# Patient Record
Sex: FEMALE | Race: WHITE | Employment: UNEMPLOYED | URBAN - METROPOLITAN AREA
[De-identification: names, ages, dates, MRNs, and addresses within clinical notes are randomized per-mention and may not be internally consistent; named-entity substitution may affect disease eponyms.]

---

## 2017-01-03 ENCOUNTER — OFFICE VISIT (OUTPATIENT)
Dept: FAMILY MEDICINE | Facility: CLINIC | Age: 57
End: 2017-01-03
Payer: COMMERCIAL

## 2017-01-03 VITALS
SYSTOLIC BLOOD PRESSURE: 132 MMHG | HEIGHT: 68 IN | BODY MASS INDEX: 19.19 KG/M2 | HEART RATE: 89 BPM | OXYGEN SATURATION: 97 % | TEMPERATURE: 98.6 F | RESPIRATION RATE: 16 BRPM | WEIGHT: 126.6 LBS | DIASTOLIC BLOOD PRESSURE: 80 MMHG

## 2017-01-03 DIAGNOSIS — Z00.00 ROUTINE GENERAL MEDICAL EXAMINATION AT A HEALTH CARE FACILITY: Primary | ICD-10-CM

## 2017-01-03 DIAGNOSIS — Z98.84 S/P GASTRIC BYPASS: ICD-10-CM

## 2017-01-03 DIAGNOSIS — R53.83 FATIGUE, UNSPECIFIED TYPE: ICD-10-CM

## 2017-01-03 DIAGNOSIS — T14.8XXA BRUISING: ICD-10-CM

## 2017-01-03 DIAGNOSIS — R25.2 CRAMP OF LIMB: ICD-10-CM

## 2017-01-03 DIAGNOSIS — R11.2 NON-INTRACTABLE VOMITING WITH NAUSEA, UNSPECIFIED VOMITING TYPE: ICD-10-CM

## 2017-01-03 DIAGNOSIS — R35.0 URINARY FREQUENCY: ICD-10-CM

## 2017-01-03 LAB
ALBUMIN SERPL-MCNC: 3.7 G/DL (ref 3.4–5)
ALBUMIN UR-MCNC: NEGATIVE MG/DL
ALP SERPL-CCNC: 120 U/L (ref 40–150)
ALT SERPL W P-5'-P-CCNC: 14 U/L (ref 0–50)
ANION GAP SERPL CALCULATED.3IONS-SCNC: 8 MMOL/L (ref 3–14)
APPEARANCE UR: CLEAR
AST SERPL W P-5'-P-CCNC: 14 U/L (ref 0–45)
BACTERIA #/AREA URNS HPF: ABNORMAL /HPF
BILIRUB SERPL-MCNC: 0.4 MG/DL (ref 0.2–1.3)
BILIRUB UR QL STRIP: ABNORMAL
BUN SERPL-MCNC: 12 MG/DL (ref 7–30)
CALCIUM SERPL-MCNC: 9 MG/DL (ref 8.5–10.1)
CAOX CRY #/AREA URNS HPF: ABNORMAL /HPF
CHLORIDE SERPL-SCNC: 107 MMOL/L (ref 94–109)
CHOLEST SERPL-MCNC: 179 MG/DL
CO2 SERPL-SCNC: 27 MMOL/L (ref 20–32)
COLOR UR AUTO: YELLOW
CREAT SERPL-MCNC: 0.76 MG/DL (ref 0.52–1.04)
DEPRECATED CALCIDIOL+CALCIFEROL SERPL-MC: 32 UG/L (ref 20–75)
ERYTHROCYTE [DISTWIDTH] IN BLOOD BY AUTOMATED COUNT: 12.9 % (ref 10–15)
ERYTHROCYTE [SEDIMENTATION RATE] IN BLOOD BY WESTERGREN METHOD: 9 MM/H (ref 0–30)
FERRITIN SERPL-MCNC: 12 NG/ML (ref 8–252)
GFR SERPL CREATININE-BSD FRML MDRD: 78 ML/MIN/1.7M2
GLUCOSE SERPL-MCNC: 99 MG/DL (ref 70–99)
GLUCOSE UR STRIP-MCNC: NEGATIVE MG/DL
HCT VFR BLD AUTO: 41.3 % (ref 35–47)
HDLC SERPL-MCNC: 78 MG/DL
HGB BLD-MCNC: 13.5 G/DL (ref 11.7–15.7)
HGB UR QL STRIP: ABNORMAL
INR PPP: 0.93 (ref 0.86–1.14)
IRON SATN MFR SERPL: 15 % (ref 15–46)
IRON SERPL-MCNC: 56 UG/DL (ref 35–180)
KETONES UR STRIP-MCNC: NEGATIVE MG/DL
LDLC SERPL CALC-MCNC: 89 MG/DL
LEUKOCYTE ESTERASE UR QL STRIP: NEGATIVE
MCH RBC QN AUTO: 30.3 PG (ref 26.5–33)
MCHC RBC AUTO-ENTMCNC: 32.7 G/DL (ref 31.5–36.5)
MCV RBC AUTO: 93 FL (ref 78–100)
MUCOUS THREADS #/AREA URNS LPF: PRESENT /LPF
NITRATE UR QL: NEGATIVE
NON-SQ EPI CELLS #/AREA URNS LPF: ABNORMAL /LPF
NONHDLC SERPL-MCNC: 101 MG/DL
PH UR STRIP: 5.5 PH (ref 5–7)
PLATELET # BLD AUTO: 290 10E9/L (ref 150–450)
POTASSIUM SERPL-SCNC: 4.3 MMOL/L (ref 3.4–5.3)
PROT SERPL-MCNC: 6.6 G/DL (ref 6.8–8.8)
RBC # BLD AUTO: 4.46 10E12/L (ref 3.8–5.2)
RBC #/AREA URNS AUTO: ABNORMAL /HPF (ref 0–2)
SODIUM SERPL-SCNC: 142 MMOL/L (ref 133–144)
SP GR UR STRIP: >1.03 (ref 1–1.03)
TIBC SERPL-MCNC: 372 UG/DL (ref 240–430)
TRIGL SERPL-MCNC: 59 MG/DL
TSH SERPL DL<=0.005 MIU/L-ACNC: 0.55 MU/L (ref 0.4–4)
URN SPEC COLLECT METH UR: ABNORMAL
UROBILINOGEN UR STRIP-ACNC: 0.2 EU/DL (ref 0.2–1)
WBC # BLD AUTO: 7.5 10E9/L (ref 4–11)
WBC #/AREA URNS AUTO: ABNORMAL /HPF (ref 0–2)

## 2017-01-03 PROCEDURE — 82306 VITAMIN D 25 HYDROXY: CPT | Mod: 90 | Performed by: PHYSICIAN ASSISTANT

## 2017-01-03 PROCEDURE — 99396 PREV VISIT EST AGE 40-64: CPT | Performed by: PHYSICIAN ASSISTANT

## 2017-01-03 PROCEDURE — 83550 IRON BINDING TEST: CPT | Performed by: PHYSICIAN ASSISTANT

## 2017-01-03 PROCEDURE — 84443 ASSAY THYROID STIM HORMONE: CPT | Performed by: PHYSICIAN ASSISTANT

## 2017-01-03 PROCEDURE — 36415 COLL VENOUS BLD VENIPUNCTURE: CPT | Performed by: PHYSICIAN ASSISTANT

## 2017-01-03 PROCEDURE — 99213 OFFICE O/P EST LOW 20 MIN: CPT | Mod: 25 | Performed by: PHYSICIAN ASSISTANT

## 2017-01-03 PROCEDURE — 82607 VITAMIN B-12: CPT | Mod: 90 | Performed by: PHYSICIAN ASSISTANT

## 2017-01-03 PROCEDURE — 99000 SPECIMEN HANDLING OFFICE-LAB: CPT | Performed by: PHYSICIAN ASSISTANT

## 2017-01-03 PROCEDURE — 85610 PROTHROMBIN TIME: CPT | Performed by: PHYSICIAN ASSISTANT

## 2017-01-03 PROCEDURE — 80061 LIPID PANEL: CPT | Performed by: PHYSICIAN ASSISTANT

## 2017-01-03 PROCEDURE — 85027 COMPLETE CBC AUTOMATED: CPT | Performed by: PHYSICIAN ASSISTANT

## 2017-01-03 PROCEDURE — 80053 COMPREHEN METABOLIC PANEL: CPT | Performed by: PHYSICIAN ASSISTANT

## 2017-01-03 PROCEDURE — 85652 RBC SED RATE AUTOMATED: CPT | Performed by: PHYSICIAN ASSISTANT

## 2017-01-03 PROCEDURE — 83540 ASSAY OF IRON: CPT | Performed by: PHYSICIAN ASSISTANT

## 2017-01-03 PROCEDURE — 82728 ASSAY OF FERRITIN: CPT | Performed by: PHYSICIAN ASSISTANT

## 2017-01-03 PROCEDURE — 81001 URINALYSIS AUTO W/SCOPE: CPT | Performed by: PHYSICIAN ASSISTANT

## 2017-01-03 RX ORDER — PANTOPRAZOLE SODIUM 20 MG/1
20 TABLET, DELAYED RELEASE ORAL DAILY
Qty: 30 TABLET | Refills: 3 | Status: SHIPPED | OUTPATIENT
Start: 2017-01-03 | End: 2017-08-17
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
## 2017-01-03 NOTE — MR AVS SNAPSHOT
After Visit Summary   1/3/2017    Antoinette Romero    MRN: [id number]           Patient Information     Date Of Birth          1960        Visit Information        Provider Department      1/3/2017 8:00 AM Umu Roberto PA-C [facility] Instructions      Preventive Health Recommendations  Female Ages 50 - 64    Yearly exam: See your health care provider every year in order to  o Review health changes.   o Discuss preventive care.    o Review your medicines if your doctor has prescribed any.      Get a Pap test every three years (unless you have an abnormal result and your provider advises testing more often).    If you get Pap tests with HPV test, you only need to test every 5 years, unless you have an abnormal result.     You do not need a Pap test if your uterus was removed (hysterectomy) and you have not had cancer.    You should be tested each year for STDs (sexually transmitted diseases) if you're at risk.     Have a mammogram every 1 to 2 years.    Have a colonoscopy at age 50, or have a yearly FIT test (stool test). These exams screen for colon cancer.      Have a cholesterol test every 5 years, or more often if advised.    Have a diabetes test (fasting glucose) every three years. If you are at risk for diabetes, you should have this test more often.     If you are at risk for osteoporosis (brittle bone disease), think about having a bone density scan (DEXA).    Shots: Get a flu shot each year. Get a tetanus shot every 10 years.    Nutrition:     Eat at least 5 servings of fruits and vegetables each day.    Eat whole-grain bread, whole-wheat pasta and brown rice instead of white grains and rice.    Talk to your provider about Calcium and Vitamin D.     Lifestyle    Exercise at least 150 minutes a week (30 minutes a day, 5 days a week). This will help you control your weight and prevent disease.    Limit alcohol to one drink per day.    No smoking.  "    Wear sunscreen to prevent skin cancer.     See your dentist every six months for an exam and cleaning.    See your eye doctor every 1 to 2 years.            Follow-ups after your visit        Who to contact     If you have questions or need follow up information about today's clinic visit or your schedule please contact [facility] directly at [phone].  Normal or non-critical lab and imaging results will be communicated to you by MyChart, letter or phone within 4 business days after the clinic has received the results. If you do not hear from us within 7 days, please contact the clinic through [employer] or phone. If you have a critical or abnormal lab result, we will notify you by phone as soon as possible.  Submit refill requests through [employer] or call your pharmacy and they will forward the refill request to us. Please allow 3 business days for your refill to be completed.          Additional Information About Your Visit        [employer] Information     [employer] lets you send messages to your doctor, view your test results, renew your prescriptions, schedule appointments and more. To sign up, go to [email or web address] . Click on \"Log in\" on the left side of the screen, which will take you to the Welcome page. Then click on \"Sign up Now\" on the right side of the page.     You will be asked to enter the access code listed below, as well as some personal information. Please follow the directions to create your username and password.     Your access code is: [email or web address]  Expires: 4/3/2017  8:19 AM     Your access code will  in 90 days. If you need help or a new code, please call your [facility] or [phone].        Your Vitals Were     Pulse Temperature Respirations    89 98.6  F (37  C) (Oral) 16    Height BMI (Body Mass Index) Pulse Oximetry    5' 8\" (1.727 m) 19.25 kg/m2 97%    Last Period Breastfeeding?       2010 No        Blood Pressure from Last 3 Encounters: "   01/03/17 132/80   06/17/15 123/90   05/04/15 124/72    Weight from Last 3 Encounters:   01/03/17 126 lb 9.6 oz (57.425 kg)   06/17/15 135 lb 11.2 oz (61.553 kg)   05/03/15 132 lb 9.6 oz (60.147 kg)              Today, you had the following     No orders found for display         Today's Medication Changes          These changes are accurate as of: 1/3/17  8:19 AM.  If you have any questions, ask your nurse or doctor.               Stop taking these medicines if you haven't already. Please contact your care team if you have questions.     biotin 1000 MCG Tabs tablet   Stopped by:  Umu Roberto PA-C           calcium carbonate 500 MG tablet   Commonly known as:  OS-BESSY 500 mg [place]. Ca   Stopped by:  Umu Roberto PA-C           cyanocolbalamin 500 MCG tablet   Commonly known as:  vitamin  B-12   Stopped by:  Umu Roberto PA-C           irbesartan 75 MG tablet   Commonly known as:  AVAPRO   Stopped by:  Umu Roberto PA-C           levofloxacin 500 MG tablet   Commonly known as:  LEVAQUIN   Stopped by:  Umu Roberto PA-C           LORazepam 0.5 MG tablet   Commonly known as:  ATIVAN   Stopped by:  Umu Roberto PA-C           MULTIVITAMIN GUMMIES ADULT Chew   Stopped by:  Umu Roberto PA-C           nicotine 14 MG/24HR 24 hr patch   Commonly known as:  NICODERM CQ   Stopped by:  Umu Roberto PA-C           nicotine 7 MG/24HR 24 hr patch   Commonly known as:  NICODERM CQ   Stopped by:  Umu Roberto PA-C           pantoprazole 40 MG EC tablet   Commonly known as:  PROTONIX   Stopped by:  Umu Roberto PA-C           promethazine 25 MG tablet   Commonly known as:  PHENERGAN   Stopped by:  Umu Roberto PA-C           sucralfate 1 GM/10ML suspension   Commonly known as:  CARAFATE   Stopped by:  Umu Roberto PA-C           SUMAtriptan 50 MG tablet   Commonly known as:  IMITREX    Stopped by:  Umu Roberto PA-C           vitamin B complex with vitamin C Tabs tablet   Stopped by:  Umu Roberto PA-C           VITAMIN C PO   Stopped by:  Umu Roberto PA-C           VITAMIN D3 PO   Stopped by:  Umu Roberto PA-C                    Primary Care Provider Office Phone # Fax #    Muu Cornelia Roberto PA-C [phone]       [address] [address]        Thank you!     Thank you for choosing [facility]  for your care. Our goal is always to provide you with excellent care. Hearing back from our patients is one way we can continue to improve our services. Please take a few minutes to complete the written survey that you may receive in the mail after your visit with us. Thank you!             Your Updated Medication List - Protect others around you: Learn how to safely use, store and throw away your medicines at www.disposemymeds.org.      Notice  As of 1/3/2017  8:19 AM    You have not been prescribed any medications.

## 2017-01-03 NOTE — NURSING NOTE
"Chief Complaint   Patient presents with     Physical       Initial /80 mmHg  Pulse 89  Temp(Src) 98.6  F (37  C) (Oral)  Resp 16  Ht 5' 8\" (1.727 m)  Wt 126 lb 9.6 oz (57.425 kg)  BMI 19.25 kg/m2  SpO2 97%  LMP 03/01/2010  Breastfeeding? No Estimated body mass index is 19.25 kg/(m^2) as calculated from the following:    Height as of this encounter: 5' 8\" (1.727 m).    Weight as of this encounter: 126 lb 9.6 oz (57.425 kg).  BP completed using cuff size: large rt arm Kortney Borrego MA  Health Maintenance has been reviewed.       "

## 2017-01-03 NOTE — Clinical Note
[facility]  [address] [place], [address]  [phone]        January 6, 2017    Antoinette THEODORE Nick                                                                                                                                                       [address]  [facility] [address]    Antionette,    Your labs show your B12, urine, blood clotting factors, thyroid, kidney function, electrolytes, liver enzymes, CBC and cholesterol were normal.   Your protein, vitamin D, ferritin (iron storage), iron saturation index were low.  This is likely what is contributing to your symptoms. Please take 3524-8322 IU of vitamin D3, iron (ferrous sulfate) 324 mg daily to help with this.  If you don't see improvement, please let me know.     Take Care,   Umu Roberto PA-C        Results for orders placed or performed in visit on 01/03/17   ESR: Erythrocyte sedimentation rate   Result Value Ref Range    Sed Rate 9 0 - 30 mm/h   INR   Result Value Ref Range    INR 0.93 0.86 - 1.14   CBC with platelets   Result Value Ref Range    WBC 7.5 4.0 - 11.0 10e9/L    RBC Count 4.46 3.8 - 5.2 10e12/L    Hemoglobin 13.5 11.7 - 15.7 g/dL    Hematocrit 41.3 35.0 - 47.0 %    MCV 93 78 - 100 fl    MCH 30.3 26.5 - 33.0 pg    MCHC 32.7 31.5 - 36.5 g/dL    RDW 12.9 10.0 - 15.0 %    Platelet Count 290 150 - 450 10e9/L   Comprehensive metabolic panel (BMP + Alb, Alk Phos, ALT, AST, Total. Bili, TP)   Result Value Ref Range    Sodium 142 133 - 144 mmol/L    Potassium 4.3 3.4 - 5.3 mmol/L    Chloride 107 94 - 109 mmol/L    Carbon Dioxide 27 20 - 32 mmol/L    Anion Gap 8 3 - 14 mmol/L    Glucose 99 70 - 99 mg/dL    Urea Nitrogen 12 7 - 30 mg/dL    Creatinine 0.76 0.52 - 1.04 mg/dL    GFR Estimate 78 >60 mL/min/1.7m2    GFR Estimate If Black >90   GFR Calc   >60 mL/min/1.7m2    Calcium 9.0 8.5 - 10.1 mg/dL    Bilirubin Total 0.4 0.2 - 1.3 mg/dL    Albumin 3.7 3.4 - 5.0 g/dL    Protein Total 6.6  (L) 6.8 - 8.8 g/dL    Alkaline Phosphatase 120 40 - 150 U/L    ALT 14 0 - 50 U/L    AST 14 0 - 45 U/L   Vitamin B12   Result Value Ref Range    Vitamin B12 929 193 - 986 pg/mL   Vitamin D Deficiency   Result Value Ref Range    Vitamin D Deficiency screening 32 20 - 75 ug/L   Ferritin   Result Value Ref Range    Ferritin 12 8 - 252 ng/mL   Iron and iron binding capacity   Result Value Ref Range    Iron 56 35 - 180 ug/dL    Iron Binding Cap 372 240 - 430 ug/dL    Iron Saturation Index 15 15 - 46 %   *UA reflex to Microscopic and Culture ([facility] (except [place] and [place])   Result Value Ref Range    Color Urine Yellow     Appearance Urine Clear     Glucose Urine Negative NEG mg/dL    Bilirubin Urine (A) NEG     Small  This is an unconfirmed screening test result. A positive result may be false.      Ketones Urine Negative NEG mg/dL    Specific Gravity Urine >1.030 1.003 - 1.035    Blood Urine Moderate (A) NEG    pH Urine 5.5 5.0 - 7.0 pH    Protein Albumin Urine Negative NEG mg/dL    Urobilinogen Urine 0.2 0.2 - 1.0 EU/dL    Nitrite Urine Negative NEG    Leukocyte Esterase Urine Negative NEG    Source Midstream Urine    Lipid Profile with reflex to direct LDL   Result Value Ref Range    Cholesterol 179 <200 mg/dL    Triglycerides 59 <150 mg/dL    HDL Cholesterol 78 >49 mg/dL    LDL Cholesterol Calculated 89 <100 mg/dL    Non HDL Cholesterol 101 <130 mg/dL   TSH with free T4 reflex   Result Value Ref Range    TSH 0.55 0.40 - 4.00 mU/L   Urine Microscopic   Result Value Ref Range    WBC Urine O - 2 0 - 2 /HPF    RBC Urine 2-5 (A) 0 - 2 /HPF    Squamous Epithelial /LPF Urine Few FEW /LPF    Bacteria Urine Few (A) NEG /HPF    Calcium Oxalate Few (A) NEG /HPF    Mucous Urine Present (A) NEG /LPF     KL

## 2017-01-03 NOTE — PATIENT INSTRUCTIONS

## 2017-01-04 ASSESSMENT — PATIENT HEALTH QUESTIONNAIRE - PHQ9: SUM OF ALL RESPONSES TO PHQ QUESTIONS 1-9: 18

## 2017-01-06 LAB — VIT B12 SERPL-MCNC: 929 PG/ML (ref 193–986)

## 2017-01-10 ENCOUNTER — TELEPHONE (OUTPATIENT)
Dept: FAMILY MEDICINE | Facility: CLINIC | Age: 57
End: 2017-01-10

## 2017-01-10 NOTE — TELEPHONE ENCOUNTER
Patient calling for lab results.  Advised of below and that letter will be coming.  Advised to recheck if not improving.  Patient agrees with plan.  Ivelisse Epperson RN    Notes Recorded by Kortney Borrego MA on 1/6/2017 at 2:59 PM  Letter has been sent.  Kortney Chen MA      ------    Notes Recorded by Umu Roberto PA-C on 1/6/2017 at 2:56 PM  Antoinette,    Your labs show your B12, urine, blood clotting factors, thyroid, kidney function, electrolytes, liver enzymes, CBC and cholesterol were normal.   Your protein, vitamin D, ferritin (iron storage), iron saturation index were low.  This is likely what is contributing to your symptoms. Please take 7461-3294 IU of vitamin D3, iron (ferrous sulfate) 324 mg daily to help with this.  If you don't see improvement, please let me know.     Take Care,   Umu Roberto PA-C

## 2017-01-17 ENCOUNTER — TELEPHONE (OUTPATIENT)
Dept: NURSING | Facility: CLINIC | Age: 57
End: 2017-01-17

## 2017-01-17 NOTE — TELEPHONE ENCOUNTER
There are specific medical doctors, MD's, that qualify patients for disability, SSDI.   [employer] office will give pt list of MDs for this.     Umu Roberto PA-C

## 2017-01-17 NOTE — TELEPHONE ENCOUNTER
Pt calls, questions on applying for social security disability, discussed with CJ at recent appointment, called SS, want to know why pt wants disability benefits, pt does not know what to say, wants CJ to give diagnosis and if disability will continue lifelong or in one year, pt never followed up with gastric MD (last appointment 6/2015), pt feels all related to gastric bypass and now depression and medication related side effects, wants to pursue permanent disability and needs CJ input, called them, pt was not sure what diagnosis to use, routed to CJ, please advise, route to inform pt  Sheridan Zuniga RN, BSN  Message handled by Nurse Triage.

## 2017-01-18 NOTE — TELEPHONE ENCOUNTER
[phone] (home)   Left VM with message  Aydee Voss RN, BS  Message handled by Nurse Triage .

## 2017-04-25 ENCOUNTER — TELEPHONE (OUTPATIENT)
Dept: FAMILY MEDICINE | Facility: CLINIC | Age: 57
End: 2017-04-25

## 2017-04-25 NOTE — TELEPHONE ENCOUNTER
Panel Management Review      Patient has the following on her problem list:     Hypertension   Last three blood pressure readings:  BP Readings from Last 3 Encounters:   01/03/17 132/80   06/17/15 123/90   05/04/15 124/72     Blood pressure: Passed    HTN Guidelines:  Age 18-59 BP range:  Less than 140/90  Age 60-85 with Diabetes:  Less than 140/90  Age 60-85 without Diabetes:  less than 150/90      Composite cancer screening  Chart review shows that this patient is due/due soon for the following Pap Smear, Mammogram and Colonoscopy  Summary:    Patient is due/failing the following:   COLONOSCOPY, MAMMOGRAM, PAP and PHYSICAL    Action needed:   Patient needs office visit for physical with pap. and Patient needs referral/order: mammo and colonoscopy     Type of outreach:    Phone, spoke to patient.  patient declined at this time     Questions for provider review:    None                                                                                                                                    Kortney Borrego MA       Chart routed to no one .

## 2017-07-05 ENCOUNTER — TELEPHONE (OUTPATIENT)
Dept: FAMILY MEDICINE | Facility: CLINIC | Age: 57
End: 2017-07-05

## 2017-07-05 NOTE — TELEPHONE ENCOUNTER
Panel Management Review      Patient has the following on her problem list:     Hypertension   Last three blood pressure readings:  BP Readings from Last 3 Encounters:   01/03/17 132/80   06/17/15 123/90   05/04/15 124/72     Blood pressure: Passed    HTN Guidelines:  Age 18-59 BP range:  Less than 140/90  Age 60-85 with Diabetes:  Less than 140/90  Age 60-85 without Diabetes:  less than 150/90      Composite cancer screening  Chart review shows that this patient is due/due soon for the following Pap Smear, Mammogram and Colonoscopy  Summary:    Patient is due/failing the following:   COLONOSCOPY, MAMMOGRAM, PAP and PHYSICAL    Action needed:   Patient needs office visit for physical with pap. and Patient needs referral/order: Mammo and Colonoscopy     Type of outreach:    Phone, left message for patient to call back.     Questions for provider review:    None                                                                                                                                    Kortney Borrego MA       Chart routed to no one .

## 2017-08-17 ENCOUNTER — OFFICE VISIT (OUTPATIENT)
Dept: FAMILY MEDICINE | Facility: CLINIC | Age: 57
End: 2017-08-17
Payer: COMMERCIAL

## 2017-08-17 ENCOUNTER — RADIANT APPOINTMENT (OUTPATIENT)
Dept: GENERAL RADIOLOGY | Facility: CLINIC | Age: 57
End: 2017-08-17
Attending: PHYSICIAN ASSISTANT
Payer: COMMERCIAL

## 2017-08-17 VITALS
TEMPERATURE: 99 F | BODY MASS INDEX: 19.61 KG/M2 | HEIGHT: 68 IN | WEIGHT: 129.4 LBS | HEART RATE: 76 BPM | DIASTOLIC BLOOD PRESSURE: 80 MMHG | OXYGEN SATURATION: 99 % | SYSTOLIC BLOOD PRESSURE: 142 MMHG

## 2017-08-17 DIAGNOSIS — M25.561 CHRONIC PAIN OF RIGHT KNEE: ICD-10-CM

## 2017-08-17 DIAGNOSIS — R11.2 NON-INTRACTABLE VOMITING WITH NAUSEA, UNSPECIFIED VOMITING TYPE: ICD-10-CM

## 2017-08-17 DIAGNOSIS — G89.29 CHRONIC PAIN OF RIGHT KNEE: ICD-10-CM

## 2017-08-17 DIAGNOSIS — R03.0 ELEVATED BLOOD PRESSURE READING WITHOUT DIAGNOSIS OF HYPERTENSION: ICD-10-CM

## 2017-08-17 DIAGNOSIS — Z12.31 VISIT FOR SCREENING MAMMOGRAM: ICD-10-CM

## 2017-08-17 DIAGNOSIS — S20.212A CONTUSION OF RIB, LEFT, INITIAL ENCOUNTER: Primary | ICD-10-CM

## 2017-08-17 PROCEDURE — 99214 OFFICE O/P EST MOD 30 MIN: CPT | Performed by: PHYSICIAN ASSISTANT

## 2017-08-17 PROCEDURE — 71101 X-RAY EXAM UNILAT RIBS/CHEST: CPT | Mod: LT

## 2017-08-17 RX ORDER — PANTOPRAZOLE SODIUM 20 MG/1
20 TABLET, DELAYED RELEASE ORAL DAILY
Qty: 30 TABLET | Refills: 3 | Status: SHIPPED | OUTPATIENT
Start: 2017-08-17 | End: 2018-03-08

## 2017-08-17 RX ORDER — HYDROCODONE BITARTRATE AND ACETAMINOPHEN 5; 325 MG/1; MG/1
1-2 TABLET ORAL EVERY 6 HOURS PRN
Qty: 20 TABLET | Refills: 0 | Status: SHIPPED | OUTPATIENT
Start: 2017-08-17 | End: 2018-03-08

## 2017-08-17 NOTE — NURSING NOTE
"Chief Complaint   Patient presents with     Fall     Rib Pain       Initial BP (!) 181/91 (BP Location: Right arm, Patient Position: Chair, Cuff Size: Adult Regular)  Pulse 76  Temp 99  F (37.2  C) (Oral)  Ht 5' 8\" (1.727 m)  Wt 129 lb 6.4 oz (58.7 kg)  LMP 03/01/2010  SpO2 99%  Breastfeeding? No  BMI 19.68 kg/m2 Estimated body mass index is 19.68 kg/(m^2) as calculated from the following:    Height as of this encounter: 5' 8\" (1.727 m).    Weight as of this encounter: 129 lb 6.4 oz (58.7 kg).  Medication Reconciliation: complete Kortney Borrego MA  Health Maintenance has been reviewed.       "

## 2017-08-17 NOTE — MR AVS SNAPSHOT
After Visit Summary   8/17/2017    Antoinette Romero    MRN: [id number]           Patient Information     Date Of Birth          1960        Visit Information        Provider Department      8/17/2017 1:15 PM Uum Roberto PA-C [facility]        Today's Diagnoses     Contusion of rib, left, initial encounter    -  1    Non-intractable vomiting with nausea, unspecified vomiting type        Chronic pain of right knee        Elevated blood pressure reading without diagnosis of hypertension        Visit for screening mammogram           Follow-ups after your visit        Additional Services     ORTHO  REFERRAL       [facility] is referring you to the Orthopedic  Services at [facility].       The  Representative will assist you in the coordination of your Orthopedic and Musculoskeletal Care as prescribed by your physician.    The  Representative will call you within 1 business day to help schedule your appointment, or you may contact the  Representative at:    All areas ~ [phone]     Type of Referral : Non Surgical       Timeframe requested: 3 - 5 days    Coverage of these services is subject to the terms and limitations of your health insurance plan.  Please call member services at your health plan with any benefit or coverage questions.      If X-rays, CT or MRI's have been performed, please contact the facility where they were done to arrange for , prior to your scheduled appointment.  Please bring this referral request to your appointment and present it to your specialist.                  Future tests that were ordered for you today     Open Future Orders        Priority Expected Expires Ordered    MA SCREENING DIGITAL BILAT - Future  (s+30) Routine  8/17/2018 8/17/2017            Who to contact     If you have questions or need follow up information about today's clinic  "visit or your schedule please contact [facility] directly at [phone].  Normal or non-critical lab and imaging results will be communicated to you by MyChart, letter or phone within 4 business days after the clinic has received the results. If you do not hear from us within 7 days, please contact the clinic through [employer] or phone. If you have a critical or abnormal lab result, we will notify you by phone as soon as possible.  Submit refill requests through [employer] or call your pharmacy and they will forward the refill request to us. Please allow 3 business days for your refill to be completed.          Additional Information About Your Visit        MyChart Information     [employer] lets you send messages to your doctor, view your test results, renew your prescriptions, schedule appointments and more. To sign up, go to [email or web address] . Click on \"Log in\" on the left side of the screen, which will take you to the Welcome page. Then click on \"Sign up Now\" on the right side of the page.     You will be asked to enter the access code listed below, as well as some personal information. Please follow the directions to create your username and password.     Your access code is: [email or web address]  Expires: 11/15/2017  3:12 PM     Your access code will  in 90 days. If you need help or a new code, please call your [facility] clinic or [phone].        Care EveryWhere ID     This is your Care EveryWhere ID. This could be used by other organizations to access your [facility] medical records  [id number]        Your Vitals Were     Pulse Temperature Height Last Period Pulse Oximetry Breastfeeding?    76 99  F (37.2  C) (Oral) 5' 8\" (1.727 m) 2010 99% No    BMI (Body Mass Index)                   19.68 kg/m2            Blood Pressure from Last 3 Encounters:   17 142/80   17 132/80   06/17/15 123/90    Weight from Last 3 Encounters:   17 129 lb 6.4 oz (58.7 kg)   17 126 " lb 9.6 oz (57.4 kg)   06/17/15 135 lb 11.2 oz (61.6 kg)              We Performed the Following     ORTHO  REFERRAL     XR Ribs & Chest Left G/E 3 Views          Today's Medication Changes          These changes are accurate as of: 8/17/17  3:12 PM.  If you have any questions, ask your nurse or doctor.               Start taking these medicines.        Dose/Directions    HYDROcodone-acetaminophen 5-325 MG per tablet   Commonly known as:  NORCO   Used for:  Contusion of rib, left, initial encounter   Started by:  Umu Roberto PA-C        Dose:  1-2 tablet   Take 1-2 tablets by mouth every 6 hours as needed for moderate to severe pain maximum 8 tablet(s) per day   Quantity:  20 tablet   Refills:  0            Where to get your medicines      These medications were sent to [facility] [id number] [place] [address]     Phone:  [phone]     pantoprazole 20 MG EC tablet         Some of these will need a paper prescription and others can be bought over the counter.  Ask your nurse if you have questions.     Bring a paper prescription for each of these medications     HYDROcodone-acetaminophen 5-325 MG per tablet                Primary Care Provider Office Phone # Fax #    Umu Roberto PA-C [phone] [address] [place] [address]        Equal Access to Services     MARY GALAN AH: Freddy hassan Sobhavyaali, waaxda luqadaha, qaybta kaalmada adeegyada, enrike torres. So [facility] [phone].    ATENCIÓN: Si habla español, tiene a shah disposición servicios gratuitos de asistencia lingüística. Fahad al [phone].    We comply with applicable federal civil rights laws and [place] laws. We do not discriminate on the basis of race, color, national origin, age, disability sex, sexual orientation or gender identity.            Thank you!     Thank you for choosing  [facility]  for your care. Our goal is always to provide you with excellent care. Hearing back from our patients is one way we can continue to improve our services. Please take a few minutes to complete the written survey that you may receive in the mail after your visit with us. Thank you!             Your Updated Medication List - Protect others around you: Learn how to safely use, store and throw away your medicines at www.disposemymeds.org.          This list is accurate as of: 8/17/17  3:12 PM.  Always use your most recent med list.                   Brand Name Dispense Instructions for use Diagnosis    HYDROcodone-acetaminophen 5-325 MG per tablet    NORCO    20 tablet    Take 1-2 tablets by mouth every 6 hours as needed for moderate to severe pain maximum 8 tablet(s) per day    Contusion of rib, left, initial encounter       pantoprazole 20 MG EC tablet    PROTONIX    30 tablet    Take 1 tablet (20 mg) by mouth daily Take by mouth 30-60 minutes before a meal.    Non-intractable vomiting with nausea, unspecified vomiting type

## 2017-08-17 NOTE — PROGRESS NOTES
SUBJECTIVE:   Antoinette Romero is a 56 year old female who presents to clinic today for the following health issues:      Patient states she fell in her bathroom about 10 days ago. Patient states she tripped over a stool and landed with her left side over the tub. Patient states she heard a cracking noise and had to lay on the floor for about 10 min. Patient states the pain is very bad and is having a hard time breathing and can't sleep. Patient states when she takes a breath she can feel a clicking or popping on the left side.     Refill protonix, works well but has been out due to not having insurance.     Problem list and histories reviewed & adjusted, as indicated.  Additional history: as documented    Patient Active Problem List   Diagnosis     HTN (hypertension)     Anxiety     HTN, goal below 140/90     CARDIOVASCULAR SCREENING; LDL GOAL LESS THAN 160     Tobacco abuse     Obesity     Morbid obesity (H)     S/P gastric bypass     Vitamin D deficiency disease     Nausea     Symptomatic cholelithiasis     Cholecystitis     Headache     Scotoma     Hiatal hernia     Abdominal pain     Ulcer (H)     Nausea & vomiting     Bilateral otitis media     Tobacco use disorder     Past Surgical History:   Procedure Laterality Date     ENDOSCOPIC UPPER GASTROINTESTINAL, REMOVE SUTURE N/A 1/17/2015    Procedure: ENDOSCOPIC UPPER GASTROINTESTINAL, REMOVE SUTURE;  Surgeon: Parviz Martinez MD;  Location:  OR     ESOPHAGOSCOPY, GASTROSCOPY, DUODENOSCOPY (EGD), COMBINED  2/18/2014    Procedure: COMBINED ESOPHAGOSCOPY, GASTROSCOPY, DUODENOSCOPY (EGD);  Esophagoscopy,Gastroscopy,Duodenoscopy;  Surgeon: Neo Sher MD;  Location:  GI     ESOPHAGOSCOPY, GASTROSCOPY, DUODENOSCOPY (EGD), COMBINED  5/7/2014    Procedure: COMBINED ESOPHAGOSCOPY, GASTROSCOPY, DUODENOSCOPY (EGD), BIOPSY SINGLE OR MULTIPLE;  Surgeon: Jose Antonio Valencia MD;  Location:  GI     GYN SURGERY       LAPAROSCOPIC BYPASS GASTRIC  3/25/2013  "   Procedure: LAPAROSCOPIC BYPASS GASTRIC;  Laparoscopic Nawaf En Y Gastric Bypass. Hiatel hernia repair;  Surgeon: Parviz Martinez MD;  Location: [facility] OR     LAPAROSCOPIC CHOLECYSTECTOMY WITH CHOLANGIOGRAMS  3/28/2014    Procedure: LAPAROSCOPIC CHOLECYSTECTOMY WITH CHOLANGIOGRAMS;;  Surgeon: Jose Antonio Valencia MD;  Location: [facility] OR     LAPAROSCOPY DIAGNOSTIC (GENERAL)  3/28/2014    Procedure: LAPAROSCOPY DIAGNOSTIC (GENERAL);  Diagnostic Laparoscopy, laparoscopic cholecystectomy, EGD, repair of johnston defect;  Surgeon: Jose Antonio Valencia MD;  Location: [facility] OR     LAPAROSCOPY OPERATIVE ADULT N/A 1/17/2015    Procedure: LAPAROSCOPY OPERATIVE ADULT;  Surgeon: Parviz Martinez MD;  Location: [facility] OR       Social History   Substance Use Topics     Smoking status: Current Every Day Smoker     Packs/day: 1.00     Types: Cigarettes     Smokeless tobacco: Never Used      Comment: quite 1 week ago     Alcohol use No     Family History   Problem Relation Age of Onset     Respiratory Mother      DIABETES Mother      Neurologic Disorder Father      CANCER Maternal Grandfather      Breast Cancer No family hx of      Ovarian Cancer No family hx of      Arthritis Maternal Grandmother      RA     Arthritis Mother      OA             Reviewed and updated as needed this visit by clinical staffTobacco  Allergies  Med Hx  Surg Hx  Fam Hx  Soc Hx      Reviewed and updated as needed this visit by Provider         ROS:  Constitutional, HEENT, cardiovascular, pulmonary, gi and gu systems are negative, except as otherwise noted.      OBJECTIVE:   BP (!) 181/91 (BP Location: Right arm, Patient Position: Chair, Cuff Size: Adult Regular)  Pulse 76  Temp 99  F (37.2  C) (Oral)  Ht 5' 8\" (1.727 m)  Wt 129 lb 6.4 oz (58.7 kg)  LMP 03/01/2010  SpO2 99%  Breastfeeding? No  BMI 19.68 kg/m2  Body mass index is 19.68 kg/(m^2).  GENERAL APPEARANCE: healthy, alert and no distress  RESP: lungs clear to auscultation - no rales, " rhonchi or wheezes, tender right lateral ribs  CV: regular rates and rhythm, normal S1 S2, no S3 or S4 and no murmur, click or rub  SKIN: no suspicious lesions or rashes        ASSESSMENT/PLAN:         1. Contusion of rib, left, initial encounter  Ice, rest.   - XR Ribs & Chest Left G/E 3 Views  - HYDROcodone-acetaminophen (NORCO) 5-325 MG per tablet; Take 1-2 tablets by mouth every 6 hours as needed for moderate to severe pain maximum 8 tablet(s) per day  Dispense: 20 tablet; Refill: 0    2. Non-intractable vomiting with nausea, unspecified vomiting type    - pantoprazole (PROTONIX) 20 MG EC tablet; Take 1 tablet (20 mg) by mouth daily Take by mouth 30-60 minutes before a meal.  Dispense: 30 tablet; Refill: 3    3. Chronic pain of right knee  On the way out the door, pt mentions chronic right knee pain for 2 year, knee if visibly swollen thru knee, referral place.d   - ORTHO  REFERRAL    4. Elevated blood pressure reading without diagnosis of hypertension  Check at pharmacy.     5. Visit for screening mammogram    - MA SCREENING DIGITAL BILAT - Future  (s+30); Future    See Patient Instructions    Umu Roberto PA-C, PAJudithC  [facility]

## 2017-10-22 ENCOUNTER — HEALTH MAINTENANCE LETTER (OUTPATIENT)
Age: 57
End: 2017-10-22

## 2017-10-22 ENCOUNTER — NURSE TRIAGE (OUTPATIENT)
Dept: NURSING | Facility: CLINIC | Age: 57
End: 2017-10-22

## 2017-10-22 NOTE — TELEPHONE ENCOUNTER
"  Additional Information    Negative: Drug overdose and nurse unable to answer question    Negative: Caller requesting information not related to medicine    Negative: Caller requesting a prescription for Strep throat and has a positive culture result    Negative: Rash while taking a medication or within 3 days of stopping it    Negative: Immunization reaction suspected    Negative: [1] Asthma and [2] having symptoms of asthma (cough, wheezing, etc)    Negative: MORE THAN A DOUBLE DOSE of a prescription or over-the-counter (OTC) drug    Negative: [1] DOUBLE DOSE (an extra dose or lesser amount) of over-the-counter (OTC) drug AND [2] any symptoms (e.g., dizziness, nausea, pain, sleepiness)    Negative: [1] DOUBLE DOSE (an extra dose or lesser amount) of prescription drug AND [2] any symptoms (e.g., dizziness, nausea, pain, sleepiness)    Negative: Took another person's prescription drug    Negative: [1] DOUBLE DOSE (an extra dose or lesser amount) of prescription drug AND [2] NO symptoms (Exception: a double dose of antibiotics)    Negative: Diabetes drug error or overdose (e.g., insulin or extra dose)    Negative: [1] Request for URGENT new prescription or refill of \"essential\" medication (i.e., likelihood of harm to patient if not taken) AND [2] triager unable to fill per unit policy    Negative: [1] Prescription not at pharmacy AND [2] was prescribed today by PCP    Negative: Pharmacy calling with prescription questions and triager unable to answer question    Negative: Caller has URGENT medication question about med that PCP prescribed and triager unable to answer question    Negative: Caller has NON-URGENT medication question about med that PCP prescribed and triager unable to answer question    Negative: Caller requesting a NON-URGENT new prescription or refill and triager unable to refill per unit policy    Negative: Caller has medication question about med not prescribed by PCP and triager unable to answer " "question (e.g., compatibility with other med, storage)    Negative: [1] DOUBLE DOSE (an extra dose or lesser amount) of over-the-counter (OTC) drug AND [2] NO symptoms (all triage questions negative)    Negative: [1] DOUBLE DOSE (an extra dose or lesser amount) of antibiotic drug AND [2] NO symptoms (all triage questions negative)    Negative: Caller has medication question, adult has minor symptoms, caller declines triage, and triager answers question    Caller has medication question only, adult not sick, and triager answers question     \"I need to have my RX transferred. Called [phone] [facility] with verbal for pantoprazole (PROTONIX) 20 MG EC tablet 30 tablet 3 8/17/2017  No  Sig: Take 1 tablet (20 mg) by mouth daily Take by mouth 30-60 minutes before a meal.    Per epic.    Protocols used: MEDICATION QUESTION CALL-ADULT-    "

## 2018-01-09 ENCOUNTER — TELEPHONE (OUTPATIENT)
Dept: FAMILY MEDICINE | Facility: CLINIC | Age: 58
End: 2018-01-09

## 2018-01-09 NOTE — TELEPHONE ENCOUNTER
Panel Management Review      Patient has the following on her problem list:     Hypertension   Last three blood pressure readings:  BP Readings from Last 3 Encounters:   08/17/17 142/80   01/03/17 132/80   06/17/15 123/90     Blood pressure: FAILED    HTN Guidelines:  Age 18-59 BP range:  Less than 140/90  Age 60-85 with Diabetes:  Less than 140/90  Age 60-85 without Diabetes:  less than 150/90        Composite cancer screening  Chart review shows that this patient is due/due soon for the following Pap Smear, Mammogram and Colonoscopy  Summary:    Patient is due/failing the following:   COLONOSCOPY, MAMMOGRAM, PAP and PHYSICAL    Action needed:   Patient needs office visit for physical with pap. and Patient needs referral/order: Mammo and Colonoscopy     Type of outreach:    Phone, spoke to patient.  Patient states she is without insurance at this time,but will come in when she gets insurance     Questions for provider review:    None                                                                                                                                    Kortney Borrego MA       Chart routed to no one .

## 2018-01-19 ENCOUNTER — TELEPHONE (OUTPATIENT)
Dept: FAMILY MEDICINE | Facility: CLINIC | Age: 58
End: 2018-01-19

## 2018-01-19 NOTE — TELEPHONE ENCOUNTER
Placed called to patient advised she may try OTC omeprazole or nexium. Advised not to take more than as directed and should not take with the pantoprazole. She may also consult with a pharmacist at any drug store.    Pt expressed understanding and acceptance of the plan.  Pt had no further questions at this time.  Advised can call back to clinic at any time with concerns.       Karen KWON RN, BSN, PHN  [facility]

## 2018-01-19 NOTE — TELEPHONE ENCOUNTER
Patient would like to speak with nurse to see what she can take in place of pantoprazole (PROTONIX) 20 MG EC tablet  As she will not have insurance.  Please call to discuss.

## 2018-03-08 ENCOUNTER — OFFICE VISIT (OUTPATIENT)
Dept: FAMILY MEDICINE | Facility: CLINIC | Age: 58
End: 2018-03-08
Payer: COMMERCIAL

## 2018-03-08 VITALS
BODY MASS INDEX: 20.56 KG/M2 | HEIGHT: 67 IN | WEIGHT: 131 LBS | RESPIRATION RATE: 16 BRPM | HEART RATE: 76 BPM | DIASTOLIC BLOOD PRESSURE: 81 MMHG | SYSTOLIC BLOOD PRESSURE: 130 MMHG | OXYGEN SATURATION: 98 % | TEMPERATURE: 98 F

## 2018-03-08 DIAGNOSIS — K44.9 HIATAL HERNIA: ICD-10-CM

## 2018-03-08 DIAGNOSIS — R11.2 NON-INTRACTABLE VOMITING WITH NAUSEA, UNSPECIFIED VOMITING TYPE: ICD-10-CM

## 2018-03-08 DIAGNOSIS — Z98.84 S/P GASTRIC BYPASS: Primary | ICD-10-CM

## 2018-03-08 DIAGNOSIS — Z72.0 TOBACCO ABUSE: ICD-10-CM

## 2018-03-08 DIAGNOSIS — F41.9 ANXIETY: ICD-10-CM

## 2018-03-08 PROCEDURE — 99214 OFFICE O/P EST MOD 30 MIN: CPT | Performed by: PHYSICIAN ASSISTANT

## 2018-03-08 RX ORDER — PANTOPRAZOLE SODIUM 20 MG/1
20 TABLET, DELAYED RELEASE ORAL DAILY
Qty: 90 TABLET | Refills: 3 | Status: ON HOLD | OUTPATIENT
Start: 2018-03-08 | End: 2018-03-27

## 2018-03-08 RX ORDER — BUSPIRONE HYDROCHLORIDE 5 MG/1
TABLET ORAL
Qty: 150 TABLET | Refills: 0 | Status: ON HOLD | OUTPATIENT
Start: 2018-03-08 | End: 2018-03-15

## 2018-03-08 RX ORDER — PANTOPRAZOLE SODIUM 20 MG/1
20 TABLET, DELAYED RELEASE ORAL DAILY
Qty: 90 TABLET | Refills: 3 | Status: SHIPPED | OUTPATIENT
Start: 2018-03-08 | End: 2018-03-08

## 2018-03-08 NOTE — NURSING NOTE
"Chief Complaint   Patient presents with     Recheck Medication     Protonix       Initial /90 (BP Location: Right arm, Patient Position: Chair, Cuff Size: Adult Regular)  Pulse 76  Temp 98  F (36.7  C) (Oral)  Resp 16  Ht 5' 7\" (1.702 m)  Wt 131 lb (59.4 kg)  LMP 03/01/2010  SpO2 98%  BMI 20.52 kg/m2 Estimated body mass index is 20.52 kg/(m^2) as calculated from the following:    Height as of this encounter: 5' 7\" (1.702 m).    Weight as of this encounter: 131 lb (59.4 kg).  Medication Reconciliation: complete   "

## 2018-03-08 NOTE — MR AVS SNAPSHOT
"              After Visit Summary   3/8/2018    Antoinette Romero    MRN: [id number]           Patient Information     Date Of Birth          1960        Visit Information        Provider Department      3/8/2018 10:45 AM Umu Roberto PA-C [facility]        Today's Diagnoses     S/P gastric bypass    -  1    Non-intractable vomiting with nausea, unspecified vomiting type        Hiatal hernia        Ulcer (H)        Tobacco abuse        Anxiety           Follow-ups after your visit        Who to contact     If you have questions or need follow up information about today's clinic visit or your schedule please contact [facility] directly at [phone].  Normal or non-critical lab and imaging results will be communicated to you by MyChart, letter or phone within 4 business days after the clinic has received the results. If you do not hear from us within 7 days, please contact the clinic through MyChart or phone. If you have a critical or abnormal lab result, we will notify you by phone as soon as possible.  Submit refill requests through [employer] or call your pharmacy and they will forward the refill request to us. Please allow 3 business days for your refill to be completed.          Additional Information About Your Visit        MyChart Information     [employer] lets you send messages to your doctor, view your test results, renew your prescriptions, schedule appointments and more. To sign up, go to [email or web address] . Click on \"Log in\" on the left side of the screen, which will take you to the Welcome page. Then click on \"Sign up Now\" on the right side of the page.     You will be asked to enter the access code listed below, as well as some personal information. Please follow the directions to create your username and password.     Your access code is: [email or web address]  Expires: 2018 12:49 PM     Your access code will  in 90 days. If you need help or a new " "code, please call your [facility] clinic or [phone].        Care EveryWhere ID     This is your Care EveryWhere ID. This could be used by other organizations to access your [facility] medical records  [id number]        Your Vitals Were     Pulse Temperature Respirations Height Last Period Pulse Oximetry    76 98  F (36.7  C) (Oral) 16 5' 7\" (1.702 m) 03/01/2010 98%    BMI (Body Mass Index)                   20.52 kg/m2            Blood Pressure from Last 3 Encounters:   03/08/18 130/81   08/17/17 142/80   01/03/17 132/80    Weight from Last 3 Encounters:   03/08/18 131 lb (59.4 kg)   08/17/17 129 lb 6.4 oz (58.7 kg)   01/03/17 126 lb 9.6 oz (57.4 kg)              Today, you had the following     No orders found for display         Today's Medication Changes          These changes are accurate as of 3/8/18 12:49 PM.  If you have any questions, ask your nurse or doctor.               Start taking these medicines.        Dose/Directions    busPIRone 5 MG tablet   Commonly known as:  BUSPAR   Used for:  Anxiety   Started by:  Umu Roberto PA-C        Start at 5 mg twice daily for 3 days, then 7.5 mg (1.5 tabs) twice daily for 3 days, then 10 mg (2 tabs) twice daily for 3 days, then 12.5 mg (2.5 tabs) twice daily for 3 days, then 15 mg (3 tabs) twice daily and stay at that dose   Quantity:  150 tablet   Refills:  0       nicotine 7 MG/24HR 24 hr patch   Commonly known as:  NICODERM CQ   Used for:  Tobacco abuse   Started by:  Umu Roberto PA-C        Dose:  1 patch   Place 1 patch onto the skin every 24 hours   Quantity:  30 patch   Refills:  1       pantoprazole 20 MG EC tablet   Commonly known as:  PROTONIX   Used for:  Non-intractable vomiting with nausea, unspecified vomiting type, S/P gastric bypass, Hiatal hernia, Ulcer (H)   Started by:  Umu Roberto PA-C        Dose:  20 mg   Take 1 tablet (20 mg) by mouth daily Take by mouth 30-60 minutes before a meal.   Quantity:  90 tablet   Refills:  3 "            Where to get your medicines      Some of these will need a paper prescription and others can be bought over the counter.  Ask your nurse if you have questions.     Bring a paper prescription for each of these medications     busPIRone 5 MG tablet    nicotine 7 MG/24HR 24 hr patch    pantoprazole 20 MG EC tablet                Primary Care Provider Office Phone # Fax #    Umu LETITIA Roberto PA-C [phone] [address] [facility] [address]        Equal Access to Services     MARY GALAN : Hadii aad ku hadasho Soomaali, waaxda luqadaha, qaybta kaalmada adeegyada, waxay idiin hayaan adeeg kharash lajohnathan . So [facility] [phone].    ATENCIÓN: Si habla español, tiene a shah disposición servicios gratuitos de asistencia lingüística. [facility] [phone].    We comply with applicable federal civil rights laws and [place] laws. We do not discriminate on the basis of race, color, national origin, age, disability, sex, sexual orientation, or gender identity.            Thank you!     Thank you for choosing [facility]  for your care. Our goal is always to provide you with excellent care. Hearing back from our patients is one way we can continue to improve our services. Please take a few minutes to complete the written survey that you may receive in the mail after your visit with us. Thank you!             Your Updated Medication List - Protect others around you: Learn how to safely use, store and throw away your medicines at www.disposemymeds.org.          This list is accurate as of 3/8/18 12:49 PM.  Always use your most recent med list.                   Brand Name Dispense Instructions for use Diagnosis    busPIRone 5 MG tablet    BUSPAR    150 tablet    Start at 5 mg twice daily for 3 days, then 7.5 mg (1.5 tabs) twice daily for 3 days, then 10 mg (2 tabs) twice daily for 3 days, then 12.5 mg (2.5 tabs) twice daily for 3 days, then 15 mg (3 tabs) twice daily and stay  at that dose    Anxiety       nicotine 7 MG/24HR 24 hr patch    NICODERM CQ    30 patch    Place 1 patch onto the skin every 24 hours    Tobacco abuse       pantoprazole 20 MG EC tablet    PROTONIX    90 tablet    Take 1 tablet (20 mg) by mouth daily Take by mouth 30-60 minutes before a meal.    Non-intractable vomiting with nausea, unspecified vomiting type, S/P gastric bypass, Hiatal hernia, Ulcer (H)

## 2018-03-08 NOTE — PROGRESS NOTES
SUBJECTIVE:   Antoinette Romero is a 57 year old female who presents to clinic today for the following health issues:      Medication Followup of  PROTONIX    Taking Medication as prescribed: yes    Side Effects:  None    Medication Helping Symptoms:  yes     Anxiety Follow-Up    Status since last visit: No change    Other associated symptoms:None    Complicating factors:   Significant life event: No   Current substance abuse: None  Depression symptoms: Yes-    Stress caring for grandchild.    RAFAL-7 SCORE 10/30/2014   Total Score 16     Patient would like to stop smoking as well.  Smoking 1/2-3/4 ppd  RAFAL-7    Problem list and histories reviewed & adjusted, as indicated.  Additional history: as documented    Patient Active Problem List   Diagnosis     HTN (hypertension)     Anxiety     HTN, goal below 140/90     CARDIOVASCULAR SCREENING; LDL GOAL LESS THAN 160     Tobacco abuse     Obesity     Morbid obesity (H)     S/P gastric bypass     Vitamin D deficiency disease     Nausea     Symptomatic cholelithiasis     Cholecystitis     Headache     Scotoma     Hiatal hernia     Abdominal pain     Ulcer (H)     Nausea & vomiting     Bilateral otitis media     Tobacco use disorder     Past Surgical History:   Procedure Laterality Date     ENDOSCOPIC UPPER GASTROINTESTINAL, REMOVE SUTURE N/A 1/17/2015    Procedure: ENDOSCOPIC UPPER GASTROINTESTINAL, REMOVE SUTURE;  Surgeon: Parviz Martinez MD;  Location:  OR     ESOPHAGOSCOPY, GASTROSCOPY, DUODENOSCOPY (EGD), COMBINED  2/18/2014    Procedure: COMBINED ESOPHAGOSCOPY, GASTROSCOPY, DUODENOSCOPY (EGD);  Esophagoscopy,Gastroscopy,Duodenoscopy;  Surgeon: Neo Sher MD;  Location:  GI     ESOPHAGOSCOPY, GASTROSCOPY, DUODENOSCOPY (EGD), COMBINED  5/7/2014    Procedure: COMBINED ESOPHAGOSCOPY, GASTROSCOPY, DUODENOSCOPY (EGD), BIOPSY SINGLE OR MULTIPLE;  Surgeon: Jose Antonio Valencia MD;  Location:  GI     GYN SURGERY       LAPAROSCOPIC BYPASS GASTRIC   "3/25/2013    Procedure: LAPAROSCOPIC BYPASS GASTRIC;  Laparoscopic Nawaf En Y Gastric Bypass. Hiatel hernia repair;  Surgeon: Parviz Martinez MD;  Location: [facility] OR     LAPAROSCOPIC CHOLECYSTECTOMY WITH CHOLANGIOGRAMS  3/28/2014    Procedure: LAPAROSCOPIC CHOLECYSTECTOMY WITH CHOLANGIOGRAMS;;  Surgeon: Jose Antonio Valencia MD;  Location: [facility] OR     LAPAROSCOPY DIAGNOSTIC (GENERAL)  3/28/2014    Procedure: LAPAROSCOPY DIAGNOSTIC (GENERAL);  Diagnostic Laparoscopy, laparoscopic cholecystectomy, EGD, repair of johnston defect;  Surgeon: Jose Antonio Valencia MD;  Location: [facility] OR     LAPAROSCOPY OPERATIVE ADULT N/A 1/17/2015    Procedure: LAPAROSCOPY OPERATIVE ADULT;  Surgeon: Parviz Martinez MD;  Location: [facility] OR       Social History   Substance Use Topics     Smoking status: Current Every Day Smoker     Packs/day: 1.00     Types: Cigarettes     Smokeless tobacco: Never Used      Comment: quite 1 week ago     Alcohol use No     Family History   Problem Relation Age of Onset     Respiratory Mother      DIABETES Mother      Arthritis Mother      OA     Neurologic Disorder Father      CANCER Maternal Grandfather      Arthritis Maternal Grandmother      RA     Breast Cancer No family hx of      Ovarian Cancer No family hx of            Reviewed and updated as needed this visit by clinical staff  Tobacco  Allergies  Meds  Med Hx  Surg Hx  Fam Hx  Soc Hx      Reviewed and updated as needed this visit by Provider         ROS:  Constitutional, HEENT, cardiovascular, pulmonary, gi and gu systems are negative, except as otherwise noted.    OBJECTIVE:     /81  Pulse 76  Temp 98  F (36.7  C) (Oral)  Resp 16  Ht 5' 7\" (1.702 m)  Wt 131 lb (59.4 kg)  LMP 03/01/2010  SpO2 98%  BMI 20.52 kg/m2  Body mass index is 20.52 kg/(m^2).  GENERAL APPEARANCE: healthy, alert and no distress  RESP: lungs clear to auscultation - no rales, rhonchi or wheezes  CV: regular rates and rhythm, normal S1 S2, no S3 or S4 and no " murmur, click or rub  ABDOMEN: soft, nontender, without hepatosplenomegaly or masses and bowel sounds normal  NEURO: Normal strength and tone, mentation intact and speech normal  PSYCH: mentation appears normal and affect normal/bright    Diagnostic Test Results:  none     ASSESSMENT/PLAN:             1. Non-intractable vomiting with nausea, unspecified vomiting type  Stable with meds.   - pantoprazole (PROTONIX) 20 MG EC tablet; Take 1 tablet (20 mg) by mouth daily Take by mouth 30-60 minutes before a meal.  Dispense: 90 tablet; Refill: 3    2. S/P gastric bypass  Stable with meds.   - pantoprazole (PROTONIX) 20 MG EC tablet; Take 1 tablet (20 mg) by mouth daily Take by mouth 30-60 minutes before a meal.  Dispense: 90 tablet; Refill: 3    3. Hiatal hernia  Stable with meds  - pantoprazole (PROTONIX) 20 MG EC tablet; Take 1 tablet (20 mg) by mouth daily Take by mouth 30-60 minutes before a meal.  Dispense: 90 tablet; Refill: 3    4. Ulcer (H)  Stable with meds- pantoprazole (PROTONIX) 20 MG EC tablet; Take 1 tablet (20 mg) by mouth daily Take by mouth 30-60 minutes before a meal.  Dispense: 90 tablet; Refill: 3    5. Tobacco abuse  Trial of patch  - nicotine (NICODERM CQ) 7 MG/24HR 24 hr patch; Place 1 patch onto the skin every 24 hours  Dispense: 30 patch; Refill: 1    6. Anxiety  Trial of buspar. F/u 3 months.   - busPIRone (BUSPAR) 5 MG tablet; Start at 5 mg twice daily for 3 days, then 7.5 mg (1.5 tabs) twice daily for 3 days, then 10 mg (2 tabs) twice daily for 3 days, then 12.5 mg (2.5 tabs) twice daily for 3 days, then 15 mg (3 tabs) twice daily and stay at that dose  Dispense: 150 tablet; Refill: 0        Umu Roberto PA-C  [facility]

## 2018-03-09 ASSESSMENT — PATIENT HEALTH QUESTIONNAIRE - PHQ9: SUM OF ALL RESPONSES TO PHQ QUESTIONS 1-9: 21

## 2018-03-10 ENCOUNTER — APPOINTMENT (OUTPATIENT)
Dept: GENERAL RADIOLOGY | Facility: CLINIC | Age: 58
End: 2018-03-10
Attending: EMERGENCY MEDICINE
Payer: COMMERCIAL

## 2018-03-10 ENCOUNTER — APPOINTMENT (OUTPATIENT)
Dept: CT IMAGING | Facility: CLINIC | Age: 58
End: 2018-03-10
Attending: EMERGENCY MEDICINE
Payer: COMMERCIAL

## 2018-03-10 ENCOUNTER — APPOINTMENT (OUTPATIENT)
Dept: MRI IMAGING | Facility: CLINIC | Age: 58
End: 2018-03-10
Attending: EMERGENCY MEDICINE
Payer: COMMERCIAL

## 2018-03-10 ENCOUNTER — HOSPITAL ENCOUNTER (INPATIENT)
Facility: CLINIC | Age: 58
LOS: 8 days | Discharge: NURSING, REHAB OR LONG-TERM CARE | DRG: 028 | End: 2018-03-18
Attending: INTERNAL MEDICINE | Admitting: INTERNAL MEDICINE
Payer: COMMERCIAL

## 2018-03-10 ENCOUNTER — HOSPITAL ENCOUNTER (EMERGENCY)
Facility: CLINIC | Age: 58
Discharge: ANOTHER HOSPITAL | End: 2018-03-10
Attending: EMERGENCY MEDICINE | Admitting: EMERGENCY MEDICINE
Payer: COMMERCIAL

## 2018-03-10 VITALS
OXYGEN SATURATION: 97 % | TEMPERATURE: 99.3 F | DIASTOLIC BLOOD PRESSURE: 102 MMHG | SYSTOLIC BLOOD PRESSURE: 185 MMHG | RESPIRATION RATE: 24 BRPM

## 2018-03-10 DIAGNOSIS — S16.1XXA STRAIN OF NECK MUSCLE, INITIAL ENCOUNTER: ICD-10-CM

## 2018-03-10 DIAGNOSIS — F10.20 ALCOHOL DEPENDENCE, DAILY USE (H): ICD-10-CM

## 2018-03-10 DIAGNOSIS — S09.90XA CLOSED HEAD INJURY, INITIAL ENCOUNTER: ICD-10-CM

## 2018-03-10 DIAGNOSIS — Z98.1 STATUS POST CERVICAL SPINAL FUSION: Primary | ICD-10-CM

## 2018-03-10 DIAGNOSIS — I10 BENIGN ESSENTIAL HYPERTENSION: ICD-10-CM

## 2018-03-10 DIAGNOSIS — S14.109A CONTUSION OF CERVICAL CORD, INITIAL ENCOUNTER (H): Primary | ICD-10-CM

## 2018-03-10 DIAGNOSIS — R20.2 ARM PARESTHESIA, LEFT: ICD-10-CM

## 2018-03-10 DIAGNOSIS — M79.641 BILATERAL HAND PAIN: ICD-10-CM

## 2018-03-10 DIAGNOSIS — S40.812A ARM ABRASION, LEFT, INITIAL ENCOUNTER: ICD-10-CM

## 2018-03-10 DIAGNOSIS — R31.29 OTHER MICROSCOPIC HEMATURIA: ICD-10-CM

## 2018-03-10 DIAGNOSIS — M79.642 BILATERAL HAND PAIN: ICD-10-CM

## 2018-03-10 DIAGNOSIS — M79.662 PAIN OF LEFT LOWER LEG: ICD-10-CM

## 2018-03-10 DIAGNOSIS — S50.02XA CONTUSION OF LEFT ELBOW, INITIAL ENCOUNTER: ICD-10-CM

## 2018-03-10 DIAGNOSIS — F43.22 ADJUSTMENT DISORDER WITH ANXIOUS MOOD: ICD-10-CM

## 2018-03-10 PROBLEM — T14.8XXA CONTUSION: Status: ACTIVE | Noted: 2018-03-10

## 2018-03-10 LAB
ALBUMIN SERPL-MCNC: 3.8 G/DL (ref 3.4–5)
ALBUMIN UR-MCNC: NEGATIVE MG/DL
ALP SERPL-CCNC: 106 U/L (ref 40–150)
ALT SERPL W P-5'-P-CCNC: 35 U/L (ref 0–50)
AMORPH CRY #/AREA URNS HPF: ABNORMAL /HPF
ANION GAP SERPL CALCULATED.3IONS-SCNC: 6 MMOL/L (ref 3–14)
APPEARANCE UR: CLEAR
AST SERPL W P-5'-P-CCNC: 64 U/L (ref 0–45)
BASOPHILS # BLD AUTO: 0.1 10E9/L (ref 0–0.2)
BASOPHILS NFR BLD AUTO: 0.5 %
BILIRUB SERPL-MCNC: 0.6 MG/DL (ref 0.2–1.3)
BILIRUB UR QL STRIP: NEGATIVE
BUN SERPL-MCNC: 12 MG/DL (ref 7–30)
CALCIUM SERPL-MCNC: 8.7 MG/DL (ref 8.5–10.1)
CHLORIDE SERPL-SCNC: 106 MMOL/L (ref 94–109)
CO2 SERPL-SCNC: 25 MMOL/L (ref 20–32)
COLOR UR AUTO: YELLOW
CREAT SERPL-MCNC: 0.45 MG/DL (ref 0.52–1.04)
DIFFERENTIAL METHOD BLD: ABNORMAL
EOSINOPHIL # BLD AUTO: 0 10E9/L (ref 0–0.7)
EOSINOPHIL NFR BLD AUTO: 0.4 %
ERYTHROCYTE [DISTWIDTH] IN BLOOD BY AUTOMATED COUNT: 14 % (ref 10–15)
ETHANOL SERPL-MCNC: <0.01 G/DL
GFR SERPL CREATININE-BSD FRML MDRD: >90 ML/MIN/1.7M2
GLUCOSE SERPL-MCNC: 106 MG/DL (ref 70–99)
GLUCOSE UR STRIP-MCNC: NEGATIVE MG/DL
HCT VFR BLD AUTO: 37.7 % (ref 35–47)
HGB BLD-MCNC: 12.3 G/DL (ref 11.7–15.7)
HGB UR QL STRIP: ABNORMAL
IMM GRANULOCYTES # BLD: 0.1 10E9/L (ref 0–0.4)
IMM GRANULOCYTES NFR BLD: 0.5 %
KETONES UR STRIP-MCNC: NEGATIVE MG/DL
LEUKOCYTE ESTERASE UR QL STRIP: NEGATIVE
LYMPHOCYTES # BLD AUTO: 1.4 10E9/L (ref 0.8–5.3)
LYMPHOCYTES NFR BLD AUTO: 12.8 %
MCH RBC QN AUTO: 26.9 PG (ref 26.5–33)
MCHC RBC AUTO-ENTMCNC: 32.6 G/DL (ref 31.5–36.5)
MCV RBC AUTO: 83 FL (ref 78–100)
MONOCYTES # BLD AUTO: 0.7 10E9/L (ref 0–1.3)
MONOCYTES NFR BLD AUTO: 6.8 %
MUCOUS THREADS #/AREA URNS LPF: PRESENT /LPF
NEUTROPHILS # BLD AUTO: 8.6 10E9/L (ref 1.6–8.3)
NEUTROPHILS NFR BLD AUTO: 79 %
NITRATE UR QL: NEGATIVE
NRBC # BLD AUTO: 0 10*3/UL
NRBC BLD AUTO-RTO: 0 /100
PH UR STRIP: 7 PH (ref 5–7)
PLATELET # BLD AUTO: 278 10E9/L (ref 150–450)
POTASSIUM SERPL-SCNC: 3.9 MMOL/L (ref 3.4–5.3)
PROT SERPL-MCNC: 7 G/DL (ref 6.8–8.8)
RBC # BLD AUTO: 4.57 10E12/L (ref 3.8–5.2)
RBC #/AREA URNS AUTO: 121 /HPF (ref 0–2)
SODIUM SERPL-SCNC: 137 MMOL/L (ref 133–144)
SOURCE: ABNORMAL
SP GR UR STRIP: 1.01 (ref 1–1.03)
UROBILINOGEN UR STRIP-MCNC: 0 MG/DL (ref 0–2)
WBC # BLD AUTO: 10.8 10E9/L (ref 4–11)
WBC #/AREA URNS AUTO: 1 /HPF (ref 0–5)

## 2018-03-10 PROCEDURE — 73502 X-RAY EXAM HIP UNI 2-3 VIEWS: CPT

## 2018-03-10 PROCEDURE — 25000128 H RX IP 250 OP 636: Performed by: NEUROLOGICAL SURGERY

## 2018-03-10 PROCEDURE — 12000000 ZZH R&B MED SURG/OB

## 2018-03-10 PROCEDURE — 99285 EMERGENCY DEPT VISIT HI MDM: CPT | Mod: 25

## 2018-03-10 PROCEDURE — 93005 ELECTROCARDIOGRAM TRACING: CPT

## 2018-03-10 PROCEDURE — 80053 COMPREHEN METABOLIC PANEL: CPT | Performed by: EMERGENCY MEDICINE

## 2018-03-10 PROCEDURE — 73590 X-RAY EXAM OF LOWER LEG: CPT | Mod: LT

## 2018-03-10 PROCEDURE — 73130 X-RAY EXAM OF HAND: CPT | Mod: 50

## 2018-03-10 PROCEDURE — 72070 X-RAY EXAM THORAC SPINE 2VWS: CPT

## 2018-03-10 PROCEDURE — 25000128 H RX IP 250 OP 636: Performed by: INTERNAL MEDICINE

## 2018-03-10 PROCEDURE — 99223 1ST HOSP IP/OBS HIGH 75: CPT | Mod: AI | Performed by: PHYSICIAN ASSISTANT

## 2018-03-10 PROCEDURE — 85025 COMPLETE CBC W/AUTO DIFF WBC: CPT | Performed by: EMERGENCY MEDICINE

## 2018-03-10 PROCEDURE — 96375 TX/PRO/DX INJ NEW DRUG ADDON: CPT

## 2018-03-10 PROCEDURE — 99207 ZZC MOONLIGHTING INDICATOR: CPT | Performed by: PHYSICIAN ASSISTANT

## 2018-03-10 PROCEDURE — 80320 DRUG SCREEN QUANTALCOHOLS: CPT | Performed by: EMERGENCY MEDICINE

## 2018-03-10 PROCEDURE — 25000132 ZZH RX MED GY IP 250 OP 250 PS 637: Performed by: EMERGENCY MEDICINE

## 2018-03-10 PROCEDURE — 73080 X-RAY EXAM OF ELBOW: CPT | Mod: LT

## 2018-03-10 PROCEDURE — 81001 URINALYSIS AUTO W/SCOPE: CPT | Performed by: EMERGENCY MEDICINE

## 2018-03-10 PROCEDURE — 72125 CT NECK SPINE W/O DYE: CPT

## 2018-03-10 PROCEDURE — 96374 THER/PROPH/DIAG INJ IV PUSH: CPT | Mod: 59

## 2018-03-10 PROCEDURE — 25000128 H RX IP 250 OP 636: Performed by: EMERGENCY MEDICINE

## 2018-03-10 PROCEDURE — 74177 CT ABD & PELVIS W/CONTRAST: CPT

## 2018-03-10 PROCEDURE — 25000128 H RX IP 250 OP 636: Performed by: PHYSICIAN ASSISTANT

## 2018-03-10 PROCEDURE — HZ2ZZZZ DETOXIFICATION SERVICES FOR SUBSTANCE ABUSE TREATMENT: ICD-10-PCS | Performed by: INTERNAL MEDICINE

## 2018-03-10 PROCEDURE — 72141 MRI NECK SPINE W/O DYE: CPT

## 2018-03-10 PROCEDURE — 25000132 ZZH RX MED GY IP 250 OP 250 PS 637: Performed by: PHYSICIAN ASSISTANT

## 2018-03-10 PROCEDURE — 70450 CT HEAD/BRAIN W/O DYE: CPT

## 2018-03-10 PROCEDURE — 96376 TX/PRO/DX INJ SAME DRUG ADON: CPT

## 2018-03-10 PROCEDURE — 73060 X-RAY EXAM OF HUMERUS: CPT | Mod: LT

## 2018-03-10 RX ORDER — AMOXICILLIN 250 MG
1 CAPSULE ORAL 2 TIMES DAILY PRN
Status: DISCONTINUED | OUTPATIENT
Start: 2018-03-10 | End: 2018-03-18 | Stop reason: HOSPADM

## 2018-03-10 RX ORDER — POTASSIUM CHLORIDE 7.45 MG/ML
10 INJECTION INTRAVENOUS
Status: DISCONTINUED | OUTPATIENT
Start: 2018-03-10 | End: 2018-03-18 | Stop reason: HOSPADM

## 2018-03-10 RX ORDER — SODIUM CHLORIDE 9 MG/ML
1000 INJECTION, SOLUTION INTRAVENOUS CONTINUOUS
Status: DISCONTINUED | OUTPATIENT
Start: 2018-03-10 | End: 2018-03-10 | Stop reason: HOSPADM

## 2018-03-10 RX ORDER — DEXAMETHASONE SODIUM PHOSPHATE 4 MG/ML
4 INJECTION, SOLUTION INTRA-ARTICULAR; INTRALESIONAL; INTRAMUSCULAR; INTRAVENOUS; SOFT TISSUE EVERY 6 HOURS
Status: DISCONTINUED | OUTPATIENT
Start: 2018-03-10 | End: 2018-03-11

## 2018-03-10 RX ORDER — HYDROCODONE BITARTRATE AND ACETAMINOPHEN 5; 325 MG/1; MG/1
1-2 TABLET ORAL EVERY 4 HOURS PRN
Status: DISCONTINUED | OUTPATIENT
Start: 2018-03-10 | End: 2018-03-10

## 2018-03-10 RX ORDER — LORAZEPAM 2 MG/ML
1-2 INJECTION INTRAMUSCULAR EVERY 30 MIN PRN
Status: DISCONTINUED | OUTPATIENT
Start: 2018-03-10 | End: 2018-03-14

## 2018-03-10 RX ORDER — SODIUM CHLORIDE 9 MG/ML
INJECTION, SOLUTION INTRAVENOUS CONTINUOUS
Status: DISCONTINUED | OUTPATIENT
Start: 2018-03-10 | End: 2018-03-18

## 2018-03-10 RX ORDER — MAGNESIUM SULFATE HEPTAHYDRATE 40 MG/ML
4 INJECTION, SOLUTION INTRAVENOUS EVERY 4 HOURS PRN
Status: DISCONTINUED | OUTPATIENT
Start: 2018-03-10 | End: 2018-03-18 | Stop reason: HOSPADM

## 2018-03-10 RX ORDER — FOLIC ACID 1 MG/1
1 TABLET ORAL DAILY
Status: DISCONTINUED | OUTPATIENT
Start: 2018-03-10 | End: 2018-03-14

## 2018-03-10 RX ORDER — DEXAMETHASONE SODIUM PHOSPHATE 4 MG/ML
4 INJECTION, SOLUTION INTRA-ARTICULAR; INTRALESIONAL; INTRAMUSCULAR; INTRAVENOUS; SOFT TISSUE EVERY 6 HOURS
Status: DISCONTINUED | OUTPATIENT
Start: 2018-03-10 | End: 2018-03-10 | Stop reason: HOSPADM

## 2018-03-10 RX ORDER — PANTOPRAZOLE SODIUM 20 MG/1
20 TABLET, DELAYED RELEASE ORAL DAILY
Status: DISCONTINUED | OUTPATIENT
Start: 2018-03-11 | End: 2018-03-18 | Stop reason: HOSPADM

## 2018-03-10 RX ORDER — IOPAMIDOL 755 MG/ML
500 INJECTION, SOLUTION INTRAVASCULAR ONCE
Status: COMPLETED | OUTPATIENT
Start: 2018-03-10 | End: 2018-03-10

## 2018-03-10 RX ORDER — BISACODYL 5 MG
5 TABLET, DELAYED RELEASE (ENTERIC COATED) ORAL DAILY PRN
Status: DISCONTINUED | OUTPATIENT
Start: 2018-03-10 | End: 2018-03-18 | Stop reason: HOSPADM

## 2018-03-10 RX ORDER — BISACODYL 5 MG
10 TABLET, DELAYED RELEASE (ENTERIC COATED) ORAL DAILY PRN
Status: DISCONTINUED | OUTPATIENT
Start: 2018-03-10 | End: 2018-03-18 | Stop reason: HOSPADM

## 2018-03-10 RX ORDER — HYDROMORPHONE HYDROCHLORIDE 1 MG/ML
0.2 INJECTION, SOLUTION INTRAMUSCULAR; INTRAVENOUS; SUBCUTANEOUS
Status: DISCONTINUED | OUTPATIENT
Start: 2018-03-10 | End: 2018-03-11

## 2018-03-10 RX ORDER — POTASSIUM CL/LIDO/0.9 % NACL 10MEQ/0.1L
10 INTRAVENOUS SOLUTION, PIGGYBACK (ML) INTRAVENOUS
Status: DISCONTINUED | OUTPATIENT
Start: 2018-03-10 | End: 2018-03-18 | Stop reason: HOSPADM

## 2018-03-10 RX ORDER — MORPHINE SULFATE 4 MG/ML
4 INJECTION, SOLUTION INTRAMUSCULAR; INTRAVENOUS
Status: COMPLETED | OUTPATIENT
Start: 2018-03-10 | End: 2018-03-10

## 2018-03-10 RX ORDER — OXYCODONE HYDROCHLORIDE 5 MG/1
5-10 TABLET ORAL
Status: DISCONTINUED | OUTPATIENT
Start: 2018-03-10 | End: 2018-03-18 | Stop reason: HOSPADM

## 2018-03-10 RX ORDER — LORAZEPAM 2 MG/ML
.5-1 INJECTION INTRAMUSCULAR EVERY 6 HOURS PRN
Status: DISCONTINUED | OUTPATIENT
Start: 2018-03-10 | End: 2018-03-18 | Stop reason: HOSPADM

## 2018-03-10 RX ORDER — NALOXONE HYDROCHLORIDE 0.4 MG/ML
.1-.4 INJECTION, SOLUTION INTRAMUSCULAR; INTRAVENOUS; SUBCUTANEOUS
Status: DISCONTINUED | OUTPATIENT
Start: 2018-03-10 | End: 2018-03-11

## 2018-03-10 RX ORDER — POTASSIUM CHLORIDE 1500 MG/1
20-40 TABLET, EXTENDED RELEASE ORAL
Status: DISCONTINUED | OUTPATIENT
Start: 2018-03-10 | End: 2018-03-18 | Stop reason: HOSPADM

## 2018-03-10 RX ORDER — HYDRALAZINE HYDROCHLORIDE 20 MG/ML
10 INJECTION INTRAMUSCULAR; INTRAVENOUS EVERY 4 HOURS PRN
Status: DISCONTINUED | OUTPATIENT
Start: 2018-03-10 | End: 2018-03-13

## 2018-03-10 RX ORDER — MULTIPLE VITAMINS W/ MINERALS TAB 9MG-400MCG
1 TAB ORAL DAILY
Status: DISCONTINUED | OUTPATIENT
Start: 2018-03-10 | End: 2018-03-18 | Stop reason: HOSPADM

## 2018-03-10 RX ORDER — LORAZEPAM 1 MG/1
1 TABLET ORAL ONCE
Status: COMPLETED | OUTPATIENT
Start: 2018-03-10 | End: 2018-03-10

## 2018-03-10 RX ORDER — ACETAMINOPHEN 325 MG/1
650 TABLET ORAL EVERY 4 HOURS PRN
Status: DISCONTINUED | OUTPATIENT
Start: 2018-03-10 | End: 2018-03-18 | Stop reason: HOSPADM

## 2018-03-10 RX ORDER — HYDROMORPHONE HYDROCHLORIDE 1 MG/ML
0.5 INJECTION, SOLUTION INTRAMUSCULAR; INTRAVENOUS; SUBCUTANEOUS
Status: DISCONTINUED | OUTPATIENT
Start: 2018-03-10 | End: 2018-03-10 | Stop reason: HOSPADM

## 2018-03-10 RX ORDER — AMOXICILLIN 250 MG
2 CAPSULE ORAL 2 TIMES DAILY PRN
Status: DISCONTINUED | OUTPATIENT
Start: 2018-03-10 | End: 2018-03-18 | Stop reason: HOSPADM

## 2018-03-10 RX ORDER — DEXAMETHASONE SODIUM PHOSPHATE 4 MG/ML
10 INJECTION, SOLUTION INTRA-ARTICULAR; INTRALESIONAL; INTRAMUSCULAR; INTRAVENOUS; SOFT TISSUE ONCE
Status: COMPLETED | OUTPATIENT
Start: 2018-03-10 | End: 2018-03-10

## 2018-03-10 RX ORDER — POTASSIUM CHLORIDE 1.5 G/1.58G
20-40 POWDER, FOR SOLUTION ORAL
Status: DISCONTINUED | OUTPATIENT
Start: 2018-03-10 | End: 2018-03-18 | Stop reason: HOSPADM

## 2018-03-10 RX ORDER — POTASSIUM CHLORIDE 29.8 MG/ML
20 INJECTION INTRAVENOUS
Status: DISCONTINUED | OUTPATIENT
Start: 2018-03-10 | End: 2018-03-18 | Stop reason: HOSPADM

## 2018-03-10 RX ORDER — BISACODYL 5 MG
15 TABLET, DELAYED RELEASE (ENTERIC COATED) ORAL DAILY PRN
Status: DISCONTINUED | OUTPATIENT
Start: 2018-03-10 | End: 2018-03-18 | Stop reason: HOSPADM

## 2018-03-10 RX ORDER — LANOLIN ALCOHOL/MO/W.PET/CERES
100 CREAM (GRAM) TOPICAL DAILY
Status: COMPLETED | OUTPATIENT
Start: 2018-03-10 | End: 2018-03-14

## 2018-03-10 RX ORDER — LORAZEPAM 1 MG/1
1-2 TABLET ORAL EVERY 30 MIN PRN
Status: DISCONTINUED | OUTPATIENT
Start: 2018-03-10 | End: 2018-03-14

## 2018-03-10 RX ADMIN — MORPHINE SULFATE 4 MG: 4 INJECTION INTRAVENOUS at 11:02

## 2018-03-10 RX ADMIN — MORPHINE SULFATE 4 MG: 4 INJECTION INTRAVENOUS at 14:08

## 2018-03-10 RX ADMIN — SODIUM CHLORIDE 57 ML: 9 INJECTION, SOLUTION INTRAVENOUS at 12:17

## 2018-03-10 RX ADMIN — HYDROMORPHONE HYDROCHLORIDE 0.2 MG: 1 INJECTION, SOLUTION INTRAMUSCULAR; INTRAVENOUS; SUBCUTANEOUS at 21:17

## 2018-03-10 RX ADMIN — HYDROMORPHONE HYDROCHLORIDE 0.2 MG: 1 INJECTION, SOLUTION INTRAMUSCULAR; INTRAVENOUS; SUBCUTANEOUS at 19:01

## 2018-03-10 RX ADMIN — LORAZEPAM 1 MG: 1 TABLET ORAL at 11:16

## 2018-03-10 RX ADMIN — MORPHINE SULFATE 4 MG: 4 INJECTION INTRAVENOUS at 09:17

## 2018-03-10 RX ADMIN — SODIUM CHLORIDE: 9 INJECTION, SOLUTION INTRAVENOUS at 22:33

## 2018-03-10 RX ADMIN — SODIUM CHLORIDE 1000 ML: 9 INJECTION, SOLUTION INTRAVENOUS at 09:16

## 2018-03-10 RX ADMIN — MULTIPLE VITAMINS W/ MINERALS TAB 1 TABLET: TAB at 20:32

## 2018-03-10 RX ADMIN — DEXAMETHASONE SODIUM PHOSPHATE 10 MG: 4 INJECTION, SOLUTION INTRAMUSCULAR; INTRAVENOUS at 17:29

## 2018-03-10 RX ADMIN — IOPAMIDOL 65 ML: 755 INJECTION, SOLUTION INTRAVENOUS at 12:17

## 2018-03-10 RX ADMIN — FOLIC ACID 1 MG: 1 TABLET ORAL at 20:32

## 2018-03-10 RX ADMIN — Medication 100 MG: at 20:32

## 2018-03-10 RX ADMIN — Medication 0.5 MG: at 16:44

## 2018-03-10 RX ADMIN — Medication 0.5 MG: at 17:29

## 2018-03-10 RX ADMIN — LORAZEPAM 0.5 MG: 2 INJECTION INTRAMUSCULAR; INTRAVENOUS at 20:31

## 2018-03-10 ASSESSMENT — ACTIVITIES OF DAILY LIVING (ADL): ADLS_ACUITY_SCORE: 15

## 2018-03-10 ASSESSMENT — VISUAL ACUITY
OU: NORMAL ACUITY
OU: NORMAL ACUITY

## 2018-03-10 ASSESSMENT — ENCOUNTER SYMPTOMS
CONSTIPATION: 0
HEMATURIA: 0
DYSURIA: 0
HEADACHES: 1
NECK PAIN: 1
DIARRHEA: 0

## 2018-03-10 NOTE — CONSULTS
Neurosurgery Brief Trauma Consult Note      Antoinette Romero is a 57 year old female that presents to [facility] ER following a multiple falls on yesterday. Now with difficulty ambulating and some hand weakness.       CT head negative for intracranial injury  CT/MRI cervical spine - C3-T1 DDD with C7-T1 anterolisthesis and C5-6 severe stenosis with cord contusion and disruption of posterior ligaments. Paraspinous edema    Recommendation:  1. Agree with transfer to [facility]  2. Hard cervical collar with bed rest  3. Will give decadron  4. Bedrest  5. Will discuss surgicial intervention in am

## 2018-03-10 NOTE — ED PROVIDER NOTES
ED Sign Out Note     Patient signed out to me by Dr. Hou at 2:30 pm     MRI Cervical Spine:  1. Spinal cord contusion at C5-C6 without hemorrhage.  2. Disruption of ligaments at C5-C6 anteriorly and posteriorly with  fluid in the facet joints and in the disc space suggesting motion at  this level.  3. Fairly extensive edema but no discrete hematoma in the paraspinous  musculature more on the right than on the left and in the prevertebral  space.  4. Extensive degenerative changes.    1545 I spoke with Dr. Daley with Neurosurgery regarding this patient.  1605 I spoke with Dr. Stauffer regarding this patient.    Findings and plan explained to the Patient. Patient will be transferred to [facility] via EMS as there is evidence of spinal cord contusion and ligamentous disruption at the C5/C6 level.  She is reporting left hand weakness as well as left leg weakness.  C-collar was placed back on the patient.  She was given Decadron to help reduce edema.  Dr. Daley is aware of the patient and plans for surgical intervention.  Discussed the case with Dr. Stauffer, the admitting hospitalist at [facility], who will admit the patient to a monitored bed for further monitoring, evaluation, and treatment.  Patient was transferred to [facility].     Ashly Swift MD  03/10/18 3542

## 2018-03-10 NOTE — ED NOTES
Fall last night. Had been drinking alcohol prior to fall. States was tired and did not want to come in after her fall. States she went to bed after the fall.   reports hearing her get out of bed again last night with a subsequent repeat fall. Left wrist and hand pain. Left hip pain. Patient here via EMS. C Collar in place.

## 2018-03-10 NOTE — IP AVS SNAPSHOT
[address] [facility]    [facility] [address]    [place] [address]    Phone:  [phone]                                       After Visit Summary   3/10/2018    Antoinette Romero    MRN: [id number]           After Visit Summary Signature Page     I have received my discharge instructions, and my questions have been answered. I have discussed any challenges I see with this plan with the nurse or doctor.    ..........................................................................................................................................  Patient/Patient Representative Signature      ..........................................................................................................................................  Patient Representative Print Name and Relationship to Patient    ..................................................               ................................................  Date                                            Time    ..........................................................................................................................................  Reviewed by Signature/Title    ...................................................              ..............................................  Date                                                            Time

## 2018-03-10 NOTE — ED PROVIDER NOTES
History     Chief Complaint:  Fall, Hip Pain, Hand Pain, Neck Pain       HPI   Antoinette Romero is a 57 year old female who presents to the emergency department today via EMS, with C Collar in place, for evaluation after a fall last night. En route her blood pressure was 170/100 and her heart rate remained in the 90s. The patient states that last night she had a couple glasses of wine, and she used the bathroom, and when she went to get up she states that she became dizzy and subsequently became dizzy, falling forward, hitting her face, injuring her left arm and hip. She is not sure if she suffered loss of consciousness at this time. She subsequently called for her  who helped her to bed. She states that she could not walk due to the pain she was experiencing. She states that her  went to bed, and around 0030 he heard a noise from upstairs and he went to find out that the patient had tried to get up out of bed to use the bathroom and subsequently suffered another fall. The patient states that she hurt her neck at this time. Her  notes that she needed to get up last night and use the bathroom as much as 5 different times. The patient woke up today still experiencing pain, and she was unable to walk, so they subsequently called EMS to bring her into the ED. Here in the ED the patient is complaining of bilateral arm and hand pain, neck pain, and profuse body soreness that is worse on the left side of her body. She endorses new abrasions on  the right arm that she obtained from the fall. She also endorses a slight headache. The patient denies the use of blood thinners. She denies any recent changes in bowel or bladder habits.     The patient's  and daughter states that the patient does not usually experience chronic pain, only minor cramping. They are not concerned for alcohol abuse. The patient's father states that the patient is not normally an anxious person, though she does get nervous  in hospital setting. They deny her any narcotic use.       Allergies:  Keflex- anaphylaxis  Unasyn- anaphylaxis and swelling  Azithromycin- heart palpitations  Compazine- headaches  Zofran- headaches        Medications:    Buspar  Protonix  Nicoderm      Past Medical History:    Anastomotic ulcer   Arthritis   Hypertension   Migraine  Morbid obesity     Past Surgical History:    Endoscopic Upper Gastrointestinal, Remove Suture  EGD, combined  GYN Surgery  Laparoscopic Bypass Gastric  Laparoscopic Cholecystectomy  Laparoscopy Diagnostic  Laparoscopic Operative Adult     Family History:    Mother positive for Respiratory history, Diabetes, and Arthritis  Father positive for Neurologic Disorder  Maternal Grandfather positive for  Cancer  Maternal Grandmother positive for Rheumatoid Arthritis     Social History:  The patient was accompanied to the ED by  and daughter.  Smoking Status: current ppd smoker  Smokeless Tobacco: negative  Alcohol Use: positive, 2 glasses of wine per night  Marital Status:   [2]     Review of Systems   Gastrointestinal: Negative for constipation and diarrhea.   Genitourinary: Negative for dysuria and hematuria.   Musculoskeletal: Positive for neck pain.        Positive for bilateral arm pain. Positive for left leg pain, hip pain.    Neurological: Positive for headaches.   All other systems reviewed and are negative.      Physical Exam     Patient Vitals for the past 24 hrs:   BP Temp Temp src Heart Rate Resp SpO2   03/10/18 0945 (!) 185/102 - - - - 97 %   03/10/18 0930 (!) 185/144 - - - - 97 %   03/10/18 0915 (!) 164/119 - - - - 97 %   03/10/18 0906 - 99.3  F (37.4  C) Temporal - - -   03/10/18 0905 (!) 158/128 - - 74 24 97 %       Physical Exam  General: Uncomfortable appearing adult female laying flat on stretcher.  Eyes: PERRL, EOMI, Conjunctive within normal limits  HENT: Moist mucous membranes, oropharynx clear. No palpable scalp tenderness or hematoma.  Neck: tender midline  mid and lower cervical spine. No palpable stepoff or edema. Maintained in rigid cervical collar with C-Spine precautions.   CV: Normal S1S2, no murmur, rub or gallop. Regular rate and rhythm. Distal pulses intact.  Resp: Clear to auscultation bilaterally, no wheezes, rales or rhonchi. Normal respiratory effort.  GI: Abdomen is soft, nontender and nondistended. No palpable masses. No rebound or guarding.  MSK: Hyperesthesia to light touch, more so in the upper extremities. Multiple areas of tenderness, seen most localized to the right index finger, left thumb and index finger, left medial elbow, left mid humerus, left mid tib/fib, left posterior buttock region. Also tender in the mid and upper thoracic spine midline. No palpable crepitus, bony deformity, or palpable edema. She refuses to range the left leg. With any ranging of bilateral hand she cries out in pain. Otherwise ranges bilateral right leg and arms. Unable to assess  strength, as patient refuses, crying out in pain with attempt.   Skin: Warm and dry. No rashes or lesions or ecchymoses on visible skin.  Neuro: Alert and oriented. Responds appropriately to all questions and commands. No focal findings appreciated. Normal muscle tone. Sensation intact to light touch diffusely over the bilateral upper and lower extremity dermatomes.   GCS 15.  Psych: Anxious appearing.        Emergency Department Course   ECG:  Indication: Fall  Completed at 1102.  Read at 1106.   Rate 65 bpm. TN interval 88 ms. QRS duration 88 ms. QT/QTc 454/472 ms . P-R-T axes 31 72 68.  Sinus rhythm with short TN. Minimal voltage criteria for LVH, may be normal variant. Borderline ECG.     Imaging:  Radiographic findings were communicated with the patient who voiced understanding of the findings.    Cervical spine MRI w/o contrast:  Pending     CT Abdomen Pelvis w Contrast:  1. No significant change since the prior exam.  2. Prior cholecystectomy with intrahepatic and extra hepatic  biliary  ductal dilatation which is relatively stable but clinical correlation  is recommended  3. Postoperative changes from gastric surgery and vascular  calcifications are noted.  4. No acute appearing abnormality in the abdomen or pelvis. As per radiology.      Humerus XR, G/E 3 views, left:  Left humerus: Marked hypertrophic degenerative change at the  acromioclavicular joint. Mild glenohumeral degenerative change. No  acute bony abnormality. As per radiology.     Elbow XR< G/E 3 views, left:  Left elbow: No acute bony abnormality or joint space effusion. Rounded  calcification projecting in the soft tissues of the proximal forearm  on the ulnar side is likely a phlebolith. As per radiology.     XR Thoracic Spine 2 Views:  Thoracic spine: No wedge compression deformities. Multilevel  degenerative disc disease. As per radiology.     XR Pelvis and Hop Left 2 Views:  Pelvis and left hip: No acute or significant chronic bony abnormality. As per radiology.     Tib/Fib XR, left:  Left tibia/fibula: No acute or significant chronic bony or soft tissue  abnormality. Proximal tibial and fibular metaphyses are not completely  included on the lateral view. As per radiology.     XR Hand Bilateral, G/E 3 views:  Right hand: No acute bony abnormality. Some degenerative change at the  first carpometacarpal joint and first metacarpophalangeal joint. As per radiology.     Left hand: Fingers are fixed in flexion and not optimally profiled. No  acute bony abnormality. Mild degenerative change at the first  carpometacarpal joint and metacarpophalangeal joint as well as other  interphalangeal joints. As per radiology.     CT Head w/o Contrast:  1. Anterior frontal vertex scalp soft tissue swelling. Otherwise  normal CT scan of the head.  2. Chronic calcification or high attenuation abnormality in the left  globe superiorly and laterally, unchanged since 2009 but of  indeterminate etiology. As per radiology.     CT Cervical Spine w/o  Contrast:  1. No evidence of fracture.  2. Extensive degenerative changes, including C7-T1 grade 1  spondylolisthesis from degenerative facet arthropathy causing moderate  to severe spinal canal stenosis.  3. Soft tissue swelling adjacent to the lateral aspect of the right  paraspinous muscles anteriorly and posteriorly and in the prevertebral  space more on the left than on the right suggesting soft tissue  injury. This extends into the upper mediastinum. No discrete hematoma.  4. Pulmonary emphysema. As per radiology.     Laboratory:  CBC: Absolute Neutrophil 8.6 (H) o/w WNL. (WBC 10.8, HGB 12.3, )   CMP: Creatinine: 0.45 (L), Glucose 106 (H), AST 64 (H)  Alcohol ethyl: <0.01  UA with Microscopic: Large blood (A), 121 RBCs (H), Mucous Present (A), Few Amorphous Crystals (A)    Interventions:  0916 Normal Saline 1,000 mL IV  0917 Morphine 4 mg IV  1102 Morphine 4 mg IV  1116 Ativan 1 mg Oral    Emergency Department Course:  Nursing notes and vitals reviewed. I performed an exam of the patient as documented above.     Blood drawn. This was sent to the lab for further testing, results above.    The patient provided a urine sample here in the emergency department. This was sent for laboratory testing, findings above.     The patient was sent for the following imaging studies while in the emergency department:     -Elbow XR< G/E 3 views, left  -Humerus XR, G/E 3 views, left  -XR Pelvis and Hop Left 2 Views  -Tib/Fib XR, left  -CT Head w/o Contrast  -CT Cervical Spine w/o Contrast  -XR Thoracic Spine 2 Views  -XR Hand Bilateral, G/E 3 views     1108 I reevaluated the patient and provided an update in regards to her ED course.     The patient was sent for the following imaging studies while in the emergency department: CT Abdomen Pelvis w Contrast.    1324 I reevaluated the patient and provided an update in regards to her ED course.  At this time she indicated that she is feeling like she cannot feel her finger  and that they feel like dead weight to her.     The patient was sent for the following imaging studies while in the emergency department: Cervical spine MRI w/o contrast.    The patient was signed out to Dr. Swift of the emergency department pending MRI results.     Impression & Plan    Medical Decision Making:  Antoinette Romero is a 57 year old female with multiple medical problems who presents to the emergency department with concerns for fall last night. It sounds like it was secondary to vasovagal episode, resulting in head injury and multiple areas of body pain, then resulting in repeat fall after that. She presents to the emergency department today due to the intractable nature of her pain. She was neurovascularly intact on initial screening. Multiple imaging's were obtained due to the severity of her various pains. Ultimately there is no evidence of acute fracture on the areas where she was most tender. She had hematuria in her urine which is of unclear cause, and therefore CT scan of her pelvis was obtained to exclude renal fracture or large renal contusion. There is no evidence of acute abnormalities here that were concerning either. Later on in her stay she did state that she was having inability to move the digits of her left hand with left arm feeling like a dead weight. This could represent a cord injury, given the CT findings of stenosis which is unlikely acute but perhaps worsened, in this acute setting, MRI was obtained and is pending at the time of this dictation. The follow up with MRI results will be signed out with Dr. Swift of the ED with appropriate disposition planning after findings. I discussed this plan with the patient and she was agreeable with it.     Diagnosis:    ICD-10-CM    1. Closed head injury, initial encounter S09.90XA    2. Strain of neck muscle, initial encounter S16.1XXA    3. Bilateral hand pain M79.641     M79.642    4. Arm paresthesia, left R20.2    5. Contusion of left elbow,  initial encounter S50.02XA    6. Pain of left lower leg M79.662    7. Arm abrasion, left, initial encounter S40.812A    8. Other microscopic hematuria R31.29      Disposition:  The patient currently going to MRI, will be awaiting results, signed out to Dr. Swift of the ED for further care.    Scribe Disclosure:   I, Naye Garcia, am serving as a scribe on 3/10/2018 at 8:51 AM to personally document services performed by No att. providers found based on my observations and the provider's statements to me.     Naye Garcia  3/10/2018   [facility] EMERGENCY DEPARTMENT       Elaine Hou MD  03/11/18 0729

## 2018-03-10 NOTE — IP AVS SNAPSHOT
MRN:[id number]                      After Visit Summary   3/10/2018    Antoinette Romero    MRN: [id number]           Thank you!     Thank you for choosing [facility] for your care. Our goal is always to provide you with excellent care. Hearing back from our patients is one way we can continue to improve our services. Please take a few minutes to complete the written survey that you may receive in the mail after you visit with us. Thank you!        Patient Information     Date Of Birth          1960        Designated Caregiver       Most Recent Value    Caregiver    Will someone help with your care after discharge? yes    Name of designated caregiver Preston Manning    Phone number of caregiver [phone]    Caregiver address [address], [place] [address]      About your hospital stay     You were admitted on:  March 10, 2018 [facility]    You were discharged on:  March 18, 2018        Reason for your hospital stay       Cervial spine injury with spinal cord compression.                  Who to Call     For medical emergencies, please call 911.  For non-urgent questions about your medical care, please call your primary care provider or clinic, [phone]  For questions related to your surgery, please call your surgery clinic        Attending Provider     Provider Specialty    Ana Stauffer MD Internal Medicine       Primary Care Provider Office Phone # Fax #    Umu Roberto PA-C [phone]      After Care Instructions     Activity - Up ad praneeth       Discharge instructions:  No lifting of more than 10 pounds, no bending, no twisting until follow up visit.  Continue to wear brace. May take off with supervision during self cares.     Ok to shampoo hair, shower or bathe, but, do not scrub or submerge incision under water until evaluated post-operatively in clinic.    Ok to walk as  tolerated, avoid bed rest and prolonged sitting.    No contact sports until after follow up visit  No high impact activities such as; running/jogging, snowmobile or 4 keene riding or any other recreational vehicles.    Do not take NSAIDS (ibuprofen, advil, aleve, naproxen, etc) for pain control.    Do NOT drive while taking narcotic pain medication.    Call the [facility] at [phone] for increasing redness, swelling or pus draining from the incision, increased pain or any other questions and concerns.            Activity - Up with nursing assistance           Advance Diet as Tolerated       Follow this diet upon discharge: Orders Placed This Encounter      Room Service      Advance Diet as Tolerated: Regular Diet Adult              General info for SNF       Length of Stay Estimate: Short Term Care: Estimated # of Days <30  Condition at Discharge: Improving  Level of care:skilled   Rehabilitation Potential: Good  Admission H&P remains valid and up-to-date: Yes  Recent Chemotherapy: N/A  Use Nursing Home Standing Orders: Yes            Mantoux instructions       Give two-step Mantoux (PPD) Per Facility Policy Yes            Wound care       Site:   Cervical   Instructions:  Keep your incision clean and dry at all times.  OK to remove dressing on postop day 2.  OK to shower on postop day 3 and allow water to run over incision, pat dry after shower.  No bathing swimming or submerging in water.  Call immediately or come to ED if any drainage occurs, or you develop new pain, redness, or swelling.                  Follow-up Appointments     Follow Up and recommended labs and tests       Please [facility] in 6 weeks with a cervical xray prior.Please call the clinic at [phone] to schedule your appointment with Mojgan Callejas CNP or Kassidy Daly CNP            Follow Up and recommended labs and tests       Follow up with rehab provider.  BMP 1-2 weeks (new medication -  lisinopril).  Follow up with primary care provider after rehab discharge.                  Additional Services     Occupational Therapy Adult Consult       Evaluate and treat as clinically indicated.    Reason:  Weakness/deconditioning            Physical Therapy Adult Consult       Evaluate and treat as clinically indicated.    Reason:  weakness/deconditioning                  Future tests that were ordered for you     X-ray Cervical spine 2-3 vws       To include entire fusion as well                  Further instructions from your care team       [facility] at [facility]  [phone]  Monday-Friday; 8am-4:00pm    Care Instructions Following Spine Surgery  In General:   After you have had surgery on your spine, remember do not twist, or excessively flex or extend the area that you had surgery.  These activities can prevent healing.  Pain is normal and to be expected following surgery.  Please call our office to schedule your appointment follow up appointment.      Bowel Care:  Many people have constipation (hard stools) after surgery.  To help prevent constipation: Drink plenty of fluid (8-10 glasses/day); Eat more fiber, such as whole grain bread, bran cereal, and fruits and vegetables; Stay active by walking; Over the counter stool softener may also help.      Medications:  Spine surgery and pain management is unique to all patients.  You will generally be given medications for pain, muscle spasms or tightness, and for constipation during the immediate post op period.  It is important that you use these as prescribed.  Please remember to bring your pill bottles to all of your appointments.  Avoid alcoholic beverages while taking narcotic pain medications.  You can use ice to areas of pain as needed, 20 minutes at a time.  Changing positions and walking will help loosen your muscles as well.       Driving:  No driving while on narcotic pain medications.  It is state law not to drive  while under the influence of a drug to a degree which renders you incapable of safely driving.  The narcotic medication you will be taking after surgery falls under this category.    If you have had a cervical fusion, the restricted movement from twisting the neck would prevent you from driving safely.  Ask your provider at your first post op visit if it is ok to drive.      Activity:   After surgery, most people feel less pain than they have had in a long time.  Walking and light activities will help you regain the use of your muscles.  You are encouraged to walk: start with short walks 5-10 minutes at a time for 4-5 times per day and increase as tolerated.  Stair climbing as tolerated, we recommend you use the railing.     No lifting greater than 10 pounds: approximately equal to one gallon of milk. No twisting, bending in the area you have had surgery. No housework, vacuuming, laundry, leaf raking, lawn mowing, or snow removal. Wear your brace (if ordered) as directed.    Sexual activity: we recommend you abstain from sexual intercourse activity for 1 month until you can do so without pain.    Showers:  If you have sutures or staples you may shower two days after surgery. It is ok to let water run over your incision but do not touch or scrub on the incision. Pat dry immediately after showering. If there is a dressing in place, you may remove it 2 days after surgery.    If you were closed with Derma morris (glue), you may shower without covering the incision.    No baths, hot tubs, or pool activity for at least 6 weeks.     Nutrition:  In general, your diet restrictions will not change with your surgery.  You may need to eat small frequent meals initially until your appetite returns.  Eat plenty of high fiber foods and drink plenty of fluids. If you do not have a fluid restriction from or prior to surgery, we recommend 6-8 (8oz) glasses of water per day. Other fluids are fine, but water is best. Nausea is not  "uncommon; it is a common side effect to many pain medications.  We recommend that you take the pain medications with food, if this does not improve your symptoms, please call us.     Smoking:  For proper healing it is required that you quit using all tobacco products.  This includes smoking, chewing, nicotine gums, and nicotine patches.  Call your Doctor if these occur:  Drainage from your incision  Increased pain, redness, or swelling  Temperatures greater than 101.5  Increased leg pain or swelling  Unrelieved headaches    Go to the nearest Emergency Room if you experience:   Chest pain, shortness of breath   Neck swelling or swallowing problems      Pending Results     No orders found from 3/8/2018 to 3/11/2018.            Statement of Approval     Ordered          18 1122  I have reviewed and agree with all the recommendations and orders detailed in this document.  EFFECTIVE NOW     Approved and electronically signed by:  Ana Stauffer MD             Admission Information     Date & Time Provider Department Dept. Phone    3/10/2018 Ana Stauffer MD [address] [facility] [phone]      Your Vitals Were     Blood Pressure Pulse Temperature Respirations Last Period Pulse Oximetry    128/92 71 97.5  F (36.4  C) (Tympanic) 16 2010 95%      MyChart Information     [facility] lets you send messages to your doctor, view your test results, renew your prescriptions, schedule appointments and more. To sign up, go to [email or web address] . Click on \"Log in\" on the left side of the screen, which will take you to the Welcome page. Then click on \"Sign up Now\" on the right side of the page.     You will be asked to enter the access code listed below, as well as some personal information. Please follow the directions to create your username and password.     Your access code is: [email or web address]  Expires: 2018  1:49 PM     Your access code will  in 90 days. If you need help " or a new code, please call your [facility] clinic or [phone].        Care EveryWhere ID     This is your Care EveryWhere ID. This could be used by other organizations to access your [facility] medical records  [id number]        Equal Access to Services     MARY GALAN : Hadii aad ku hadleathabarbara Nessa, wacorinada luqadaha, qaybta kaalmada ademandy, enrike carie elmaxi prattjaswant alfredonicki torres. So [facility] [phone].    ATENCIÓN: Si habla español, tiene a shah disposición servicios gratuitos de asistencia lingüística. Llame al [phone].    We comply with applicable federal civil rights laws and [place] laws. We do not discriminate on the basis of race, color, national origin, age, disability, sex, sexual orientation, or gender identity.               Review of your medicines      START taking        Dose / Directions    * acetaminophen 325 MG tablet   Commonly known as:  TYLENOL   Used for:  Status post cervical spinal fusion        Dose:  650 mg   Take 2 tablets (650 mg) by mouth every 4 hours as needed for mild pain   Quantity:  100 tablet   Refills:  0       * acetaminophen 325 MG tablet   Commonly known as:  TYLENOL   Used for:  Status post cervical spinal fusion        Dose:  975 mg   Take 3 tablets (975 mg) by mouth every 8 hours   Quantity:  100 tablet   Refills:  0       benzocaine-menthol 6-10 MG lozenge   Commonly known as:  CHLORASEPTIC   Used for:  Status post cervical spinal fusion        Dose:  1 lozenge   Place 1 lozenge inside cheek every hour as needed for sore throat (dry/sore throat without fever)   Quantity:  84 lozenge   Refills:  0       clonazePAM 0.5 MG tablet   Commonly known as:  klonoPIN   Used for:  Adjustment disorder with anxious mood        Dose:  0.25 mg   Take 0.5 tablets (0.25 mg) by mouth 3 times daily as needed for anxiety   Quantity:  30 tablet   Refills:  0       cyclobenzaprine 10 MG tablet   Commonly known as:  FLEXERIL   Used for:  Status post cervical spinal fusion         Dose:  10 mg   Take 1 tablet (10 mg) by mouth 3 times daily as needed for muscle spasms   Quantity:  42 tablet   Refills:  0       lisinopril 10 MG tablet   Commonly known as:  PRINIVIL/ZESTRIL   Used for:  Benign essential hypertension        Dose:  5 mg   Take 0.5 tablets (5 mg) by mouth daily   Quantity:  30 tablet   Refills:  3       multivitamin, therapeutic with minerals Tabs tablet   Used for:  Alcohol dependence, daily use (H)        Dose:  1 tablet   Take 1 tablet by mouth daily   Quantity:  30 each   Refills:  0       oxyCODONE IR 5 MG tablet   Commonly known as:  ROXICODONE   Used for:  Status post cervical spinal fusion        Dose:  5-10 mg   Take 1-2 tablets (5-10 mg) by mouth every 4 hours as needed for moderate to severe pain   Quantity:  30 tablet   Refills:  0       QUEtiapine 25 MG tablet   Commonly known as:  SEROquel   Used for:  Adjustment disorder with anxious mood        Dose:  25 mg   Take 1 tablet (25 mg) by mouth 3 times daily as needed (Agitation or insomnia)   Quantity:  60 tablet   Refills:  0       * Notice:  This list has 2 medication(s) that are the same as other medications prescribed for you. Read the directions carefully, and ask your doctor or other care provider to review them with you.      CONTINUE these medicines which have NOT CHANGED        Dose / Directions    nicotine 7 MG/24HR 24 hr patch   Commonly known as:  NICODERM CQ   Used for:  Tobacco abuse        Dose:  1 patch   Place 1 patch onto the skin every 24 hours   Quantity:  30 patch   Refills:  1       pantoprazole 20 MG EC tablet   Commonly known as:  PROTONIX   Used for:  Non-intractable vomiting with nausea, unspecified vomiting type, S/P gastric bypass, Hiatal hernia, Ulcer (H)        Dose:  20 mg   Take 1 tablet (20 mg) by mouth daily Take by mouth 30-60 minutes before a meal.   Quantity:  90 tablet   Refills:  3         STOP taking     busPIRone 5 MG tablet   Commonly known as:  BUSPAR                Where to  get your medicines      Some of these will need a paper prescription and others can be bought over the counter. Ask your nurse if you have questions.     Bring a paper prescription for each of these medications     clonazePAM 0.5 MG tablet    oxyCODONE IR 5 MG tablet       You don't need a prescription for these medications     acetaminophen 325 MG tablet    acetaminophen 325 MG tablet    benzocaine-menthol 6-10 MG lozenge    cyclobenzaprine 10 MG tablet    lisinopril 10 MG tablet    multivitamin, therapeutic with minerals Tabs tablet    QUEtiapine 25 MG tablet                Protect others around you: Learn how to safely use, store and throw away your medicines at www.disposemymeds.org.        Information about OPIOIDS     PRESCRIPTION OPIOIDS: WHAT YOU NEED TO KNOW    Prescription opioids can be used to help relieve moderate to severe pain and are often prescribed following a surgery or injury, or for certain health conditions. These medications can be an important part of treatment but also come with serious risks. It is important to work with your health care provider to make sure you are getting the safest, most effective care.    WHAT ARE THE RISKS AND SIDE EFFECTS OF OPIOID USE?  Prescription opioids carry serious risks of addiction and overdose, especially with prolonged use. An opioid overdose, often marked by slowed breathing can cause sudden death. The use of prescription opioids can have a number of side effects as well, even when taken as directed:      Tolerance - meaning you might need to take more of a medication for the same pain relief    Physical dependence - meaning you have symptoms of withdrawal when a medication is stopped    Increased sensitivity to pain    Constipation    Nausea, vomiting, and dry mouth    Sleepiness and dizziness    Confusion    Depression    Low levels of testosterone that can result in lower sex drive, energy, and strength    Itching and sweating    RISKS ARE GREATER  WITH:    History of drug misuse, substance use disorder, or overdose    Mental health conditions (such as depression or anxiety)    Sleep apnea    Older age (65 years or older)    Pregnancy    Avoid alcohol while taking prescription opioids.   Also, unless specifically advised by your health care provider, medications to avoid include:    Benzodiazepines (such as Xanax or Valium)    Muscle relaxants (such as Soma or Flexeril)    Hypnotics (such as Ambien or Lunesta)    Other prescription opioids    KNOW YOUR OPTIONS:  Talk to your health care provider about ways to manage your pain that do not involve prescription opioids. Some of these options may actually work better and have fewer risks and side effects:    Pain relievers such as acetaminophen, ibuprofen, and naproxen    Some medications that are also used for depression or seizures    Physical therapy and exercise    Cognitive behavioral therapy, a psychological, goal-directed approach, in which patients learn how to modify physical, behavioral, and emotional triggers of pain and stress    IF YOU ARE PRESCRIBED OPIOIDS FOR PAIN:    Never take opioids in greater amounts or more often than prescribed    Follow up with your primary health care provider and work together to create a plan on how to manage your pain.    Talk about ways to help manage your pain that do not involve prescription opioids    Talk about all concerns and side effects    Help prevent misuse and abuse    Never sell or share prescription opioids    Never use another person's prescription opioids    Store prescription opioids in a secure place and out of reach of others (this may include visitors, children, friends, and family)    Visit www.cdc.gov/drugoverdose to learn about risks of opioid abuse and overdose    If you believe you may be struggling with addiction, tell your health care provider and ask for guidance or call [facility]'s National Helpline at 9-588-050-HELP    LEARN MORE /  www.cdc.gov/drugoverdose/prescribing/guideline.html    Safely dispose of unused prescription opioids: Find your local drug take-back programs and more information about the importance of safe disposal at www.doseofreality.[place].gov             Medication List: This is a list of all your medications and when to take them. Check marks below indicate your daily home schedule. Keep this list as a reference.      Medications           Morning Afternoon Evening Bedtime As Needed    * acetaminophen 325 MG tablet   Commonly known as:  TYLENOL   Take 2 tablets (650 mg) by mouth every 4 hours as needed for mild pain                                * acetaminophen 325 MG tablet   Commonly known as:  TYLENOL   Take 3 tablets (975 mg) by mouth every 8 hours                                benzocaine-menthol 6-10 MG lozenge   Commonly known as:  CHLORASEPTIC   Place 1 lozenge inside cheek every hour as needed for sore throat (dry/sore throat without fever)                                clonazePAM 0.5 MG tablet   Commonly known as:  klonoPIN   Take 0.5 tablets (0.25 mg) by mouth 3 times daily as needed for anxiety                                cyclobenzaprine 10 MG tablet   Commonly known as:  FLEXERIL   Take 1 tablet (10 mg) by mouth 3 times daily as needed for muscle spasms   Last time this was given:  10 mg on 3/18/2018  8:23 AM                                lisinopril 10 MG tablet   Commonly known as:  PRINIVIL/ZESTRIL   Take 0.5 tablets (5 mg) by mouth daily   Last time this was given:  5 mg on 3/18/2018  8:22 AM                                multivitamin, therapeutic with minerals Tabs tablet   Take 1 tablet by mouth daily   Last time this was given:  1 tablet on 3/14/2018  8:05 AM                                nicotine 7 MG/24HR 24 hr patch   Commonly known as:  NICODERM CQ   Place 1 patch onto the skin every 24 hours   Last time this was given:  1 patch on 3/16/2018  8:17 AM                                oxyCODONE IR 5 MG  tablet   Commonly known as:  ROXICODONE   Take 1-2 tablets (5-10 mg) by mouth every 4 hours as needed for moderate to severe pain   Last time this was given:  10 mg on 3/18/2018  8:22 AM                                pantoprazole 20 MG EC tablet   Commonly known as:  PROTONIX   Take 1 tablet (20 mg) by mouth daily Take by mouth 30-60 minutes before a meal.   Last time this was given:  20 mg on 3/18/2018  8:22 AM                                QUEtiapine 25 MG tablet   Commonly known as:  SEROquel   Take 1 tablet (25 mg) by mouth 3 times daily as needed (Agitation or insomnia)   Last time this was given:  25 mg on 3/17/2018 11:11 PM                                * Notice:  This list has 2 medication(s) that are the same as other medications prescribed for you. Read the directions carefully, and ask your doctor or other care provider to review them with you.              More Information        Spinal Fusion: Cervical  Fusing vertebrae in the cervical spine (the top 7 vertebrae of your spine) may help ease neck and arm pain. It may also help relieve progressive paralysis caused by compression of your nerve roots or spinal cord. Two or more vertebrae in your neck are fused. Cervical fusion is usually done through an incision in the front of the neck. It may sometimes be done through the back of the neck, or through both the front and back. The surgery generally takes from 1 to 4 hours.     Cervical vertebrae    The fusion procedure  These steps apply to fusion from the front of the neck:    The skin is cut and the muscles, blood vessels, trachea, and esophagus are pushed apart to get to the vertebrae and disks.    An X-ray is done to verify that the right spinal level is being operated on.    The disk is removed from between the vertebrae.    Bone spurs are removed.    Bone graft is packed into the now-empty space between the vertebrae. In time, the graft and the bone around it will grow into a solid unit.    To help  keep your spine steady and promote fusion, extra support (see below) may be used    The incision is closed with sutures or staples.     The disk between the vertebrae is removed.         Bone graft is packed into the now-empty space between the vertebrae.         Over a few months, the bone graft and vertebrae fuse into a solid unit.    If extra support is needed  Metal supports called instrumentation may be used to help steady your spine while it fuses. Your surgeon may use one or more types of support, such as a plate or a cage. A plate is a metal piece put across the front of the vertebrae and graft. It is secured with screws. A cage is a plastic or metal  basket  that is packed with bone graft or stem cells. This is inserted into the empty space where the disk was removed. These supports remain in the body and are not removed.  Date Last Reviewed: 10/5/2015    4643-8410 [employer]. [address] [place] [address]. All rights reserved. This information is not intended as a substitute for professional medical care. Always follow your healthcare professional's instructions.

## 2018-03-10 NOTE — ED NOTES
Bed: ED08  Expected date:   Expected time:   Means of arrival: Ambulance  Comments:  BV3. 57F.Fall

## 2018-03-11 ENCOUNTER — APPOINTMENT (OUTPATIENT)
Dept: GENERAL RADIOLOGY | Facility: CLINIC | Age: 58
DRG: 028 | End: 2018-03-11
Attending: NEUROLOGICAL SURGERY
Payer: COMMERCIAL

## 2018-03-11 ENCOUNTER — ANESTHESIA EVENT (OUTPATIENT)
Dept: SURGERY | Facility: CLINIC | Age: 58
DRG: 028 | End: 2018-03-11
Payer: COMMERCIAL

## 2018-03-11 ENCOUNTER — ANESTHESIA (OUTPATIENT)
Dept: SURGERY | Facility: CLINIC | Age: 58
DRG: 028 | End: 2018-03-11
Payer: COMMERCIAL

## 2018-03-11 LAB
ABO + RH BLD: NORMAL
ALBUMIN SERPL-MCNC: 3.5 G/DL (ref 3.4–5)
ALP SERPL-CCNC: 117 U/L (ref 40–150)
ALT SERPL W P-5'-P-CCNC: 31 U/L (ref 0–50)
AST SERPL W P-5'-P-CCNC: 48 U/L (ref 0–45)
BILIRUB DIRECT SERPL-MCNC: 0.2 MG/DL (ref 0–0.2)
BILIRUB SERPL-MCNC: 0.8 MG/DL (ref 0.2–1.3)
BLD GP AB SCN SERPL QL: NORMAL
BLD GP AB SCN SERPL QL: NORMAL
BLOOD BANK CMNT PATIENT-IMP: NORMAL
INR PPP: 0.91 (ref 0.86–1.14)
INTERPRETATION ECG - MUSE: NORMAL
PROT SERPL-MCNC: 6.7 G/DL (ref 6.8–8.8)
SPECIMEN EXP DATE BLD: NORMAL
SPECIMEN EXP DATE BLD: NORMAL

## 2018-03-11 PROCEDURE — 4A11X4G MONITORING OF PERIPHERAL NERVOUS ELECTRICAL ACTIVITY, INTRAOPERATIVE, EXTERNAL APPROACH: ICD-10-PCS | Performed by: NEUROLOGICAL SURGERY

## 2018-03-11 PROCEDURE — 95940 IONM IN OPERATNG ROOM 15 MIN: CPT | Performed by: NEUROLOGICAL SURGERY

## 2018-03-11 PROCEDURE — 25000125 ZZHC RX 250: Performed by: NEUROLOGICAL SURGERY

## 2018-03-11 PROCEDURE — 25000128 H RX IP 250 OP 636: Performed by: NURSE ANESTHETIST, CERTIFIED REGISTERED

## 2018-03-11 PROCEDURE — 27211022 ZZHC OR IOM SUPPLIES OPNP: Performed by: NEUROLOGICAL SURGERY

## 2018-03-11 PROCEDURE — 0RB30ZZ EXCISION OF CERVICAL VERTEBRAL DISC, OPEN APPROACH: ICD-10-PCS | Performed by: NEUROLOGICAL SURGERY

## 2018-03-11 PROCEDURE — 0RG40A0 FUSION OF CERVICOTHORACIC VERTEBRAL JOINT WITH INTERBODY FUSION DEVICE, ANTERIOR APPROACH, ANTERIOR COLUMN, OPEN APPROACH: ICD-10-PCS | Performed by: NEUROLOGICAL SURGERY

## 2018-03-11 PROCEDURE — 27210794 ZZH OR GENERAL SUPPLY STERILE: Performed by: NEUROLOGICAL SURGERY

## 2018-03-11 PROCEDURE — 37000009 ZZH ANESTHESIA TECHNICAL FEE, EACH ADDTL 15 MIN: Performed by: NEUROLOGICAL SURGERY

## 2018-03-11 PROCEDURE — 36415 COLL VENOUS BLD VENIPUNCTURE: CPT | Performed by: NEUROLOGICAL SURGERY

## 2018-03-11 PROCEDURE — 95926 SOMATOSENSORY TESTING: CPT | Performed by: NEUROLOGICAL SURGERY

## 2018-03-11 PROCEDURE — 27210995 ZZH RX 272: Performed by: NEUROLOGICAL SURGERY

## 2018-03-11 PROCEDURE — 0PS404Z REPOSITION THORACIC VERTEBRA WITH INTERNAL FIXATION DEVICE, OPEN APPROACH: ICD-10-PCS | Performed by: NEUROLOGICAL SURGERY

## 2018-03-11 PROCEDURE — 95925 SOMATOSENSORY TESTING: CPT | Performed by: NEUROLOGICAL SURGERY

## 2018-03-11 PROCEDURE — 71000012 ZZH RECOVERY PHASE 1 LEVEL 1 FIRST HR: Performed by: NEUROLOGICAL SURGERY

## 2018-03-11 PROCEDURE — 37000008 ZZH ANESTHESIA TECHNICAL FEE, 1ST 30 MIN: Performed by: NEUROLOGICAL SURGERY

## 2018-03-11 PROCEDURE — 22551 ARTHRD ANT NTRBDY CERVICAL: CPT | Performed by: NEUROLOGICAL SURGERY

## 2018-03-11 PROCEDURE — 25000132 ZZH RX MED GY IP 250 OP 250 PS 637: Performed by: PHYSICIAN ASSISTANT

## 2018-03-11 PROCEDURE — 25000132 ZZH RX MED GY IP 250 OP 250 PS 637: Performed by: INTERNAL MEDICINE

## 2018-03-11 PROCEDURE — 25000128 H RX IP 250 OP 636: Performed by: NEUROLOGICAL SURGERY

## 2018-03-11 PROCEDURE — 01N10ZZ RELEASE CERVICAL NERVE, OPEN APPROACH: ICD-10-PCS | Performed by: NEUROLOGICAL SURGERY

## 2018-03-11 PROCEDURE — 25000125 ZZHC RX 250: Performed by: NURSE ANESTHETIST, CERTIFIED REGISTERED

## 2018-03-11 PROCEDURE — 25000125 ZZHC RX 250

## 2018-03-11 PROCEDURE — 36000071 ZZH SURGERY LEVEL 5 W FLUORO 1ST 30 MIN: Performed by: NEUROLOGICAL SURGERY

## 2018-03-11 PROCEDURE — C1713 ANCHOR/SCREW BN/BN,TIS/BN: HCPCS | Performed by: NEUROLOGICAL SURGERY

## 2018-03-11 PROCEDURE — 80076 HEPATIC FUNCTION PANEL: CPT | Performed by: PHYSICIAN ASSISTANT

## 2018-03-11 PROCEDURE — 86900 BLOOD TYPING SEROLOGIC ABO: CPT | Performed by: NEUROLOGICAL SURGERY

## 2018-03-11 PROCEDURE — 12000007 ZZH R&B INTERMEDIATE

## 2018-03-11 PROCEDURE — 25000128 H RX IP 250 OP 636: Performed by: PHYSICIAN ASSISTANT

## 2018-03-11 PROCEDURE — 99233 SBSQ HOSP IP/OBS HIGH 50: CPT | Performed by: INTERNAL MEDICINE

## 2018-03-11 PROCEDURE — C1763 CONN TISS, NON-HUMAN: HCPCS | Performed by: NEUROLOGICAL SURGERY

## 2018-03-11 PROCEDURE — 25000301 ZZH OR RX SURGIFLO W/THROMBIN KIT 2ML 1991 OPNP: Performed by: NEUROLOGICAL SURGERY

## 2018-03-11 PROCEDURE — 40000170 ZZH STATISTIC PRE-PROCEDURE ASSESSMENT II: Performed by: NEUROLOGICAL SURGERY

## 2018-03-11 PROCEDURE — 25000566 ZZH SEVOFLURANE, EA 15 MIN: Performed by: NEUROLOGICAL SURGERY

## 2018-03-11 PROCEDURE — 22552 ARTHRD ANT NTRBD CERVICAL EA: CPT | Performed by: NEUROLOGICAL SURGERY

## 2018-03-11 PROCEDURE — 95929 C MOTOR EVOKED LWR LIMBS: CPT | Performed by: NEUROLOGICAL SURGERY

## 2018-03-11 PROCEDURE — 85610 PROTHROMBIN TIME: CPT | Performed by: PHYSICIAN ASSISTANT

## 2018-03-11 PROCEDURE — 40000277 XR SURGERY CARM FLUORO LESS THAN 5 MIN W STILLS

## 2018-03-11 PROCEDURE — 22846 INSERT SPINE FIXATION DEVICE: CPT | Mod: 59 | Performed by: NEUROLOGICAL SURGERY

## 2018-03-11 PROCEDURE — 25000128 H RX IP 250 OP 636: Performed by: ANESTHESIOLOGY

## 2018-03-11 PROCEDURE — 0RB50ZZ EXCISION OF CERVICOTHORACIC VERTEBRAL DISC, OPEN APPROACH: ICD-10-PCS | Performed by: NEUROLOGICAL SURGERY

## 2018-03-11 PROCEDURE — 0RG20A0 FUSION OF 2 OR MORE CERVICAL VERTEBRAL JOINTS WITH INTERBODY FUSION DEVICE, ANTERIOR APPROACH, ANTERIOR COLUMN, OPEN APPROACH: ICD-10-PCS | Performed by: NEUROLOGICAL SURGERY

## 2018-03-11 PROCEDURE — 0PS304Z REPOSITION CERVICAL VERTEBRA WITH INTERNAL FIXATION DEVICE, OPEN APPROACH: ICD-10-PCS | Performed by: NEUROLOGICAL SURGERY

## 2018-03-11 PROCEDURE — 99223 1ST HOSP IP/OBS HIGH 75: CPT | Performed by: NEUROLOGICAL SURGERY

## 2018-03-11 PROCEDURE — 27211024 ZZHC OR SUPPLY OTHER OPNP: Performed by: NEUROLOGICAL SURGERY

## 2018-03-11 PROCEDURE — 86850 RBC ANTIBODY SCREEN: CPT | Performed by: NEUROLOGICAL SURGERY

## 2018-03-11 PROCEDURE — 25000128 H RX IP 250 OP 636

## 2018-03-11 PROCEDURE — 25000128 H RX IP 250 OP 636: Performed by: INTERNAL MEDICINE

## 2018-03-11 PROCEDURE — 95937 NEUROMUSCULAR JUNCTION TEST: CPT | Performed by: NEUROLOGICAL SURGERY

## 2018-03-11 PROCEDURE — 22853 INSJ BIOMECHANICAL DEVICE: CPT | Performed by: NEUROLOGICAL SURGERY

## 2018-03-11 PROCEDURE — 86901 BLOOD TYPING SEROLOGIC RH(D): CPT | Performed by: NEUROLOGICAL SURGERY

## 2018-03-11 PROCEDURE — 95955 EEG DURING SURGERY: CPT | Performed by: NEUROLOGICAL SURGERY

## 2018-03-11 PROCEDURE — 36000069 ZZH SURGERY LEVEL 5 EA 15 ADDTL MIN: Performed by: NEUROLOGICAL SURGERY

## 2018-03-11 PROCEDURE — 95928 C MOTOR EVOKED UPPR LIMBS: CPT | Performed by: NEUROLOGICAL SURGERY

## 2018-03-11 DEVICE — IMP SCR GLOBUS PROV VAR ANG SELF DRILL 4.2X16MM: Type: IMPLANTABLE DEVICE | Site: SPINE CERVICAL | Status: FUNCTIONAL

## 2018-03-11 DEVICE — IMP SCR GLOBUS PROV FIXED ANG SELF DRILL 4.2X16MM: Type: IMPLANTABLE DEVICE | Site: SPINE CERVICAL | Status: FUNCTIONAL

## 2018-03-11 DEVICE — IMPLANTABLE DEVICE: Type: IMPLANTABLE DEVICE | Site: SPINE CERVICAL | Status: FUNCTIONAL

## 2018-03-11 RX ORDER — BUPIVACAINE HYDROCHLORIDE AND EPINEPHRINE 5; 5 MG/ML; UG/ML
INJECTION, SOLUTION PERINEURAL PRN
Status: DISCONTINUED | OUTPATIENT
Start: 2018-03-11 | End: 2018-03-11 | Stop reason: HOSPADM

## 2018-03-11 RX ORDER — ONDANSETRON 2 MG/ML
INJECTION INTRAMUSCULAR; INTRAVENOUS PRN
Status: DISCONTINUED | OUTPATIENT
Start: 2018-03-11 | End: 2018-03-11

## 2018-03-11 RX ORDER — KETAMINE HYDROCHLORIDE 10 MG/ML
INJECTION INTRAMUSCULAR; INTRAVENOUS PRN
Status: DISCONTINUED | OUTPATIENT
Start: 2018-03-11 | End: 2018-03-11

## 2018-03-11 RX ORDER — CLINDAMYCIN PHOSPHATE 900 MG/50ML
900 INJECTION, SOLUTION INTRAVENOUS SEE ADMIN INSTRUCTIONS
Status: DISCONTINUED | OUTPATIENT
Start: 2018-03-11 | End: 2018-03-11

## 2018-03-11 RX ORDER — FENTANYL CITRATE 50 UG/ML
INJECTION, SOLUTION INTRAMUSCULAR; INTRAVENOUS PRN
Status: DISCONTINUED | OUTPATIENT
Start: 2018-03-11 | End: 2018-03-11

## 2018-03-11 RX ORDER — HYDROMORPHONE HYDROCHLORIDE 1 MG/ML
.3-.5 INJECTION, SOLUTION INTRAMUSCULAR; INTRAVENOUS; SUBCUTANEOUS EVERY 5 MIN PRN
Status: DISCONTINUED | OUTPATIENT
Start: 2018-03-11 | End: 2018-03-11 | Stop reason: HOSPADM

## 2018-03-11 RX ORDER — HYDROMORPHONE HYDROCHLORIDE 1 MG/ML
.3-.5 INJECTION, SOLUTION INTRAMUSCULAR; INTRAVENOUS; SUBCUTANEOUS
Status: DISCONTINUED | OUTPATIENT
Start: 2018-03-11 | End: 2018-03-18 | Stop reason: HOSPADM

## 2018-03-11 RX ORDER — PROPOFOL 10 MG/ML
INJECTION, EMULSION INTRAVENOUS PRN
Status: DISCONTINUED | OUTPATIENT
Start: 2018-03-11 | End: 2018-03-11

## 2018-03-11 RX ORDER — SODIUM CHLORIDE, SODIUM LACTATE, POTASSIUM CHLORIDE, CALCIUM CHLORIDE 600; 310; 30; 20 MG/100ML; MG/100ML; MG/100ML; MG/100ML
INJECTION, SOLUTION INTRAVENOUS CONTINUOUS
Status: DISCONTINUED | OUTPATIENT
Start: 2018-03-11 | End: 2018-03-11 | Stop reason: HOSPADM

## 2018-03-11 RX ORDER — EPHEDRINE SULFATE 50 MG/ML
INJECTION, SOLUTION INTRAMUSCULAR; INTRAVENOUS; SUBCUTANEOUS PRN
Status: DISCONTINUED | OUTPATIENT
Start: 2018-03-11 | End: 2018-03-11

## 2018-03-11 RX ORDER — FENTANYL CITRATE 0.05 MG/ML
25-50 INJECTION, SOLUTION INTRAMUSCULAR; INTRAVENOUS
Status: DISCONTINUED | OUTPATIENT
Start: 2018-03-11 | End: 2018-03-11 | Stop reason: HOSPADM

## 2018-03-11 RX ORDER — HYDROMORPHONE HYDROCHLORIDE 2 MG/1
2-4 TABLET ORAL
Status: DISCONTINUED | OUTPATIENT
Start: 2018-03-11 | End: 2018-03-17

## 2018-03-11 RX ORDER — NALOXONE HYDROCHLORIDE 0.4 MG/ML
.1-.4 INJECTION, SOLUTION INTRAMUSCULAR; INTRAVENOUS; SUBCUTANEOUS
Status: ACTIVE | OUTPATIENT
Start: 2018-03-11 | End: 2018-03-12

## 2018-03-11 RX ORDER — SODIUM CHLORIDE, SODIUM LACTATE, POTASSIUM CHLORIDE, CALCIUM CHLORIDE 600; 310; 30; 20 MG/100ML; MG/100ML; MG/100ML; MG/100ML
INJECTION, SOLUTION INTRAVENOUS CONTINUOUS PRN
Status: DISCONTINUED | OUTPATIENT
Start: 2018-03-11 | End: 2018-03-11

## 2018-03-11 RX ORDER — DIAZEPAM 5 MG
5-10 TABLET ORAL EVERY 6 HOURS PRN
Status: DISCONTINUED | OUTPATIENT
Start: 2018-03-11 | End: 2018-03-13

## 2018-03-11 RX ORDER — DEXAMETHASONE SODIUM PHOSPHATE 10 MG/ML
INJECTION, SOLUTION INTRAMUSCULAR; INTRAVENOUS PRN
Status: DISCONTINUED | OUTPATIENT
Start: 2018-03-11 | End: 2018-03-11

## 2018-03-11 RX ORDER — PROPOFOL 10 MG/ML
INJECTION, EMULSION INTRAVENOUS CONTINUOUS PRN
Status: DISCONTINUED | OUTPATIENT
Start: 2018-03-11 | End: 2018-03-11

## 2018-03-11 RX ORDER — CLINDAMYCIN PHOSPHATE 900 MG/50ML
900 INJECTION, SOLUTION INTRAVENOUS
Status: DISCONTINUED | OUTPATIENT
Start: 2018-03-11 | End: 2018-03-11 | Stop reason: HOSPADM

## 2018-03-11 RX ORDER — DEXAMETHASONE SODIUM PHOSPHATE 4 MG/ML
4 INJECTION, SOLUTION INTRA-ARTICULAR; INTRALESIONAL; INTRAMUSCULAR; INTRAVENOUS; SOFT TISSUE EVERY 6 HOURS
Status: COMPLETED | OUTPATIENT
Start: 2018-03-11 | End: 2018-03-12

## 2018-03-11 RX ORDER — LIDOCAINE HYDROCHLORIDE 20 MG/ML
INJECTION, SOLUTION INFILTRATION; PERINEURAL PRN
Status: DISCONTINUED | OUTPATIENT
Start: 2018-03-11 | End: 2018-03-11

## 2018-03-11 RX ADMIN — HYDROMORPHONE HYDROCHLORIDE 0.5 MG: 1 INJECTION, SOLUTION INTRAMUSCULAR; INTRAVENOUS; SUBCUTANEOUS at 13:21

## 2018-03-11 RX ADMIN — DEXMEDETOMIDINE HYDROCHLORIDE 0.5 MCG: 100 INJECTION, SOLUTION INTRAVENOUS at 12:56

## 2018-03-11 RX ADMIN — OXYCODONE HYDROCHLORIDE 5 MG: 5 TABLET ORAL at 00:02

## 2018-03-11 RX ADMIN — LIDOCAINE HYDROCHLORIDE 60 MG: 20 INJECTION, SOLUTION INFILTRATION; PERINEURAL at 12:50

## 2018-03-11 RX ADMIN — PROPOFOL 150 MG: 10 INJECTION, EMULSION INTRAVENOUS at 12:50

## 2018-03-11 RX ADMIN — KETAMINE HYDROCHLORIDE 10 MG: 10 INJECTION, SOLUTION INTRAMUSCULAR; INTRAVENOUS at 15:25

## 2018-03-11 RX ADMIN — SUCCINYLCHOLINE CHLORIDE 100 MG: 20 INJECTION, SOLUTION INTRAMUSCULAR; INTRAVENOUS; PARENTERAL at 12:50

## 2018-03-11 RX ADMIN — SODIUM CHLORIDE, POTASSIUM CHLORIDE, SODIUM LACTATE AND CALCIUM CHLORIDE: 600; 310; 30; 20 INJECTION, SOLUTION INTRAVENOUS at 12:55

## 2018-03-11 RX ADMIN — KETAMINE HYDROCHLORIDE 10 MG: 10 INJECTION, SOLUTION INTRAMUSCULAR; INTRAVENOUS at 15:05

## 2018-03-11 RX ADMIN — HYDROMORPHONE HYDROCHLORIDE 0.5 MG: 1 INJECTION, SOLUTION INTRAMUSCULAR; INTRAVENOUS; SUBCUTANEOUS at 13:05

## 2018-03-11 RX ADMIN — KETAMINE HYDROCHLORIDE 10 MG: 10 INJECTION, SOLUTION INTRAMUSCULAR; INTRAVENOUS at 16:06

## 2018-03-11 RX ADMIN — DEXAMETHASONE SODIUM PHOSPHATE 10 MG: 10 INJECTION, SOLUTION INTRAMUSCULAR; INTRAVENOUS at 16:56

## 2018-03-11 RX ADMIN — PROPOFOL 100 MG: 10 INJECTION, EMULSION INTRAVENOUS at 13:30

## 2018-03-11 RX ADMIN — SODIUM CHLORIDE: 9 INJECTION, SOLUTION INTRAVENOUS at 20:45

## 2018-03-11 RX ADMIN — FENTANYL CITRATE 150 MCG: 50 INJECTION, SOLUTION INTRAMUSCULAR; INTRAVENOUS at 13:35

## 2018-03-11 RX ADMIN — REMIFENTANIL HYDROCHLORIDE 0.25 MCG/KG/MIN: 1 INJECTION, POWDER, LYOPHILIZED, FOR SOLUTION INTRAVENOUS at 13:59

## 2018-03-11 RX ADMIN — SODIUM CHLORIDE, POTASSIUM CHLORIDE, SODIUM LACTATE AND CALCIUM CHLORIDE: 600; 310; 30; 20 INJECTION, SOLUTION INTRAVENOUS at 14:11

## 2018-03-11 RX ADMIN — Medication 100 MG: at 08:04

## 2018-03-11 RX ADMIN — PROPOFOL: 10 INJECTION, EMULSION INTRAVENOUS at 16:37

## 2018-03-11 RX ADMIN — PHENYLEPHRINE HYDROCHLORIDE 200 MCG: 10 INJECTION INTRAVENOUS at 13:45

## 2018-03-11 RX ADMIN — HYDROMORPHONE HYDROCHLORIDE 0.3 MG: 1 INJECTION, SOLUTION INTRAMUSCULAR; INTRAVENOUS; SUBCUTANEOUS at 20:41

## 2018-03-11 RX ADMIN — LORAZEPAM 2 MG: 2 INJECTION INTRAMUSCULAR; INTRAVENOUS at 08:00

## 2018-03-11 RX ADMIN — PHENYLEPHRINE HYDROCHLORIDE 100 MCG: 10 INJECTION INTRAVENOUS at 13:16

## 2018-03-11 RX ADMIN — DEXAMETHASONE SODIUM PHOSPHATE 4 MG: 4 INJECTION, SOLUTION INTRAMUSCULAR; INTRAVENOUS at 06:01

## 2018-03-11 RX ADMIN — NICOTINE 1 PATCH: 7 PATCH, EXTENDED RELEASE TRANSDERMAL at 07:01

## 2018-03-11 RX ADMIN — DEXAMETHASONE SODIUM PHOSPHATE 4 MG: 4 INJECTION, SOLUTION INTRAMUSCULAR; INTRAVENOUS at 00:02

## 2018-03-11 RX ADMIN — SODIUM CHLORIDE, POTASSIUM CHLORIDE, SODIUM LACTATE AND CALCIUM CHLORIDE: 600; 310; 30; 20 INJECTION, SOLUTION INTRAVENOUS at 12:46

## 2018-03-11 RX ADMIN — PROPOFOL 20 MG: 10 INJECTION, EMULSION INTRAVENOUS at 13:04

## 2018-03-11 RX ADMIN — FENTANYL CITRATE 50 MCG: 50 INJECTION, SOLUTION INTRAMUSCULAR; INTRAVENOUS at 16:05

## 2018-03-11 RX ADMIN — OXYCODONE HYDROCHLORIDE 5 MG: 5 TABLET ORAL at 00:47

## 2018-03-11 RX ADMIN — FENTANYL CITRATE 100 MCG: 50 INJECTION, SOLUTION INTRAMUSCULAR; INTRAVENOUS at 12:50

## 2018-03-11 RX ADMIN — ONDANSETRON 4 MG: 2 INJECTION INTRAMUSCULAR; INTRAVENOUS at 17:00

## 2018-03-11 RX ADMIN — REMIFENTANIL HYDROCHLORIDE: 1 INJECTION, POWDER, LYOPHILIZED, FOR SOLUTION INTRAVENOUS at 15:43

## 2018-03-11 RX ADMIN — MIDAZOLAM 2 MG: 1 INJECTION INTRAMUSCULAR; INTRAVENOUS at 12:50

## 2018-03-11 RX ADMIN — FENTANYL CITRATE 50 MCG: 50 INJECTION, SOLUTION INTRAMUSCULAR; INTRAVENOUS at 15:01

## 2018-03-11 RX ADMIN — KETAMINE HYDROCHLORIDE 10 MG: 10 INJECTION, SOLUTION INTRAMUSCULAR; INTRAVENOUS at 15:41

## 2018-03-11 RX ADMIN — PHENYLEPHRINE HYDROCHLORIDE 100 MCG: 10 INJECTION INTRAVENOUS at 14:28

## 2018-03-11 RX ADMIN — FENTANYL CITRATE 100 MCG: 50 INJECTION, SOLUTION INTRAMUSCULAR; INTRAVENOUS at 13:51

## 2018-03-11 RX ADMIN — PROPOFOL 30 MG: 10 INJECTION, EMULSION INTRAVENOUS at 13:00

## 2018-03-11 RX ADMIN — KETAMINE HYDROCHLORIDE 40 MG: 10 INJECTION, SOLUTION INTRAMUSCULAR; INTRAVENOUS at 14:02

## 2018-03-11 RX ADMIN — OXYCODONE HYDROCHLORIDE 10 MG: 5 TABLET ORAL at 07:01

## 2018-03-11 RX ADMIN — PHENYLEPHRINE HYDROCHLORIDE 100 MCG: 10 INJECTION INTRAVENOUS at 14:11

## 2018-03-11 RX ADMIN — PROPOFOL: 10 INJECTION, EMULSION INTRAVENOUS at 15:47

## 2018-03-11 RX ADMIN — KETAMINE HYDROCHLORIDE 10 MG: 10 INJECTION, SOLUTION INTRAMUSCULAR; INTRAVENOUS at 16:25

## 2018-03-11 RX ADMIN — PROPOFOL: 10 INJECTION, EMULSION INTRAVENOUS at 15:08

## 2018-03-11 RX ADMIN — PROPOFOL 200 MCG/KG/MIN: 10 INJECTION, EMULSION INTRAVENOUS at 12:55

## 2018-03-11 RX ADMIN — LORAZEPAM 0.5 MG: 2 INJECTION INTRAMUSCULAR; INTRAVENOUS at 21:30

## 2018-03-11 RX ADMIN — OXYCODONE HYDROCHLORIDE 10 MG: 5 TABLET ORAL at 04:30

## 2018-03-11 RX ADMIN — FENTANYL CITRATE 50 MCG: 50 INJECTION, SOLUTION INTRAMUSCULAR; INTRAVENOUS at 15:38

## 2018-03-11 RX ADMIN — DEXAMETHASONE SODIUM PHOSPHATE 4 MG: 4 INJECTION, SOLUTION INTRAMUSCULAR; INTRAVENOUS at 23:18

## 2018-03-11 RX ADMIN — KETAMINE HYDROCHLORIDE 10 MG: 10 INJECTION, SOLUTION INTRAMUSCULAR; INTRAVENOUS at 15:51

## 2018-03-11 RX ADMIN — SODIUM CHLORIDE, POTASSIUM CHLORIDE, SODIUM LACTATE AND CALCIUM CHLORIDE: 600; 310; 30; 20 INJECTION, SOLUTION INTRAVENOUS at 14:45

## 2018-03-11 RX ADMIN — FENTANYL CITRATE 50 MCG: 50 INJECTION, SOLUTION INTRAMUSCULAR; INTRAVENOUS at 17:15

## 2018-03-11 RX ADMIN — REMIFENTANIL HYDROCHLORIDE: 1 INJECTION, POWDER, LYOPHILIZED, FOR SOLUTION INTRAVENOUS at 16:37

## 2018-03-11 RX ADMIN — FOLIC ACID 1 MG: 1 TABLET ORAL at 08:04

## 2018-03-11 RX ADMIN — PANTOPRAZOLE SODIUM 20 MG: 20 TABLET, DELAYED RELEASE ORAL at 08:04

## 2018-03-11 RX ADMIN — REMIFENTANIL HYDROCHLORIDE: 1 INJECTION, POWDER, LYOPHILIZED, FOR SOLUTION INTRAVENOUS at 15:08

## 2018-03-11 RX ADMIN — CLINDAMYCIN PHOSPHATE 900 MG: 18 INJECTION, SOLUTION INTRAVENOUS at 13:05

## 2018-03-11 RX ADMIN — OXYCODONE HYDROCHLORIDE 10 MG: 5 TABLET ORAL at 23:18

## 2018-03-11 RX ADMIN — HYDROMORPHONE HYDROCHLORIDE 0.2 MG: 1 INJECTION, SOLUTION INTRAMUSCULAR; INTRAVENOUS; SUBCUTANEOUS at 10:30

## 2018-03-11 RX ADMIN — FENTANYL CITRATE 50 MCG: 50 INJECTION, SOLUTION INTRAMUSCULAR; INTRAVENOUS at 16:27

## 2018-03-11 RX ADMIN — Medication 10 MG: at 13:30

## 2018-03-11 RX ADMIN — MULTIPLE VITAMINS W/ MINERALS TAB 1 TABLET: TAB at 08:04

## 2018-03-11 ASSESSMENT — VISUAL ACUITY
OU: NORMAL ACUITY

## 2018-03-11 ASSESSMENT — ACTIVITIES OF DAILY LIVING (ADL)
ADLS_ACUITY_SCORE: 14

## 2018-03-11 ASSESSMENT — LIFESTYLE VARIABLES: TOBACCO_USE: 1

## 2018-03-11 ASSESSMENT — COPD QUESTIONNAIRES: COPD: 1

## 2018-03-11 NOTE — PHARMACY-ADMISSION MEDICATION HISTORY
Admission medication history interview status for the 3/10/2018  admission is complete. See EPIC admission navigator for prior to admission medications     Medication history source reliability:Good    Actions taken by pharmacist (provider contacted, etc):None     Additional medication history information not noted on PTA med list :None    Medication reconciliation/reorder completed by provider prior to medication history? No    Time spent in this activity: 10min    Prior to Admission medications    Medication Sig Last Dose Taking? Auth Provider   pantoprazole (PROTONIX) 20 MG EC tablet Take 1 tablet (20 mg) by mouth daily Take by mouth 30-60 minutes before a meal. 3/10/2018 at am Yes Umu Roberto PA-C   busPIRone (BUSPAR) 5 MG tablet Start at 5 mg twice daily for 3 days, then 7.5 mg (1.5 tabs) twice daily for 3 days, then 10 mg (2 tabs) twice daily for 3 days, then 12.5 mg (2.5 tabs) twice daily for 3 days, then 15 mg (3 tabs) twice daily and stay at that dose was too expensive  Umu Roberto PA-C   nicotine (NICODERM CQ) 7 MG/24HR 24 hr patch Place 1 patch onto the skin every 24 hours not started yet  Umu Roberto PA-C

## 2018-03-11 NOTE — PLAN OF CARE
Problem: Patient Care Overview  Goal: Plan of Care/Patient Progress Review  Outcome: No Change  Patient here due to fall with injury to neck. A&O x4. Neuros include bilateral hand and left arm numbness. VSS. NPO for surgery. Strict bedrest; logroll with bedpan. IV ativan given for pain and CIWA score of 13 per protocol; reassessed 1 hour later at a score of 2. Patient in surgery today with Dr. Daley. Continue to monitor and follow POC.

## 2018-03-11 NOTE — ANESTHESIA CARE TRANSFER NOTE
Patient: Antoinette Romero    Procedure(s):   INTRA-OPERATIVE SPINAL CORD MONITIORING, ANTERIOR CERVICAL DECOMPRESSION AND FUSION C3-T1  - Wound Class: I-Clean    Diagnosis: central code syndrome  Diagnosis Additional Information: No value filed.    Anesthesia Type:   General, ETT     Note:  Airway :Face Mask  Patient transferred to:PACU  Comments: BP: 121/81  Pulse: 85  Resp: 14  SpO2: 99 %  Temp: 98.6    TOF 4/4 with sustained tetany > 5secs. Spontaneous resp with tidal volume >400ml.. Followed commands et purposeful. Strong tongue thrust. Extubated with cuff down. Pt maintains resp. O2 sat > 97%. Simple face mask on with 10l O2.To PACU: VSS, placed on monitors with alarms on. Report given to RN.;       Vitals: (Last set prior to Anesthesia Care Transfer)    CRNA VITALS  3/11/2018 1712 - 3/11/2018 1756      3/11/2018             Pulse: 97    SpO2: 98 %    Resp Rate (set): 10                Electronically Signed By: MARILIN Tracey CRNA  March 11, 2018  5:56 PM

## 2018-03-11 NOTE — PROGRESS NOTES
[facility]    Hospitalist Progress Note  Milad Long MD    Assessment & Plan     57 year old female, with past medical history of tobacco use, marijuana use, migraines, morbid obesity, with previous gastric bypass surgery and migraines, who was transferred from [facility] on 3/10/18, after sustaining multiple falls post drinking alcohol, the night prior. The patient woke up this morning and had difficulty with walking and left hand and left foot weakness. Work up done on 3/10/18 revealed, CMP significant for AST 64. CBC was normal. UA revealed, large amt of blood, , WBC 1. Serum ethanol was <0.01.     Imaging studies: X-rays of left humerus, left lebow, thoracic spine, left tibia and fibula, bilateral hands on 3/10/18 revealed:    Left humerus: Marked hypertrophic degenerative change at the  acromioclavicular joint. Mild glenohumeral degenerative change. No  acute bony abnormality.     Left elbow: No acute bony abnormality or joint space effusion. Rounded  calcification projecting in the soft tissues of the proximal forearm  on the ulnar side is likely a phlebolith.     Thoracic spine: No wedge compression deformities. Multilevel  degenerative disc disease.     Pelvis and left hip: No acute or significant chronic bony abnormality.     Left tibia/fibula: No acute or significant chronic bony or soft tissue  abnormality. Proximal tibial and fibular metaphyses are not completely  included on the lateral view.     Right hand: No acute bony abnormality. Some degenerative change at the  first carpometacarpal joint and first metacarpophalangeal joint.     Left hand: Fingers are fixed in flexion and not optimally profiled. No  acute bony abnormality. Mild degenerative change at the first  carpometacarpal joint and metacarpophalangeal joint as well as other  interphalangeal joints.    CT abd/pelvis on 3/10/18: was normal.     MRI Cervical spine on 3/10/18: revealed spinal  cord contusion at C5-C6 without hemorrhage.  Disruption of ligaments at C5-C6 anteriorly and posteriorly with fluid in the facet joints and in the disc space suggesting motion at this level. Fairly extensive edema, but no discrete hematoma in the paraspinous musculature more on the right than on the left and in the prevertebral space. Extensive degenerative changes.       1. Cervical spine contusion at C5-6 with disruption of posterior ligaments, Paraspinous edema and C7-T1 anterolisthesis and C5-6 severe spinal canal stenosis:  For review by Neurosurgeon.  Continue neuro checks q2hrs. Keep NPO.  For anterior and posterior cervical decompression and fusion today. Continue IV N/Saline. Continue IV Decadron 4 mg q 6 hours. Continue IV dilaudid prn. For IV Ativan prn.                          2. Alcohol use: Continue CIWA protocol. For review by Psychiatrist.        3. Anxiety/Depression: for review by Psychiatrist.     4. History of morbid obesity with previous gastric bypass and anastomosic ulcer: Continue Protonix 20 mg daily     5. Tobacco use: for nicotine patch prn.        DVT Prophylaxis: Pneumatic Compression Devices  Code Status: Full Code    Disposition: Expected discharge in 3-5 days.      Interval History   The pt has bilateral numbness and tingling in both upper extremities. She was taken to the OR today.    -Data reviewed today: I reviewed all new labs and imaging results over the last 24 hours. I personally reviewed no images or EKG's today.    Physical Exam   Temp: 98  F (36.7  C) Temp src: Oral BP: 146/88 Pulse: 71 Heart Rate: 75 Resp: 16 SpO2: 95 % O2 Device: None (Room air)    There were no vitals filed for this visit.  Vital Signs with Ranges  Temp:  [97.6  F (36.4  C)-98.1  F (36.7  C)] 98  F (36.7  C)  Pulse:  [71-73] 71  Heart Rate:  [73-76] 75  Resp:  [16-18] 16  BP: (144-163)/(86-95) 146/88  SpO2:  [93 %-97 %] 95 %       Constitutional: Adult female, awake, cooperative, no apparent distress, O2  Sats 96% on RA  Respiratory: BS vesicular bilaterally, no crackles or wheezing  Cardiovascular: S1 and S2, reg, no murmur noted  GI: Soft, non-distended, non-tender, no masses, BS present+  Skin/Integumen: No rashes  Extremities: No pedal edema    Medications     sodium chloride 75 mL/hr at 03/10/18 2233       dexamethasone  4 mg Intravenous Q6H     thiamine  100 mg Oral Daily     folic acid  1 mg Oral Daily     multivitamin, therapeutic with minerals  1 tablet Oral Daily     pantoprazole  20 mg Oral Daily     nicotine   Transdermal Daily     nicotine   Transdermal Q8H       Data     Recent Labs  Lab 03/11/18  0730 03/10/18  0919   WBC  --  10.8   HGB  --  12.3   MCV  --  83   PLT  --  278   INR 0.91  --    NA  --  137   POTASSIUM  --  3.9   CHLORIDE  --  106   CO2  --  25   BUN  --  12   CR  --  0.45*   ANIONGAP  --  6   BESSY  --  8.7   GLC  --  106*   ALBUMIN 3.5 3.8   PROTTOTAL 6.7* 7.0   BILITOTAL 0.8 0.6   ALKPHOS 117 106   ALT 31 35   AST 48* 64*       Recent Results (from the past 24 hour(s))   CT Cervical Spine w/o Contrast    Narrative    CT CERVICAL SPINE WITHOUT CONTRAST   3/10/2018 10:13 AM     HISTORY: Neck pain from trauma. Patient fell last night after becoming  dizzy, fell forward and hit face and injured left arm and hip.     TECHNIQUE: Axial images of the cervical spine were obtained without  intravenous contrast. Multiplanar reformations were performed.  Radiation dose for this scan was reduced using automated exposure  control, adjustment of the mA and/or kV according to patient size, or  iterative reconstruction technique.    COMPARISON: None.    FINDINGS: There is no evidence of fracture. There is multilevel  degenerative disc disease mainly from C3 through T1 with grade 1  spondylolisthesis at C7-T1. There is multilevel degenerative facet  arthropathy worse on the right at C4-C5 and on the left at C2-C3,  C3-C4, C4-C5 and C7-T1. There is moderate spinal canal stenosis at  C4-C5 and mild spinal  canal stenosis at C5-C6. There is moderate to  severe spinal canal stenosis at C7-T1. Axial soft tissue images show  strand-like densities in the fat adjacent to the right paraspinous  muscles as well as in the prevertebral space suggesting soft tissue  injury.      Impression    IMPRESSION:  1. No evidence of fracture.  2. Extensive degenerative changes, including C7-T1 grade 1  spondylolisthesis from degenerative facet arthropathy causing moderate  to severe spinal canal stenosis.  3. Soft tissue swelling adjacent to the lateral aspect of the right  paraspinous muscles anteriorly and posteriorly and in the prevertebral  space more on the left than on the right suggesting soft tissue  injury. This extends into the upper mediastinum. No discrete hematoma.  4. Pulmonary emphysema.    LOGAN BARRAZA MD   Elbow  XR, G/E 3 views, left    Narrative    LEFT HUMERUS TWO OR MORE VIEWS, LEFT ELBOW THREE OR MORE VIEWS,  THORACIC SPINE TWO VIEWS, TIBIA AND FIBULA LEFT TWO VIEWS, HAND  BILATERAL THREE OR MORE VIEWS 3/10/2018 10:55 AM     HISTORY: Trauma.    COMPARISON: None.      Impression    IMPRESSION:     Left humerus: Marked hypertrophic degenerative change at the  acromioclavicular joint. Mild glenohumeral degenerative change. No  acute bony abnormality.    Left elbow: No acute bony abnormality or joint space effusion. Rounded  calcification projecting in the soft tissues of the proximal forearm  on the ulnar side is likely a phlebolith.    Thoracic spine: No wedge compression deformities. Multilevel  degenerative disc disease.    Pelvis and left hip: No acute or significant chronic bony abnormality.    Left tibia/fibula: No acute or significant chronic bony or soft tissue  abnormality. Proximal tibial and fibular metaphyses are not completely  included on the lateral view.    Right hand: No acute bony abnormality. Some degenerative change at the  first carpometacarpal joint and first metacarpophalangeal joint.    Left  hand: Fingers are fixed in flexion and not optimally profiled. No  acute bony abnormality. Mild degenerative change at the first  carpometacarpal joint and metacarpophalangeal joint as well as other  interphalangeal joints.    HUMA RUIZ MD   Humerus XR,  G/E 2 views, left    Narrative    LEFT HUMERUS TWO OR MORE VIEWS, LEFT ELBOW THREE OR MORE VIEWS,  THORACIC SPINE TWO VIEWS, TIBIA AND FIBULA LEFT TWO VIEWS, HAND  BILATERAL THREE OR MORE VIEWS 3/10/2018 10:55 AM     HISTORY: Trauma.    COMPARISON: None.      Impression    IMPRESSION:     Left humerus: Marked hypertrophic degenerative change at the  acromioclavicular joint. Mild glenohumeral degenerative change. No  acute bony abnormality.    Left elbow: No acute bony abnormality or joint space effusion. Rounded  calcification projecting in the soft tissues of the proximal forearm  on the ulnar side is likely a phlebolith.    Thoracic spine: No wedge compression deformities. Multilevel  degenerative disc disease.    Pelvis and left hip: No acute or significant chronic bony abnormality.    Left tibia/fibula: No acute or significant chronic bony or soft tissue  abnormality. Proximal tibial and fibular metaphyses are not completely  included on the lateral view.    Right hand: No acute bony abnormality. Some degenerative change at the  first carpometacarpal joint and first metacarpophalangeal joint.    Left hand: Fingers are fixed in flexion and not optimally profiled. No  acute bony abnormality. Mild degenerative change at the first  carpometacarpal joint and metacarpophalangeal joint as well as other  interphalangeal joints.    HUMA RUIZ MD   XR Hand Bilateral G/E 3 Views    Narrative    LEFT HUMERUS TWO OR MORE VIEWS, LEFT ELBOW THREE OR MORE VIEWS,  THORACIC SPINE TWO VIEWS, TIBIA AND FIBULA LEFT TWO VIEWS, HAND  BILATERAL THREE OR MORE VIEWS 3/10/2018 10:55 AM     HISTORY: Trauma.    COMPARISON: None.      Impression    IMPRESSION:     Left humerus:  Marked hypertrophic degenerative change at the  acromioclavicular joint. Mild glenohumeral degenerative change. No  acute bony abnormality.    Left elbow: No acute bony abnormality or joint space effusion. Rounded  calcification projecting in the soft tissues of the proximal forearm  on the ulnar side is likely a phlebolith.    Thoracic spine: No wedge compression deformities. Multilevel  degenerative disc disease.    Pelvis and left hip: No acute or significant chronic bony abnormality.    Left tibia/fibula: No acute or significant chronic bony or soft tissue  abnormality. Proximal tibial and fibular metaphyses are not completely  included on the lateral view.    Right hand: No acute bony abnormality. Some degenerative change at the  first carpometacarpal joint and first metacarpophalangeal joint.    Left hand: Fingers are fixed in flexion and not optimally profiled. No  acute bony abnormality. Mild degenerative change at the first  carpometacarpal joint and metacarpophalangeal joint as well as other  interphalangeal joints.    HUMA RUIZ MD   XR Thoracic Spine 2 Views    Narrative    LEFT HUMERUS TWO OR MORE VIEWS, LEFT ELBOW THREE OR MORE VIEWS,  THORACIC SPINE TWO VIEWS, TIBIA AND FIBULA LEFT TWO VIEWS, HAND  BILATERAL THREE OR MORE VIEWS 3/10/2018 10:55 AM     HISTORY: Trauma.    COMPARISON: None.      Impression    IMPRESSION:     Left humerus: Marked hypertrophic degenerative change at the  acromioclavicular joint. Mild glenohumeral degenerative change. No  acute bony abnormality.    Left elbow: No acute bony abnormality or joint space effusion. Rounded  calcification projecting in the soft tissues of the proximal forearm  on the ulnar side is likely a phlebolith.    Thoracic spine: No wedge compression deformities. Multilevel  degenerative disc disease.    Pelvis and left hip: No acute or significant chronic bony abnormality.    Left tibia/fibula: No acute or significant chronic bony or soft  tissue  abnormality. Proximal tibial and fibular metaphyses are not completely  included on the lateral view.    Right hand: No acute bony abnormality. Some degenerative change at the  first carpometacarpal joint and first metacarpophalangeal joint.    Left hand: Fingers are fixed in flexion and not optimally profiled. No  acute bony abnormality. Mild degenerative change at the first  carpometacarpal joint and metacarpophalangeal joint as well as other  interphalangeal joints.    HUMA RUIZ MD   Tib/Fib XR, left    Narrative    LEFT HUMERUS TWO OR MORE VIEWS, LEFT ELBOW THREE OR MORE VIEWS,  THORACIC SPINE TWO VIEWS, TIBIA AND FIBULA LEFT TWO VIEWS, HAND  BILATERAL THREE OR MORE VIEWS 3/10/2018 10:55 AM     HISTORY: Trauma.    COMPARISON: None.      Impression    IMPRESSION:     Left humerus: Marked hypertrophic degenerative change at the  acromioclavicular joint. Mild glenohumeral degenerative change. No  acute bony abnormality.    Left elbow: No acute bony abnormality or joint space effusion. Rounded  calcification projecting in the soft tissues of the proximal forearm  on the ulnar side is likely a phlebolith.    Thoracic spine: No wedge compression deformities. Multilevel  degenerative disc disease.    Pelvis and left hip: No acute or significant chronic bony abnormality.    Left tibia/fibula: No acute or significant chronic bony or soft tissue  abnormality. Proximal tibial and fibular metaphyses are not completely  included on the lateral view.    Right hand: No acute bony abnormality. Some degenerative change at the  first carpometacarpal joint and first metacarpophalangeal joint.    Left hand: Fingers are fixed in flexion and not optimally profiled. No  acute bony abnormality. Mild degenerative change at the first  carpometacarpal joint and metacarpophalangeal joint as well as other  interphalangeal joints.    HUMA RUIZ MD   XR Pelvis and Hip Left 2 Views    Narrative    LEFT HUMERUS TWO OR MORE  VIEWS, LEFT ELBOW THREE OR MORE VIEWS,  THORACIC SPINE TWO VIEWS, TIBIA AND FIBULA LEFT TWO VIEWS, HAND  BILATERAL THREE OR MORE VIEWS 3/10/2018 10:55 AM     HISTORY: Trauma.    COMPARISON: None.      Impression    IMPRESSION:     Left humerus: Marked hypertrophic degenerative change at the  acromioclavicular joint. Mild glenohumeral degenerative change. No  acute bony abnormality.    Left elbow: No acute bony abnormality or joint space effusion. Rounded  calcification projecting in the soft tissues of the proximal forearm  on the ulnar side is likely a phlebolith.    Thoracic spine: No wedge compression deformities. Multilevel  degenerative disc disease.    Pelvis and left hip: No acute or significant chronic bony abnormality.    Left tibia/fibula: No acute or significant chronic bony or soft tissue  abnormality. Proximal tibial and fibular metaphyses are not completely  included on the lateral view.    Right hand: No acute bony abnormality. Some degenerative change at the  first carpometacarpal joint and first metacarpophalangeal joint.    Left hand: Fingers are fixed in flexion and not optimally profiled. No  acute bony abnormality. Mild degenerative change at the first  carpometacarpal joint and metacarpophalangeal joint as well as other  interphalangeal joints.    HUMA RUIZ MD   CT Abdomen Pelvis w Contrast    Narrative    CT ABDOMEN AND PELVIS WITH CONTRAST 3/10/2018 12:46 PM    HISTORY: Hematuria after trauma.    TECHNIQUE: Helical axial scans from dome of liver through pubic  symphysis with 65 mL Isovue-370 IV contrast. Radiation dose for this  scan was reduced using automated exposure control, adjustment of the  mA and/or kV according to patient size, or iterative reconstruction  technique.    COMPARISON: 4/26/2015.    FINDINGS: Emphysematous changes in the lung bases. Prior  cholecystectomy. Intrahepatic and extra hepatic biliary ductal  dilatation may be related to prior cholecystectomy alone and  appears  similar to the prior exam with maximum common duct diameter of about  1.5 cm. Clinical correlation for any elevation of serum bilirubin is  recommended. Scattered calcifications in the posterior spleen  consistent with old granulomatous disease. The pancreas is normal.  Minimal bilateral adrenal gland thickening, likely of no significance  and unchanged. Kidneys are unremarkable bilaterally. Vascular  calcifications are present. Prior gastric bypass surgery is again  noted. The remainder of the bowel and mesentery in the upper abdomen  are unremarkable.    Scans through the pelvis show no acute abnormality or free fluid. No  acute bony abnormality.      Impression    IMPRESSION:  1. No significant change since the prior exam.  2. Prior cholecystectomy with intrahepatic and extra hepatic biliary  ductal dilatation which is relatively stable but clinical correlation  is recommended  3. Postoperative changes from gastric surgery and vascular  calcifications are noted.  4. No acute appearing abnormality in the abdomen or pelvis.    HUMA RUIZ MD   Cervical spine MRI w/o contrast    Narrative    MRI CERVICAL SPINE WITHOUT CONTRAST 3/10/2018 2:53 PM     HISTORY: Patient fell after drinking last evening and felt dizzy, fell  again in the night. Complains of left arm and hip pain, neck pain and  hand pain. No fractures on CT scan but adjacent soft tissue swelling.    TECHNIQUE: Multiplanar, multisequence MRI of the cervical spine  without contrast.     COMPARISON: CT scan today.    FINDINGS: There is mild T1 hyperintensity in the spinal cord at C5-C6  on sagittal T2 weighted and STIR images #8.  There is no evidence of  hemorrhage on the gradient echo images.    There is T2 hyperintensity and T1 hypointensity in the paraspinous  soft tissues anterior to the cervical spine and upper thoracic spine  indicating edema. This extends from side to side. There is also edema  in the paraspinous soft tissues posteriorly  more on the right in the  upper to mid cervical spine than to the left. There is fluid in the  C5-C6 disc space with disruption of the anterior longitudinal  ligament. There is also fluid in the interspinous ligament posteriorly  at C5-C6 and in the adjacent soft tissues, and fluid in the facet  joints at this level. The findings indicate motion at this level due  to ligamentous disruption. However there is no evidence of fracture  even in retrospect on the previous CT scan. No bone marrow edema is  seen to indicate an occult fracture at any level. There is no epidural  fluid collection.    There is multilevel degenerative disc disease from C3 through T1.  There is multilevel degenerative facet arthropathy bilaterally. There  is grade 1 spondylolisthesis at C7-T1 that appears old and chronic.      Impression    IMPRESSION:  1. Spinal cord contusion at C5-C6 without hemorrhage.  2. Disruption of ligaments at C5-C6 anteriorly and posteriorly with  fluid in the facet joints and in the disc space suggesting motion at  this level.  3. Fairly extensive edema but no discrete hematoma in the paraspinous  musculature more on the right than on the left and in the prevertebral  space.  4. Extensive degenerative changes.    LOGAN BARRAZA MD

## 2018-03-11 NOTE — OR NURSING
Upon admin to PACU copious thick white secretions- Sx multiple times- eventual gone- lungs rhonchi - RR 12-16 O2 sats 96% w 2 ltres N/C-

## 2018-03-11 NOTE — PLAN OF CARE
Problem: Patient Care Overview  Goal: Plan of Care/Patient Progress Review  Outcome: Improving  A&Ox4, forgetful. Neuros: L droop (not fully participating), L side hemiparesis, numbness and tingling in BUE. Tele. VSS, RA. Bedrest. Regular diet, NPO at midnight. C/o neck, headache, BUE pain, decreased with ativan and IV dilaudid. CIWA -6. C/o nausea, denied interventions. Pt c/o BUE sensitive and painful with light touch. Neck collar in place. Plan for neurosurg consult in the morning.    0642:  Continues to have L side hemiparesis and numbness and tingling BUE. Tele NSR. Bedrest. C/o neck and BUE pain, decreased with oxycodone. Declines ativan. Neck collar in place.

## 2018-03-11 NOTE — CONSULTS
[facility]   Neurosurgery Consult Note         CC: Incomplete spinal cord injury    Primary care Provider: Umu Roberto    I was aked to see this patient by: AMRIK Moreno        [place]: Antoinette Romero is a 57 year old female that presented to [facility] ER with a complaint of multiple falls and weakness. She reportedly was intoxicated and had multiple falls and weakness in her left hand and left leg. She says both arms are weak and she has extreme hypersensitivity in her BUE and hands. She is unable to  with her left hand and has weakness left > right in the LE. She is very anxious and emotional at this time and says the pain in her arms and hypersensitivity are extreme.       Past Medical History:   Diagnosis Date     Anastomotic ulcer 3/28/2014    smoking history; 1PPD     Anxiety      Arthritis      Migraine      Morbid obesity (H)     s/p gastric bypass       Past Surgical History:   Procedure Laterality Date     ENDOSCOPIC UPPER GASTROINTESTINAL, REMOVE SUTURE N/A 1/17/2015    Procedure: ENDOSCOPIC UPPER GASTROINTESTINAL, REMOVE SUTURE;  Surgeon: Parviz Martinez MD;  Location:  OR     ESOPHAGOSCOPY, GASTROSCOPY, DUODENOSCOPY (EGD), COMBINED  2/18/2014    Procedure: COMBINED ESOPHAGOSCOPY, GASTROSCOPY, DUODENOSCOPY (EGD);  Esophagoscopy,Gastroscopy,Duodenoscopy;  Surgeon: Neo Sher MD;  Location:  GI     ESOPHAGOSCOPY, GASTROSCOPY, DUODENOSCOPY (EGD), COMBINED  5/7/2014    Procedure: COMBINED ESOPHAGOSCOPY, GASTROSCOPY, DUODENOSCOPY (EGD), BIOPSY SINGLE OR MULTIPLE;  Surgeon: Jose Antonio Valencia MD;  Location:  GI     GYN SURGERY       LAPAROSCOPIC BYPASS GASTRIC  3/25/2013    Procedure: LAPAROSCOPIC BYPASS GASTRIC;  Laparoscopic Nawaf En Y Gastric Bypass. Hiatel hernia repair;  Surgeon: Parviz Martinez MD;  Location:  OR     LAPAROSCOPIC CHOLECYSTECTOMY WITH CHOLANGIOGRAMS  3/28/2014    Procedure: LAPAROSCOPIC CHOLECYSTECTOMY WITH CHOLANGIOGRAMS;;   Surgeon: Jose Antonio Valencia MD;  Location: [facility] OR     LAPAROSCOPY DIAGNOSTIC (GENERAL)  3/28/2014    Procedure: LAPAROSCOPY DIAGNOSTIC (GENERAL);  Diagnostic Laparoscopy, laparoscopic cholecystectomy, EGD, repair of johnston defect;  Surgeon: Jose Antonio Valencia MD;  Location: [facility] OR     LAPAROSCOPY OPERATIVE ADULT N/A 1/17/2015    Procedure: LAPAROSCOPY OPERATIVE ADULT;  Surgeon: Parviz Martinez MD;  Location: [facility] OR       Current Facility-Administered Medications   Medication     naloxone (NARCAN) injection 0.1-0.4 mg     melatonin tablet 1 mg     acetaminophen (TYLENOL) tablet 650 mg     HYDROmorphone (PF) (DILAUDID) injection 0.2 mg     senna-docusate (SENOKOT-S;PERICOLACE) 8.6-50 MG per tablet 1 tablet    Or     senna-docusate (SENOKOT-S;PERICOLACE) 8.6-50 MG per tablet 2 tablet     bisacodyl (DULCOLAX) EC tablet 5 mg    Or     bisacodyl (DULCOLAX) EC tablet 10 mg    Or     bisacodyl (DULCOLAX) EC tablet 15 mg     dexamethasone (DECADRON) injection 4 mg     hydrALAZINE (APRESOLINE) injection 10 mg     LORazepam (ATIVAN) injection 0.5-1 mg     LORazepam (ATIVAN) tablet 1-2 mg    Or     LORazepam (ATIVAN) injection 1-2 mg     thiamine tablet 100 mg     folic acid (FOLVITE) tablet 1 mg     multivitamin, therapeutic with minerals (THERA-VIT-M) tablet 1 tablet     potassium chloride SA (K-DUR/KLOR-CON M) CR tablet 20-40 mEq     potassium chloride (KLOR-CON) Packet 20-40 mEq     potassium chloride 10 mEq in 100 mL sterile water intermittent infusion (premix)     potassium chloride 10 mEq in 100 mL intermittent infusion with 10 mg lidocaine     potassium chloride 20 mEq in 50 mL intermittent infusion     magnesium sulfate 4 g in 100 mL sterile water (premade)     pantoprazole (PROTONIX) EC tablet 20 mg     nicotine (NICODERM CQ) 7 MG/24HR 24 hr patch 1 patch     nicotine patch REMOVAL     nicotine Patch in Place     oxyCODONE IR (ROXICODONE) tablet 5-10 mg     sodium chloride 0.9% infusion       Allergies    Allergen Reactions     Keflex [Cephalexin-Fd&C Yellow #6] Anaphylaxis     Unasyn Anaphylaxis and Swelling     Azithromycin      Heart palpitations     Compazine [Prochlorperazine] Other (See Comments)     headaches     Zofran [Ondansetron] Other (See Comments)     headaches       Social History     Social History     Marital status:      Spouse name: Preston     Number of children: 2     Years of education: N/A     Occupational History      None      Social History Main Topics     Smoking status: Current Every Day Smoker     Packs/day: 1.00     Types: Cigarettes     Smokeless tobacco: Never Used      Comment: quite 1 week ago     Alcohol use No     Drug use: No     Sexual activity: Not Currently     Partners: Male     Other Topics Concern     None     Social History Narrative       Family History   Problem Relation Age of Onset     Respiratory Mother      DIABETES Mother      Arthritis Mother      OA     Neurologic Disorder Father      CANCER Maternal Grandfather      Arthritis Maternal Grandmother      RA     Breast Cancer No family hx of      Ovarian Cancer No family hx of          Review Of Systems  Skin: negative  Eyes: negative  Ears/Nose/Throat: negative  Respiratory: No shortness of breath, dyspnea on exertion, cough, or hemoptysis  Cardiovascular: negative  Gastrointestinal: negative  Genitourinary: negative  Musculoskeletal: as above  Neurologic: as above  Psychiatric: negative  Hematologic/Lymphatic/Immunologic: negative  Endocrine: negative    B/P: 145/86, T: 97.8, P: 71, R: 16    Examination:  Very emotional  No obvious labored respiration  No skin lesions noted   No obvious pitting edema  No abnormal psychiatric behavior  No obvious musculoskeletal abnormalities   Awake  Alert  Oriented x 3  Pupils reactive and EOMI  Speech clear  Cranial nerves II - XII intact  Face symmetric  Tongue midline  Neck tender and in hard collar  Limited ROM of neck in collar  Motor exam    RUE - deltoid  4+/5, biceps 4+/5, triceps 4+/5, wrist extension 4+/5, wrist flexion 4/5,  4-/5   LUE - deltoid 4/5, biceps 5/5, triceps 4-/5, wrist extension 4-/5, wrist flexion 4-/5,  0/5   RLE - 4+/5 throughout   LLE - 4-/5 DF/PF, quads and hamstrings    Sensation hypersensate and unable to examine  Clonus unable to tolerate testing  DTR 3+   Hernandez's sign positive  Spurling's sign unable to examine  Ambulation not examined, but, she has weakness in both legs left > right    Imaging:   CT cervical spine - C7-T1 subluxation and no fractures, soft tissue swelling in the right paraspinous muscles and anteriorly and posteriorly.    MRI cervical spine - C5-6 spinal cord contusion with T2 changes and severe stenosis from C3-6 and C7-T1 subluxation with severe bilateral foraminal stenosis. Also, likely torn posterior ligaments at C5-6      Diagnosis:  Incomplete spinal cord injury  Central cord syndrome  C3-T1 severe stenosis with cord contusion  Cervical cinstability    Assessment/Plan:   I have discussed with the patient that she has a very serious cervical spine injury. She has central cord syndrome and weakness in her BUE L>R and no  on the left. She also has BLE weakness L > R. This is a difficulty situation because her contusion is at C5-6 and she has severe stenosis from C3-6 and a C7-T1 subluxation with severe stenosis in the bilateral foramina with is likely compressing her C8 roots. We discussed the options of C3-6 ACDF with C3-6 posterior decompression and instrumented fusion vs C3-T1 ACDF and C3-T2 posterior decompression and instrumented fusion. SHe understands that the subluxation at C7-T1 could be causing the  weakness and also could become grossly unstable if she has the shorter segment fusion. She also has torn posterior ligaments at C5-6 which is causing instability. We will plan to proceed with anterior and posterior cervical decompression and fusion with intraoperative spinal cord monitoring later  today.    NPO          Maynor Daley MD, MS, FAANS  Neurosurgeon  [facility]  [facility]  [address], [address]  [place]  [address]  Tel [phone]

## 2018-03-11 NOTE — ANESTHESIA PREPROCEDURE EVALUATION
Procedure: Procedure(s):  COMBINED DECOMPRESSION, FUSION CERVICAL ANTERIOR THREE+ LEVELS  COMBINED DECOMPRESSION, FUSION LUMBAR POSTERIOR THREE + LEVELS  Preop diagnosis: central code syndrome    Allergies   Allergen Reactions     Keflex [Cephalexin-Fd&C Yellow #6] Anaphylaxis     Unasyn Anaphylaxis and Swelling     Azithromycin      Heart palpitations     Compazine [Prochlorperazine] Other (See Comments)     headaches     Zofran [Ondansetron] Other (See Comments)     headaches     Past Medical History:   Diagnosis Date     Anastomotic ulcer 3/28/2014    smoking history; 1PPD     Anxiety      Arthritis      Migraine      Morbid obesity (H)     s/p gastric bypass     Past Surgical History:   Procedure Laterality Date     ENDOSCOPIC UPPER GASTROINTESTINAL, REMOVE SUTURE N/A 1/17/2015    Procedure: ENDOSCOPIC UPPER GASTROINTESTINAL, REMOVE SUTURE;  Surgeon: Parviz Martinez MD;  Location:  OR     ESOPHAGOSCOPY, GASTROSCOPY, DUODENOSCOPY (EGD), COMBINED  2/18/2014    Procedure: COMBINED ESOPHAGOSCOPY, GASTROSCOPY, DUODENOSCOPY (EGD);  Esophagoscopy,Gastroscopy,Duodenoscopy;  Surgeon: Neo Sher MD;  Location: [facility]     ESOPHAGOSCOPY, GASTROSCOPY, DUODENOSCOPY (EGD), COMBINED  5/7/2014    Procedure: COMBINED ESOPHAGOSCOPY, GASTROSCOPY, DUODENOSCOPY (EGD), BIOPSY SINGLE OR MULTIPLE;  Surgeon: Jose Antonio Valencia MD;  Location:  GI     GYN SURGERY       LAPAROSCOPIC BYPASS GASTRIC  3/25/2013    Procedure: LAPAROSCOPIC BYPASS GASTRIC;  Laparoscopic Nawaf En Y Gastric Bypass. Hiatel hernia repair;  Surgeon: Parviz Martinez MD;  Location:  OR     LAPAROSCOPIC CHOLECYSTECTOMY WITH CHOLANGIOGRAMS  3/28/2014    Procedure: LAPAROSCOPIC CHOLECYSTECTOMY WITH CHOLANGIOGRAMS;;  Surgeon: Jose Antonio Valencia MD;  Location: [facility] OR     LAPAROSCOPY DIAGNOSTIC (GENERAL)  3/28/2014    Procedure: LAPAROSCOPY DIAGNOSTIC (GENERAL);  Diagnostic Laparoscopy, laparoscopic cholecystectomy, EGD, repair of johnston  defect;  Surgeon: Jose Antonio Valencia MD;  Location: [facility] OR     LAPAROSCOPY OPERATIVE ADULT N/A 1/17/2015    Procedure: LAPAROSCOPY OPERATIVE ADULT;  Surgeon: Parviz Martinez MD;  Location: [facility] OR     Social History   Substance Use Topics     Smoking status: Current Every Day Smoker     Packs/day: 1.00     Types: Cigarettes     Smokeless tobacco: Never Used      Comment: quite 1 week ago     Alcohol use No     Prior to Admission medications    Medication Sig Start Date End Date Taking? Authorizing Provider   pantoprazole (PROTONIX) 20 MG EC tablet Take 1 tablet (20 mg) by mouth daily Take by mouth 30-60 minutes before a meal. 3/8/18  Yes Umu Roberto PA-C   busPIRone (BUSPAR) 5 MG tablet Start at 5 mg twice daily for 3 days, then 7.5 mg (1.5 tabs) twice daily for 3 days, then 10 mg (2 tabs) twice daily for 3 days, then 12.5 mg (2.5 tabs) twice daily for 3 days, then 15 mg (3 tabs) twice daily and stay at that dose 3/8/18   Umu Roberto PA-C   nicotine (NICODERM CQ) 7 MG/24HR 24 hr patch Place 1 patch onto the skin every 24 hours 3/8/18   Umu Roberto PA-C     Current Facility-Administered Medications Ordered in Epic   Medication Dose Route Frequency Last Rate Last Dose     clindamycin (CLEOCIN) infusion 900 mg  900 mg Intravenous Pre-Op/Pre-procedure x 1 dose         clindamycin (CLEOCIN) infusion 900 mg  900 mg Intravenous See Admin Instructions         [Auto Hold] naloxone (NARCAN) injection 0.1-0.4 mg  0.1-0.4 mg Intravenous Q2 Min PRN         [Auto Hold] melatonin tablet 1 mg  1 mg Oral At Bedtime PRN         [Auto Hold] acetaminophen (TYLENOL) tablet 650 mg  650 mg Oral Q4H PRN         [Auto Hold] HYDROmorphone (PF) (DILAUDID) injection 0.2 mg  0.2 mg Intravenous Q2H PRN   0.2 mg at 03/10/18 2117     [Auto Hold] senna-docusate (SENOKOT-S;PERICOLACE) 8.6-50 MG per tablet 1 tablet  1 tablet Oral BID PRN        Or     [Auto Hold] senna-docusate (SENOKOT-S;PERICOLACE) 8.6-50 MG per tablet 2  tablet  2 tablet Oral BID PRN         [Auto Hold] bisacodyl (DULCOLAX) EC tablet 5 mg  5 mg Oral Daily PRN        Or     [Auto Hold] bisacodyl (DULCOLAX) EC tablet 10 mg  10 mg Oral Daily PRN        Or     [Auto Hold] bisacodyl (DULCOLAX) EC tablet 15 mg  15 mg Oral Daily PRN         [Auto Hold] dexamethasone (DECADRON) injection 4 mg  4 mg Intravenous Q6H   4 mg at 03/11/18 0601     [Auto Hold] hydrALAZINE (APRESOLINE) injection 10 mg  10 mg Intravenous Q4H PRN         [Auto Hold] LORazepam (ATIVAN) injection 0.5-1 mg  0.5-1 mg Intravenous Q6H PRN   0.5 mg at 03/10/18 2031     [Auto Hold] LORazepam (ATIVAN) tablet 1-2 mg  1-2 mg Oral Q30 Min PRN        Or     [Auto Hold] LORazepam (ATIVAN) injection 1-2 mg  1-2 mg Intravenous Q30 Min PRN   2 mg at 03/11/18 0800     [Auto Hold] thiamine tablet 100 mg  100 mg Oral Daily   100 mg at 03/11/18 0804     [Auto Hold] folic acid (FOLVITE) tablet 1 mg  1 mg Oral Daily   1 mg at 03/11/18 0804     [Auto Hold] multivitamin, therapeutic with minerals (THERA-VIT-M) tablet 1 tablet  1 tablet Oral Daily   1 tablet at 03/11/18 0804     [Auto Hold] potassium chloride SA (K-DUR/KLOR-CON M) CR tablet 20-40 mEq  20-40 mEq Oral Q2H PRN         [Auto Hold] potassium chloride (KLOR-CON) Packet 20-40 mEq  20-40 mEq Oral or Feeding Tube Q2H PRN         [Auto Hold] potassium chloride 10 mEq in 100 mL sterile water intermittent infusion (premix)  10 mEq Intravenous Q1H PRN         [Auto Hold] potassium chloride 10 mEq in 100 mL intermittent infusion with 10 mg lidocaine  10 mEq Intravenous Q1H PRN         [Auto Hold] potassium chloride 20 mEq in 50 mL intermittent infusion  20 mEq Intravenous Q1H PRN         [Auto Hold] magnesium sulfate 4 g in 100 mL sterile water (premade)  4 g Intravenous Q4H PRN         [Auto Hold] pantoprazole (PROTONIX) EC tablet 20 mg  20 mg Oral Daily   20 mg at 03/11/18 0804     [Auto Hold] nicotine (NICODERM CQ) 7 MG/24HR 24 hr patch 1 patch  1 patch Transdermal  Daily PRN   1 patch at 03/11/18 0701     [Auto Hold] nicotine patch REMOVAL   Transdermal Daily         [Auto Hold] nicotine Patch in Place   Transdermal Q8H         [Auto Hold] oxyCODONE IR (ROXICODONE) tablet 5-10 mg  5-10 mg Oral Q3H PRN   10 mg at 03/11/18 0701     sodium chloride 0.9% infusion   Intravenous Continuous 75 mL/hr at 03/10/18 2233       No current Epic-ordered outpatient prescriptions on file.       sodium chloride 75 mL/hr at 03/10/18 2233     Wt Readings from Last 1 Encounters:   03/08/18 59.4 kg (131 lb)     Temp Readings from Last 1 Encounters:   03/11/18 36.7  C (98  F) (Oral)     BP Readings from Last 6 Encounters:   03/11/18 (!) 140/91   03/10/18 (!) 185/102   03/08/18 130/81   08/17/17 142/80   01/03/17 132/80   06/17/15 123/90     Pulse Readings from Last 4 Encounters:   03/11/18 71   03/08/18 76   08/17/17 76   01/03/17 89     Resp Readings from Last 1 Encounters:   03/11/18 13     SpO2 Readings from Last 1 Encounters:   03/11/18 96%     Recent Labs   Lab Test  03/10/18   0919  01/03/17   0851   NA  137  142   POTASSIUM  3.9  4.3   CHLORIDE  106  107   CO2  25  27   ANIONGAP  6  8   GLC  106*  99   BUN  12  12   CR  0.45*  0.76   BESSY  8.7  9.0     Recent Labs   Lab Test  03/11/18   0730  03/10/18   0919   04/25/15   2344  04/24/15   0500   AST  48*  64*   < >  26  30   ALT  31  35   < >  17  18   ALKPHOS  117  106   < >  111  120   BILITOTAL  0.8  0.6   < >  0.5  0.4   LIPASE   --    --    --   148  160    < > = values in this interval not displayed.     Recent Labs   Lab Test  03/10/18   0919  01/03/17   0851   WBC  10.8  7.5   HGB  12.3  13.5   PLT  278  290     Recent Labs   Lab Test  03/11/18   0802   ABO  O   RH  Pos     Recent Labs   Lab Test  03/11/18   0730  01/03/17   0851   INR  0.91  0.93      Recent Labs   Lab Test  02/11/14   0845   TROPI  <0.012     No results for input(s): PH, PCO2, PO2, HCO3 in the last 89222 hours.  No results for input(s): HCG in the last 27368  hours.  Recent Results (from the past 744 hour(s))   CT Head w/o Contrast    Narrative    CT SCAN OF THE HEAD WITHOUT CONTRAST   3/10/2018 10:11 AM     HISTORY:  Headache, trauma.     TECHNIQUE:  Axial images of the head and coronal reformations without  IV contrast material. Radiation dose for this scan was reduced using  automated exposure control, adjustment of the mA and/or kV according  to patient size, or iterative reconstruction technique.    COMPARISON: 5/1/2015 and 11/4/2009    FINDINGS:  The ventricles are normal in size, shape and configuration.  The brain parenchyma and subarachnoid spaces are normal. There is no  evidence of intracranial hemorrhage, mass, acute infarct or anomaly.     There is moderate mucosal thickening in the nasal cavity. Paranasal  sinuses and mastoids are clear with interval clearing of right mastoid  and middle ear fluid. There is a small focus of increased attenuation  at the posterior lateral superior aspect of the left globe along the  sclera or the choroid or retina, but this is unchanged back to 2009.  There is anterior frontal central scalp soft tissue swelling with no  underlying hematoma. No evidence of fracture.      Impression    IMPRESSION:  1. Anterior frontal vertex scalp soft tissue swelling. Otherwise  normal CT scan of the head.  2. Chronic calcification or high attenuation abnormality in the left  globe superiorly and laterally, unchanged since 2009 but of  indeterminate etiology.      LOGAN BARRAZA MD   CT Cervical Spine w/o Contrast    Narrative    CT CERVICAL SPINE WITHOUT CONTRAST   3/10/2018 10:13 AM     HISTORY: Neck pain from trauma. Patient fell last night after becoming  dizzy, fell forward and hit face and injured left arm and hip.     TECHNIQUE: Axial images of the cervical spine were obtained without  intravenous contrast. Multiplanar reformations were performed.  Radiation dose for this scan was reduced using automated exposure  control, adjustment  of the mA and/or kV according to patient size, or  iterative reconstruction technique.    COMPARISON: None.    FINDINGS: There is no evidence of fracture. There is multilevel  degenerative disc disease mainly from C3 through T1 with grade 1  spondylolisthesis at C7-T1. There is multilevel degenerative facet  arthropathy worse on the right at C4-C5 and on the left at C2-C3,  C3-C4, C4-C5 and C7-T1. There is moderate spinal canal stenosis at  C4-C5 and mild spinal canal stenosis at C5-C6. There is moderate to  severe spinal canal stenosis at C7-T1. Axial soft tissue images show  strand-like densities in the fat adjacent to the right paraspinous  muscles as well as in the prevertebral space suggesting soft tissue  injury.      Impression    IMPRESSION:  1. No evidence of fracture.  2. Extensive degenerative changes, including C7-T1 grade 1  spondylolisthesis from degenerative facet arthropathy causing moderate  to severe spinal canal stenosis.  3. Soft tissue swelling adjacent to the lateral aspect of the right  paraspinous muscles anteriorly and posteriorly and in the prevertebral  space more on the left than on the right suggesting soft tissue  injury. This extends into the upper mediastinum. No discrete hematoma.  4. Pulmonary emphysema.    LOGAN BARRAZA MD   Elbow  XR, G/E 3 views, left    Narrative    LEFT HUMERUS TWO OR MORE VIEWS, LEFT ELBOW THREE OR MORE VIEWS,  THORACIC SPINE TWO VIEWS, TIBIA AND FIBULA LEFT TWO VIEWS, HAND  BILATERAL THREE OR MORE VIEWS 3/10/2018 10:55 AM     HISTORY: Trauma.    COMPARISON: None.      Impression    IMPRESSION:     Left humerus: Marked hypertrophic degenerative change at the  acromioclavicular joint. Mild glenohumeral degenerative change. No  acute bony abnormality.    Left elbow: No acute bony abnormality or joint space effusion. Rounded  calcification projecting in the soft tissues of the proximal forearm  on the ulnar side is likely a phlebolith.    Thoracic spine: No  wedge compression deformities. Multilevel  degenerative disc disease.    Pelvis and left hip: No acute or significant chronic bony abnormality.    Left tibia/fibula: No acute or significant chronic bony or soft tissue  abnormality. Proximal tibial and fibular metaphyses are not completely  included on the lateral view.    Right hand: No acute bony abnormality. Some degenerative change at the  first carpometacarpal joint and first metacarpophalangeal joint.    Left hand: Fingers are fixed in flexion and not optimally profiled. No  acute bony abnormality. Mild degenerative change at the first  carpometacarpal joint and metacarpophalangeal joint as well as other  interphalangeal joints.    HUMA RUIZ MD   Humerus XR,  G/E 2 views, left    Narrative    LEFT HUMERUS TWO OR MORE VIEWS, LEFT ELBOW THREE OR MORE VIEWS,  THORACIC SPINE TWO VIEWS, TIBIA AND FIBULA LEFT TWO VIEWS, HAND  BILATERAL THREE OR MORE VIEWS 3/10/2018 10:55 AM     HISTORY: Trauma.    COMPARISON: None.      Impression    IMPRESSION:     Left humerus: Marked hypertrophic degenerative change at the  acromioclavicular joint. Mild glenohumeral degenerative change. No  acute bony abnormality.    Left elbow: No acute bony abnormality or joint space effusion. Rounded  calcification projecting in the soft tissues of the proximal forearm  on the ulnar side is likely a phlebolith.    Thoracic spine: No wedge compression deformities. Multilevel  degenerative disc disease.    Pelvis and left hip: No acute or significant chronic bony abnormality.    Left tibia/fibula: No acute or significant chronic bony or soft tissue  abnormality. Proximal tibial and fibular metaphyses are not completely  included on the lateral view.    Right hand: No acute bony abnormality. Some degenerative change at the  first carpometacarpal joint and first metacarpophalangeal joint.    Left hand: Fingers are fixed in flexion and not optimally profiled. No  acute bony abnormality. Mild  degenerative change at the first  carpometacarpal joint and metacarpophalangeal joint as well as other  interphalangeal joints.    HUMA RUIZ MD   XR Hand Bilateral G/E 3 Views    Narrative    LEFT HUMERUS TWO OR MORE VIEWS, LEFT ELBOW THREE OR MORE VIEWS,  THORACIC SPINE TWO VIEWS, TIBIA AND FIBULA LEFT TWO VIEWS, HAND  BILATERAL THREE OR MORE VIEWS 3/10/2018 10:55 AM     HISTORY: Trauma.    COMPARISON: None.      Impression    IMPRESSION:     Left humerus: Marked hypertrophic degenerative change at the  acromioclavicular joint. Mild glenohumeral degenerative change. No  acute bony abnormality.    Left elbow: No acute bony abnormality or joint space effusion. Rounded  calcification projecting in the soft tissues of the proximal forearm  on the ulnar side is likely a phlebolith.    Thoracic spine: No wedge compression deformities. Multilevel  degenerative disc disease.    Pelvis and left hip: No acute or significant chronic bony abnormality.    Left tibia/fibula: No acute or significant chronic bony or soft tissue  abnormality. Proximal tibial and fibular metaphyses are not completely  included on the lateral view.    Right hand: No acute bony abnormality. Some degenerative change at the  first carpometacarpal joint and first metacarpophalangeal joint.    Left hand: Fingers are fixed in flexion and not optimally profiled. No  acute bony abnormality. Mild degenerative change at the first  carpometacarpal joint and metacarpophalangeal joint as well as other  interphalangeal joints.    HUMA RUIZ MD   XR Thoracic Spine 2 Views    Narrative    LEFT HUMERUS TWO OR MORE VIEWS, LEFT ELBOW THREE OR MORE VIEWS,  THORACIC SPINE TWO VIEWS, TIBIA AND FIBULA LEFT TWO VIEWS, HAND  BILATERAL THREE OR MORE VIEWS 3/10/2018 10:55 AM     HISTORY: Trauma.    COMPARISON: None.      Impression    IMPRESSION:     Left humerus: Marked hypertrophic degenerative change at the  acromioclavicular joint. Mild glenohumeral degenerative  change. No  acute bony abnormality.    Left elbow: No acute bony abnormality or joint space effusion. Rounded  calcification projecting in the soft tissues of the proximal forearm  on the ulnar side is likely a phlebolith.    Thoracic spine: No wedge compression deformities. Multilevel  degenerative disc disease.    Pelvis and left hip: No acute or significant chronic bony abnormality.    Left tibia/fibula: No acute or significant chronic bony or soft tissue  abnormality. Proximal tibial and fibular metaphyses are not completely  included on the lateral view.    Right hand: No acute bony abnormality. Some degenerative change at the  first carpometacarpal joint and first metacarpophalangeal joint.    Left hand: Fingers are fixed in flexion and not optimally profiled. No  acute bony abnormality. Mild degenerative change at the first  carpometacarpal joint and metacarpophalangeal joint as well as other  interphalangeal joints.    HUMA RUIZ MD   Tib/Fib XR, left    Narrative    LEFT HUMERUS TWO OR MORE VIEWS, LEFT ELBOW THREE OR MORE VIEWS,  THORACIC SPINE TWO VIEWS, TIBIA AND FIBULA LEFT TWO VIEWS, HAND  BILATERAL THREE OR MORE VIEWS 3/10/2018 10:55 AM     HISTORY: Trauma.    COMPARISON: None.      Impression    IMPRESSION:     Left humerus: Marked hypertrophic degenerative change at the  acromioclavicular joint. Mild glenohumeral degenerative change. No  acute bony abnormality.    Left elbow: No acute bony abnormality or joint space effusion. Rounded  calcification projecting in the soft tissues of the proximal forearm  on the ulnar side is likely a phlebolith.    Thoracic spine: No wedge compression deformities. Multilevel  degenerative disc disease.    Pelvis and left hip: No acute or significant chronic bony abnormality.    Left tibia/fibula: No acute or significant chronic bony or soft tissue  abnormality. Proximal tibial and fibular metaphyses are not completely  included on the lateral view.    Right hand:  No acute bony abnormality. Some degenerative change at the  first carpometacarpal joint and first metacarpophalangeal joint.    Left hand: Fingers are fixed in flexion and not optimally profiled. No  acute bony abnormality. Mild degenerative change at the first  carpometacarpal joint and metacarpophalangeal joint as well as other  interphalangeal joints.    HUMA RUIZ MD   XR Pelvis and Hip Left 2 Views    Narrative    LEFT HUMERUS TWO OR MORE VIEWS, LEFT ELBOW THREE OR MORE VIEWS,  THORACIC SPINE TWO VIEWS, TIBIA AND FIBULA LEFT TWO VIEWS, HAND  BILATERAL THREE OR MORE VIEWS 3/10/2018 10:55 AM     HISTORY: Trauma.    COMPARISON: None.      Impression    IMPRESSION:     Left humerus: Marked hypertrophic degenerative change at the  acromioclavicular joint. Mild glenohumeral degenerative change. No  acute bony abnormality.    Left elbow: No acute bony abnormality or joint space effusion. Rounded  calcification projecting in the soft tissues of the proximal forearm  on the ulnar side is likely a phlebolith.    Thoracic spine: No wedge compression deformities. Multilevel  degenerative disc disease.    Pelvis and left hip: No acute or significant chronic bony abnormality.    Left tibia/fibula: No acute or significant chronic bony or soft tissue  abnormality. Proximal tibial and fibular metaphyses are not completely  included on the lateral view.    Right hand: No acute bony abnormality. Some degenerative change at the  first carpometacarpal joint and first metacarpophalangeal joint.    Left hand: Fingers are fixed in flexion and not optimally profiled. No  acute bony abnormality. Mild degenerative change at the first  carpometacarpal joint and metacarpophalangeal joint as well as other  interphalangeal joints.    HUMA RUIZ MD   CT Abdomen Pelvis w Contrast    Narrative    CT ABDOMEN AND PELVIS WITH CONTRAST 3/10/2018 12:46 PM    HISTORY: Hematuria after trauma.    TECHNIQUE: Helical axial scans from [facility]  liver through pubic  symphysis with 65 mL Isovue-370 IV contrast. Radiation dose for this  scan was reduced using automated exposure control, adjustment of the  mA and/or kV according to patient size, or iterative reconstruction  technique.    COMPARISON: 4/26/2015.    FINDINGS: Emphysematous changes in the lung bases. Prior  cholecystectomy. Intrahepatic and extra hepatic biliary ductal  dilatation may be related to prior cholecystectomy alone and appears  similar to the prior exam with maximum common duct diameter of about  1.5 cm. Clinical correlation for any elevation of serum bilirubin is  recommended. Scattered calcifications in the posterior spleen  consistent with old granulomatous disease. The pancreas is normal.  Minimal bilateral adrenal gland thickening, likely of no significance  and unchanged. Kidneys are unremarkable bilaterally. Vascular  calcifications are present. Prior gastric bypass surgery is again  noted. The remainder of the bowel and mesentery in the upper abdomen  are unremarkable.    Scans through the pelvis show no acute abnormality or free fluid. No  acute bony abnormality.      Impression    IMPRESSION:  1. No significant change since the prior exam.  2. Prior cholecystectomy with intrahepatic and extra hepatic biliary  ductal dilatation which is relatively stable but clinical correlation  is recommended  3. Postoperative changes from gastric surgery and vascular  calcifications are noted.  4. No acute appearing abnormality in the abdomen or pelvis.    HUMA RUIZ MD   Cervical spine MRI w/o contrast    Narrative    MRI CERVICAL SPINE WITHOUT CONTRAST 3/10/2018 2:53 PM     HISTORY: Patient fell after drinking last evening and felt dizzy, fell  again in the night. Complains of left arm and hip pain, neck pain and  hand pain. No fractures on CT scan but adjacent soft tissue swelling.    TECHNIQUE: Multiplanar, multisequence MRI of the cervical spine  without contrast.     COMPARISON:  CT scan today.    FINDINGS: There is mild T1 hyperintensity in the spinal cord at C5-C6  on sagittal T2 weighted and STIR images #8.  There is no evidence of  hemorrhage on the gradient echo images.    There is T2 hyperintensity and T1 hypointensity in the paraspinous  soft tissues anterior to the cervical spine and upper thoracic spine  indicating edema. This extends from side to side. There is also edema  in the paraspinous soft tissues posteriorly more on the right in the  upper to mid cervical spine than to the left. There is fluid in the  C5-C6 disc space with disruption of the anterior longitudinal  ligament. There is also fluid in the interspinous ligament posteriorly  at C5-C6 and in the adjacent soft tissues, and fluid in the facet  joints at this level. The findings indicate motion at this level due  to ligamentous disruption. However there is no evidence of fracture  even in retrospect on the previous CT scan. No bone marrow edema is  seen to indicate an occult fracture at any level. There is no epidural  fluid collection.    There is multilevel degenerative disc disease from C3 through T1.  There is multilevel degenerative facet arthropathy bilaterally. There  is grade 1 spondylolisthesis at C7-T1 that appears old and chronic.      Impression    IMPRESSION:  1. Spinal cord contusion at C5-C6 without hemorrhage.  2. Disruption of ligaments at C5-C6 anteriorly and posteriorly with  fluid in the facet joints and in the disc space suggesting motion at  this level.  3. Fairly extensive edema but no discrete hematoma in the paraspinous  musculature more on the right than on the left and in the prevertebral  space.  4. Extensive degenerative changes.    LOGAN BARRAZA MD           Anesthesia Evaluation     . Pt has had prior anesthetic.     History of anesthetic complications   - PONV        ROS/MED HX    ENT/Pulmonary:     (+)tobacco use, COPD, , . .   (-) sleep apnea   Neurologic: Comment: Cervical  radiculopathy    (+)neuropathy Spinal cord injury     Cardiovascular:     (+) hypertension----. : . . . :. .      SCHMITT: s/p gastric bypass.   METS/Exercise Tolerance:     Hematologic:         Musculoskeletal:         GI/Hepatic:     (+) hiatal hernia, Other GI/Hepatic       Renal/Genitourinary:     (+) Nephrolithiasis ,       Endo:     (+) Obesity, .      Psychiatric: Comment: etoh abuse    (+) psychiatric history anxiety and depression      Infectious Disease:         Malignancy:         Other:                     Physical Exam      Airway   Mallampati: II  TM distance: >3 FB  Neck ROM: limited  Comment: c-collar    Dental   (+) caps    Cardiovascular   Rhythm and rate: regular      Pulmonary    breath sounds clear to auscultation                    Anesthesia Plan      History & Physical Review  History and physical reviewed and following examination; no interval change.    ASA Status:  3 .    NPO Status:  > 8 hours    Plan for General and ETT     Additional equipment: Videolaryngoscope Propofol gtt  precedex gtt       Postoperative Care      Consents  Anesthetic plan, risks, benefits and alternatives discussed with:  Patient..                          .

## 2018-03-11 NOTE — OP NOTE
OPERATIVE NOTE        Pre op Diagnosis:   1. 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 C5-6 ruptured anterior longitudinal ligament and disk with a anterior osteophyte fracture  3. C7-T1 anterior subluxation with severe bilateral foraminal stenosis  4. Central cord syndrome   5. LUE > RLE weakness with loss of left hand function  6. BLE weakness L > R    Post op Diagnosis: Same    Procedure:   1. C3-4 anterior cervical discectomy with arthrodesis and placement of a 7 mm Globus [place] PEEK interbody graft with Globus Signify Putty placed centrally  2. C4-5 anterior cervical discectomy with arthrodesis and placement of a 6 mm Globus [place] PEEK interbody graft with Globus Signify Putty placed centrally  3. C5-6 anterior cervical discectomy with arthrodesis and placement of a 7 mm Globus [place] PEEK interbody graft with Globus Signify Putty placed centrally  4.  C6-7 anterior cervical discectomy with arthrodesis and placement of a 6 mm Globus [place] PEEK interbody graft with Globus Signify Putty placed centrally  5. C7-T1 anterior cervical discectomy with arthrodesis and placement of a 7 mm Globus [place] PEEK interbody graft with Globus Signify Putty placed centrally  6. Placement of a 83 mm Globus [place] anterior cervical plate with 12 15 and 16 mm screws  7 open reduction and internal fixation of the C5-6 fracture and the C7-T1 subluxation   8. Use of C-arm and Microscope  9. Use of intraoperative spinal cord monitoring     Surgeon: Maynor Daley MD    Assistant: Brennan Herman    Indication for procedure: The patient is a 57 year old feamle that presented with [facility] following multiple fall with incomplete spinal cord injury After reviewing the patient's imaging studies and examination, the decision was made to proceed with the above procedure. The patient understood the risks and benefits of surgery and wanted to proceed.    Description of Procedure:  The patient was seen in the pre op area and the procedure was discussed with the patient and family once again and all questions were answered. The consent was then signed and the patient's neck was marked with a marker. The patient was then transferred to the operating suite on a stretcher and received general endotracheal anesthesia. She was then placed on the operating table in the supine position with all pressure points padded and spinal cord monitoring leads were placed and a baseline motors were obtained. A small roll was placed under her shoulders for slight extension. Next, the C-arm fluoroscopy was brought in the operating room for localization of the C3 to T1 disk spaces. The patient's neck was then prepped and draped in a normal sterile fashion and a carotid incision was marked along the C3-T1 interspaces. Local anesthesia was injected along the planned incision and a 10 blade scalpel was used to make the incision. The platysma muscle was then identified, undermined and incised with the Metzenbaum scissors. The Weitlaner retractors were used to retract the platysma muscle and the skin edges. A dissection plane was then made with both sharp and blunt dissection medical to the sternocliedomastoid muscle and the carotid artery down to the prevertebral fascia. The esophagus and trachea were then retracted laterally with the Cloward retractor and the prevertebral fascia was clean with Kitners. A spinal need was placed in the C5-6 interspace and verified with C-arm fluoroscopy. The C5-6 interspaces was noted to have a fractured osteophyte and torn disk space and was grossly unstable. The longus coli muscles were then elevated with the bovie cautery and the  retractor was placed under the muscle. The C3-T1 disk interspaces were incised with the bovie cautery and the disk were then removed with the Midas Alexey drill down to the posterior longitudinal ligament at each level. The ligament was then  penetrated with a microball hook and the Kerrison rongeur was used to remove the posterior longitudinal ligament. All foramen were decompressed and the exiting nerve roots were decompressed and verified by with the microball hook from C3-T1. Size 6 and 7 mm interbody trials were placed in the C3-T1 interspaces and they were noted to be the appropriate size, therefore, a two 6 mm PEEK interbody graft was placed in the C4-5 and C6-7 interspaces and three 7 mm PEEK interbody graft was placed in C3-4, C5-6 and C7-T1 interspaces all with Globus Signify putty placed centrally under fluoroscopic guidance and noted to have an appropriate fit. Next, a size 83 mm anterior cervical plate was secured from C3 toT1 with twelve 15 and 16  mm screws which were locked in placed with the hand held locking bit and used to perform open reduction internal fixation of the C5-6 fracture and C7-T1 subluxation. The wound was then copiously irrigated and hemostasis was obtained with bipolar cautery, gelfoam and Surgiflo. A MARY drain was placed in the operative bed and the retractors were removed and there was no bleeding or injury to the carotid artery. The wound was irrigated once again and the platysma muscle was closed with 2-0 vicryl and the skin edges were closed with 3-0 vicryl and Deremabond. The wound was dressed appropriately and the patient was then extubated and transferred to recovery.    At the end of the case all counts were correct    Complications: none    Spinal cord monitoring remained normal throughout the case     Estimated blood loss: 75 ml    IV Fluid: See Anesthesia    The patient received Clindamycin preoperatively      Maynor Daley MD, MS, FAANS

## 2018-03-11 NOTE — PLAN OF CARE
Problem: Patient Care Overview  Goal: Plan of Care/Patient Progress Review  Outcome: No Change  Patient here due to fall with injury to neck. A&O x4. Neuros include bilateral hand and left arm numbness. VSS. Regular diet. Strict bedrest; logroll with bedpan. IV Dilaudid and PO Norco for pain. Continue to monitor and follow POC.

## 2018-03-11 NOTE — H&P
[facility]    History and Physical  Hospitalist       Date of Admission: 3/10/2018      Assessment & Plan   Antoinette Romero is a 57 year old female with past medical history of tobacco use, migraines, morbid obesity with previous bypass surgery, and migraines who was transferred from [facility] for evaluation of a cervical spine contusion with disruption of the posterior ligaments after multiple falls while drinking the night prior.    Summary:    -Cervical spine contusion at C5-6 with disruption of posterior ligaments, Paraspinous edema and C7-T1 anterolisthesis and C5-6 severe spinal canal stenosis. Reported left hand weakness, right foot weakness and difficulty with ambulation . CT head, Tib-fib x-ray, elbow x-ray, humerus x-ray, Hand x-ray, Pelvis/hip x-ray, and CT abd/pelvis without acute fracture or process.   -Neuro checks q 2 hours overnight, can change to q 4 in am if stable and ok with neurosurgery   -Bed Rest   -Neurosurgery consult (Dr. Daley aware), Dr. Daley to order additional imaging if needed   -NPO at midnight as surgical intervention to be reviewed tomorrow   -IV Decadron 4 mg q 6 hours   -Hold on PT and OT eval at this time   -Hard C-collar to be worn at all times    -discussed what can occur (up to paralysis) if C-collar is removed after she questioned how to take it off stating it was unbearable and uncomfortable.    -Alcohol use: No current signs of withdrawal. No hx of seizures. Has been drinking 2 glasses of wine/day for past year. Has lost both her brother and sister due to opiate deaths in the past year.   -[facility] protocol    -Elevated blood pressure reading without diagnosis of hypertension, suspect may be secondary to pain and/or anxiety but states a while ago was told she had    -will optimize pain management   -prn hydralazine as needed for SBP > 180    -Anxiety/Depression: Not currently taking medications at home, will have iv ativan available for anxiety and muscle  spasms. Did not start Buspar due to cost. Will consult psych. Would benefit from outpatient psychiatry and grief counseling.    -Hx of morbid obesity with previous gastric bypass and anastomosic ulcer: Continue Protonix 20 mg daily    -Marijuana use: Smokes every few days to help stimulate appetite following gastric bypass  -Tobacco use: 7 mg nicotine patch available as needed  -Elevated transaminases: suspect due to alcohol use    # Pain Assessment:   Current Pain Score 3/10/2018 3/10/2018 3/10/2018   Patient currently in pain? yes yes -   Pain score (0-10) 7 7 8   Pain location Generalized Neck -   Pain descriptors - Sharp;Aching;Constant -   - Antoinette is experiencing pain due to diffuse body pain following a fall. Pain management was discussed and the plan was created in a collaborative fashion.  Antoinette's response to the current recommendations: engaged  - Opioid/pain regimen: Tylenol, Norco, Dilaudid, prn ativan also available for muscle spasms  - Response to opioid medications: Reduction of symptoms able to rest  - Bowel regimen: senna and docusate    DVT Prophylaxis: Pneumatic Compression Devices  Code Status: Full Code    Disposition: Expected discharge pending neurosurgical evaluation.    Kassandra Forte PA-C  Pager [phone]     This patient was discussed with Dr. Stauffer of the Hospitalist Service who agrees with current plans as outlined above.  Inpatient admission recommended by Dr. Stauffer given cervical spine contusion with associated persistent neurologic deficits, need for q 6 hour IV steroids and IV analgesics.    Primary Care Physician   Umu Roberto    Chief Complaint   Cervical spine contusion    History is obtained from the patient and outside record review    History of Present Illness   Antoinette Romero is a 57 year old female with past medical history of tobacco use, migraines, morbid obesity with previous bypass surgery, and migraines who was transferred from [facility] for evaluation  of a cervical spine contusion with disruption of the posterior ligaments after multiple falls while drinking the night prior.    Last night she states she had 2 glasses of wine.  She has had increasing hip and leg pain due to chronic osteoarthritis.  She then woke up in the middle of the night to go to the restroom. At that time she fell. She noted a hard time walking and difficulty with her hand.  She did not wish to come in so she went back to bed. When she awoke this morning she had difficulty walking and reports her  called for assistance.    Imaging revealed cervical spine contusion at C5-6 with disruption of posterior ligaments, Paraspinous edema and C7-T1 anterolisthesis and C5-6 severe spinal canal stenosis. Reported right hand weakness, right foot weakness and difficulty with ambulation. CT head, Tib-fib x-ray, elbow x-ray, humerus x-ray, Hand x-ray, Pelvis/hip x-ray, and CT abd/pelvis without acute fracture or process.    Currently she reports diffuse pain head to toe. She has no  strength in the left hand but can move the arm.  She cannot dorsiflex or plantar flex her left foot. She can wiggle her toes and lift her leg off the bed. She is unclear if this is different from her initial presenting symptoms at [facility]. She was given 10 mg decadron and multiple iv analgesics prior to transfer.    Over the past year she has lost her brother and sister to opioid overdoses. This has caused a great deal of grief. She was prescribed Buspar as an outpatient but never took this. She has not seen a counselor. She has turned to drinking 1-2 glasses per wine each evening starting at 6:30.  She takes care of her 19 month old grandson during the day.  She lives with her  and 20 year old son.    She smokes tobacco and marijuana.    Past Medical History    I have reviewed this patient's medical history and updated it with pertinent information if needed.   Past Medical History:   Diagnosis Date      Anastomotic ulcer 3/28/2014    smoking history; 1PPD     Anxiety      Arthritis      Migraine      Morbid obesity (H)     s/p gastric bypass       Past Surgical History   I have reviewed this patient's surgical history and updated it with pertinent information if needed.  Past Surgical History:   Procedure Laterality Date     ENDOSCOPIC UPPER GASTROINTESTINAL, REMOVE SUTURE N/A 1/17/2015    Procedure: ENDOSCOPIC UPPER GASTROINTESTINAL, REMOVE SUTURE;  Surgeon: Parviz Martinez MD;  Location: [facility] OR     ESOPHAGOSCOPY, GASTROSCOPY, DUODENOSCOPY (EGD), COMBINED  2/18/2014    Procedure: COMBINED ESOPHAGOSCOPY, GASTROSCOPY, DUODENOSCOPY (EGD);  Esophagoscopy,Gastroscopy,Duodenoscopy;  Surgeon: Neo Sher MD;  Location:  GI     ESOPHAGOSCOPY, GASTROSCOPY, DUODENOSCOPY (EGD), COMBINED  5/7/2014    Procedure: COMBINED ESOPHAGOSCOPY, GASTROSCOPY, DUODENOSCOPY (EGD), BIOPSY SINGLE OR MULTIPLE;  Surgeon: Jose Antonio Valencia MD;  [facility]: [facility]     GYN SURGERY       LAPAROSCOPIC BYPASS GASTRIC  3/25/2013    Procedure: LAPAROSCOPIC BYPASS GASTRIC;  Laparoscopic Nawaf En Y Gastric Bypass. Hiatel hernia repair;  Surgeon: Parviz Martinez MD;  Location: [facility] OR     LAPAROSCOPIC CHOLECYSTECTOMY WITH CHOLANGIOGRAMS  3/28/2014    Procedure: LAPAROSCOPIC CHOLECYSTECTOMY WITH CHOLANGIOGRAMS;;  Surgeon: Jose Antonio Valencia MD;  Location: [facility] OR     LAPAROSCOPY DIAGNOSTIC (GENERAL)  3/28/2014    Procedure: LAPAROSCOPY DIAGNOSTIC (GENERAL);  Diagnostic Laparoscopy, laparoscopic cholecystectomy, EGD, repair of johnston defect;  Surgeon: Jose Antonio Valencia MD;  Location: [facility] OR     LAPAROSCOPY OPERATIVE ADULT N/A 1/17/2015    Procedure: LAPAROSCOPY OPERATIVE ADULT;  Surgeon: Parviz Martinez MD;  Location: [facility] OR       Prior to Admission Medications   Prior to Admission Medications   Prescriptions Last Dose Informant Patient Reported? Taking?   busPIRone (BUSPAR) 5 MG tablet was too expensive  No No   Sig: Start at 5  mg twice daily for 3 days, then 7.5 mg (1.5 tabs) twice daily for 3 days, then 10 mg (2 tabs) twice daily for 3 days, then 12.5 mg (2.5 tabs) twice daily for 3 days, then 15 mg (3 tabs) twice daily and stay at that dose   nicotine (NICODERM CQ) 7 MG/24HR 24 hr patch not started yet  No No   Sig: Place 1 patch onto the skin every 24 hours   pantoprazole (PROTONIX) 20 MG EC tablet 3/10/2018 at am  No Yes   Sig: Take 1 tablet (20 mg) by mouth daily Take by mouth 30-60 minutes before a meal.      Facility-Administered Medications: None     Allergies   Allergies   Allergen Reactions     Keflex [Cephalexin-Fd&C Yellow #6] Anaphylaxis     Unasyn Anaphylaxis and Swelling     Azithromycin      Heart palpitations     Compazine [Prochlorperazine] Other (See Comments)     headaches     Zofran [Ondansetron] Other (See Comments)     headaches       Social History   I have reviewed this patient's social history and updated it with pertinent information if needed. Antoinette THEODORE Nick  reports that she has been smoking Cigarettes.  She has been smoking about 1.00 pack per day. She has never used smokeless tobacco. She reports that she does not drink alcohol or use illicit drugs.    Family History   I have reviewed this patient's family history and updated it with pertinent information if needed.   Family History   Problem Relation Age of Onset     Respiratory Mother      DIABETES Mother      Arthritis Mother      OA     Neurologic Disorder Father      CANCER Maternal Grandfather      Arthritis Maternal Grandmother      RA     Breast Cancer No family hx of      Ovarian Cancer No family hx of        Review of Systems   The 10 point Review of Systems is negative other than noted in the HPI or here.     Physical Exam   Temp: 97.6  F (36.4  C) Temp src: Oral BP: 163/89 Pulse: 71 Heart Rate: 73 Resp: 16 SpO2: 97 % O2 Device: None (Room air)    Vital Signs with Ranges  Temp:  [97.6  F (36.4  C)-99.3  F (37.4  C)] 97.6  F (36.4  C)  Pulse:   [71] 71  Heart Rate:  [73-74] 73  Resp:  [16-24] 16  BP: (155-185)/() 163/89  SpO2:  [93 %-97 %] 97 %  0 lbs 0 oz    Constitutional: Alert and oriented x3, mild acute distress, hard c-collar in place  Eyes: PERRL, EOMs intact  HEENT: patent nares, MMM, no tongue fasciculations  Respiratory: clear to auscultation anteriorly  Cardiovascular: regular rate and rhythm, no murmurs, no edema, pedal pulses present  GI: soft, mild diffuse tenderness tender, non distended, bowel sounds present  Lymph/Hematologic: no evidence of jaundice or bruising  Genitourinary: no urinary incontinence noted or reported.  Skin: warm and dry, no rashes, diffusely tender to touch all over with specific pain reported in left calf, negative homans' sign  Musculoskeletal:  She has no  strength in the left hand but can move the arm.  She cannot dorsiflex or plantar flex her left foot. She can wiggle her toes and lift her leg off the bed.   Neurologic: gait not tested as on bedrest  Psychiatric: affect flat, tearful, answers questions appropriately    Data   Data reviewed today:  I personally reviewed no images or EKG's today.    Recent Labs  Lab 03/10/18  0919   WBC 10.8   HGB 12.3   MCV 83         POTASSIUM 3.9   CHLORIDE 106   CO2 25   BUN 12   CR 0.45*   ANIONGAP 6   BESSY 8.7   *   ALBUMIN 3.8   PROTTOTAL 7.0   BILITOTAL 0.6   ALKPHOS 106   ALT 35   AST 64*       Imaging:  Recent Results (from the past 24 hour(s))   CT Head w/o Contrast    Narrative    CT SCAN OF THE HEAD WITHOUT CONTRAST   3/10/2018 10:11 AM     HISTORY:  Headache, trauma.     TECHNIQUE:  Axial images of the head and coronal reformations without  IV contrast material. Radiation dose for this scan was reduced using  automated exposure control, adjustment of the mA and/or kV according  to patient size, or iterative reconstruction technique.    COMPARISON: 5/1/2015 and 11/4/2009    FINDINGS:  The ventricles are normal in size, shape and  configuration.  The brain parenchyma and subarachnoid spaces are normal. There is no  evidence of intracranial hemorrhage, mass, acute infarct or anomaly.     There is moderate mucosal thickening in the nasal cavity. Paranasal  sinuses and mastoids are clear with interval clearing of right mastoid  and middle ear fluid. There is a small focus of increased attenuation  at the posterior lateral superior aspect of the left globe along the  sclera or the choroid or retina, but this is unchanged back to 2009.  There is anterior frontal central scalp soft tissue swelling with no  underlying hematoma. No evidence of fracture.      Impression    IMPRESSION:  1. Anterior frontal vertex scalp soft tissue swelling. Otherwise  normal CT scan of the head.  2. Chronic calcification or high attenuation abnormality in the left  globe superiorly and laterally, unchanged since 2009 but of  indeterminate etiology.      LOGAN BARRAZA MD   CT Cervical Spine w/o Contrast    Narrative    CT CERVICAL SPINE WITHOUT CONTRAST   3/10/2018 10:13 AM     HISTORY: Neck pain from trauma. Patient fell last night after becoming  dizzy, fell forward and hit face and injured left arm and hip.     TECHNIQUE: Axial images of the cervical spine were obtained without  intravenous contrast. Multiplanar reformations were performed.  Radiation dose for this scan was reduced using automated exposure  control, adjustment of the mA and/or kV according to patient size, or  iterative reconstruction technique.    COMPARISON: None.    FINDINGS: There is no evidence of fracture. There is multilevel  degenerative disc disease mainly from C3 through T1 with grade 1  spondylolisthesis at C7-T1. There is multilevel degenerative facet  arthropathy worse on the right at C4-C5 and on the left at C2-C3,  C3-C4, C4-C5 and C7-T1. There is moderate spinal canal stenosis at  C4-C5 and mild spinal canal stenosis at C5-C6. There is moderate to  severe spinal canal stenosis at  C7-T1. Axial soft tissue images show  strand-like densities in the fat adjacent to the right paraspinous  muscles as well as in the prevertebral space suggesting soft tissue  injury.      Impression    IMPRESSION:  1. No evidence of fracture.  2. Extensive degenerative changes, including C7-T1 grade 1  spondylolisthesis from degenerative facet arthropathy causing moderate  to severe spinal canal stenosis.  3. Soft tissue swelling adjacent to the lateral aspect of the right  paraspinous muscles anteriorly and posteriorly and in the prevertebral  space more on the left than on the right suggesting soft tissue  injury. This extends into the upper mediastinum. No discrete hematoma.  4. Pulmonary emphysema.    LOGAN BARRAZA MD   Elbow  XR, G/E 3 views, left    Narrative    LEFT HUMERUS TWO OR MORE VIEWS, LEFT ELBOW THREE OR MORE VIEWS,  THORACIC SPINE TWO VIEWS, TIBIA AND FIBULA LEFT TWO VIEWS, HAND  BILATERAL THREE OR MORE VIEWS 3/10/2018 10:55 AM     HISTORY: Trauma.    COMPARISON: None.      Impression    IMPRESSION:     Left humerus: Marked hypertrophic degenerative change at the  acromioclavicular joint. Mild glenohumeral degenerative change. No  acute bony abnormality.    Left elbow: No acute bony abnormality or joint space effusion. Rounded  calcification projecting in the soft tissues of the proximal forearm  on the ulnar side is likely a phlebolith.    Thoracic spine: No wedge compression deformities. Multilevel  degenerative disc disease.    Pelvis and left hip: No acute or significant chronic bony abnormality.    Left tibia/fibula: No acute or significant chronic bony or soft tissue  abnormality. Proximal tibial and fibular metaphyses are not completely  included on the lateral view.    Right hand: No acute bony abnormality. Some degenerative change at the  first carpometacarpal joint and first metacarpophalangeal joint.    Left hand: Fingers are fixed in flexion and not optimally profiled. No  acute bony  abnormality. Mild degenerative change at the first  carpometacarpal joint and metacarpophalangeal joint as well as other  interphalangeal joints.    HUMA RUIZ MD   Humerus XR,  G/E 2 views, left    Narrative    LEFT HUMERUS TWO OR MORE VIEWS, LEFT ELBOW THREE OR MORE VIEWS,  THORACIC SPINE TWO VIEWS, TIBIA AND FIBULA LEFT TWO VIEWS, HAND  BILATERAL THREE OR MORE VIEWS 3/10/2018 10:55 AM     HISTORY: Trauma.    COMPARISON: None.      Impression    IMPRESSION:     Left humerus: Marked hypertrophic degenerative change at the  acromioclavicular joint. Mild glenohumeral degenerative change. No  acute bony abnormality.    Left elbow: No acute bony abnormality or joint space effusion. Rounded  calcification projecting in the soft tissues of the proximal forearm  on the ulnar side is likely a phlebolith.    Thoracic spine: No wedge compression deformities. Multilevel  degenerative disc disease.    Pelvis and left hip: No acute or significant chronic bony abnormality.    Left tibia/fibula: No acute or significant chronic bony or soft tissue  abnormality. Proximal tibial and fibular metaphyses are not completely  included on the lateral view.    Right hand: No acute bony abnormality. Some degenerative change at the  first carpometacarpal joint and first metacarpophalangeal joint.    Left hand: Fingers are fixed in flexion and not optimally profiled. No  acute bony abnormality. Mild degenerative change at the first  carpometacarpal joint and metacarpophalangeal joint as well as other  interphalangeal joints.    HUMA RUIZ MD   XR Hand Bilateral G/E 3 Views    Narrative    LEFT HUMERUS TWO OR MORE VIEWS, LEFT ELBOW THREE OR MORE VIEWS,  THORACIC SPINE TWO VIEWS, TIBIA AND FIBULA LEFT TWO VIEWS, HAND  BILATERAL THREE OR MORE VIEWS 3/10/2018 10:55 AM     HISTORY: Trauma.    COMPARISON: None.      Impression    IMPRESSION:     Left humerus: Marked hypertrophic degenerative change at the  acromioclavicular joint. Mild  glenohumeral degenerative change. No  acute bony abnormality.    Left elbow: No acute bony abnormality or joint space effusion. Rounded  calcification projecting in the soft tissues of the proximal forearm  on the ulnar side is likely a phlebolith.    Thoracic spine: No wedge compression deformities. Multilevel  degenerative disc disease.    Pelvis and left hip: No acute or significant chronic bony abnormality.    Left tibia/fibula: No acute or significant chronic bony or soft tissue  abnormality. Proximal tibial and fibular metaphyses are not completely  included on the lateral view.    Right hand: No acute bony abnormality. Some degenerative change at the  first carpometacarpal joint and first metacarpophalangeal joint.    Left hand: Fingers are fixed in flexion and not optimally profiled. No  acute bony abnormality. Mild degenerative change at the first  carpometacarpal joint and metacarpophalangeal joint as well as other  interphalangeal joints.    HUMA RUIZ MD   XR Thoracic Spine 2 Views    Narrative    LEFT HUMERUS TWO OR MORE VIEWS, LEFT ELBOW THREE OR MORE VIEWS,  THORACIC SPINE TWO VIEWS, TIBIA AND FIBULA LEFT TWO VIEWS, HAND  BILATERAL THREE OR MORE VIEWS 3/10/2018 10:55 AM     HISTORY: Trauma.    COMPARISON: None.      Impression    IMPRESSION:     Left humerus: Marked hypertrophic degenerative change at the  acromioclavicular joint. Mild glenohumeral degenerative change. No  acute bony abnormality.    Left elbow: No acute bony abnormality or joint space effusion. Rounded  calcification projecting in the soft tissues of the proximal forearm  on the ulnar side is likely a phlebolith.    Thoracic spine: No wedge compression deformities. Multilevel  degenerative disc disease.    Pelvis and left hip: No acute or significant chronic bony abnormality.    Left tibia/fibula: No acute or significant chronic bony or soft tissue  abnormality. Proximal tibial and fibular metaphyses are not completely  included  on the lateral view.    Right hand: No acute bony abnormality. Some degenerative change at the  first carpometacarpal joint and first metacarpophalangeal joint.    Left hand: Fingers are fixed in flexion and not optimally profiled. No  acute bony abnormality. Mild degenerative change at the first  carpometacarpal joint and metacarpophalangeal joint as well as other  interphalangeal joints.    HUMA RUIZ MD   Tib/Fib XR, left    Narrative    LEFT HUMERUS TWO OR MORE VIEWS, LEFT ELBOW THREE OR MORE VIEWS,  THORACIC SPINE TWO VIEWS, TIBIA AND FIBULA LEFT TWO VIEWS, HAND  BILATERAL THREE OR MORE VIEWS 3/10/2018 10:55 AM     HISTORY: Trauma.    COMPARISON: None.      Impression    IMPRESSION:     Left humerus: Marked hypertrophic degenerative change at the  acromioclavicular joint. Mild glenohumeral degenerative change. No  acute bony abnormality.    Left elbow: No acute bony abnormality or joint space effusion. Rounded  calcification projecting in the soft tissues of the proximal forearm  on the ulnar side is likely a phlebolith.    Thoracic spine: No wedge compression deformities. Multilevel  degenerative disc disease.    Pelvis and left hip: No acute or significant chronic bony abnormality.    Left tibia/fibula: No acute or significant chronic bony or soft tissue  abnormality. Proximal tibial and fibular metaphyses are not completely  included on the lateral view.    Right hand: No acute bony abnormality. Some degenerative change at the  first carpometacarpal joint and first metacarpophalangeal joint.    Left hand: Fingers are fixed in flexion and not optimally profiled. No  acute bony abnormality. Mild degenerative change at the first  carpometacarpal joint and metacarpophalangeal joint as well as other  interphalangeal joints.    HUMA RUIZ MD   XR Pelvis and Hip Left 2 Views    Narrative    LEFT HUMERUS TWO OR MORE VIEWS, LEFT ELBOW THREE OR MORE VIEWS,  THORACIC SPINE TWO VIEWS, TIBIA AND FIBULA LEFT TWO  VIEWS, HAND  BILATERAL THREE OR MORE VIEWS 3/10/2018 10:55 AM     HISTORY: Trauma.    COMPARISON: None.      Impression    IMPRESSION:     Left humerus: Marked hypertrophic degenerative change at the  acromioclavicular joint. Mild glenohumeral degenerative change. No  acute bony abnormality.    Left elbow: No acute bony abnormality or joint space effusion. Rounded  calcification projecting in the soft tissues of the proximal forearm  on the ulnar side is likely a phlebolith.    Thoracic spine: No wedge compression deformities. Multilevel  degenerative disc disease.    Pelvis and left hip: No acute or significant chronic bony abnormality.    Left tibia/fibula: No acute or significant chronic bony or soft tissue  abnormality. Proximal tibial and fibular metaphyses are not completely  included on the lateral view.    Right hand: No acute bony abnormality. Some degenerative change at the  first carpometacarpal joint and first metacarpophalangeal joint.    Left hand: Fingers are fixed in flexion and not optimally profiled. No  acute bony abnormality. Mild degenerative change at the first  carpometacarpal joint and metacarpophalangeal joint as well as other  interphalangeal joints.    HUMA RUIZ MD   CT Abdomen Pelvis w Contrast    Narrative    CT ABDOMEN AND PELVIS WITH CONTRAST 3/10/2018 12:46 PM    HISTORY: Hematuria after trauma.    TECHNIQUE: Helical axial scans from dome of liver through pubic  symphysis with 65 mL Isovue-370 IV contrast. Radiation dose for this  scan was reduced using automated exposure control, adjustment of the  mA and/or kV according to patient size, or iterative reconstruction  technique.    COMPARISON: 4/26/2015.    FINDINGS: Emphysematous changes in the lung bases. Prior  cholecystectomy. Intrahepatic and extra hepatic biliary ductal  dilatation may be related to prior cholecystectomy alone and appears  similar to the prior exam with maximum common duct diameter of about  1.5 cm.  Clinical correlation for any elevation of serum bilirubin is  recommended. Scattered calcifications in the posterior spleen  consistent with old granulomatous disease. The pancreas is normal.  Minimal bilateral adrenal gland thickening, likely of no significance  and unchanged. Kidneys are unremarkable bilaterally. Vascular  calcifications are present. Prior gastric bypass surgery is again  noted. The remainder of the bowel and mesentery in the upper abdomen  are unremarkable.    Scans through the pelvis show no acute abnormality or free fluid. No  acute bony abnormality.      Impression    IMPRESSION:  1. No significant change since the prior exam.  2. Prior cholecystectomy with intrahepatic and extra hepatic biliary  ductal dilatation which is relatively stable but clinical correlation  is recommended  3. Postoperative changes from gastric surgery and vascular  calcifications are noted.  4. No acute appearing abnormality in the abdomen or pelvis.    HUMA RUIZ MD   Cervical spine MRI w/o contrast    Narrative    MRI CERVICAL SPINE WITHOUT CONTRAST 3/10/2018 2:53 PM     HISTORY: Patient fell after drinking last evening and felt dizzy, fell  again in the night. Complains of left arm and hip pain, neck pain and  hand pain. No fractures on CT scan but adjacent soft tissue swelling.    TECHNIQUE: Multiplanar, multisequence MRI of the cervical spine  without contrast.     COMPARISON: CT scan today.    FINDINGS: There is mild T1 hyperintensity in the spinal cord at C5-C6  on sagittal T2 weighted and STIR images #8.  There is no evidence of  hemorrhage on the gradient echo images.    There is T2 hyperintensity and T1 hypointensity in the paraspinous  soft tissues anterior to the cervical spine and upper thoracic spine  indicating edema. This extends from side to side. There is also edema  in the paraspinous soft tissues posteriorly more on the right in the  upper to mid cervical spine than to the left. There is fluid  in the  C5-C6 disc space with disruption of the anterior longitudinal  ligament. There is also fluid in the interspinous ligament posteriorly  at C5-C6 and in the adjacent soft tissues, and fluid in the facet  joints at this level. The findings indicate motion at this level due  to ligamentous disruption. However there is no evidence of fracture  even in retrospect on the previous CT scan. No bone marrow edema is  seen to indicate an occult fracture at any level. There is no epidural  fluid collection.    There is multilevel degenerative disc disease from C3 through T1.  There is multilevel degenerative facet arthropathy bilaterally. There  is grade 1 spondylolisthesis at C7-T1 that appears old and chronic.      Impression    IMPRESSION:  1. Spinal cord contusion at C5-C6 without hemorrhage.  2. Disruption of ligaments at C5-C6 anteriorly and posteriorly with  fluid in the facet joints and in the disc space suggesting motion at  this level.  3. Fairly extensive edema but no discrete hematoma in the paraspinous  musculature more on the right than on the left and in the prevertebral  space.  4. Extensive degenerative changes.    LOGAN BARRAZA MD

## 2018-03-12 ENCOUNTER — APPOINTMENT (OUTPATIENT)
Dept: GENERAL RADIOLOGY | Facility: CLINIC | Age: 58
DRG: 028 | End: 2018-03-12
Attending: NEUROLOGICAL SURGERY
Payer: COMMERCIAL

## 2018-03-12 DIAGNOSIS — Z98.1 S/P CERVICAL SPINAL FUSION: Primary | ICD-10-CM

## 2018-03-12 LAB — GLUCOSE BLDC GLUCOMTR-MCNC: 149 MG/DL (ref 70–99)

## 2018-03-12 PROCEDURE — 40000986 XR CERVICAL SPINE 1 VW

## 2018-03-12 PROCEDURE — 99232 SBSQ HOSP IP/OBS MODERATE 35: CPT | Performed by: INTERNAL MEDICINE

## 2018-03-12 PROCEDURE — 99221 1ST HOSP IP/OBS SF/LOW 40: CPT | Performed by: PSYCHIATRY & NEUROLOGY

## 2018-03-12 PROCEDURE — 25000132 ZZH RX MED GY IP 250 OP 250 PS 637: Performed by: INTERNAL MEDICINE

## 2018-03-12 PROCEDURE — 25000128 H RX IP 250 OP 636: Performed by: PHYSICIAN ASSISTANT

## 2018-03-12 PROCEDURE — 25000132 ZZH RX MED GY IP 250 OP 250 PS 637: Performed by: PHYSICIAN ASSISTANT

## 2018-03-12 PROCEDURE — 25000132 ZZH RX MED GY IP 250 OP 250 PS 637: Performed by: PSYCHIATRY & NEUROLOGY

## 2018-03-12 PROCEDURE — 25000132 ZZH RX MED GY IP 250 OP 250 PS 637: Performed by: NEUROLOGICAL SURGERY

## 2018-03-12 PROCEDURE — 12000007 ZZH R&B INTERMEDIATE

## 2018-03-12 PROCEDURE — 25000128 H RX IP 250 OP 636: Performed by: NEUROLOGICAL SURGERY

## 2018-03-12 PROCEDURE — 00000146 ZZHCL STATISTIC GLUCOSE BY METER IP

## 2018-03-12 RX ORDER — QUETIAPINE FUMARATE 25 MG/1
25 TABLET, FILM COATED ORAL 3 TIMES DAILY PRN
Status: DISCONTINUED | OUTPATIENT
Start: 2018-03-12 | End: 2018-03-18 | Stop reason: HOSPADM

## 2018-03-12 RX ADMIN — HYDROMORPHONE HYDROCHLORIDE 4 MG: 2 TABLET ORAL at 08:31

## 2018-03-12 RX ADMIN — HYDROMORPHONE HYDROCHLORIDE 4 MG: 2 TABLET ORAL at 20:54

## 2018-03-12 RX ADMIN — FOLIC ACID 1 MG: 1 TABLET ORAL at 08:32

## 2018-03-12 RX ADMIN — MULTIPLE VITAMINS W/ MINERALS TAB 1 TABLET: TAB at 08:32

## 2018-03-12 RX ADMIN — LORAZEPAM 0.5 MG: 2 INJECTION INTRAMUSCULAR; INTRAVENOUS at 04:12

## 2018-03-12 RX ADMIN — PANTOPRAZOLE SODIUM 20 MG: 20 TABLET, DELAYED RELEASE ORAL at 08:32

## 2018-03-12 RX ADMIN — QUETIAPINE FUMARATE 25 MG: 25 TABLET ORAL at 12:53

## 2018-03-12 RX ADMIN — HYDROMORPHONE HYDROCHLORIDE 4 MG: 2 TABLET ORAL at 17:17

## 2018-03-12 RX ADMIN — OXYCODONE HYDROCHLORIDE 10 MG: 5 TABLET ORAL at 02:14

## 2018-03-12 RX ADMIN — HYDROMORPHONE HYDROCHLORIDE 4 MG: 2 TABLET ORAL at 12:06

## 2018-03-12 RX ADMIN — Medication 100 MG: at 08:32

## 2018-03-12 RX ADMIN — LORAZEPAM 2 MG: 2 INJECTION INTRAMUSCULAR; INTRAVENOUS at 10:13

## 2018-03-12 RX ADMIN — BISACODYL 15 MG: 5 TABLET, COATED ORAL at 12:53

## 2018-03-12 RX ADMIN — HYDROMORPHONE HYDROCHLORIDE 2 MG: 2 TABLET ORAL at 05:30

## 2018-03-12 RX ADMIN — DEXAMETHASONE SODIUM PHOSPHATE 4 MG: 4 INJECTION, SOLUTION INTRAMUSCULAR; INTRAVENOUS at 04:12

## 2018-03-12 ASSESSMENT — ACTIVITIES OF DAILY LIVING (ADL)
NUMBER_OF_TIMES_PATIENT_HAS_FALLEN_WITHIN_LAST_SIX_MONTHS: 2
COGNITION: 0 - NO COGNITION ISSUES REPORTED
ADLS_ACUITY_SCORE: 15
ADLS_ACUITY_SCORE: 24
FALL_HISTORY_WITHIN_LAST_SIX_MONTHS: YES
DRESS: 3-->ASSISTIVE EQUIPMENT AND PERSON
AMBULATION: 3-->ASSISTIVE EQUIPMENT AND PERSON
WHICH_OF_THE_ABOVE_FUNCTIONAL_RISKS_HAD_A_RECENT_ONSET_OR_CHANGE?: AMBULATION;TRANSFERRING;TOILETING;BATHING;DRESSING;EATING;FALL HISTORY
ADLS_ACUITY_SCORE: 25
ADLS_ACUITY_SCORE: 26
RETIRED_COMMUNICATION: 0-->UNDERSTANDS/COMMUNICATES WITHOUT DIFFICULTY
TRANSFERRING: 3-->ASSISTIVE EQUIPMENT AND PERSON
SWALLOWING: 0-->SWALLOWS FOODS/LIQUIDS WITHOUT DIFFICULTY
ADLS_ACUITY_SCORE: 26
TOILETING: 3-->ASSISTIVE EQUIPMENT AND PERSON
RETIRED_EATING: 2-->ASSISTIVE PERSON
BATHING: 3-->ASSISTIVE EQUIPMENT AND PERSON
ADLS_ACUITY_SCORE: 14

## 2018-03-12 ASSESSMENT — VISUAL ACUITY
OU: NORMAL ACUITY

## 2018-03-12 NOTE — PROGRESS NOTES
[facility] Progress Note  [facility]    Visited pt per consult. Pt has been sleeping most of the day as she recovers from surgery. I was able to see pt this afternoon, but she was still tired and requested that SH return tomorrow for a visit. Pt requested prayer before I left. I provided pt with prayer. SH will follow up tomorrow.      Micki Castrejonin Resident  Pager: [phone]  Office: [phone]

## 2018-03-12 NOTE — PROGRESS NOTES
"Patient stating \"if the doctor does not come to visit me I am getting dressed and leaving\". Reoriented patient. Explained that the PA was just in to see her in the past 30 minutes and spent a considerable amount of time with her. Patient kept repeating request and getting worked up. Gave patient prn dose of seroquel.   "

## 2018-03-12 NOTE — PROGRESS NOTES
Dr Daley would like [employer] cervical bone growth stimulator ordered for patient. DME order placed and emailed to [employer]rystal [employer] rep on 3/12/18 . Krystal will contact patient.

## 2018-03-12 NOTE — PLAN OF CARE
Problem: Patient Care Overview  Goal: Plan of Care/Patient Progress Review  Outcome: Improving  POD #1. A&O x4 but confused and forgetful. Neuros include bilateral hand (only in fingertips on rt) numbness/tingling; arm numbness improved and leg numbness improved. Strengths improved; left side 4/5 and right side 5/5. VSS. Clear liquid diet; pt refusing to eat stating she has an aversion to food/nothing sounds good. Up with A1 + GB + Walker. IV ativan given for pain and CIWA score of 13 per protocol; reassessed 1 hour later at a score of 4. Seroquel also given for aggitation. Continue to monitor and follow POC.

## 2018-03-12 NOTE — ANESTHESIA POSTPROCEDURE EVALUATION
Patient: Antoinette Romero    Procedure(s):   INTRA-OPERATIVE SPINAL CORD MONITIORING, ANTERIOR CERVICAL DECOMPRESSION AND FUSION C3-T1  - Wound Class: I-Clean    Diagnosis:central code syndrome  Diagnosis Additional Information: No value filed.    Anesthesia Type:  General, ETT    Note:  Anesthesia Post Evaluation    Patient location during evaluation: PACU  Patient participation: Able to fully participate in evaluation  Level of consciousness: awake and alert  Pain management: satisfactory to patient  Airway patency: patent  Cardiovascular status: hemodynamically stable  Respiratory status: acceptable and unassisted  Hydration status: balanced  PONV: none     Anesthetic complications: None          Last vitals:  Vitals:    03/11/18 2030 03/11/18 2048 03/11/18 2056   BP: 135/85 129/90    Pulse:      Resp: 15 16 16   Temp:      SpO2: 98% 98%          Electronically Signed By: Antonino Guidry MD  March 11, 2018  9:11 PM

## 2018-03-12 NOTE — PROGRESS NOTES
[facility]    Neurosurgery Progress Note    Date of Service (when I saw the patient): 03/12/2018     Assessment & Plan   Antoinette Romero is a 57 year old female who was admitted on 3/10/2018 with multiple falls and weakness with incomplete spinal cord injury. She had weaknes sin left arm and leg and extreme hypersensitivity in BUE. Subsequently underwent C3-T1 ACDF with Dr. Maynor Daley on 3/11/2018. Today, she is lying in bed and states she is having a great deal of neck pain rated 10/10, but she is able to converse and is mainly worried about other factors like how uncomfortable her collar is, what her lifting restrictions will be, and when she gets to go home. She does appear very anxious. Per daughter, she is now able to raise left leg off of bed and squeeze left hand, which she was unable to do prior to surgery. Discussed importance of keeping collar in at all times and also the importance of tobacco cessation.     Active Problems:    Contusion    Assessment: s/p C3-T1 ACDF    Plan:   -Up to chair and with assist  -Pain control  -Keep collar in place  -Encourage use of IS and deep breathing   -Keep drains in until less than 30 cc's         I have discussed the following assessment and plan with Dr. Daley who is in agreement with initial plan and will follow up with further consultation recommendations.      Marlin Mathis PA-C  [facility]  [address] [address]    Tel [phone]  Pager [phone]      Interval History   Doing well.     Physical Exam   Temp: 98.7  F (37.1  C) Temp src: Oral BP: (!) 150/96   Heart Rate: 77 Resp: 18 SpO2: 97 % O2 Device: Nasal cannula Oxygen Delivery: 2 LPM  There were no vitals filed for this visit.  Vital Signs with Ranges  Temp:  [97.6  F (36.4  C)-98.7  F (37.1  C)] 98.7  F (37.1  C)  Heart Rate:  [73-96] 77  Resp:  [12-27] 18  BP: (108-156)/(64-96) 150/96  SpO2:  [89 %-98 %] 97  %  I/O last 3 completed shifts:  In: 4945 [P.O.:400; I.V.:4545]  Out: 2395 [Urine:2225; Drains:95; Blood:75]    Heart Rate: 77, Blood pressure (!) 150/96, pulse 71, temperature 98.7  F (37.1  C), temperature source Oral, resp. rate 18, last menstrual period 03/01/2010, SpO2 97 %, not currently breastfeeding.  0 lbs 0 oz  HEENT:  Normocephalic, atraumatic.  PERRLA.  EOM s intact.    Neck:  Cervical collar in place.  Heart:  No peripheral edema  Lungs:  No SOB  Skin:  Warm and dry. Incision covered with dressing CDI. AMRY x1.  Extremities:  No edema, cyanosis or clubbing.    NEUROLOGICAL EXAMINATION:   Mental status:  Alert and Oriented x 3, speech is fluent.  Cranial nerves:  II-XII intact.   Motor:  RUE 4+/5, weak grasp to LUE improving, able to raise LLE off bed, RLE 4+/5  Sensation:  Intact, hypersensitivity improving  Reflexes:  Negative Babinski.   Gait:  Deferred. On bedrest.       Medications     sodium chloride 75 mL/hr at 03/11/18 2045       thiamine  100 mg Oral Daily     folic acid  1 mg Oral Daily     multivitamin, therapeutic with minerals  1 tablet Oral Daily     pantoprazole  20 mg Oral Daily     nicotine   Transdermal Daily     nicotine   Transdermal Q8H       Data   CBC RESULTS:   Recent Labs   Lab Test  03/10/18   0919   WBC  10.8   RBC  4.57   HGB  12.3   HCT  37.7   MCV  83   MCH  26.9   MCHC  32.6   RDW  14.0   PLT  278     Basic Metabolic Panel:  Lab Results   Component Value Date     03/10/2018      Lab Results   Component Value Date    POTASSIUM 3.9 03/10/2018     Lab Results   Component Value Date    CHLORIDE 106 03/10/2018     Lab Results   Component Value Date    BESSY 8.7 03/10/2018     Lab Results   Component Value Date    CO2 25 03/10/2018     Lab Results   Component Value Date    BUN 12 03/10/2018     Lab Results   Component Value Date    CR 0.45 03/10/2018     Lab Results   Component Value Date     03/10/2018     INR:  Lab Results   Component Value Date    INR 0.91 03/11/2018     INR 0.93 01/03/2017    INR 1.01 01/16/2015

## 2018-03-12 NOTE — CONSULTS
"Consult Date:  03/12/2018      PSYCHIATRIC CONSULTATION      DATE OF EVALUATION:  03/12/2018.      TIME OF EVALUATION:  7:30 a.m.       REASON FOR REFERRAL:  Depression/grief.      REFERRING PHYSICIAN:  Kassandra Wilkes PA-C.      HISTORY OF PRESENT ILLNESS:  Ms. Antoinette Romero is a 57-year-old   mother of 2 from [place], [place], with psychiatric history of unspecified depression, anxiety and previous concerns about alcohol use and previous cocaine addiction.  She presented to the hospital in the setting of an injury from a fall, thought to be related to alcohol intoxication.  She was found to have a cervical spine contusion, required placement in a C-collar.  In the midst of admission, she was demonstrating depression.  She has been placed on [facility] protocol for alcohol withdrawal.  Psychiatry was asked to assess the patient's depression and ongoing grief related to losing 2 siblings to opioid use issues.  Her nurse reports that the patient has made passive suicidal thoughts, as well as made frustrated statements such as along the lines of \"just kill me.\"      On interview, Ms. Romero reports that she has been depressed since arriving to the hospital.  She also acknowledges that she has been more depressed at home as well in the midst of significant psychosocial stressors.  Most specifically, she does have ongoing grief related to losing 2 siblings over the past 3 years to opioid use issues.  She also notes that she is extremely overwhelmed from a very busy job as she takes care of her home and her  who is quite ill from mental illness himself.  She also has to care for their very rambunctious [id number] grandson from the morning until 8:00 p.m. each night, and this wears her out completely.  At the end of each day, she feels like she needs to have a glass of wine or two.  Given she has gastric bypass surgery, she does feel the effects of even 1 or 2 glasses of wine.  She " acknowledges that the alcohol use has become a problem here recently as evidenced by this significant injury.  She is very frustrated with herself that this occurred.  At this point, she states that she will never drink again.  She did have treatment for cocaine use issues many years ago, and, per her report, had 15 years or so of sobriety.  She had been doing very well when she was living in [place], and her  was doing well as a  there.  However, they came to [place] in about 2011 to follow their daughter.  Her  has been struggling substantially with making enough money for them.  So now they are living in a trailer home.  She feels quite distraught about the fact that she no longer has health insurance and that she has not been able to follow up with her therapist.  She also is unable to afford buspirone, which was recently prescribed by her primary care provider.      She acknowledges sleep disturbance, appetite disturbance, reduced motivation and energy.  However, she sounds quite resilient, and, again, takes care of a very busy household each day.  Denies any history of symptoms consistent with bipolar disorder.  Denies any psychotic symptoms including auditory or visual hallucinations or any paranoid ideation.      PAST PSYCHIATRIC HISTORY:  Previously has been treated with antidepressants but felt that they potentially have made things worse.  She reports that sertraline, Prozac and Celexa were not tolerated.  Effexor was not tolerated.  BuSpar was too expensive, which was recently prescribed.  She has been hospitalized on a couple occasions before the age of 18.  This was for depression.  She had grown up in the foster system and states that they would hospitalize residents if they had any concerns, but she denies any history of suicide attempts or previous true suicidal ideation.      CHEMICAL DEPENDENCY HISTORY:  She reports that she was successfully treated for cocaine  dependence in the past and was sober for about 15 years.  She also acknowledges alcohol has been a problem and recently resumed drinking several years ago in the context of worsening stressors here after moving to [place].  She now drinks about 2 glasses of wine per day, though with her gastric bypass, this affects her quite a bit.      PAST MEDICAL HISTORY:   1.  Anastomotic ulcer.   2.  Arthritis.   3.  Migraines.   4.  Obesity, status post gastric bypass.      REVIEW OF SYSTEMS:  A 10-point review of systems is completed by Dr. Correa, negative  other than described above.      PRIOR TO ADMISSION MEDICATIONS:   1.  BuSpar, though had not yet started this due to cost.   2.  Nicotine patch.   3.  Pantoprazole.      ALLERGIES:     1.  KEFLEX.   2.  UNASYN.     3.  AZITHROMYCIN.   4.  COMPAZINE.   5.  ZOFRAN.      SOCIAL HISTORY:  She resides in [place] with her  who is a , but apparently struggling quite a bit financially and with his own mental health problems.  She has 2 children here.  Her 19-year-old son lives in the house, and per her report just stays on his computer all day and sleeps.  She also takes care of her [id number] grandchild, the son of her daughter who also is .  She does also acknowledge smoking marijuana and daily use of alcohol as described.      FAMILY HISTORY:  Two siblings  of opioid use in the past 3 years.      PHYSICAL EXAMINATION:   VITAL SIGNS:  Temperature 98.7, blood pressure 150/96, SpO2 is 97%, heart rate 77.      MENTAL STATUS EXAMINATION:  She is dressed in a hospital gown, lying down on her C-collar.  She appears uncomfortable and in pain, somewhat anxious.  No tremulousness noted.  She is emotionally labile, often tearful during the encounter, at times getting very irritable and distressed when discussing her situation.  Mood is depressed.  Affect is congruent, tearful, at times a bit agitated.  Thought form linear, logical, goal directed, and  without flight of ideas.  She is not responding to internal stimuli.  Denies perceptual disturbances.  Denies active suicide intent or planning though is making passive suicidal statements.  Currently, denies passive suicidal wish.  Denies homicidal ideation.  There is no obvious fluctuation in cognition or obvious deficits in cognitive processing, though I note she is postoperative surgically.  Attention and concentration reasonably intact.  She is able to maintain conversation with appropriate responses.  Insight poor, given she is not recognizing the significance of medical and psychiatric issues at this present time.  Judgment poor, given recent suicidal statements and somewhat labile behavior.  Use of language was unremarkable.  Speech is nonpressured, of normal tone and prosody.  Eye contact is reasonable.      FORMULATION:   1.  Unspecified depressive disorder.   2.  Alcohol use disorder, moderate to severe.   3.  Cocaine use disorder by history, in remission.   4.  Rule out marijuana use disorder.      Ms. Romero is a 57-year-old woman currently being managed medically for results of an injury she sustained while falling while intoxicated from alcohol.  She is now status post neurosurgical procedure day #1 for injury to her cervical spine.  Today, she is emotionally labile, at times a bit agitated and very uncomfortable from the surgical procedure and being in a C-collar.  She is very dysphoric in the setting of stress related to her injury, as well as previous depressive symptoms that were not adequately treated due to her financial issues.  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  Thus, it is quite difficult for her to accept her new position of requiring care from others.  She is in the midst of hospitalization and is being monitored for alcohol withdrawal in addition to the above medical stabilization that is being provided.      PLAN:   1.  Will add Quetiapine 25 mg  p.r.n. for agitation or insomnia.   2.  She declines any antidepressants given history of not doing well on them in the past and concerns about financial issues.  She would benefit from social work intervention when she is more cooperative and feels more comfortable from her medical issues to see if she can have some of her financial stressors addressed including issues with medical insurance.   3.  Continue CIWA protocol.   4.  The patient would be considered holdable she were to demand discharge from the hospital or attempt to leave before being medically cleared by her medical team.   5.  Please contact [phone] with any questions or concerns.         YUSUF SUE MD             D: 2018   T: 2018   MT: FRANCO      Name:     MYRIAM FRANCIS   MRN:      [id number]        Account:       [id number]   :      1960           Consult Date:  2018      Document: [id number]

## 2018-03-12 NOTE — PROGRESS NOTES
[facility]    Internal Medicine Hospitalist Progress Note  03/12/2018  I evaluated patient on the above date.    Ray Simon Jr., MD  [phone] (p)  Text Page (7 am to 6 pm)      Assessment & Plan   Ms. Antoinette Romero is a 57 year old female, with past medical history including tobacco use, marijuana use, migraines and morbid obesity with previous gastric bypass surgery, who presented 3/10 to OSH with recent falls and focal weakness, with pain including in neck and hip, and was found with spinal cord contusion and disruption of spine ligaments. Transferred to [facility] 3/10 for management.    1. Traumatic cervical spine injury with resulting C3-T1 severe stenosis with spinal cord compression and myelomalacia, C5-6 ruptured anterior longitudinal ligament and disk with a anterior osteophyte fracture and C7-T1 anterior subluxation with severe bilateral foraminal stenosis - s/p C3-T1 ACDF on 3/11/2018.  * Pt with h/o EtOH and multiple falls recently. The patient woke up am 3/10 and had difficulty with walking and left hand and left foot weakness; had pain in neck, hip and hand. Presented to [facility] 3/10 and x-rays of hand, hip tib-fib were negative. CT abdomen and pelvis 3/10 no acute findings. MRI Cervical spine on 3/10/18 revealed spinal cord contusion at C5-C6 without hemorrhage; disruption of ligaments at C5-C6 anteriorly and posteriorly with fluid in the facet joints and in the disc space suggesting motion at this level; fairly extensive edema, but no discrete hematoma in the paraspinous musculature more on the right than on the left and in the prevertebral space; extensive degenerative changes. Pt transferred to [facility] 3/10.  * Seen by Neurosurgery and noted with LUE > RUE weakness with loss of left hand function as well as BLE weakness L > R. Pt underwent C3-T1 ACDF on 3/11/2018.  - Post-op management per Neurosurgery.    2. Alcohol use/abuse/dependence with EtOH w/d.  - Continue [facility] protocol with  PRN IV lorazepam.  - Psychiatry consult pending.    3. Confusion/encephalopathy (suspect toxic).  Pt confused 3/12. Has been getting PRN IV lorazepam for EtOH w/o as well as PRN IV Dilaudid for pain.  - Minimize sedative meds as able.      4. Anxiety/depression.  - Continue PRN Seroquel.  - Psychiatry to see.     5. History of morbid obesity with previous gastric bypass and anastomotic ulcer.  - Continue Protonix 20 mg daily      6. Tobacco use.  - Continue PRN nicotine patch.    Prophylaxis.  - PCD's, ambulation.    CODE STATUS: FULL.    Dispo.  - Pending above.    Interval History   Pt a bit confused; asking about the surgical approach when it was discussed with her previously by Neurosurgery today. C/o cervical collar/brace discomfort. Says left upper extremity numbness same; weakness the same. Says hip feels better.    -Data reviewed today: I reviewed all new labs and imaging over the last 24 hours. I personally reviewed no images or EKG's today.    Physical Exam   Heart Rate: 77, Blood pressure (!) 150/96, pulse 71, temperature 98.7  F (37.1  C), temperature source Oral, resp. rate 18, last menstrual period 03/01/2010, SpO2 97 %, not currently breastfeeding.  There were no vitals filed for this visit.  Vital Signs with Ranges  Temp:  [97.6  F (36.4  C)-98.7  F (37.1  C)] 98.7  F (37.1  C)  Heart Rate:  [73-96] 77  Resp:  [12-27] 18  BP: (108-156)/(64-96) 150/96  SpO2:  [89 %-98 %] 97 %  Patient Vitals for the past 24 hrs:   BP Temp Temp src Heart Rate Resp SpO2   03/12/18 1152 (!) 150/96 98.7  F (37.1  C) Oral 77 18 97 %   03/12/18 1109 - - - - - 97 %   03/12/18 1100 - - - - - (!) 89 %   03/12/18 0916 - - - - 16 92 %   03/12/18 0831 - - - - 18 93 %   03/12/18 0818 (!) 156/93 98.3  F (36.8  C) Oral 79 16 97 %   03/12/18 0615 - - - - 16 -   03/12/18 0411 136/71 97.6  F (36.4  C) Oral 78 16 98 %   03/12/18 0259 - - - - 14 -   03/12/18 0212 118/72 - - 78 16 97 %   03/12/18 0100 108/64 - - 73 14 96 %   03/12/18 0003  - - - - 14 -   03/11/18 2355 140/90 97.9  F (36.6  C) Oral 83 16 97 %   03/11/18 2318 132/79 - - 81 16 95 %   03/11/18 2146 124/78 - - 84 16 95 %   03/11/18 2122 113/79 98.1  F (36.7  C) - 74 16 98 %   03/11/18 2056 - - - - 16 -   03/11/18 2048 129/90 - - 85 16 98 %   03/11/18 2030 135/85 - - 94 15 98 %   03/11/18 2020 127/76 - - - - 92 %   03/11/18 2010 135/84 - - - 16 95 %   03/11/18 2000 139/86 98.1  F (36.7  C) Temporal - 15 96 %   03/11/18 1950 129/79 - - - 15 96 %   03/11/18 1940 128/79 - - - 16 94 %   03/11/18 1930 129/76 - - - 16 94 %   03/11/18 1920 141/84 - - 90 15 93 %   03/11/18 1910 134/84 - - 83 17 96 %   03/11/18 1900 133/80 - - 78 12 97 %   03/11/18 1850 134/83 - - 96 27 97 %   03/11/18 1840 126/88 - - 80 14 96 %   03/11/18 1830 - 97.9  F (36.6  C) Temporal 80 14 97 %   03/11/18 1820 125/81 - - 81 16 97 %   03/11/18 1810 132/87 - - 81 15 97 %   03/11/18 1800 134/84 - - 83 13 98 %   03/11/18 1750 121/81 - - 93 15 98 %   03/11/18 1748 133/79 98.5  F (36.9  C) Temporal 87 12 98 %     I/O's Last 24 hours  I/O last 3 completed shifts:  In: 4945 [P.O.:400; I.V.:4545]  Out: 2395 [Urine:2225; Drains:95; Blood:75]    Constitutional: Awake, conversant, but slight confusion, follows commands.  Respiratory: Diminished in bases. No crackles or wheezes.  Cardiovascular: RRR no m/r/g.  GI: Soft, nt, nd, +BS.  Skin/Integumen:   Other:        Data     Recent Labs  Lab 03/11/18  0730 03/10/18  0919   WBC  --  10.8   HGB  --  12.3   MCV  --  83   PLT  --  278   INR 0.91  --    NA  --  137   POTASSIUM  --  3.9   CHLORIDE  --  106   CO2  --  25   BUN  --  12   CR  --  0.45*   ANIONGAP  --  6   BESSY  --  8.7   GLC  --  106*   ALBUMIN 3.5 3.8   PROTTOTAL 6.7* 7.0   BILITOTAL 0.8 0.6   ALKPHOS 117 106   ALT 31 35   AST 48* 64*     Recent Labs   Lab Test  03/12/18   0610  03/10/18   0919  01/03/17   0851  06/17/15   0853  05/04/15   0555  05/03/15   0530   03/30/14   0712  03/29/14   0556   GLC   --   106*  99  92  81  107*    < >   --    --    BGM  149*   --    --    --    --    --    --   79  89    < > = values in this interval not displayed.         Recent Results (from the past 24 hour(s))   XR Surgery KAREEN Fluoro L/T 5 Min w Stills    Narrative    XR SURGERY KAREEN FLUORO LESS THAN 5 MIN W STILLS 3/11/2018 5:35 PM     COMPARISON: MRI dated 3/10/2018    HISTORY: C3-T1 ACDF.    NUMBER OF IMAGES ACQUIRED: 8    VIEWS: 1    FLUOROSCOPY TIME: .5      Impression    IMPRESSION: Intraoperative images during anterior instrumented  surgical fusion at C3-T1.    NICOLASA NOEL MD       Medications   All medications were reviewed.    sodium chloride 75 mL/hr at 03/11/18 2045       thiamine  100 mg Oral Daily     folic acid  1 mg Oral Daily     multivitamin, therapeutic with minerals  1 tablet Oral Daily     pantoprazole  20 mg Oral Daily     nicotine   Transdermal Daily     nicotine   Transdermal Q8H

## 2018-03-12 NOTE — PLAN OF CARE
Problem: Patient Care Overview  Goal: Plan of Care/Patient Progress Review  Outcome: Improving  A&Ox4. Neuros: L side hemiparesis and numbness and tingling BUE. VSS, 2L oxygen. Tele NSR. Dressing CDI. MARY 40cc. BS active, - flatus. Clear liquid diet. Bedrest overnight. C/o neck, arm, and hand pain, decreased with dilaudid and ativan. Continues to be sensitive BUE. Pt states dilaudid works better than oxycodone. Emotional and tearful at times. Forman patent. D/c pending will continue to monitor.

## 2018-03-13 ENCOUNTER — APPOINTMENT (OUTPATIENT)
Dept: PHYSICAL THERAPY | Facility: CLINIC | Age: 58
DRG: 028 | End: 2018-03-13
Attending: PHYSICIAN ASSISTANT
Payer: COMMERCIAL

## 2018-03-13 ENCOUNTER — APPOINTMENT (OUTPATIENT)
Dept: GENERAL RADIOLOGY | Facility: CLINIC | Age: 58
DRG: 028 | End: 2018-03-13
Attending: INTERNAL MEDICINE
Payer: COMMERCIAL

## 2018-03-13 LAB
ANION GAP SERPL CALCULATED.3IONS-SCNC: 7 MMOL/L (ref 3–14)
BASOPHILS # BLD AUTO: 0 10E9/L (ref 0–0.2)
BASOPHILS NFR BLD AUTO: 0.2 %
BUN SERPL-MCNC: 18 MG/DL (ref 7–30)
CALCIUM SERPL-MCNC: 7.7 MG/DL (ref 8.5–10.1)
CHLORIDE SERPL-SCNC: 106 MMOL/L (ref 94–109)
CO2 SERPL-SCNC: 27 MMOL/L (ref 20–32)
CREAT SERPL-MCNC: 0.5 MG/DL (ref 0.52–1.04)
DIFFERENTIAL METHOD BLD: ABNORMAL
EOSINOPHIL # BLD AUTO: 0.1 10E9/L (ref 0–0.7)
EOSINOPHIL NFR BLD AUTO: 0.5 %
ERYTHROCYTE [DISTWIDTH] IN BLOOD BY AUTOMATED COUNT: 14.1 % (ref 10–15)
GFR SERPL CREATININE-BSD FRML MDRD: >90 ML/MIN/1.7M2
GLUCOSE SERPL-MCNC: 91 MG/DL (ref 70–99)
HCT VFR BLD AUTO: 35.2 % (ref 35–47)
HGB BLD-MCNC: 11.2 G/DL (ref 11.7–15.7)
IMM GRANULOCYTES # BLD: 0 10E9/L (ref 0–0.4)
IMM GRANULOCYTES NFR BLD: 0.1 %
LYMPHOCYTES # BLD AUTO: 3 10E9/L (ref 0.8–5.3)
LYMPHOCYTES NFR BLD AUTO: 30.6 %
MCH RBC QN AUTO: 26.8 PG (ref 26.5–33)
MCHC RBC AUTO-ENTMCNC: 31.8 G/DL (ref 31.5–36.5)
MCV RBC AUTO: 84 FL (ref 78–100)
MONOCYTES # BLD AUTO: 1 10E9/L (ref 0–1.3)
MONOCYTES NFR BLD AUTO: 10.4 %
NEUTROPHILS # BLD AUTO: 5.7 10E9/L (ref 1.6–8.3)
NEUTROPHILS NFR BLD AUTO: 58.2 %
NRBC # BLD AUTO: 0 10*3/UL
NRBC BLD AUTO-RTO: 0 /100
PLATELET # BLD AUTO: 212 10E9/L (ref 150–450)
POTASSIUM SERPL-SCNC: 3.4 MMOL/L (ref 3.4–5.3)
RBC # BLD AUTO: 4.18 10E12/L (ref 3.8–5.2)
SODIUM SERPL-SCNC: 140 MMOL/L (ref 133–144)
WBC # BLD AUTO: 9.8 10E9/L (ref 4–11)

## 2018-03-13 PROCEDURE — 25000128 H RX IP 250 OP 636: Performed by: INTERNAL MEDICINE

## 2018-03-13 PROCEDURE — 25000132 ZZH RX MED GY IP 250 OP 250 PS 637: Performed by: PHYSICIAN ASSISTANT

## 2018-03-13 PROCEDURE — 25000132 ZZH RX MED GY IP 250 OP 250 PS 637: Performed by: NEUROLOGICAL SURGERY

## 2018-03-13 PROCEDURE — 36415 COLL VENOUS BLD VENIPUNCTURE: CPT | Performed by: INTERNAL MEDICINE

## 2018-03-13 PROCEDURE — 97110 THERAPEUTIC EXERCISES: CPT | Mod: GP | Performed by: PHYSICAL THERAPIST

## 2018-03-13 PROCEDURE — 40000193 ZZH STATISTIC PT WARD VISIT: Performed by: PHYSICAL THERAPIST

## 2018-03-13 PROCEDURE — 80048 BASIC METABOLIC PNL TOTAL CA: CPT | Performed by: INTERNAL MEDICINE

## 2018-03-13 PROCEDURE — 85025 COMPLETE CBC W/AUTO DIFF WBC: CPT | Performed by: INTERNAL MEDICINE

## 2018-03-13 PROCEDURE — 97530 THERAPEUTIC ACTIVITIES: CPT | Mod: GP | Performed by: PHYSICAL THERAPIST

## 2018-03-13 PROCEDURE — 71045 X-RAY EXAM CHEST 1 VIEW: CPT

## 2018-03-13 PROCEDURE — 99232 SBSQ HOSP IP/OBS MODERATE 35: CPT | Performed by: INTERNAL MEDICINE

## 2018-03-13 PROCEDURE — 97162 PT EVAL MOD COMPLEX 30 MIN: CPT | Mod: GP | Performed by: PHYSICAL THERAPIST

## 2018-03-13 PROCEDURE — 12000000 ZZH R&B MED SURG/OB

## 2018-03-13 RX ORDER — HYDRALAZINE HYDROCHLORIDE 20 MG/ML
10 INJECTION INTRAMUSCULAR; INTRAVENOUS EVERY 6 HOURS PRN
Status: DISCONTINUED | OUTPATIENT
Start: 2018-03-13 | End: 2018-03-18 | Stop reason: HOSPADM

## 2018-03-13 RX ORDER — LABETALOL HYDROCHLORIDE 5 MG/ML
10 INJECTION, SOLUTION INTRAVENOUS EVERY 6 HOURS PRN
Status: DISCONTINUED | OUTPATIENT
Start: 2018-03-13 | End: 2018-03-18 | Stop reason: HOSPADM

## 2018-03-13 RX ORDER — CYCLOBENZAPRINE HCL 10 MG
10 TABLET ORAL 3 TIMES DAILY
Status: DISCONTINUED | OUTPATIENT
Start: 2018-03-13 | End: 2018-03-18 | Stop reason: HOSPADM

## 2018-03-13 RX ADMIN — SODIUM CHLORIDE: 9 INJECTION, SOLUTION INTRAVENOUS at 01:32

## 2018-03-13 RX ADMIN — FOLIC ACID 1 MG: 1 TABLET ORAL at 08:03

## 2018-03-13 RX ADMIN — SENNOSIDES AND DOCUSATE SODIUM 1 TABLET: 8.6; 5 TABLET ORAL at 01:17

## 2018-03-13 RX ADMIN — HYDROMORPHONE HYDROCHLORIDE 4 MG: 2 TABLET ORAL at 10:26

## 2018-03-13 RX ADMIN — HYDROMORPHONE HYDROCHLORIDE 4 MG: 2 TABLET ORAL at 23:18

## 2018-03-13 RX ADMIN — MULTIPLE VITAMINS W/ MINERALS TAB 1 TABLET: TAB at 08:03

## 2018-03-13 RX ADMIN — HYDROMORPHONE HYDROCHLORIDE 4 MG: 2 TABLET ORAL at 07:17

## 2018-03-13 RX ADMIN — HYDROMORPHONE HYDROCHLORIDE 4 MG: 2 TABLET ORAL at 19:15

## 2018-03-13 RX ADMIN — PANTOPRAZOLE SODIUM 20 MG: 20 TABLET, DELAYED RELEASE ORAL at 08:03

## 2018-03-13 RX ADMIN — HYDROMORPHONE HYDROCHLORIDE 4 MG: 2 TABLET ORAL at 01:17

## 2018-03-13 RX ADMIN — HYDROMORPHONE HYDROCHLORIDE 4 MG: 2 TABLET ORAL at 13:39

## 2018-03-13 RX ADMIN — LORAZEPAM 1 MG: 1 TABLET ORAL at 16:19

## 2018-03-13 RX ADMIN — Medication 100 MG: at 08:03

## 2018-03-13 ASSESSMENT — VISUAL ACUITY
OU: NORMAL ACUITY

## 2018-03-13 ASSESSMENT — ACTIVITIES OF DAILY LIVING (ADL)
ADLS_ACUITY_SCORE: 24
ADLS_ACUITY_SCORE: 12
ADLS_ACUITY_SCORE: 12
ADLS_ACUITY_SCORE: 24
ADLS_ACUITY_SCORE: 24
ADLS_ACUITY_SCORE: 12

## 2018-03-13 NOTE — PROGRESS NOTES
"[facility]    Neurosurgery Progress Note    Date of Service (when I saw the patient): 03/13/2018     Assessment & Plan   Antoinette Romero is a 57 year old female who was admitted on 3/10/2018 with multiple falls and weakness with incomplete spinal cord injury. She had weaknes sin left arm and leg and extreme hypersensitivity in BUE. Subsequently underwent C3-T1 ACDF with Dr. Maynor Daley on 3/11/2018. Pt is lying in bed today. She reports neck pain with movement today.  I was paged by Gerda CHEEMA and pt reports that she will not eat until she see's Dr. Maynor Daley and had many questions regarding her surgery. It was explained to the pt that I work with Dr. Maynor Daley.  The pt asked again when her left arm will be full strength. It was explained that she did have a severe spinal cord contusion/injury and we are not sure if she will ever gain full strength again. She then began swearing. She was asked to not swear.  It was explained that her injury was severe and that she could have been paralyzed with the injury.  She reports \"thats what everyone says\". It was explained that we will continue to monitor. She was asked if she was eating and taking her medications and she said yes.  She was educated as well that she will need to wear her collar at all times for a minimum of 12 weeks.  Dressing may be removed and incision to air.   From a NSG standpoint she is stable to  DC.  She has ongoing left arm and leg weakness and will most likely need rehab placement.         Active Problems:    Contusion    Assessment: s/p C3-T1 ACDF    Plan:   -Up to chair and with assist  -Pain control  -Keep collar in place  -Encourage use of IS and deep breathing          I have discussed the following assessment and plan Dr. Maynor Daley  who is in agreement with initial plan and will follow up with further consultation recommendations.    Mojgan Callejas [facility]  [facility]  [facility] " [address]  [address]  [place] [address]    Tel [phone]  Pager [phone]      Interval History   Stable but agitated    Physical Exam   Temp: 97.6  F (36.4  C) Temp src: Oral BP: (!) 178/106   Heart Rate: 77 Resp: 16 SpO2: 94 % O2 Device: None (Room air) Oxygen Delivery: 2 LPM  There were no vitals filed for this visit.  Vital Signs with Ranges  Temp:  [97.4  F (36.3  C)-98.2  F (36.8  C)] 97.6  F (36.4  C)  Heart Rate:  [68-83] 77  Resp:  [16-18] 16  BP: (104-178)/() 178/106  SpO2:  [94 %-99 %] 94 %  I/O last 3 completed shifts:  In: 1650 [P.O.:330; I.V.:1320]  Out: 1340 [Urine:1300; Drains:40]    Heart Rate: 77, Blood pressure (!) 178/106, pulse 71, temperature 97.6  F (36.4  C), temperature source Oral, resp. rate 16, last menstrual period 03/01/2010, SpO2 94 %, not currently breastfeeding.  0 lbs 0 oz  HEENT:  Normocephalic, atraumatic.  PERRLA.  EOM s intact.    Neck:  Supple, non-tender, without lymphadenopathy.  Heart:  No peripheral edema  Lungs:  No SOB  Abdomen:  Soft, non-tender, non-distended.   Skin:  Warm and dry, good capillary refill.  Extremities:  Good radial and dorsalis pedis pulses bilaterally, no edema, cyanosis or clubbing.    NEUROLOGICAL EXAMINATION:     Mental status:  Alert and Oriented x 3, speech is fluent. Agitated   Cranial nerves:  II-XII intact.   Motor:     Full strength to RUE and LUE. . Diffuse weakness to LUE and LLE    Sensation:  intact  Gait:  Deferred    Cervical examination reveals pain with movement      Medications     sodium chloride 75 mL/hr at 03/13/18 0132       cyclobenzaprine  10 mg Oral TID     thiamine  100 mg Oral Daily     folic acid  1 mg Oral Daily     multivitamin, therapeutic with minerals  1 tablet Oral Daily     pantoprazole  20 mg Oral Daily     nicotine   Transdermal Daily     nicotine   Transdermal Q8H       Data     All new lab and imaging data was personally reviewed by me.  CBC RESULTS:   Recent Labs   Lab Test   03/13/18   0835   WBC  9.8   RBC  4.18   HGB  11.2*   HCT  35.2   MCV  84   MCH  26.8   MCHC  31.8   RDW  14.1   PLT  212     Basic Metabolic Panel:  Lab Results   Component Value Date     03/13/2018      Lab Results   Component Value Date    POTASSIUM 3.4 03/13/2018     Lab Results   Component Value Date    CHLORIDE 106 03/13/2018     Lab Results   Component Value Date    BESSY 7.7 03/13/2018     Lab Results   Component Value Date    CO2 27 03/13/2018     Lab Results   Component Value Date    BUN 18 03/13/2018     Lab Results   Component Value Date    CR 0.50 03/13/2018     Lab Results   Component Value Date    GLC 91 03/13/2018     INR:  Lab Results   Component Value Date    INR 0.91 03/11/2018    INR 0.93 01/03/2017    INR 1.01 01/16/2015

## 2018-03-13 NOTE — PROGRESS NOTES
[facility]    Internal Medicine Hospitalist Progress Note  03/13/2018  I evaluated patient on the above date.    Ray Simon Jr., MD  [phone] (p)  Text Page (7 am to 6 pm)      Assessment & Plan   Ms. Antoinette Romero is a 57 year old female, with past medical history including tobacco use, marijuana use, migraines and morbid obesity with previous gastric bypass surgery, who presented 3/10 to OSH with recent falls and focal weakness, with pain including in neck and hip, and was found with spinal cord contusion and disruption of spine ligaments. [facility]    1. Traumatic cervical spine injury with resulting C3-T1 severe stenosis with spinal cord compression and myelomalacia, C5-6 ruptured anterior longitudinal ligament and disk with a anterior osteophyte fracture and C7-T1 anterior subluxation with severe bilateral foraminal stenosis - s/p C3-T1 ACDF on 3/11/2018.  * Pt with h/o EtOH and multiple falls recently. The patient woke up am 3/10 and had difficulty with walking and left hand and left foot weakness; had pain in neck, hip and hand. Presented to [facility] 3/10 and x-rays of hand, hip tib-fib were negative. CT abdomen and pelvis 3/10 no acute findings. MRI Cervical spine on 3/10/18 revealed spinal cord contusion at C5-C6 without hemorrhage; disruption of ligaments at C5-C6 anteriorly and posteriorly with fluid in the facet joints and in the disc space suggesting motion at this level; fairly extensive edema, but no discrete hematoma in the paraspinous musculature more on the right than on the left and in the prevertebral space; extensive degenerative changes. [facility].  * Seen by Neurosurgery and noted with LUE > RUE weakness with loss of left hand function as well as BLE weakness L > R. Pt underwent C3-T1 ACDF on 3/11/2018.  - Post-op management per Neurosurgery.    2. Alcohol use/abuse/dependence with EtOH w/d.  - Continue [facility] protocol with  "PRN IV lorazepam.    3. Confusion/encephalopathy (suspect toxic).  Pt confused 3/12. Had been getting PRN IV lorazepam for EtOH w/o as well as PRN IV Dilaudid for pain.  - Minimize sedative meds as able.    4. Elevated BP's, suspect related to w/d vs pain vs underlying essential HTN vs combination,  - Treat EtOH w/d as above.  - Monitor BP's.      5. Anxiety/depression related to problem #1.  Seen by Psychiatry 3/12 and PRN Seroquel added. Pt more emotionally labile and pessimistic 3/13 but later calmed down.  - Continue PRN Seroquel.  - Appreciate Psychiatry help.     6. History of morbid obesity with previous gastric bypass and anastomotic ulcer.  - Continue Protonix 20 mg daily      7. Tobacco use.  - Continue PRN nicotine patch.    Prophylaxis.  - PCD's, ambulation.    CODE STATUS: FULL.    Dispo.  - Pending above.  - Holdable if tries to leave AMA, per Psychiatry rec's.    Interval History   Pt had questions about the surgery which I tried to answer.  I showed her x-rays post-op and pt became very emotional and upset regarding having \"hardware\" in her neck; she later calmed down, she expressed pessimism about her recovery and about having the hardware in place and we re-assured her.    -Data reviewed today: I reviewed all new labs and imaging over the last 24 hours. I personally reviewed no images or EKG's today.    Physical Exam   Heart Rate: 68, Blood pressure 104/78, pulse 71, temperature 98.2  F (36.8  C), temperature source Oral, resp. rate 16, last menstrual period 03/01/2010, SpO2 96 %, not currently breastfeeding.  There were no vitals filed for this visit.  Vital Signs with Ranges  Temp:  [97.4  F (36.3  C)-98.7  F (37.1  C)] 98.2  F (36.8  C)  Heart Rate:  [68-83] 68  Resp:  [16-18] 16  BP: (104-170)/() 104/78  SpO2:  [89 %-99 %] 96 %  Patient Vitals for the past 24 hrs:   BP Temp Temp src Heart Rate Resp SpO2   03/13/18 0745 104/78 98.2  F (36.8  C) Oral 68 16 96 %   03/13/18 0400 (!) 162/94 - " - 75 16 99 %   03/13/18 0000 (!) 167/97 97.4  F (36.3  C) Oral 74 18 99 %   03/12/18 2141 158/90 - - - - -   03/12/18 2139 - - - - 16 -   03/12/18 2054 - - - - 16 -   03/12/18 2048 (!) 170/107 98  F (36.7  C) Oral - 16 96 %   03/12/18 1938 - - - - 16 95 %   03/12/18 1802 - - - - 16 -   03/12/18 1717 - - - - 16 -   03/12/18 1700 (!) 158/91 98.1  F (36.7  C) Oral 83 16 94 %   03/12/18 1646 - - - - 18 -   03/12/18 1152 (!) 150/96 98.7  F (37.1  C) Oral 77 18 97 %   03/12/18 1109 - - - - - 97 %   03/12/18 1100 - - - - - (!) 89 %   03/12/18 0916 - - - - 16 92 %   03/12/18 0831 - - - - 18 93 %   03/12/18 0818 (!) 156/93 98.3  F (36.8  C) Oral 79 16 97 %     I/O's Last 24 hours  I/O last 3 completed shifts:  In: 1650 [P.O.:330; I.V.:1320]  Out: 1340 [Urine:1300; Drains:40]    Constitutional: Awake, conversant, more coherent and lucid. Emotional and anxious and tearful at times.  Respiratory: Diminished in bases. No crackles; few expiratory upper airway sounds heard on left..  Cardiovascular: RRR no m/r/g.  GI: Soft, nt, nd, +BS.  Skin/Integumen:   Other:        Data     Recent Labs  Lab 03/11/18  0730 03/10/18  0919   WBC  --  10.8   HGB  --  12.3   MCV  --  83   PLT  --  278   INR 0.91  --    NA  --  137   POTASSIUM  --  3.9   CHLORIDE  --  106   CO2  --  25   BUN  --  12   CR  --  0.45*   ANIONGAP  --  6   BESSY  --  8.7   GLC  --  106*   ALBUMIN 3.5 3.8   PROTTOTAL 6.7* 7.0   BILITOTAL 0.8 0.6   ALKPHOS 117 106   ALT 31 35   AST 48* 64*     Recent Labs   Lab Test  03/12/18   0610  03/10/18   0919  01/03/17   0851  06/17/15   0853  05/04/15   0555  05/03/15   0530   03/30/14   0712  03/29/14   0556   GLC   --   106*  99  92  81  107*   < >   --    --    BGM  149*   --    --    --    --    --    --   79  89    < > = values in this interval not displayed.         Recent Results (from the past 24 hour(s))   XR Cervical Spine 1 View    Narrative    CERVICAL SPINE ONE VIEW   3/12/2018 5:34 PM     HISTORY: Postoperative.       COMPARISON: None.    FINDINGS: Lateral view of the cervical spine shows anterior fusion  hardware in place C3-T1. Alignment appears normal. Surgical drain in  place.      Impression    IMPRESSION: Postoperative anterior fusion hardware C3-T1 in good  position.    DORIS ESPINOSA MD       Medications   All medications were reviewed.    sodium chloride 75 mL/hr at 03/13/18 0132       thiamine  100 mg Oral Daily     folic acid  1 mg Oral Daily     multivitamin, therapeutic with minerals  1 tablet Oral Daily     pantoprazole  20 mg Oral Daily     nicotine   Transdermal Daily     nicotine   Transdermal Q8H

## 2018-03-13 NOTE — PROGRESS NOTES
"SP[facility] Progress Note  [facility]    Follow-up visit with pt. Pt's chart indicates that pt lost 2 siblings to opoid deaths in the past 2 years, but pt does not name this in conversation. As I entered the room, pt stated that too many people were coming in and out off her room. Pt said she didn't want to talk, and then stated that she didn't believe that God loves her because of the suffering that she and her family have experienced. Pt stated that she has worked so hard to take care of others, and doesn't understand why this would happen to her. Pt stated that she felt like her \"head was going to snap off\" because of her injury. I provided reflective listening and emotional support. Pt asked me for \"the verse of the day\" and I recited Psalm 23 to her and named that it sounded like she was walking through the valley of the shadow of death. Pt nodded, crying. I reassured her of God's love for her and her family. Pt thanked me for the visit and stated that she wanted to sleep. Per staff reports and my experience, pt is experiencing significant distress about both her family and her current medical situation. SH will continue to follow as available.      Micki Ramos  Chaplain Resident  Pager: [phone]  Office: [phone]  "

## 2018-03-13 NOTE — PLAN OF CARE
"Problem: Patient Care Overview  Goal: Plan of Care/Patient Progress Review  Outcome: Improving  Pt A&Ox4, VSS, O2 via face mask @ 2lpm while sleeping. Patient has LUE/RUE & LLE hemiparesis. Numbness& tingling in bilat hands. CIWA score 1. Patient up to bedside commode x2 for BM, sm & med, both loose. PO dilaudid for pain. Dressing CDI. MARY patent. Patient requested that a note be entered that \"need to see Dr. Daley right away in the morning.\" Forman intact & patent. BS faint. Discharge pending therapy assessments.      "

## 2018-03-13 NOTE — PROGRESS NOTES
" 03/13/18 0919   Quick Adds   Type of Visit Initial PT Evaluation   Living Environment   Lives With child(martín), adult;spouse  (Son is 30)   Living Arrangements house   Home Accessibility stairs to enter home   Number of Stairs to Enter Home 5   Number of Stairs Within Home 0   Stair Railings at Home (2 rails. )   Living Environment Comment Patient was active and independent prior to admission. Patient reports she does all the cooking, shopping cleaning. \"Nobody else is going to do it.\" \"That's the only thing that kept me sane.\"Takes care of her 19 month old grandson when her daughter needs her to.   Self-Care   Dominant Hand right   Usual Activity Tolerance good   Current Activity Tolerance fair   Regular Exercise no   Functional Level Prior   Ambulation 0-->independent   Transferring 0-->independent   Toileting 0-->independent   Bathing 0-->independent   Dressing 0-->independent   Eating 0-->independent   Communication 0-->understands/communicates without difficulty   Swallowing 0-->swallows foods/liquids without difficulty   Cognition 0 - no cognition issues reported   Fall history within last six months yes   Number of times patient has fallen within last six months 4   General Information   Onset of Illness/Injury or Date of Surgery - Date 03/10/18   Referring Physician Marlin Mathis PA-C   Patient/Family Goals Statement Not receptive to rehab but states \"I don't know where else to go.\"   Pertinent History of Current Problem (include personal factors and/or comorbidities that impact the POC) Antoinette Romero is a 57 year old female who was admitted on 3/10/2018 with multiple falls and weakness with incomplete spinal cord injury. She had weaknes sin left arm and leg and extreme hypersensitivity in BUE. Subsequently underwent C3-T1 ACDF with Dr. Maynor Daley on 3/11/2018.    Precautions/Limitations fall precautions   Weight-Bearing Status - LUE full weight-bearing   Weight-Bearing Status - RUE full weight-bearing "   Weight-Bearing Status - LLE full weight-bearing   Weight-Bearing Status - RLE full weight-bearing   General Observations Patient very angry about everything. At one point stating she wants to leave the hospital.    General Info Comments Cervical collar needs to be on at all times.    Cognitive Status Examination   Orientation orientation to person, place and time   Level of Consciousness alert   Follows Commands and Answers Questions 100% of the time   Personal Safety and Judgment impaired   Memory intact   Pain Assessment   Patient Currently in Pain Yes, see Vital Sign flowsheet   Range of Motion (ROM)   ROM Quick Adds No deficits were identified   Strength   Strength Comments Left DF 1/5, left quads 3/5, hip abd/add 2/5, hip flex 2+/5, right LE strength 5/5, left arm weakness but deferred specific testing to OT.    Bed Mobility   Bed Mobility Comments Sup to sit with mod assist.   Transfer Skills   Transfer Comments Sit to stand with mod assist and transferred to chair with mod assist of 1 and min assist of 1.    Gait   Gait Comments Unable   Balance   Balance Comments Good sitting balance. Poor standing balance due to left LE weakness. Needed mod assist to maintain static standing balance.    Sensory Examination   Sensory Perception Comments Reports right index finger feels like it has been ripped apart. Left entire UE extremely hypersensitive.    Muscle Tone   Muscle Tone Comments Increased tone in left calf musculature.    General Therapy Interventions   Planned Therapy Interventions bed mobility training;gait training;neuromuscular re-education;strengthening;transfer training   Clinical Impression   Criteria for Skilled Therapeutic Intervention yes, treatment indicated   PT Diagnosis impaired functional independence   Influenced by the following impairments Pain, decreased stength, altered sensation   Functional limitations due to impairments Needs assist for bed mobility, transfers, unable to amb   Clinical  "Presentation Evolving/Changing   Clinical Presentation Rationale 3+ complicating factors   Clinical Decision Making (Complexity) Moderate complexity   Therapy Frequency` 2 times/day   Predicted Duration of Therapy Intervention (days/wks) 4 days   Anticipated Equipment Needs at Discharge (To be determined by rehab. )   Anticipated Discharge Disposition Acute Rehabilitation Facility   Risk & Benefits of therapy have been explained Yes   Patient, Family & other staff in agreement with plan of care Yes   [facility] TM \"6 Clicks\"   2016, Trustees of [facility], under license to [employer].  All rights reserved.   6 Clicks Short Forms Basic Mobility Inpatient Short Form   [facility]  \"6 Clicks\" V.2 Basic Mobility Inpatient Short Form   1. Turning from your back to your side while in a flat bed without using bedrails? 3 - A Little   2. Moving from lying on your back to sitting on the side of a flat bed without using bedrails? 2 - A Lot   3. Moving to and from a bed to a chair (including a wheelchair)? 2 - A Lot   4. Standing up from a chair using your arms (e.g., wheelchair, or bedside chair)? 2 - A Lot   5. To walk in hospital room? 1 - Total   6. Climbing 3-5 steps with a railing? 1 - Total   Basic Mobility Raw Score (Score out of 24.Lower scores equate to lower levels of function) 11   Total Evaluation Time   Total Evaluation Time (Minutes) 15     "

## 2018-03-13 NOTE — DISCHARGE INSTRUCTIONS
[facility] at [facility]  [phone]  Monday-Friday; 8am-4:00pm    Care Instructions Following Spine Surgery  In General:   After you have had surgery on your spine, remember do not twist, or excessively flex or extend the area that you had surgery.  These activities can prevent healing.  Pain is normal and to be expected following surgery.  Please call our office to schedule your appointment follow up appointment.      Bowel Care:  Many people have constipation (hard stools) after surgery.  To help prevent constipation: Drink plenty of fluid (8-10 glasses/day); Eat more fiber, such as whole grain bread, bran cereal, and fruits and vegetables; Stay active by walking; Over the counter stool softener may also help.      Medications:  Spine surgery and pain management is unique to all patients.  You will generally be given medications for pain, muscle spasms or tightness, and for constipation during the immediate post op period.  It is important that you use these as prescribed.  Please remember to bring your pill bottles to all of your appointments.  Avoid alcoholic beverages while taking narcotic pain medications.  You can use ice to areas of pain as needed, 20 minutes at a time.  Changing positions and walking will help loosen your muscles as well.       Driving:  No driving while on narcotic pain medications.  It is state law not to drive while under the influence of a drug to a degree which renders you incapable of safely driving.  The narcotic medication you will be taking after surgery falls under this category.    If you have had a cervical fusion, the restricted movement from twisting the neck would prevent you from driving safely.  Ask your provider at your first post op visit if it is ok to drive.      Activity:   After surgery, most people feel less pain than they have had in a long time.  Walking and light activities will help you regain the use of your muscles.  You are  encouraged to walk: start with short walks 5-10 minutes at a time for 4-5 times per day and increase as tolerated.  Stair climbing as tolerated, we recommend you use the railing.     No lifting greater than 10 pounds: approximately equal to one gallon of milk. No twisting, bending in the area you have had surgery. No housework, vacuuming, laundry, leaf raking, lawn mowing, or snow removal. Wear your brace (if ordered) as directed.    Sexual activity: we recommend you abstain from sexual intercourse activity for 1 month until you can do so without pain.    Showers:  If you have sutures or staples you may shower two days after surgery. It is ok to let water run over your incision but do not touch or scrub on the incision. Pat dry immediately after showering. If there is a dressing in place, you may remove it 2 days after surgery.    If you were closed with Derma morris (glue), you may shower without covering the incision.    No baths, hot tubs, or pool activity for at least 6 weeks.     Nutrition:  In general, your diet restrictions will not change with your surgery.  You may need to eat small frequent meals initially until your appetite returns.  Eat plenty of high fiber foods and drink plenty of fluids. If you do not have a fluid restriction from or prior to surgery, we recommend 6-8 (8oz) glasses of water per day. Other fluids are fine, but water is best. Nausea is not uncommon; it is a common side effect to many pain medications.  We recommend that you take the pain medications with food, if this does not improve your symptoms, please call us.     Smoking:  For proper healing it is required that you quit using all tobacco products.  This includes smoking, chewing, nicotine gums, and nicotine patches.  Call your Doctor if these occur:  Drainage from your incision  Increased pain, redness, or swelling  Temperatures greater than 101.5  Increased leg pain or swelling  Unrelieved headaches    Go to the nearest Emergency  Room if you experience:   Chest pain, shortness of breath   Neck swelling or swallowing problems

## 2018-03-13 NOTE — PLAN OF CARE
"Problem: Patient Care Overview  Goal: Plan of Care/Patient Progress Review  Outcome: No Change  A&O x4 but confused and lethargic at times. Emotional/ tearful and frustrated with progress. VSS , 2LPM face mask while sleeping.  LUE/RUE and LLE hemiparesis. Numbness to RUE fingers and tingling to fingertips. CIWA score 2. Able to turn and reposition self in bed. Educated on proper body mechanics and importance of collar at all times. Dressing CDI. MARY patent. Pt anxious and apprehensive about MARY removal. Frequently saying, \"where is the ortho doctor\" redirected that Dr. Edi ROWLEY rounding on patient and will see patient in the AM. Forman intact. Poor appetite, encourage PO intake, faint bowel sounds. C/o pain to neck and arms, decreased with PO dilaudid. Discharge pending. Therapies to assess tomorrow. Continue to monitor.       "

## 2018-03-13 NOTE — PLAN OF CARE
Problem: Patient Care Overview  Goal: Plan of Care/Patient Progress Review  PT-Patient seen for initial eval and treatment initiated. Patient lives in a mobile home with her  and adult son. Patient spoke very poorly of the two of them and reported they didn't help her when she fell until she called for them. She was tearful at times and appeared very angry about her situation. She was independent and active prior to admission and reports she watches her grandson a couple days/week.   Discharge Planner PT   Patient plan for discharge: Wants to go home. Therapist explained rehab to patient and educated her that this is the recommendation.    Current status: Patient needed a lot of reassurance and encouragement. She fluctuates from wanting to move to not wanting to. Transferred sup to sit with mod assist. Sit to stand with mod assist and transferred to chair with mod assist of 1 and min assist of 1. Left patient sitting up in chair with chair alarm on and call light in reach. Limited by right index finger pain and sensitivity, right UE pain and weakness and left LE weakness. Also very emotional.   Barriers to return to prior living situation: Has steps at home, amount of assist needed currently needed for mobility, limited endurance, falls risk  Recommendations for discharge: ARC  Rationale for recommendations: Patient was independent and active prior to admission and has good potential to return to prior living situation after rehab, however, question family support. Patient appears motivated but also admitting to being very depressed with current situation.        Entered by: Deanna Davila 03/13/2018 5:00 PM

## 2018-03-14 ENCOUNTER — APPOINTMENT (OUTPATIENT)
Dept: OCCUPATIONAL THERAPY | Facility: CLINIC | Age: 58
DRG: 028 | End: 2018-03-14
Attending: PHYSICIAN ASSISTANT
Payer: COMMERCIAL

## 2018-03-14 ENCOUNTER — APPOINTMENT (OUTPATIENT)
Dept: PHYSICAL THERAPY | Facility: CLINIC | Age: 58
DRG: 028 | End: 2018-03-14
Attending: INTERNAL MEDICINE
Payer: COMMERCIAL

## 2018-03-14 PROCEDURE — 25000132 ZZH RX MED GY IP 250 OP 250 PS 637: Performed by: INTERNAL MEDICINE

## 2018-03-14 PROCEDURE — 40000133 ZZH STATISTIC OT WARD VISIT

## 2018-03-14 PROCEDURE — 97535 SELF CARE MNGMENT TRAINING: CPT | Mod: GO

## 2018-03-14 PROCEDURE — 25000132 ZZH RX MED GY IP 250 OP 250 PS 637: Performed by: NURSE PRACTITIONER

## 2018-03-14 PROCEDURE — 12000000 ZZH R&B MED SURG/OB

## 2018-03-14 PROCEDURE — 25000132 ZZH RX MED GY IP 250 OP 250 PS 637: Performed by: PHYSICIAN ASSISTANT

## 2018-03-14 PROCEDURE — 97530 THERAPEUTIC ACTIVITIES: CPT | Mod: GO

## 2018-03-14 PROCEDURE — 40000193 ZZH STATISTIC PT WARD VISIT: Performed by: PHYSICAL THERAPIST

## 2018-03-14 PROCEDURE — 25000132 ZZH RX MED GY IP 250 OP 250 PS 637: Performed by: PSYCHIATRY & NEUROLOGY

## 2018-03-14 PROCEDURE — 25000132 ZZH RX MED GY IP 250 OP 250 PS 637: Performed by: NEUROLOGICAL SURGERY

## 2018-03-14 PROCEDURE — 97530 THERAPEUTIC ACTIVITIES: CPT | Mod: GP | Performed by: PHYSICAL THERAPIST

## 2018-03-14 PROCEDURE — 99232 SBSQ HOSP IP/OBS MODERATE 35: CPT | Performed by: INTERNAL MEDICINE

## 2018-03-14 RX ORDER — CLONAZEPAM 0.5 MG/1
0.25 TABLET ORAL 3 TIMES DAILY PRN
Status: DISCONTINUED | OUTPATIENT
Start: 2018-03-14 | End: 2018-03-18 | Stop reason: HOSPADM

## 2018-03-14 RX ORDER — LISINOPRIL 10 MG/1
10 TABLET ORAL DAILY
Status: DISCONTINUED | OUTPATIENT
Start: 2018-03-14 | End: 2018-03-18

## 2018-03-14 RX ORDER — ALPRAZOLAM 0.25 MG
0.25 TABLET ORAL 3 TIMES DAILY PRN
Status: DISCONTINUED | OUTPATIENT
Start: 2018-03-14 | End: 2018-03-14

## 2018-03-14 RX ADMIN — FOLIC ACID 1 MG: 1 TABLET ORAL at 08:05

## 2018-03-14 RX ADMIN — HYDROMORPHONE HYDROCHLORIDE 4 MG: 2 TABLET ORAL at 02:36

## 2018-03-14 RX ADMIN — NICOTINE 1 PATCH: 7 PATCH, EXTENDED RELEASE TRANSDERMAL at 08:01

## 2018-03-14 RX ADMIN — PANTOPRAZOLE SODIUM 20 MG: 20 TABLET, DELAYED RELEASE ORAL at 08:05

## 2018-03-14 RX ADMIN — OXYCODONE HYDROCHLORIDE 10 MG: 5 TABLET ORAL at 14:05

## 2018-03-14 RX ADMIN — LORAZEPAM 1 MG: 1 TABLET ORAL at 04:31

## 2018-03-14 RX ADMIN — CYCLOBENZAPRINE HYDROCHLORIDE 10 MG: 10 TABLET, FILM COATED ORAL at 11:39

## 2018-03-14 RX ADMIN — QUETIAPINE FUMARATE 25 MG: 25 TABLET ORAL at 21:26

## 2018-03-14 RX ADMIN — LISINOPRIL 10 MG: 10 TABLET ORAL at 13:59

## 2018-03-14 RX ADMIN — OXYCODONE HYDROCHLORIDE 10 MG: 5 TABLET ORAL at 20:00

## 2018-03-14 RX ADMIN — HYDROMORPHONE HYDROCHLORIDE 4 MG: 2 TABLET ORAL at 08:05

## 2018-03-14 RX ADMIN — MULTIPLE VITAMINS W/ MINERALS TAB 1 TABLET: TAB at 08:05

## 2018-03-14 RX ADMIN — CYCLOBENZAPRINE HYDROCHLORIDE 10 MG: 10 TABLET, FILM COATED ORAL at 21:25

## 2018-03-14 RX ADMIN — Medication 100 MG: at 08:05

## 2018-03-14 ASSESSMENT — ACTIVITIES OF DAILY LIVING (ADL)
ADLS_ACUITY_SCORE: 11
PREVIOUS_RESPONSIBILITIES: MEAL PREP;HOUSEKEEPING;LAUNDRY
ADLS_ACUITY_SCORE: 11
ADLS_ACUITY_SCORE: 13
ADLS_ACUITY_SCORE: 12

## 2018-03-14 ASSESSMENT — VISUAL ACUITY
OU: NORMAL ACUITY

## 2018-03-14 ASSESSMENT — PAIN DESCRIPTION - DESCRIPTORS
DESCRIPTORS: ACHING;CONSTANT
DESCRIPTORS: ACHING

## 2018-03-14 NOTE — PLAN OF CARE
"Problem: Patient Care Overview  Goal: Plan of Care/Patient Progress Review  Discharge Planner PT   Patient plan for discharge: Wants to go home. Therapist explained rehab to patient and educated her that this is the recommendation.    Current status: Patient continues to need a lot of reassurance and encouragement, with variable participation. Bed mobility with SBA, fair follow through on cue for body mechanics post op. Sit to stand w/ min A, HHA taking stept from bed to chair but declining further. Pt appears to feel if she has any pain than she can't move. Offered options for AD to assist with mobility, pt stating \" I can't use those.\"  Barriers to return to prior living situation: Has steps at home, amount of assist needed currently needed for mobility, limited endurance, falls risk  Recommendations for discharge: ARC  Rationale for recommendations: Patient was independent and active prior to admission and has good potential to return to prior living situation after rehab, however, question family support. Patient appears motivated but also admitting to being very depressed with current situation.        Entered by: Liz De La Cruz 03/14/2018 12:52 PM         "

## 2018-03-14 NOTE — PLAN OF CARE
Problem: Patient Care Overview  Goal: Plan of Care/Patient Progress Review  Outcome: Improving  A&Ox4, anxious. Neuros intact, except for LAUREN/LLE hemiparisis, numbness/tingling in R hand and L pointer finger. VSS, except for elevated BP, lisinopril ordered.  Regular diet. Up with A1-2 w/ walker and GB. C/o pain in neck, rating 10/10 without relief from medication. When entering the room, pt appears to be resting comfortably. Plan to continue encouraging ambulation and up in chair for meals, manage BP and axiety. CIWA and lorazepam D/Cd. Possible D/C tomorrow to a TCU.

## 2018-03-14 NOTE — PROGRESS NOTES
"SPIRITUAL HEALTH SERVICES Progress Note  [facility]    Follow-up visit with pt. Once again, pt asked for the opportunity to rest, but then continued the conversation to talk a little about her distress. Pt requested that  put pt \"on your prayer list.\"  offered prayer for pt and pt accepted. Pt appreciated the prayer and support. SH will continue to follow as available or upon request.      Micki Ramos  Chaplain Resident  Pager: [phone]  Office: [phone]  "

## 2018-03-14 NOTE — PLAN OF CARE
Problem: Patient Care Overview  Goal: Plan of Care/Patient Progress Review  PT: Patient declines PM PT session due to fatigue. Will continue to follow per POC.

## 2018-03-14 NOTE — PLAN OF CARE
Problem: Patient Care Overview  Goal: Plan of Care/Patient Progress Review  OT: Eval complete and tx initiated. Pt admitted for cervical spine contusion after multiple falls. Pt seen POD 3 for C3-T1 anterior disectomy and fusion. Prior to admit pt lives in a house with her  and adult son. Pt reports I in all ADLs/IADLs.    Discharge Planner OT   Patient plan for discharge: Home  Current status: Pt required min A x 2 to complete toilet transfer on raised toilet seat. With use of FWW, pt progressed to min-mod A for shower chair transfer. Pt required mod A for 1 g/h task while seated. Pt required min A for 1 LE dressing task while standing.   Barriers to return to prior living situation: Current level of A; stairs to enter home; Pain level; Fall risk with history  Recommendations for discharge: ARU  Rationale for recommendations: Pt is currently limited by decreased balance, spinal precautions, L UE weakness and incoordination, anxiety, and pain. Pt would benefit from continued therapy during hospitalization to increase I in ADLs. Pt is very motivated to return to her PLOF.        Entered by: Patrick Negrete 03/14/2018 11:16 AM

## 2018-03-14 NOTE — PLAN OF CARE
Problem: Patient Care Overview  Goal: Plan of Care/Patient Progress Review  Outcome: Improving  Patient A&Ox4, VSS, CIWA score 0 at 0000 & 8 at 0400, gave 1mg ativan with relief. Pain managed with PRN dilaudid. Pt up to bedside commode with assist of 1. Pt is anxious, especially about moving her neck (and how to be sure her aspen collar is tight enough) and what her future holds. Redirected to focus on the now, recovery is a process. Dressings CDI. IV SL. Discharge plans tbd.

## 2018-03-14 NOTE — PROGRESS NOTES
"CM    I: SW consult pending.  Patient appears to have a Commercial Insurance plan which would allow [facility] CD to see patient to address those concerns.  SW spoke w/RN CC who will request a CD consult be placed.    P:  Will continue to follow.    GATITO Kiran    UDPATE@1400:  Per Physician, patient does not want a CD assessment/consult.      Care Transition Initial Assessment - MISTY  Reason For Consult: community resources, discharge planning, substance use concerns  Met with: PATIENT    Active Problems:    Contusion       DATA  Lives With: spouse  Living Arrangements: mobile home  Description of Support System: Supportive, Involved  Who is your support system?: , Children  Identified issues/concerns regarding health management: Need for increased supports at time of discharge.  ASSESSMENT  Cognitive Status:  Awake, alert, oriented  Concerns to be addressed: Discharge planning; financial issues.    SW reviewed chart and met with patient to discuss the following issues:  CD assessment/information, Financial issues, discharge planning. Patient was admitted 3/10/18 with Cervical Spine Contusion.  Anticipated discharge date: 3/15.  SW introduced self and role.  Patient confirmed she lives with her spouse in their [facility]  Mobile Home.  Patient reported she had not been receiving any outside services prior to this admission.  SW reviewed therapy recommendation for Acute Rehab.  Patient stated she does not want to discharge to any facility that is not close to her home but stated she would consider TCU placement, agreeing to referrals sent to following facilities:  [facility] and [facility]. We also discussed patient's reported financial concerns.  Pt confirmed her insurance is thru her spouse;  \"hoping he doesn't quit his job\". Patient also commented their Prescription coverage has some co pays she may find difficult to pay for.  SW explained the TCU's will check with her current " coverage to confirm/deny TCU coverage.  Per Physician, patient has stated she would not want to speak with anyone regarding CD treatment.  Based on this physician has decided against putting in a CD consult.  SW placed TCU referrals thru DOD.  Will await callbacks.  Per patient, spouse would need to transport patient after work, due to potential transport costs.     PLAN  Financial costs for the patient includes:  Transportation, if applicable .  Patient given options and choices for discharge: Yes .  Patient/family is agreeable to the plan?  YES  Patient Goals and Preferences: Discharge to TCU.  Patient anticipates discharging to:  TCU.      Continue to assist with discharge planning as needed.    GATITO Kiran

## 2018-03-14 NOTE — PROGRESS NOTES
03/14/18 1106   Quick Adds   Type of Visit Initial Occupational Therapy Evaluation   Living Environment   Lives With child(martín), adult;spouse   Living Arrangements mobile home   Home Accessibility stairs to enter home   Number of Stairs to Enter Home 5   Self-Care   Dominant Hand right   Usual Activity Tolerance good   Current Activity Tolerance poor   Equipment Currently Used at Home none   Functional Level Prior   Ambulation 0-->independent   Transferring 0-->independent   Toileting 0-->independent   Bathing 0-->independent   Dressing 0-->independent   Eating 0-->independent   Communication 0-->understands/communicates without difficulty   Fall history within last six months yes   General Information   Onset of Illness/Injury or Date of Surgery - Date 03/10/18   Referring Physician Marlin Mathis PA-C   Patient/Family Goals Statement Home   Additional Occupational Profile Info/Pertinent History of Current Problem Admitted after multiple falls. Seen POD #3 of C3-T1 anterior disectomy and fusion.    Precautions/Limitations spinal precautions;fall precautions   Cognitive Status Examination   Orientation orientation to person, place and time   Level of Consciousness alert   Able to Follow Commands WNL/WFL   Personal Safety (Cognitive) at risk behaviors demonstrated   Memory (will continue to monitor and assess as needed)   Visual Perception   Visual Perception No deficits were identified   Pain Assessment   Patient Currently in Pain Yes, see Vital Sign flowsheet   Range of Motion (ROM)   ROM Comment B UE WFL   Strength   Strength Comments Pt reports weakness of L UE. Pt demonstrated weakness with L UE grasp during functional tasks. B UE not formally tested due to spinal precautions and pain   Coordination   Upper Extremity Coordination Left UE impaired   Bed Mobility Analysis   Impairments Contributing to Impaired Bed Mobility impaired balance;pain   Transfer Skills   Transfer Transfer Safety Analysis Bed/Chair;Transfer  Skill: Stand to Sit;Transfer Safety Analysis Sit/Stand   Transfer Skill: Bed to Chair/Chair to Bed   Level of Morgan: Bed to Chair minimum assist (75% patients effort)   Physical Assist/Nonphysical Assist: Bed to Chair 2 persons   Transfer Safety Analysis Bed/Chair   Transfer Safety Concerns Noted losing balance backward   Impairments Contributing to Impaired Transfers impaired balance;pain   Transfer Skill: Sit to Stand   Level of Morgan: Sit/Stand minimum assist (75% patients effort)   Physical Assist/Nonphysical Assist: Sit/Stand 2 persons   Transfer Safety Analysis Sit/Stand   Transfer Safety Concerns Noted: Sit/Stand losing balance backward   Impaired Transfers: Sit/Stand impaired balance;pain   Toilet Transfer   Toilet Transfer Toilet Transfer Skill;Toilet Transfer Safety Analysis   Transfer Skill: Toilet Transfer   Level of Morgan: Toilet minimum assist (75% patients effort)   Physical Assist/Nonphysical Assist: Toilet 2 persons   Assistive Device seat riser   Transfer Safety Analysis Toilet   Transfer Safety Concerns Noted: Toilet losing balance backward   Transfer Safety Analysis Toilet impaired balance;pain   Upper Body Dressing   Level of Morgan: Dress Upper Body moderate assist (50% patients effort)   Lower Body Dressing   Level of Morgan: Dress Lower Body minimum assist (75% patients effort)   Toileting   Level of Morgan: Toilet minimum assist (75% patients effort)   Grooming   Level of Morgan: Grooming moderate assist (50% patients effort)   Instrumental Activities of Daily Living (IADL)   Previous Responsibilities meal prep;housekeeping;laundry   Activities of Daily Living Analysis   Impairments Contributing to Impaired Activities of Daily Living balance impaired;pain;post surgical precautions;coordination impaired;fear and anxiety;strength decreased   General Therapy Interventions   Planned Therapy Interventions ADL retraining;transfer training   Clinical  "Impression   Criteria for Skilled Therapeutic Interventions Met yes, treatment indicated   OT Diagnosis Decreased I in ADLs and functional mobility   Influenced by the following impairments Pain, spinal precautions, decreased balance   Assessment of Occupational Performance 3-5 Performance Deficits   Identified Performance Deficits Bathing, dressing, toileting, IADLs   Clinical Decision Making (Complexity) Moderate complexity   Therapy Frequency daily   Predicted Duration of Therapy Intervention (days/wks) 3 days   Anticipated Discharge Disposition Acute Rehabilitation Facility   Risks and Benefits of Treatment have been explained. Yes   Patient, Family & other staff in agreement with plan of care Yes   [facility] TM \"6 Clicks\"   2016, Trustees of [facility], under license to [employer].  All rights reserved.   6 Clicks Short Forms Daily Activity Inpatient Short Form   [facility]  \"6 Clicks\" Daily Activity Inpatient Short Form   1. Putting on and taking off regular lower body clothing? 2 - A Lot   2. Bathing (including washing, rinsing, drying)? 2 - A Lot   3. Toileting, which includes using toilet, bedpan or urinal? 3 - A Little   4. Putting on and taking off regular upper body clothing? 3 - A Little   5. Taking care of personal grooming such as brushing teeth? 3 - A Little   6. Eating meals? 3 - A Little   Daily Activity Raw Score (Score out of 24.Lower scores equate to lower levels of function) 16   Total Evaluation Time   Total Evaluation Time (Minutes) 5     "

## 2018-03-14 NOTE — PLAN OF CARE
Problem: Patient Care Overview  Goal: Plan of Care/Patient Progress Review  Outcome: No Change  A&O x 4. Generalized weakness. Anxious. LUE/LLE hemiparesis. Numbness and tingling bilateral hands. CIWAA score of 12 improved with Ativan. Infrequent dry cough. Lung sound diminished. Bowel sounds active. Regular diet. High SBP but still within given parameter . Dressing on neck CDI. Aspen collar all time. Up with 1 with belt to pivot to bedside commode. Voiding adequately. C/o posterior neck pain, decreased with dilaudid. Plan continue to monitor.

## 2018-03-14 NOTE — PROGRESS NOTES
[facility]    Internal Medicine Hospitalist Progress Note  03/14/2018  I evaluated patient on the above date.    Ray Simon Jr., MD  [phone] (p)  Text Page (7 am to 6 pm)      Assessment & Plan   Ms. Antoinette Romero is a 57 year old female, with past medical history including tobacco use, marijuana use, migraines and morbid obesity with previous gastric bypass surgery, who presented 3/10 to OSH with recent falls and focal weakness, with pain including in neck and hip, and was found with spinal cord contusion and disruption of spine ligaments. Transferred to [facility] 3/10 for management.    1. Traumatic cervical spine injury with resulting C3-T1 severe stenosis with spinal cord compression and myelomalacia, C5-6 ruptured anterior longitudinal ligament and disk with a anterior osteophyte fracture and C7-T1 anterior subluxation with severe bilateral foraminal stenosis - s/p C3-T1 ACDF on 3/11/2018.  * Pt with h/o EtOH and multiple falls recently. The patient woke up am 3/10 and had difficulty with walking and left hand and left foot weakness; had pain in neck, hip and hand. Presented to [facility] 3/10 and x-rays of hand, hip tib-fib were negative. CT abdomen and pelvis 3/10 no acute findings. MRI Cervical spine on 3/10/18 revealed spinal cord contusion at C5-C6 without hemorrhage; disruption of ligaments at C5-C6 anteriorly and posteriorly with fluid in the facet joints and in the disc space suggesting motion at this level; fairly extensive edema, but no discrete hematoma in the paraspinous musculature more on the right than on the left and in the prevertebral space; extensive degenerative changes. Pt transferred to [facility] 3/10.  * Seen by Neurosurgery and noted with LUE > RUE weakness with loss of left hand function as well as BLE weakness L > R. Pt underwent C3-T1 ACDF on 3/11/2018.  - Post-op management per Neurosurgery.    2. Alcohol use/abuse/dependence with EtOH w/d.  Improve.  - D/C CIWA and PRN  lorazepam.    3. Confusion/encephalopathy (suspect toxic).  Pt confused 3/12. Had been getting PRN IV lorazepam for EtOH w/o as well as PRN IV Dilaudid for pain. Improved overall 3/13 and 3/14.  - Minimize sedative meds as able.    4. Elevated BP's, suspect related pain, anxiety and underlying essential HTN.  Pt says told me 3/14 that she has h/o HTN and has been on lisinopril in the past.  - Start lisinopril 10 mg daily.  - Continue PRN IV hydralazine and PRN IV labetalol.      5. Anxiety/depression related to problem #1.  Seen by Psychiatry 3/12 and PRN Seroquel added. Pt more emotionally labile and pessimistic 3/13 but later calmed down. Pt does not want to take Seroquel.  - Add PRN clonazepam 0.25 mg TID.  - Continue PRN Seroquel but pt stated she would not take.     6. History of morbid obesity with previous gastric bypass and anastomotic ulcer.  - Continue Protonix 20 mg daily      7. Tobacco use.  - Continue PRN nicotine patch.    Prophylaxis.  - PCD's, ambulation.    CODE STATUS: FULL.    Dispo.  - Plan TCU once bed available.    Interval History   Pt c/o pain and soreness. Otherwise has been ambulating with help.    -Data reviewed today: I reviewed all new labs and imaging over the last 24 hours. I personally reviewed no images or EKG's today.    Physical Exam   Heart Rate: 89, Blood pressure (!) 157/95, pulse 71, temperature 97.2  F (36.2  C), temperature source Axillary, resp. rate 16, last menstrual period 03/01/2010, SpO2 98 %, not currently breastfeeding.  There were no vitals filed for this visit.  Vital Signs with Ranges  Temp:  [97.2  F (36.2  C)-98.3  F (36.8  C)] 97.2  F (36.2  C)  Heart Rate:  [67-98] 89  Resp:  [16-20] 16  BP: (144-180)/() 157/95  SpO2:  [88 %-99 %] 98 %  Patient Vitals for the past 24 hrs:   BP Temp Temp src Heart Rate Resp SpO2   03/14/18 1317 - - - - 16 -   03/14/18 1217 (!) 157/95 97.2  F (36.2  C) Axillary 89 16 98 %   03/14/18 1141 - - - - 16 -   03/14/18 0805 - - -  - 16 -   03/14/18 0744 (!) 154/100 97.8  F (36.6  C) Oral 79 18 99 %   03/14/18 0548 - - - - - 96 %   03/14/18 0545 - - - - - (!) 88 %   03/14/18 0400 (!) 156/107 97.9  F (36.6  C) Oral 70 20 94 %   03/14/18 0000 (!) 150/91 98.3  F (36.8  C) Oral 76 18 99 %   03/13/18 1904 (!) 144/96 98  F (36.7  C) Oral 98 18 92 %   03/13/18 1536 (!) 162/93 98.1  F (36.7  C) Oral 67 18 94 %   03/13/18 1501 155/89 - - 77 - -   03/13/18 1425 - - - - 18 -   03/13/18 1344 (!) 180/107 - - - - -   03/13/18 1339 - - - - 18 -     I/O's Last 24 hours  I/O last 3 completed shifts:  In: 370 [P.O.:370]  Out: 2050 [Urine:2050]    Constitutional: Awake, conversant, coherent and lucid, though emotional and anxious at times.  Respiratory: Diminished in bases. No crackles; few expiratory upper airway sounds heard on left..  Cardiovascular: RRR no m/r/g.  GI: Soft, nt, nd, +BS.  Skin/Integumen:   Other:        Data     Recent Labs  Lab 03/13/18  0835 03/11/18  0730 03/10/18  0919   WBC 9.8  --  10.8   HGB 11.2*  --  12.3   MCV 84  --  83     --  278   INR  --  0.91  --      --  137   POTASSIUM 3.4  --  3.9   CHLORIDE 106  --  106   CO2 27  --  25   BUN 18  --  12   CR 0.50*  --  0.45*   ANIONGAP 7  --  6   BESSY 7.7*  --  8.7   GLC 91  --  106*   ALBUMIN  --  3.5 3.8   PROTTOTAL  --  6.7* 7.0   BILITOTAL  --  0.8 0.6   ALKPHOS  --  117 106   ALT  --  31 35   AST  --  48* 64*     Recent Labs   Lab Test  03/13/18   0835  03/12/18   0610  03/10/18   0919  01/03/17   0851  06/17/15   0853  05/04/15   0555   03/30/14   0712  03/29/14   0556   GLC  91   --   106*  99  92  81   < >   --    --    BGM   --   149*   --    --    --    --    --   79  89    < > = values in this interval not displayed.         No results found for this or any previous visit (from the past 24 hour(s)).    Medications   All medications were reviewed.    sodium chloride 75 mL/hr at 03/13/18 0132       cyclobenzaprine  10 mg Oral TID     folic acid  1 mg Oral Daily      multivitamin, therapeutic with minerals  1 tablet Oral Daily     pantoprazole  20 mg Oral Daily     nicotine   Transdermal Daily     nicotine   Transdermal Q8H

## 2018-03-14 NOTE — PROGRESS NOTES
"After discussion with patient, patient states that she drinks 1/2 glass of wine a night, sometimes she will have an additional 1/2 glass.  Patient states that she does think she has a problem with alcohol but when writer suggests talking to a CD counselor patient states, \"I'm done, look what happened to me.  I don't need someone to tell me to stop.\"  Above information relayed to medical team.  "

## 2018-03-15 ENCOUNTER — APPOINTMENT (OUTPATIENT)
Dept: PHYSICAL THERAPY | Facility: CLINIC | Age: 58
DRG: 028 | End: 2018-03-15
Attending: INTERNAL MEDICINE
Payer: COMMERCIAL

## 2018-03-15 ENCOUNTER — APPOINTMENT (OUTPATIENT)
Dept: OCCUPATIONAL THERAPY | Facility: CLINIC | Age: 58
DRG: 028 | End: 2018-03-15
Attending: INTERNAL MEDICINE
Payer: COMMERCIAL

## 2018-03-15 PROCEDURE — 97530 THERAPEUTIC ACTIVITIES: CPT | Mod: GO | Performed by: OCCUPATIONAL THERAPY ASSISTANT

## 2018-03-15 PROCEDURE — 40000133 ZZH STATISTIC OT WARD VISIT: Performed by: OCCUPATIONAL THERAPY ASSISTANT

## 2018-03-15 PROCEDURE — 25000132 ZZH RX MED GY IP 250 OP 250 PS 637: Performed by: PSYCHIATRY & NEUROLOGY

## 2018-03-15 PROCEDURE — 25000132 ZZH RX MED GY IP 250 OP 250 PS 637: Performed by: NURSE PRACTITIONER

## 2018-03-15 PROCEDURE — 40000193 ZZH STATISTIC PT WARD VISIT: Performed by: PHYSICAL THERAPIST

## 2018-03-15 PROCEDURE — 99232 SBSQ HOSP IP/OBS MODERATE 35: CPT | Performed by: INTERNAL MEDICINE

## 2018-03-15 PROCEDURE — 25000132 ZZH RX MED GY IP 250 OP 250 PS 637: Performed by: PHYSICIAN ASSISTANT

## 2018-03-15 PROCEDURE — 97533 SENSORY INTEGRATION: CPT | Mod: GO | Performed by: OCCUPATIONAL THERAPY ASSISTANT

## 2018-03-15 PROCEDURE — 12000000 ZZH R&B MED SURG/OB

## 2018-03-15 PROCEDURE — 25000132 ZZH RX MED GY IP 250 OP 250 PS 637: Performed by: INTERNAL MEDICINE

## 2018-03-15 PROCEDURE — 97116 GAIT TRAINING THERAPY: CPT | Mod: GP | Performed by: PHYSICAL THERAPIST

## 2018-03-15 PROCEDURE — 99232 SBSQ HOSP IP/OBS MODERATE 35: CPT | Performed by: PSYCHIATRY & NEUROLOGY

## 2018-03-15 RX ORDER — LISINOPRIL 10 MG/1
10 TABLET ORAL DAILY
Qty: 30 TABLET | Refills: 3 | DISCHARGE
Start: 2018-03-16 | End: 2018-03-18

## 2018-03-15 RX ORDER — OXYCODONE HYDROCHLORIDE 5 MG/1
5-10 TABLET ORAL EVERY 4 HOURS PRN
Qty: 30 TABLET | Refills: 0 | Status: ON HOLD | OUTPATIENT
Start: 2018-03-15 | End: 2018-03-27

## 2018-03-15 RX ORDER — CYCLOBENZAPRINE HCL 10 MG
10 TABLET ORAL 3 TIMES DAILY PRN
Qty: 42 TABLET | Status: ON HOLD | DISCHARGE
Start: 2018-03-15 | End: 2018-03-27

## 2018-03-15 RX ORDER — CLONAZEPAM 0.5 MG/1
0.25 TABLET ORAL 3 TIMES DAILY PRN
Qty: 30 TABLET | Refills: 0 | Status: ON HOLD | OUTPATIENT
Start: 2018-03-15 | End: 2018-03-27

## 2018-03-15 RX ORDER — ACETAMINOPHEN 325 MG/1
650 TABLET ORAL EVERY 4 HOURS PRN
Qty: 100 TABLET | DISCHARGE
Start: 2018-03-15

## 2018-03-15 RX ORDER — QUETIAPINE FUMARATE 25 MG/1
25 TABLET, FILM COATED ORAL 3 TIMES DAILY PRN
Qty: 60 TABLET | Status: ON HOLD | DISCHARGE
Start: 2018-03-15 | End: 2018-03-27

## 2018-03-15 RX ORDER — MULTIPLE VITAMINS W/ MINERALS TAB 9MG-400MCG
1 TAB ORAL DAILY
Qty: 30 EACH | Refills: 0 | Status: ON HOLD | DISCHARGE
Start: 2018-03-16 | End: 2020-10-03

## 2018-03-15 RX ADMIN — OXYCODONE HYDROCHLORIDE 10 MG: 5 TABLET ORAL at 23:57

## 2018-03-15 RX ADMIN — QUETIAPINE FUMARATE 25 MG: 25 TABLET ORAL at 12:42

## 2018-03-15 RX ADMIN — QUETIAPINE FUMARATE 25 MG: 25 TABLET ORAL at 21:47

## 2018-03-15 RX ADMIN — OXYCODONE HYDROCHLORIDE 10 MG: 5 TABLET ORAL at 07:35

## 2018-03-15 RX ADMIN — LISINOPRIL 10 MG: 10 TABLET ORAL at 12:42

## 2018-03-15 RX ADMIN — OXYCODONE HYDROCHLORIDE 10 MG: 5 TABLET ORAL at 19:47

## 2018-03-15 RX ADMIN — OXYCODONE HYDROCHLORIDE 5 MG: 5 TABLET ORAL at 03:32

## 2018-03-15 RX ADMIN — CYCLOBENZAPRINE HYDROCHLORIDE 10 MG: 10 TABLET, FILM COATED ORAL at 15:24

## 2018-03-15 RX ADMIN — CYCLOBENZAPRINE HYDROCHLORIDE 10 MG: 10 TABLET, FILM COATED ORAL at 21:47

## 2018-03-15 RX ADMIN — OXYCODONE HYDROCHLORIDE 10 MG: 5 TABLET ORAL at 12:42

## 2018-03-15 RX ADMIN — NICOTINE 1 PATCH: 7 PATCH, EXTENDED RELEASE TRANSDERMAL at 12:42

## 2018-03-15 ASSESSMENT — ACTIVITIES OF DAILY LIVING (ADL)
ADLS_ACUITY_SCORE: 12
ADLS_ACUITY_SCORE: 10
ADLS_ACUITY_SCORE: 12

## 2018-03-15 ASSESSMENT — VISUAL ACUITY
OU: NORMAL ACUITY

## 2018-03-15 NOTE — PLAN OF CARE
Problem: Patient Care Overview  Goal: Plan of Care/Patient Progress Review  Outcome: Improving  Neuro exam intact except for LUE strength 4/5 with weak grasp, LLE strength 3/5, intermittent N/T left hand, numbness in right index finger.  Labile emotions,anxious/tearful and reports hopelessness.  Blood pressures elevated, started on Lisinopril. reports adequate pain relief with Oxycodone.  Ambulated in rowe with assist of one/GB/walker.  Voiding adequately, BM yesterday.  Patient agreeable to [facility] as long as insurance will pay for it.  She reports that she will probably need transportation at discharge and would like to know if there is a co-pay for that.  If [facility] not covered by insurance patient would like a TCU in the [place] area.  D/C plan pending.

## 2018-03-15 NOTE — PROGRESS NOTES
[facility]    Internal Medicine Hospitalist Progress Note  03/15/2018  I evaluated patient on the above date.    Ray Simon Jr., MD  [phone] (p)  Text Page (7 am to 6 pm)      Assessment & Plan   Ms. Antoinette Romero is a 57 year old female, with past medical history including tobacco use, marijuana use, migraines and morbid obesity with previous gastric bypass surgery, who presented 3/10 to OSH with recent falls and focal weakness, with pain including in neck and hip, and was found with spinal cord contusion and disruption of spine ligaments. Transferred to [facility] 3/10 for management.    1. Traumatic cervical spine injury with resulting C3-T1 severe stenosis with spinal cord compression and myelomalacia, C5-6 ruptured anterior longitudinal ligament and disk with a anterior osteophyte fracture and C7-T1 anterior subluxation with severe bilateral foraminal stenosis - s/p C3-T1 ACDF on 3/11/2018.  * Pt with h/o EtOH and multiple falls recently. The patient woke up am 3/10 and had difficulty with walking and left hand and left foot weakness; had pain in neck, hip and hand. Presented to [facility] 3/10 and x-rays of hand, hip tib-fib were negative. CT abdomen and pelvis 3/10 no acute findings. MRI Cervical spine on 3/10/18 revealed spinal cord contusion at C5-C6 without hemorrhage; disruption of ligaments at C5-C6 anteriorly and posteriorly with fluid in the facet joints and in the disc space suggesting motion at this level; fairly extensive edema, but no discrete hematoma in the paraspinous musculature more on the right than on the left and in the prevertebral space; extensive degenerative changes. Pt transferred to [facility] 3/10.  * Seen by Neurosurgery and noted with LUE > RUE weakness with loss of left hand function as well as BLE weakness L > R. Pt underwent C3-T1 ACDF on 3/11/2018.  - Post-op management per Neurosurgery.    2. Alcohol use/abuse/dependence with EtOH w/d.  Improve. CIWA d/c'ed 3/14. Pt  "said that she was \"done\" with alcohol given above issues.    3. Confusion/encephalopathy (suspect toxic).  Pt confused 3/12. Had been getting PRN IV lorazepam for EtOH w/o as well as PRN IV Dilaudid for pain. Improved overall 3/13 and 3/14.  - Minimize sedative meds as able.    4. Elevated BP's, suspect related pain, anxiety and underlying essential HTN.  Pt says told me 3/14 that she has h/o HTN and has been on lisinopril in the past. Started lisinopril 3/14. BP's better 3/15.  - Continue lisinopril 10 mg daily.  - Continue PRN IV hydralazine and PRN IV labetalol.  - Treat anxiety as below.      5. Anxiety/depression related to problem #1.  Seen by Psychiatry 3/12 and PRN Seroquel added. Pt more emotionally labile and pessimistic 3/13 but later calmed down. Reluctant to take Seroquel but has taken some doses. PRN clonazepam added 3/14. Very angry and upset, yelling at RN 3/15; but after a period of quiet and rest she was much better.  - Continue PRN clonazepam 0.25 mg TID.  - Continue PRN Seroquel.  - Ask Psychiatry to see, appreciate help, pt agreeable.    6. History of morbid obesity with previous gastric bypass and anastomotic ulcer.  - Continue Protonix 20 mg daily      7. Tobacco use.  - Continue PRN nicotine patch.    Prophylaxis.  - PCD's, ambulation.    CODE STATUS: FULL.    Dispo.  Pt was initially indecisive about ARU vs TCU, but now wants ARU (as of 3/15).  - Plan acute rehab pending above anxiety issues and bed availability.    Interval History   Pt very upset this morning, was screaming to be left alone. But after a period of quiet and rest she was much better. Calm presently and motivated to go to acute rehab.    -Data reviewed today: I reviewed all new labs and imaging over the last 24 hours. I personally reviewed no images or EKG's today.    Physical Exam   Heart Rate: 96, Blood pressure 123/84, pulse 71, temperature 97.4  F (36.3  C), temperature source Oral, resp. rate 16, last menstrual period " 03/01/2010, SpO2 96 %, not currently breastfeeding.  There were no vitals filed for this visit.  Vital Signs with Ranges  Temp:  [97.2  F (36.2  C)-98.2  F (36.8  C)] 97.4  F (36.3  C)  Heart Rate:  [] 96  Resp:  [16] 16  BP: (123-161)/() 123/84  SpO2:  [94 %-98 %] 96 %  Patient Vitals for the past 24 hrs:   BP Temp Temp src Heart Rate Resp SpO2   03/15/18 0900 123/84 97.4  F (36.3  C) Oral 96 16 96 %   03/15/18 0417 - - - - 16 -   03/15/18 0324 133/84 97.9  F (36.6  C) Oral 98 16 96 %   03/14/18 2351 141/55 98.2  F (36.8  C) Oral 102 16 94 %   03/14/18 1916 (!) 149/96 98.2  F (36.8  C) Oral 100 16 96 %   03/14/18 1558 (!) 142/91 98.2  F (36.8  C) Oral 83 16 98 %   03/14/18 1359 (!) 161/102 - - - - -   03/14/18 1317 - - - - 16 -   03/14/18 1217 (!) 157/95 97.2  F (36.2  C) Axillary 89 16 98 %   03/14/18 1141 - - - - 16 -     I/O's Last 24 hours  I/O last 3 completed shifts:  In: -   Out: 1000 [Urine:1000]    Constitutional: Alert, calm, pleasant.  Respiratory: Diminished in bases. No crackles; few expiratory upper airway sounds heard on left..  Cardiovascular: Reg, no m/r/g.  GI: Soft, nt, nd, +BS.  Skin/Integumen:   Other:        Data     Recent Labs  Lab 03/13/18  0835 03/11/18  0730 03/10/18  0919   WBC 9.8  --  10.8   HGB 11.2*  --  12.3   MCV 84  --  83     --  278   INR  --  0.91  --      --  137   POTASSIUM 3.4  --  3.9   CHLORIDE 106  --  106   CO2 27  --  25   BUN 18  --  12   CR 0.50*  --  0.45*   ANIONGAP 7  --  6   BESSY 7.7*  --  8.7   GLC 91  --  106*   ALBUMIN  --  3.5 3.8   PROTTOTAL  --  6.7* 7.0   BILITOTAL  --  0.8 0.6   ALKPHOS  --  117 106   ALT  --  31 35   AST  --  48* 64*     Recent Labs   Lab Test  03/13/18   0835  03/12/18   0610  03/10/18   0919  01/03/17   0851  06/17/15   0853  05/04/15   0555   03/30/14   0712  03/29/14   0556   GLC  91   --   106*  99  92  81   < >   --    --    BGM   --   149*   --    --    --    --    --   79  89    < > = values in this  interval not displayed.         No results found for this or any previous visit (from the past 24 hour(s)).    Medications   All medications were reviewed.    sodium chloride 75 mL/hr at 03/13/18 0132       lisinopril  10 mg Oral Daily     cyclobenzaprine  10 mg Oral TID     multivitamin, therapeutic with minerals  1 tablet Oral Daily     pantoprazole  20 mg Oral Daily     nicotine   Transdermal Daily     nicotine   Transdermal Q8H

## 2018-03-15 NOTE — PLAN OF CARE
Problem: Patient Care Overview  Goal: Plan of Care/Patient Progress Review  POD 4 C3-T1 ACDF. A&O x 4. Pt has LUE weakness LLE weakness more than RLE and numbness in LUE and R index finger. Bowel sounds active. VSS slightly tachycardic but WNL. ASPEN collar on at all times. Dressing CDI. Up with assist of 1 GB/W. C/o neck pain 10/10, received oxycodone and flexeril with some relief. Pt also has been anxious and perseverative. Pt redirected to breath work on relaxation. Given Seroquel and has been less been less anxious . Plan to DC to [facility] or TCU pending.

## 2018-03-15 NOTE — DISCHARGE SUMMARY
[facility]  Discharge Summary        Antoinette Romero MRN# [id number]   YOB: 1960 Age: 57 year old     Date of Admission: 3/10/2018  Date of Discharge: 3/18/2018  Admitting Physician: Ana Stauffer MD  Discharge Physician: Ana Stauffer     Primary Provider: Uum Roberto  Primary Care Physician Phone Number: [phone]         Discharge Diagnoses:   1. Traumatic cervical spine injury with resulting C3-T1 severe stenosis with spinal cord compression and myelomalacia, C5-6 ruptured anterior longitudinal ligament and disk with a anterior osteophyte fracture and C7-T1 anterior subluxation with severe bilateral foraminal stenosis - s/p C3-T1 ACDF on 3/11/2018.  2. Alcohol use/abuse/dependence with EtOH withdrawal.  3. Confusion/encephalopathy, suspect toxic.  4. Elevated BP's, suspect related pain, anxiety and underlying essential HTN.  5. Anxiety/depression related to problem #1.  6. History of morbid obesity with previous gastric bypass and anastomotic ulcer.  7. Tobacco use.       Allergies:         Allergies   Allergen Reactions     Keflex [Cephalexin-Fd&C Yellow #6] Anaphylaxis     Unasyn Anaphylaxis and Swelling     Azithromycin      Heart palpitations     Compazine [Prochlorperazine] Other (See Comments)     headaches     Zofran [Ondansetron] Other (See Comments)     headaches           Discharge Medications:        Current Discharge Medication List      START taking these medications    Details   lisinopril (PRINIVIL/ZESTRIL) 10 MG tablet Take 0.5 tablets (5 mg) by mouth daily  Qty: 30 tablet, Refills: 3    Associated Diagnoses: Benign essential hypertension      !! acetaminophen (TYLENOL) 325 MG tablet Take 3 tablets (975 mg) by mouth every 8 hours  Qty: 100 tablet    Associated Diagnoses: Status post cervical spinal fusion      benzocaine-menthol (CHLORASEPTIC) 6-10 MG lozenge Place 1 lozenge inside cheek every hour as needed for sore throat (dry/sore throat  without fever)  Qty: 84 lozenge    Associated Diagnoses: Status post cervical spinal fusion      !! acetaminophen (TYLENOL) 325 MG tablet Take 2 tablets (650 mg) by mouth every 4 hours as needed for mild pain  Qty: 100 tablet    Associated Diagnoses: Status post cervical spinal fusion      clonazePAM (KLONOPIN) 0.5 MG tablet Take 0.5 tablets (0.25 mg) by mouth 3 times daily as needed for anxiety  Qty: 30 tablet, Refills: 0    Associated Diagnoses: Adjustment disorder with anxious mood      QUEtiapine (SEROQUEL) 25 MG tablet Take 1 tablet (25 mg) by mouth 3 times daily as needed (Agitation or insomnia)  Qty: 60 tablet    Associated Diagnoses: Adjustment disorder with anxious mood      multivitamin, therapeutic with minerals (THERA-VIT-M) TABS tablet Take 1 tablet by mouth daily  Qty: 30 each, Refills: 0    Associated Diagnoses: Alcohol dependence, daily use (H)      cyclobenzaprine (FLEXERIL) 10 MG tablet Take 1 tablet (10 mg) by mouth 3 times daily as needed for muscle spasms  Qty: 42 tablet    Associated Diagnoses: Status post cervical spinal fusion      oxyCODONE IR (ROXICODONE) 5 MG tablet Take 1-2 tablets (5-10 mg) by mouth every 4 hours as needed for moderate to severe pain  Qty: 30 tablet, Refills: 0    Associated Diagnoses: Status post cervical spinal fusion       !! - Potential duplicate medications found. Please discuss with provider.      CONTINUE these medications which have NOT CHANGED    Details   pantoprazole (PROTONIX) 20 MG EC tablet Take 1 tablet (20 mg) by mouth daily Take by mouth 30-60 minutes before a meal.  Qty: 90 tablet, Refills: 3    Associated Diagnoses: Non-intractable vomiting with nausea, unspecified vomiting type; S/P gastric bypass; Hiatal hernia; Ulcer (H)      nicotine (NICODERM CQ) 7 MG/24HR 24 hr patch Place 1 patch onto the skin every 24 hours  Qty: 30 patch, Refills: 1    Associated Diagnoses: Tobacco abuse         STOP taking these medications       busPIRone (BUSPAR) 5 MG  tablet Comments:   Reason for Stopping:                   Discharge Instructions and Follow-Up:      Follow-up Appointments     Follow Up and recommended labs and tests       [facility] with a cervical   xray prior.Please call the clinic at [phone] to schedule your   appointment with Mojgan Callejas CNP or Kassidy Daly CNP            Follow Up and recommended labs and tests       Follow up with rehab provider.  BMP 1-2 weeks (new medication - lisinopril).  Follow up with primary care provider after rehab discharge.                        Consultations This Hospital Stay:      1. Neurosurgery.  2. Psychiatry.        Admission History:      Please see the H&P by Ana Stauffer MD on 3/10/2018 for complete details. Briefly, Ms. Antoinette Romero is a 57 year old female, with past medical history including tobacco use, marijuana use, migraines and morbid obesity with previous gastric bypass surgery, who presented 3/10 to OSH with recent falls and focal weakness, with pain including in neck and hip, and was found with spinal cord contusion and disruption of spine ligaments. Transferred to [facility] 3/10 for management.        Problem Oriented Hospital Course:      1. Traumatic cervical spine injury with resulting C3-T1 severe stenosis with spinal cord compression and myelomalacia, C5-6 ruptured anterior longitudinal ligament and disk with a anterior osteophyte fracture and C7-T1 anterior subluxation with severe bilateral foraminal stenosis - s/p C3-T1 ACDF on 3/11/2018.  * Pt with h/o EtOH and multiple falls recently. The patient woke up am 3/10 and had difficulty with walking and left hand and left foot weakness; had pain in neck, hip and hand. Presented to [facility] 3/10 and x-rays of hand, hip, tib-fib were negative. CT abdomen and pelvis 3/10 no acute findings. MRI Cervical spine on 3/10/18 revealed spinal cord contusion at C5-C6 without hemorrhage; disruption of  "ligaments at C5-C6 anteriorly and posteriorly with fluid in the facet joints and in the disc space suggesting motion at this level; fairly extensive edema, but no discrete hematoma in the paraspinous musculature more on the right than on the left and in the prevertebral space; extensive degenerative changes. Pt transferred to [facility] 3/10.  * Seen by Neurosurgery and noted with LUE > RUE weakness with loss of left hand function as well as BLE weakness L > R. Pt underwent C3-T1 ACDF on 3/11/2018. She has progressed well with therapies.  - pain management- scheduled Tylenol 975 mg po TID and prn, scheduled Flexeril 10 mg po TID, Roxicodone 5-10 mg po q4h prn  - keep neck collar in place  - F/u Neurosurgery.  - Continue therapies at ARU.     2. Alcohol use/abuse/dependence with EtOH w/d.  Improved. CIWA d/c'ed 3/14. Pt said that she was \"done\" with alcohol given above issues.     3. Confusion/encephalopathy (suspect toxic).  Pt confused 3/12. Had been getting PRN IV lorazepam for EtOH w/o as well as PRN IV Dilaudid for pain. Improved overall starting on 3/13-0 now back to baseline.     4. Elevated BP's, suspect related pain, anxiety and underlying essential HTN.  - she has h/o HTN and has been on lisinopril in the past   - started lisinopril 10 mg po daily on 3/14.   - BP much better now, actually on lower side SBP in 's  - decrease lisinopril to 5 mg daily  - Treat anxiety as below.      5. Anxiety/depression, worsened related to problem #1.  Pt with prior h/o depression/anxiety and had been on multiple meds in the past. Seen by Psychiatry 3/12 and PRN Seroquel added. Pt more emotionally labile and pessimistic 3/13; Reluctant to take Seroquel but did later take some doses. PRN clonazepam added 3/14; Pt seen by Psychiatry again 3/15 and offered to start Trintellix, but pt declined.  - Continue PRN clonazepam 0.25 mg TID.  - Continue PRN Seroquel.  - PTA Buspar stopped    6. History of morbid obesity with previous " gastric bypass and anastomotic ulcer.  - Continue Protonix 20 mg daily.      7. Tobacco use.   - Continue nicotine patch.           Code Status:      Full Code        Pending Results:      None.          Discharge Disposition:      Discharged to [facility]        Discharge Time:      Greater than 30 minutes.        Key Imaging Studies, Lab Findings and Procedures/Surgeries:        Results for orders placed or performed during the hospital encounter of 03/10/18   XR Surgery KAREEN Fluoro L/T 5 Min w Stills    Narrative    XR SURGERY KAREEN FLUORO LESS THAN 5 MIN W STILLS 3/11/2018 5:35 PM     COMPARISON: MRI dated 3/10/2018    HISTORY: C3-T1 ACDF.    NUMBER OF IMAGES ACQUIRED: 8    VIEWS: 1    FLUOROSCOPY TIME: .5      Impression    IMPRESSION: Intraoperative images during anterior instrumented  surgical fusion at C3-T1.    NICOLASA NOEL MD   XR Cervical Spine 1 View    Narrative    CERVICAL SPINE ONE VIEW   3/12/2018 5:34 PM     HISTORY: Postoperative.      COMPARISON: None.    FINDINGS: Lateral view of the cervical spine shows anterior fusion  hardware in place C3-T1. Alignment appears normal. Surgical drain in  place.      Impression    IMPRESSION: Postoperative anterior fusion hardware C3-T1 in good  position.    DORIS ESPINOSA MD   XR Chest Port 1 View    Narrative    XR CHEST PORT 1 VW  3/13/2018 10:09 AM     HISTORY:  hypoxia, cough, eval for signs of pneumonia;     COMPARISON: Film dated 8/17/2017.    FINDINGS:  Heart and pulmonary vasculature appear normal. Tubing is  seen over the right thorax. Mild interstitial changes again noted. No  focal alveolar-type infiltrate.      Impression    IMPRESSION: No active alveolar-type infiltrates are seen.    JULIO SHIN MD     Surgery 3/11/2018:  1. C3-4 anterior cervical discectomy with arthrodesis and placement of a 7 mm Globus Colonial PEEK interbody graft with Globus Signify Putty placed centrally  2. C4-5 anterior cervical discectomy with arthrodesis and placement of a 6 mm  Globus Colonial PEEK interbody graft with Globus Signify Putty placed centrally  3. C5-6 anterior cervical discectomy with arthrodesis and placement of a 7 mm Globus Colonial PEEK interbody graft with Globus Signify Putty placed centrally  4.  C6-7 anterior cervical discectomy with arthrodesis and placement of a 6 mm Globus Colonial PEEK interbody graft with Globus Signify Putty placed centrally  5. C7-T1 anterior cervical discectomy with arthrodesis and placement of a 7 mm Globus Colonial PEEK interbody graft with Globus Signify Putty placed centrally  6. Placement of a 83 mm Globus Harmony anterior cervical plate with 12 15 and 16 mm screws  7 open reduction and internal fixation of the C5-6 fracture and the C7-T1 subluxation   8. Use of C-arm and Microscope  9. Use of intraoperative spinal cord monitoring

## 2018-03-15 NOTE — PLAN OF CARE
Problem: Patient Care Overview  Goal: Plan of Care/Patient Progress Review  Physical Therapy: Pt sleeping upon PT arrival, awakens to name. Pt adamantly refuses PT session this morning, reports that she is too tired and in too much pain to participated. Pt educated on benefits of therapy and continued to refuse.

## 2018-03-15 NOTE — PLAN OF CARE
Problem: Patient Care Overview  Goal: Plan of Care/Patient Progress Review  Outcome: Improving  Patient angry and agitated this AM, seen by psych.  Calm, cooperative and motivated to improve this afternoon.  Neuro exam with weak left grasp (moving thumb more than yesterday however), LUE<LLE hemiparesis.  Up with assist of one, GB, and walker to bathroom and in rowe with therapy.  Rating pain 10/10 at times, decreased with Oxycodone, repositioning, and emotional support.  Voiding adequately, incision LATASHA, Aspen collar in place.  Plan for discharge to ARU tomorrow pending approval.  Patient is going to try and arrange transportation in anticipation of D/C.

## 2018-03-15 NOTE — PROVIDER NOTIFICATION
"Call placed to Dr. Simon: \"Could we get a psych consult for this patient? Preferably stat? Please call if questions, thanks!\"  "

## 2018-03-15 NOTE — PLAN OF CARE
Problem: Patient Care Overview  Goal: Plan of Care/Patient Progress Review  Discharge Planner PT   Patient plan for discharge: Per chart, patient agreeable to ARC.    Current status: Pt in supine upon arrival of therapist with report of 5/10 pain. Pt performed bed mobility SBA. Sit <> stand and ambulation of 200 feet with FWW and Hayes due to unsteadiness. Pt required frequent cues for proper use of FWW. Pt agreeable to sit up in chair at end of session.  Barriers to return to prior living situation: Has steps at home, Level of assist, Fall risk  Recommendations for discharge: ARC  Rationale for recommendations: Patient was independent and active prior to admission and has good potential to return to prior living situation after rehab. Patient appears motivated but also admitting to being very depressed with current situation.        Entered by: Sowmya Hernandez 03/15/2018 5:23 PM

## 2018-03-15 NOTE — PROGRESS NOTES
Spoke w/ Aleja Sahu [facility] liaison. Pt ready for dc tomorrow if accepted at [facility].  She would need [employer] auth. Aleja is reviewing and will pursue auth and speak with pt in am pending review.

## 2018-03-15 NOTE — PLAN OF CARE
Problem: Patient Care Overview  Goal: Plan of Care/Patient Progress Review  Discharge Planner OT   Patient plan for discharge: home  Current status: Pt agreeable to OT session, completed supine to sit EOB with HOB elevated SBA, min A sit to stand, amb with FWW DEBORA to/from bathroom, toilet transfer with RTS Debora, Debora clothing management. Pt amb ~ 100' into hallway toward shower room, pt c/o increase BLE weakness, transport chair brought up behind pt and wheeled rest of way to shower room for nursing to give shower.    Barriers to return to prior living situation: Current level of A; stairs to enter home; Pain level; Fall risk with history  Recommendations for discharge: ARU per plan established by the Occupational Therapist  Rationale for recommendations: Pt is currently limited by decreased balance, spinal precautions, L UE weakness and incoordination, anxiety, and pain. Pt would benefit from continued therapy during hospitalization to increase I in ADLs. Pt is very motivated to return to her PLOF.        Entered by: Jenni Gonzalez 03/15/2018 11:29 AM

## 2018-03-15 NOTE — PROGRESS NOTES
CM    I:  MISTY received call back from [facility] admissions, [place] () stating they can take patient today if patient agrees to the following:  No cigarette or marijuana smoking.  Lala also asked about patient's agitation and anxiety.  MISTY updated her Psych is to see patient today.  Per Lala, they will review Psych notes once they are completed.  If patient appears more accepting of things and agrees to their smoking parameters, they can take patient today. [facility] has declined patient.  Awaiting Psych consult.     P:  Continue to assist with discharge planning as needed.    GATITO Kiran     UPDATE@9533:  MISTY was updated patient's daughter had arrived to discuss discharge plan. Per patient and her daughter, Meggan, patient has agreed now to ARU stating this would be the best plan for her recovery.  MISTY placed referral thru DOD.    UPDATE@4250:  Per Lala, admissions at [facility], they are not comfortable taking patient today but want to see how patient does overnight, if there are any outbursts; also wondering if patient may still be in withdrawals.  MISTY to call Lala in morning tomorrow with update:  [phone].  No ARU update has yet been received.     UPDATE@0394:  Per [facility] liaison, patient appears appropriate for their program.  As ARU is patient's first choice, MISTY will need to follow up with [facility] admissions tomorrow when final discharge plan is confirmed.

## 2018-03-15 NOTE — CONSULTS
"[facility] Psychiatric Consult Progress Note      Interval History:   Pt seen, care reviewed with treatment team. Staff report the patient has been very overwhelmed on account of multiple psychosocial stressors.  She has reported significant anxiety about multiple things she has no control over.  She states she continues to worry about her grandson anytime her daughter takes him away from her as she thinks he may get into an accident.  She states she is worried about their finances as a currently financially strapped.  She states she cannot afford to see a psychiatrist or therapist asked the \"have no money at all.\"  She tells me that she thought she saw a bug on the wall yesterday and admits that she may be going through withdrawal.  She was offered CD assessment on account of her self-report of consistent use of alcohol over the last two years.  She states she probably has a date with that she does not drink but for the most part she drinks a couple of glasses of wine every day.  She claims she's been drinking alcohol since her brother  in  but had been sober for a period of 15 years.  Patient reports \"I would rather be in a holding the ground and another glass of wine.  I know you heard this before but I don't have any reason to lie to you.\"  She admits that she is feeling depressed at this time and cries frequently during this interview.  She states she has tried multiple medications for depression in the past including Prozac, Paxil, Effexor, Zoloft, Celexa, Lexapro and Remeron.  She states she did best on Zoloft for a number of years before she began to experience psychosis while on it. She did admit that she was drinking at the time.  The deleterious effects of continued alcohol use or discussed with her and options for medication assisted treatment were discussed but she declined any medications.  In terms of her depression and anxiety she also declined medications but asked me told there were " natural supplements she could take for both depression and anxiety.  She states PCP just prescribed buspirone to her but she can still afford to get the medication.  She currently denies experiencing any self-harm thoughts plans or intent.  She reports future orientation.  She states she is scared to go to the acute rehabilitation facility that is being recommended.  She denies the presence of auditory or visual hallucinations.     Review of systems:   The Review of Systems is negative other than noted in the HPI     Medications:       lisinopril  10 mg Oral Daily     cyclobenzaprine  10 mg Oral TID     multivitamin, therapeutic with minerals  1 tablet Oral Daily     pantoprazole  20 mg Oral Daily     nicotine   Transdermal Daily     nicotine   Transdermal Q8H     clonazePAM, hydrALAZINE, labetalol, QUEtiapine, HYDROmorphone, HYDROmorphone, acetaminophen, senna-docusate **OR** senna-docusate, bisacodyl **OR** bisacodyl **OR** bisacodyl, LORazepam, potassium chloride, potassium chloride, potassium chloride, potassium chloride with lidocaine, potassium chloride, magnesium sulfate, nicotine, oxyCODONE IR      Mental Status Examination:     Appearance:  awake, alert, appeared older than stated age and Argumentative  Eye Contact:  good  Speech:  clear, coherent and Difficult to interrupt  Psychomotor Behavior:  no evidence of tardive dyskinesia, dystonia, or tics  Mood:  anxious, sad  and depressed  Affect:  mood congruent, intensity is flat and restricted range  Thought Process:  logical, linear and goal oriented no loose associations  Thought Content:  no evidence of suicidal ideation or homicidal ideation and Somewhat paranoid about world events  Oriented to:  time, person, and place  Attention Span and Concentration:  fair  Recent and Remote Memory:  fair  Fund of Knowledge: appropriate  Muscle Strength and Tone: normal  Gait and Station: Not assessed  Insight:  limited  Judgment:  fair        Labs/vitals:   No  results found for this or any previous visit (from the past 24 hour(s)).  B/P: 123/84, T: 97.4, P: 71, R: 16    Impression:   Ms. Romero is a 57-year-old woman currently being managed medically for results of an injury she sustained while falling while intoxicated from alcohol. She is now status post neurosurgical procedure day #3 for injury to her cervical spine.  She is very dysphoric in the setting of stress related to her injury, as well as previous depressive symptoms that were not adequately treated due to her financial issues.  She admits that she is more or less an alcoholic as she had been through treatment in the past.  She had been sober up until she lost her brother in 2015.  Thus, it is quite difficult for her to accept her new position of requiring care from others.  She is in the midst of hospitalization and is being monitored for alcohol withdrawal in addition to the above medical stabilization that is being provided.  She is currently depressed and exhibiting features of generalized anxiety and major depressive disorder.  However she declines medication management or psychiatric care.      DIagnoses:     1.  Unspecified depressive disorder.  Rule out major depressive disorder, recurrent, moderate.   2.  Alcohol use disorder, moderate to severe.   3.  Cocaine use disorder by history, in remission.   4.  Rule out marijuana use disorder.   5.  Generalized anxiety disorder           Plan:   1. Written information given on medications. Side effects, risks, benefits reviewed.  2. She will benefit from CD assessment but she declines claiming she is done with drinking.  3. Offered Trintellix 5 mg but she declines all antidepressants as she claims she had tried and failed different options in the past.  She is also very concerned about her status post gastric bypass as it pertains to medications.  4. Denies self harm thoughts or plans.        Attestation:  Patient has been seen and evaluated by me,   Oz Regalado MD

## 2018-03-16 ENCOUNTER — APPOINTMENT (OUTPATIENT)
Dept: OCCUPATIONAL THERAPY | Facility: CLINIC | Age: 58
DRG: 028 | End: 2018-03-16
Attending: INTERNAL MEDICINE
Payer: COMMERCIAL

## 2018-03-16 ENCOUNTER — APPOINTMENT (OUTPATIENT)
Dept: PHYSICAL THERAPY | Facility: CLINIC | Age: 58
DRG: 028 | End: 2018-03-16
Attending: INTERNAL MEDICINE
Payer: COMMERCIAL

## 2018-03-16 PROCEDURE — 99207 ZZC CDG-MDM COMPONENT: MEETS MODERATE - UP CODED: CPT | Performed by: INTERNAL MEDICINE

## 2018-03-16 PROCEDURE — 25000132 ZZH RX MED GY IP 250 OP 250 PS 637: Performed by: INTERNAL MEDICINE

## 2018-03-16 PROCEDURE — 99232 SBSQ HOSP IP/OBS MODERATE 35: CPT | Performed by: INTERNAL MEDICINE

## 2018-03-16 PROCEDURE — 40000193 ZZH STATISTIC PT WARD VISIT: Performed by: PHYSICAL THERAPIST

## 2018-03-16 PROCEDURE — 97116 GAIT TRAINING THERAPY: CPT | Mod: GP | Performed by: PHYSICAL THERAPIST

## 2018-03-16 PROCEDURE — 25000132 ZZH RX MED GY IP 250 OP 250 PS 637: Performed by: PSYCHIATRY & NEUROLOGY

## 2018-03-16 PROCEDURE — 97110 THERAPEUTIC EXERCISES: CPT | Mod: GO | Performed by: OCCUPATIONAL THERAPY ASSISTANT

## 2018-03-16 PROCEDURE — 25000132 ZZH RX MED GY IP 250 OP 250 PS 637: Performed by: PHYSICIAN ASSISTANT

## 2018-03-16 PROCEDURE — 99239 HOSP IP/OBS DSCHRG MGMT >30: CPT | Performed by: INTERNAL MEDICINE

## 2018-03-16 PROCEDURE — 97535 SELF CARE MNGMENT TRAINING: CPT | Mod: GO | Performed by: OCCUPATIONAL THERAPY ASSISTANT

## 2018-03-16 PROCEDURE — 25000132 ZZH RX MED GY IP 250 OP 250 PS 637: Performed by: NURSE PRACTITIONER

## 2018-03-16 PROCEDURE — 97110 THERAPEUTIC EXERCISES: CPT | Mod: GP | Performed by: PHYSICAL THERAPIST

## 2018-03-16 PROCEDURE — 40000133 ZZH STATISTIC OT WARD VISIT: Performed by: OCCUPATIONAL THERAPY ASSISTANT

## 2018-03-16 PROCEDURE — 12000000 ZZH R&B MED SURG/OB

## 2018-03-16 RX ORDER — NICOTINE 21 MG/24HR
1 PATCH, TRANSDERMAL 24 HOURS TRANSDERMAL DAILY
Status: DISCONTINUED | OUTPATIENT
Start: 2018-03-16 | End: 2018-03-18 | Stop reason: HOSPADM

## 2018-03-16 RX ADMIN — CYCLOBENZAPRINE HYDROCHLORIDE 10 MG: 10 TABLET, FILM COATED ORAL at 08:20

## 2018-03-16 RX ADMIN — OXYCODONE HYDROCHLORIDE 10 MG: 5 TABLET ORAL at 20:55

## 2018-03-16 RX ADMIN — OXYCODONE HYDROCHLORIDE 10 MG: 5 TABLET ORAL at 11:00

## 2018-03-16 RX ADMIN — NICOTINE 1 PATCH: 7 PATCH, EXTENDED RELEASE TRANSDERMAL at 08:17

## 2018-03-16 RX ADMIN — CYCLOBENZAPRINE HYDROCHLORIDE 10 MG: 10 TABLET, FILM COATED ORAL at 16:33

## 2018-03-16 RX ADMIN — PANTOPRAZOLE SODIUM 20 MG: 20 TABLET, DELAYED RELEASE ORAL at 08:20

## 2018-03-16 RX ADMIN — OXYCODONE HYDROCHLORIDE 10 MG: 5 TABLET ORAL at 16:33

## 2018-03-16 RX ADMIN — QUETIAPINE FUMARATE 25 MG: 25 TABLET ORAL at 20:55

## 2018-03-16 RX ADMIN — LISINOPRIL 10 MG: 10 TABLET ORAL at 08:18

## 2018-03-16 RX ADMIN — SENNOSIDES AND DOCUSATE SODIUM 2 TABLET: 8.6; 5 TABLET ORAL at 08:20

## 2018-03-16 RX ADMIN — OXYCODONE HYDROCHLORIDE 10 MG: 5 TABLET ORAL at 06:16

## 2018-03-16 RX ADMIN — NICOTINE 1 PATCH: 21 PATCH, EXTENDED RELEASE TRANSDERMAL at 22:16

## 2018-03-16 ASSESSMENT — ACTIVITIES OF DAILY LIVING (ADL)
ADLS_ACUITY_SCORE: 10
ADLS_ACUITY_SCORE: 11
ADLS_ACUITY_SCORE: 11
ADLS_ACUITY_SCORE: 10
ADLS_ACUITY_SCORE: 11
ADLS_ACUITY_SCORE: 11

## 2018-03-16 ASSESSMENT — VISUAL ACUITY
OU: NORMAL ACUITY

## 2018-03-16 NOTE — PLAN OF CARE
Problem: Patient Care Overview  Goal: Plan of Care/Patient Progress Review  A&O x 4. Pt has LUE weakness and numbness in her left hand, numbness in right index finger, LLE weakness. Pt has had pain in her neck, shoulder, and head and received oxycodone PRN, scheduled flexeril and ice. Pt's pain fluctuates from 8-2-10/10 through the NOC.  Aspen collar on throughout the NOC. Pt voiding well and encouraged to increase fluids.  VSS except at  times slightly tachycardic. Reg diet. Pt did ambulate in the rowe with 1 GB/W. Plan to DC to ARU pending transportation .

## 2018-03-16 NOTE — PLAN OF CARE
Problem: Patient Care Overview  Goal: Plan of Care/Patient Progress Review  Discharge Planner OT   Patient plan for discharge: home  Current status: completed supine to sit EOB with log roll SBA, CGA sit to stand, amb with FWW CGA to/from bathroom, standing at sink ADLS CGA. Educated on LUE FMC/strength exercises with use of yellow sponge, pt with decrease strength  Barriers to return to prior living situation: level of A; stairs to enter home; Pain level; Fall risk with history  Recommendations for discharge: ARU per plan established by the Occupational Therapist  Rationale for recommendations:  Pt is currently limited by decreased balance, spinal precautions, L UE weakness and incoordination, anxiety, and pain. Pt would benefit from continued therapy during hospitalization to increase I in ADLs. Pt is very motivated to return to her PLOF.        Entered by: Jenni Gonzalez 03/16/2018 3:33 PM

## 2018-03-16 NOTE — PROGRESS NOTES
Misty Progress Note  Chart Reviewed, Pt discussed in Interdisciplinary Rounds.   Pt is clinically accepted to  ARU and TCU. Referrals have been sent to both places. Pt needing insurance authorization for ARU that has not been obtained yet. is not received yet.  ARU does not have a bed for pt today per am report.    Intervention:   Per bedside RN, pt is having difficulty waiting for rehab and would like to discharge to [facility] if she is able to discharge sooner.  MISTY contacted [facility] to inquire regarding pt's availability for admission there. Pt reviewed and accepted to admit to [facility].  Pt now has changed mind regarding discharge to TCU and is voicing concern regarding her medical costs. MISTY met with pt to discuss ARU vs TCU and transportation.  MISTY reviewed with pt the process of rehab review, acceptance clinically and financially.   MISTY confirmed with admissions at [facility] that they would have a semi-private room available to pt for Saturnday. Lala will be in admissions on Saturday.  Pt is encouraged to try and arrange her ride to rehab tomorrow.  Pt continues to consider  ARU for rehab until Satrurday.  MISTY will review status with pt and rehab units in am on Saturday.      Team Members notified:   RN,CC, ARU liaison, HUC,BB      Plan: Rehab placement  Barriers: insurance authorization and bed availability    GATITO Staples  [facility]  Phone: [phone]     ADDENDUM: MISTY verified with Aleja at  ARU that they have insurance authorization for rehab stay. Pt is likely to have a bed at  [facility] for Sunday due to admissions that are already scheduled to ARU for Sautrday. If another admission falls through or changes, pt may be able to admit to ARU on Saturday.  MISTY will assess with  ARU on Saturday in am.

## 2018-03-16 NOTE — PROGRESS NOTES
[facility]    Internal Medicine Hospitalist Progress Note  03/16/2018  I evaluated patient on the above date.    Ray Simon Jr., MD  [phone] (p)  Text Page (7 am to 6 pm)      Assessment & Plan   Ms. Antoinette Romero is a 57 year old female, with past medical history including tobacco use, marijuana use, migraines and morbid obesity with previous gastric bypass surgery, who presented 3/10 to OSH with recent falls and focal weakness, with pain including in neck and hip, and was found with spinal cord contusion and disruption of spine ligaments. Transferred to [facility] 3/10 for management.    1. Traumatic cervical spine injury with resulting C3-T1 severe stenosis with spinal cord compression and myelomalacia, C5-6 ruptured anterior longitudinal ligament and disk with a anterior osteophyte fracture and C7-T1 anterior subluxation with severe bilateral foraminal stenosis - s/p C3-T1 ACDF on 3/11/2018.  * Pt with h/o EtOH and multiple falls recently. The patient woke up am 3/10 and had difficulty with walking and left hand and left foot weakness; had pain in neck, hip and hand. Presented to [facility] 3/10 and x-rays of hand, hip tib-fib were negative. CT abdomen and pelvis 3/10 no acute findings. MRI Cervical spine on 3/10/18 revealed spinal cord contusion at C5-C6 without hemorrhage; disruption of ligaments at C5-C6 anteriorly and posteriorly with fluid in the facet joints and in the disc space suggesting motion at this level; fairly extensive edema, but no discrete hematoma in the paraspinous musculature more on the right than on the left and in the prevertebral space; extensive degenerative changes. Pt transferred to [facility] 3/10.  * Seen by Neurosurgery and noted with LUE > RUE weakness with loss of left hand function as well as BLE weakness L > R. Pt underwent C3-T1 ACDF on 3/11/2018.  - F/u outpatient with Neurosurgery.    2. Alcohol use/abuse/dependence with EtOH w/d.  Improve. CIWA d/c'ed 3/14. Pt said  "that she was \"done\" with alcohol given above issues.    3. Confusion/encephalopathy (suspect toxic).  Pt confused 3/12. Had been getting PRN IV lorazepam for EtOH w/o as well as PRN IV Dilaudid for pain. Improved overall 3/13 and 3/14.  - Minimize sedative meds.    4. Elevated BP's, suspect related pain, anxiety and underlying essential HTN.  Pt says told me 3/14 that she has h/o HTN and has been on lisinopril in the past. Started lisinopril 3/14. BP's better 3/15.  - Continue lisinopril 10 mg daily.  - Continue PRN IV hydralazine and PRN IV labetalol.  - Treat anxiety as below.      5. Anxiety/depression, worsened related to problem #1.  Pt with prior h/o depression/anxiety and had been on multiple meds in the past. Seen by Psychiatry 3/12 and PRN Seroquel added. Pt more emotionally labile and pessimistic 3/13 but later calmed down. Reluctant to take Seroquel but did later take some doses. PRN clonazepam added 3/14. Very angry and upset, yelling at RN 3/15; but after a period of quiet and rest she was much better. Pt seen by Psychiatry again 3/15 and offered to start Trintellix, but pt declined.  - Continue PRN clonazepam 0.25 mg TID.  - Continue PRN Seroquel.    6. History of morbid obesity with previous gastric bypass and anastomotic ulcer.  - Continue Protonix 20 mg daily      7. Tobacco use.  - Continue PRN nicotine patch.    Prophylaxis.  - PCD's, ambulation.    CODE STATUS: FULL.    Dispo.  - Plan acute rehab once insurance accepted, likely 3/17.  - D/c orders done and summary created. Changes to be made as needed; addendum should follow if changes.      Interval History   Doing better today overall.   Motivated to go to acute rehab.    -Data reviewed today: I reviewed all new labs and imaging over the last 24 hours. I personally reviewed no images or EKG's today.    Physical Exam   Heart Rate: 116, Blood pressure 115/76, pulse 71, temperature 98.3  F (36.8  C), temperature source Oral, resp. rate 16, last " menstrual period 03/01/2010, SpO2 97 %, not currently breastfeeding.  There were no vitals filed for this visit.  Vital Signs with Ranges  Temp:  [97.5  F (36.4  C)-98.5  F (36.9  C)] 98.3  F (36.8  C)  Heart Rate:  [] 116  Resp:  [16] 16  BP: ()/(60-84) 115/76  SpO2:  [94 %-98 %] 97 %  Patient Vitals for the past 24 hrs:   BP Temp Temp src Heart Rate Resp SpO2   03/16/18 1146 - - - - 16 -   03/16/18 1123 115/76 98.3  F (36.8  C) Oral 116 16 97 %   03/16/18 1100 - - - - 16 -   03/16/18 0818 108/81 97.7  F (36.5  C) Oral 96 16 98 %   03/16/18 0710 - - - - 16 -   03/16/18 0339 111/82 98.4  F (36.9  C) Oral 95 16 94 %   03/15/18 2354 107/76 97.8  F (36.6  C) Oral 107 16 95 %   03/15/18 1935 122/84 98.5  F (36.9  C) Oral 117 16 97 %   03/15/18 1908 124/60 98.5  F (36.9  C) Oral 102 16 94 %   03/15/18 1620 - - - - 16 -   03/15/18 1532 95/72 97.5  F (36.4  C) Oral 92 16 95 %     I/O's Last 24 hours  I/O last 3 completed shifts:  In: -   Out: 800 [Urine:800]    Constitutional: Alert, calm, pleasant and conversant.  HEENT: Left upper eyelid with slight swelling.  Respiratory: Diminished in bases. No crackles or wheezes.  Cardiovascular: RRR, no m/r/g.  GI:   Skin/Integumen:   Other:        Data     Recent Labs  Lab 03/13/18  0835 03/11/18  0730 03/10/18  0919   WBC 9.8  --  10.8   HGB 11.2*  --  12.3   MCV 84  --  83     --  278   INR  --  0.91  --      --  137   POTASSIUM 3.4  --  3.9   CHLORIDE 106  --  106   CO2 27  --  25   BUN 18  --  12   CR 0.50*  --  0.45*   ANIONGAP 7  --  6   EBSSY 7.7*  --  8.7   GLC 91  --  106*   ALBUMIN  --  3.5 3.8   PROTTOTAL  --  6.7* 7.0   BILITOTAL  --  0.8 0.6   ALKPHOS  --  117 106   ALT  --  31 35   AST  --  48* 64*     Recent Labs   Lab Test  03/13/18   0835  03/12/18   0610  03/10/18   0919  01/03/17   0851  06/17/15   0853  05/04/15   0555   03/30/14   0712  03/29/14   0556   GLC  91   --   106*  99  92  81   < >   --    --    BGM   --   149*   --    --    --     --    --   79  89    < > = values in this interval not displayed.         No results found for this or any previous visit (from the past 24 hour(s)).    Medications   All medications were reviewed.    sodium chloride 75 mL/hr at 03/13/18 0132       lisinopril  10 mg Oral Daily     cyclobenzaprine  10 mg Oral TID     multivitamin, therapeutic with minerals  1 tablet Oral Daily     pantoprazole  20 mg Oral Daily     nicotine   Transdermal Daily     nicotine   Transdermal Q8H

## 2018-03-16 NOTE — PLAN OF CARE
Problem: Patient Care Overview  Goal: Plan of Care/Patient Progress Review  Discharge Planner PT   Patient plan for discharge: Pt is still debating  Current status: Sit<>supine with SBA; Sit<>stand FWW with SBA; Pt ambulated 300' with FWW, CGA. Pt exhibits mild impaired coordination with no complete LOB.   Barriers to return to prior living situation: Has steps at home, amount of assist needed currently needed for mobility, limited endurance, falls risk  Recommendations for discharge: ARC  Rationale for recommendations: Patient was independent and active prior to admission and has good potential to return to prior living situation after rehab, however, question family support. Patient appears motivated but also admitting to being very depressed with current situation.        Entered by: Deena Andrew 03/16/2018 10:12 AM

## 2018-03-16 NOTE — PLAN OF CARE
Problem: Patient Care Overview  Goal: Plan of Care/Patient Progress Review  Outcome: Improving  A&O, anxious. LUE/LLE hemiparesis. Weak L . L eye swollen. Baseline numbness to L hand and R index finger. HR tachy, all other VSS. Aspen collar in place. Incision LATASHA, WDL. Up with SBA/GB/W. Regular diet-poor appetite. Nausea this afternoon, resolved spontaneously. Plan for ARU pending insurance auth.

## 2018-03-17 ENCOUNTER — APPOINTMENT (OUTPATIENT)
Dept: OCCUPATIONAL THERAPY | Facility: CLINIC | Age: 58
DRG: 028 | End: 2018-03-17
Attending: INTERNAL MEDICINE
Payer: COMMERCIAL

## 2018-03-17 ENCOUNTER — APPOINTMENT (OUTPATIENT)
Dept: PHYSICAL THERAPY | Facility: CLINIC | Age: 58
DRG: 028 | End: 2018-03-17
Attending: INTERNAL MEDICINE
Payer: COMMERCIAL

## 2018-03-17 LAB
ALBUMIN UR-MCNC: 30 MG/DL
APPEARANCE UR: CLEAR
BILIRUB UR QL STRIP: NEGATIVE
COLOR UR AUTO: YELLOW
GLUCOSE UR STRIP-MCNC: NEGATIVE MG/DL
HGB UR QL STRIP: NEGATIVE
HYALINE CASTS #/AREA URNS LPF: 3 /LPF (ref 0–2)
KETONES UR STRIP-MCNC: NEGATIVE MG/DL
LEUKOCYTE ESTERASE UR QL STRIP: ABNORMAL
MUCOUS THREADS #/AREA URNS LPF: PRESENT /LPF
NITRATE UR QL: NEGATIVE
PH UR STRIP: 6 PH (ref 5–7)
RBC #/AREA URNS AUTO: 1 /HPF (ref 0–2)
SOURCE: ABNORMAL
SP GR UR STRIP: 1.02 (ref 1–1.03)
SQUAMOUS #/AREA URNS AUTO: 2 /HPF (ref 0–1)
UROBILINOGEN UR STRIP-MCNC: 2 MG/DL (ref 0–2)
WBC #/AREA URNS AUTO: 3 /HPF (ref 0–5)

## 2018-03-17 PROCEDURE — 25000132 ZZH RX MED GY IP 250 OP 250 PS 637: Performed by: PSYCHIATRY & NEUROLOGY

## 2018-03-17 PROCEDURE — 97530 THERAPEUTIC ACTIVITIES: CPT | Mod: GP

## 2018-03-17 PROCEDURE — 81001 URINALYSIS AUTO W/SCOPE: CPT | Performed by: INTERNAL MEDICINE

## 2018-03-17 PROCEDURE — 25000128 H RX IP 250 OP 636: Performed by: NEUROLOGICAL SURGERY

## 2018-03-17 PROCEDURE — 25000132 ZZH RX MED GY IP 250 OP 250 PS 637: Performed by: NURSE PRACTITIONER

## 2018-03-17 PROCEDURE — 97535 SELF CARE MNGMENT TRAINING: CPT | Mod: GO | Performed by: OCCUPATIONAL THERAPIST

## 2018-03-17 PROCEDURE — 99232 SBSQ HOSP IP/OBS MODERATE 35: CPT | Performed by: INTERNAL MEDICINE

## 2018-03-17 PROCEDURE — 25000132 ZZH RX MED GY IP 250 OP 250 PS 637: Performed by: INTERNAL MEDICINE

## 2018-03-17 PROCEDURE — 12000000 ZZH R&B MED SURG/OB

## 2018-03-17 PROCEDURE — 40000193 ZZH STATISTIC PT WARD VISIT

## 2018-03-17 PROCEDURE — 97116 GAIT TRAINING THERAPY: CPT | Mod: GP

## 2018-03-17 PROCEDURE — 25000132 ZZH RX MED GY IP 250 OP 250 PS 637: Performed by: PHYSICIAN ASSISTANT

## 2018-03-17 PROCEDURE — 40000133 ZZH STATISTIC OT WARD VISIT: Performed by: OCCUPATIONAL THERAPIST

## 2018-03-17 RX ADMIN — OXYCODONE HYDROCHLORIDE 5 MG: 5 TABLET ORAL at 23:10

## 2018-03-17 RX ADMIN — OXYCODONE HYDROCHLORIDE 10 MG: 5 TABLET ORAL at 03:54

## 2018-03-17 RX ADMIN — OXYCODONE HYDROCHLORIDE 10 MG: 5 TABLET ORAL at 20:07

## 2018-03-17 RX ADMIN — QUETIAPINE FUMARATE 25 MG: 25 TABLET ORAL at 23:11

## 2018-03-17 RX ADMIN — OXYCODONE HYDROCHLORIDE 10 MG: 5 TABLET ORAL at 16:49

## 2018-03-17 RX ADMIN — OXYCODONE HYDROCHLORIDE 10 MG: 5 TABLET ORAL at 12:42

## 2018-03-17 RX ADMIN — OXYCODONE HYDROCHLORIDE 10 MG: 5 TABLET ORAL at 08:54

## 2018-03-17 RX ADMIN — CYCLOBENZAPRINE HYDROCHLORIDE 10 MG: 10 TABLET, FILM COATED ORAL at 00:15

## 2018-03-17 RX ADMIN — CYCLOBENZAPRINE HYDROCHLORIDE 10 MG: 10 TABLET, FILM COATED ORAL at 23:11

## 2018-03-17 RX ADMIN — HYDROMORPHONE HYDROCHLORIDE 0.5 MG: 1 INJECTION, SOLUTION INTRAMUSCULAR; INTRAVENOUS; SUBCUTANEOUS at 03:17

## 2018-03-17 RX ADMIN — CYCLOBENZAPRINE HYDROCHLORIDE 10 MG: 10 TABLET, FILM COATED ORAL at 08:55

## 2018-03-17 RX ADMIN — SENNOSIDES AND DOCUSATE SODIUM 2 TABLET: 8.6; 5 TABLET ORAL at 20:30

## 2018-03-17 RX ADMIN — CYCLOBENZAPRINE HYDROCHLORIDE 10 MG: 10 TABLET, FILM COATED ORAL at 16:23

## 2018-03-17 RX ADMIN — OXYCODONE HYDROCHLORIDE 5 MG: 5 TABLET ORAL at 00:15

## 2018-03-17 RX ADMIN — LISINOPRIL 10 MG: 10 TABLET ORAL at 08:54

## 2018-03-17 RX ADMIN — PANTOPRAZOLE SODIUM 20 MG: 20 TABLET, DELAYED RELEASE ORAL at 08:55

## 2018-03-17 ASSESSMENT — VISUAL ACUITY
OU: BLURRED VISION
OU: NORMAL ACUITY

## 2018-03-17 ASSESSMENT — ACTIVITIES OF DAILY LIVING (ADL)
ADLS_ACUITY_SCORE: 10

## 2018-03-17 NOTE — PLAN OF CARE
Problem: Patient Care Overview  Goal: Plan of Care/Patient Progress Review  Discharge Planner PT   Patient plan for discharge: Per chart, ARU  Current status: Supine to sit with SBA. Sit to/from stand with CGA. Pt needs CGA/min A for bathroom negotiation. Cues needed to keep using walker instead of abandoning. Pt ambulates 400' with FWW and CGA. Decreased gait speed. >5 rest breaks needed this AM.   Barriers to return to prior living situation: Fall risk, balance concerns, decreased activity tolerance, stairs  Recommendations for discharge: ARU  Rationale for recommendations: Patient would benefit from continued PT services to address strength, balance and activity tolerance deficits.        Entered by: Sanford Milligan 03/17/2018 12:44 PM

## 2018-03-17 NOTE — PROGRESS NOTES
"[facility]    Hospitalist Progress Note      Assessment & Plan   Ms. Antoinette Romero is a 57 year old female, with past medical history including tobacco use, marijuana use, migraines and morbid obesity with previous gastric bypass surgery, who presented 3/10 to OS with recent falls and focal weakness, with pain including in neck and hip, and was found with spinal cord contusion and disruption of spine ligaments. Transferred to [facility] 3/10 for management.       1. Traumatic cervical spine injury with resulting C3-T1 severe stenosis with spinal cord compression and myelomalacia, C5-6 ruptured anterior longitudinal ligament and disk with a anterior osteophyte fracture and C7-T1 anterior subluxation with severe bilateral foraminal stenosis - s/p C3-T1 ACDF on 3/11/2018.  * Pt with h/o EtOH and multiple falls recently. The patient woke up am 3/10 and had difficulty with walking and left hand and left foot weakness; had pain in neck, hip and hand. Presented to [facility] 3/10 and x-rays of hand, hip tib-fib were negative. CT abdomen and pelvis 3/10 no acute findings. MRI Cervical spine on 3/10/18 revealed spinal cord contusion at C5-C6 without hemorrhage; disruption of ligaments at C5-C6 anteriorly and posteriorly with fluid in the facet joints and in the disc space suggesting motion at this level; fairly extensive edema, but no discrete hematoma in the paraspinous musculature more on the right than on the left and in the prevertebral space; extensive degenerative changes. Pt transferred to [facility] 3/10.  * Seen by Neurosurgery and noted with LUE > RUE weakness with loss of left hand function as well as BLE weakness L > R. Pt underwent C3-T1 ACDF on 3/11/2018.  -PT/OT- rec d/c to ARF  - keep collar in place  - F/u outpatient with Neurosurgery.     2. Alcohol use/abuse/dependence with EtOH w/d.  Improved. IRINAWA d/c'ed 3/14. Pt said that she was \"done\" with alcohol given above issues.     3. Confusion/encephalopathy " (suspect toxic).  Pt confused 3/12. Had been getting PRN IV lorazepam for EtOH w/o as well as PRN IV Dilaudid for pain. Improved overall 3/13 and 3/14.  - Minimize sedative meds.     4. Elevated BP's, suspect related pain, anxiety and underlying essential HTN.  - she stated she h/o HTN and has been on lisinopril in the past. Started lisinopril 3/14. -   - BP much better now  - Continue lisinopril 10 mg daily.  - Continue PRN IV hydralazine and PRN IV labetalol.  - Treat anxiety as below.      5. Anxiety/depression, worsened related to problem #1.  Pt with prior h/o depression/anxiety and had been on multiple meds in the past. Seen by Psychiatry 3/12 and PRN Seroquel added. Pt more emotionally labile and pessimistic 3/13; Reluctant to take Seroquel but did later take some doses. PRN clonazepam added 3/14; Pt seen by Psychiatry again 3/15 and offered to start Trintellix, but pt declined.  - Continue PRN clonazepam 0.25 mg TID.  - Continue PRN Seroquel.  - PTA Buspar stopped     6. History of morbid obesity with previous gastric bypass and anastomotic ulcer.  - Continue Protonix 20 mg daily      7. Tobacco use.  - Continue PRN nicotine patch.        # Pain Assessment:   Current Pain Score 3/17/2018 3/17/2018 3/17/2018   Patient currently in pain? - sleeping: patient not able to self report yes   Pain score (0-10) 10 - 9   Pain location - - -   Pain descriptors - - -   - Antoinette is experiencing neck pain and back pain due to above mentioned problem. Pain management was discussed and the plan was created in a collaborative fashion.  Antoinette's response to the current recommendations: engaged  - Opioid regimen: Tylenol prn, Flexeril 10 mg po TID, Oxycodone 5-10 mg po q3h prn, Dilaudid 0.3-0.5 mg ivq2h  - Response to opioid medications: Reduction of symptoms  - Bowel regimen: senna and bisacodyl suppository      Prophylaxis.  - PCD's, ambulation.    Code Status: Full Code    Disposition: Ex[facility]  03[facility]17  [facility]    Interval History   Doing fine, reports some neck pain and lower back pain, thinks she might have UTI (will check UA); reports improvement of LUE strength, reports some numbness of the tip of right thumb; denies chest pain, no SOB, no abd pain, no N/V    -Data reviewed today: I reviewed all new labs and imaging results over the last 24 hours. I personally reviewed no images or EKG's today.    Physical Exam   Temp: 97.3  F (36.3  C) Temp src: Oral BP: 126/72   Heart Rate: 126 Resp: 16 SpO2: 97 % O2 Device: None (Room air)    There were no vitals filed for this visit.  Vital Signs with Ranges  Temp:  [97.3  F (36.3  C)-98.3  F (36.8  C)] 97.3  F (36.3  C)  Heart Rate:  [] 126  Resp:  [16] 16  BP: ()/(63-85) 126/72  SpO2:  [94 %-97 %] 97 %  I/O last 3 completed shifts:  In: 400 [P.O.:400]  Out: 300 [Urine:300]    Constitutional: Awake, alert, mildly anxious  Respiratory: Diminished air entry bilateral lungs, no wheezing, no rales, no crackles  Cardiovascular: S1S2, RRR, no murmurs, no rubs  GI: abd soft, nonT, nonD, BS present  Skin/Integumen: intact, no rashes, no cyanosis  Extremities- no leg swellings     Medications     sodium chloride 75 mL/hr at 03/13/18 0132       nicotine   Transdermal Q8H     nicotine   Transdermal Daily     nicotine  1 patch Transdermal Daily     lisinopril  10 mg Oral Daily     cyclobenzaprine  10 mg Oral TID     multivitamin, therapeutic with minerals  1 tablet Oral Daily     pantoprazole  20 mg Oral Daily       Data     Recent Labs  Lab 03/13/18  0835 03/11/18  0730 03/10/18  0919   WBC 9.8  --  10.8   HGB 11.2*  --  12.3   MCV 84  --  83     --  278   INR  --  0.91  --      --  137   POTASSIUM 3.4  --  3.9   CHLORIDE 106  --  106   CO2 27  --  25   BUN 18  --  12   CR 0.50*  --  0.45*   ANIONGAP 7  --  6   BESSY 7.7*  --  8.7   GLC 91  --  106*   ALBUMIN  --  3.5 3.8   PROTTOTAL  --  6.7* 7.0   BILITOTAL  --  0.8 0.6   ALKPHOS  --  117  106   ALT  --  31 35   AST  --  48* 64*       No results found for this or any previous visit (from the past 24 hour(s)).

## 2018-03-17 NOTE — PROGRESS NOTES
"BRIEF NUTRITION ASSESSMENT      REASON FOR ASSESSMENT:  LOS    NUTRITION HISTORY:  Patient reports that she does not eat much at baseline.  \"This is normal for me.\"  Denies any decreased appetite.  Used to take oral nutritional supplements at home - \"not anymore\".    CURRENT DIET AND INTAKE:  Diet:  Regular diet               Was able to consume 50% of a sandwich with chocolate milk for lunch.  Willing to do some Ensure supplements - \"then I don't have to do that stupid Greek yogurt for breakfast\".    ANTHROPOMETRICS:  Height: 5'7\"  Weight: 59.4 kg (131#)(3/8)  BMI: 20.52 kg/m2  IBW:  61.4 kg  Weight Status: Normal BMI  %IBW: 97%  Weight History:   Wt Readings from Last 10 Encounters:   03/08/18 59.4 kg (131 lb)   08/17/17 58.7 kg (129 lb 6.4 oz)   01/03/17 57.4 kg (126 lb 9.6 oz)   06/17/15 61.6 kg (135 lb 11.2 oz)   05/03/15 60.1 kg (132 lb 9.6 oz)   04/24/15 61.2 kg (135 lb)   04/23/15 66.2 kg (146 lb)   02/26/15 66.2 kg (146 lb)   01/17/15 65.4 kg (144 lb 3.2 oz)   10/30/14 62.1 kg (136 lb 12.8 oz)   Weight has been stable for the last year+      LABS:  Labs noted    MALNUTRITION:  Patient does not meet two of the following criteria necessary for diagnosing malnutrition: significant weight loss, reduced intake, subcutaneous fat loss, muscle loss or fluid retention    NUTRITION INTERVENTION:  Nutrition Diagnosis:  No nutrition diagnosis at this time.    Implementation:  Nutrition Education:  Per Provider order if indicated.  Medical food supplement therapy:  Ensure BID between meals.     FOLLOW UP/MONITORING:   Will re-evaluate in 7 - 10 days, or sooner, if re-consulted.    Bibi Martines RD, LD, [facility]   Clinical Dietitian - [facility]             "

## 2018-03-17 NOTE — PROGRESS NOTES
Cross cover:    Paged regarding Nicoderm order.    --Nicoderm replacement ordered.      Bhanu Banda PA-C  [phone]

## 2018-03-17 NOTE — PLAN OF CARE
Problem: Patient Care Overview  Goal: Plan of Care/Patient Progress Review  Outcome: No Change  POD#5 of a cervical fusion after a fall. A&0x4, VSS, CMS intact. Left sided weakness and LUE ataxia. Tolerating regular diet but poor appetite. Incision closed with glue, no drainage, cervical collar on. Up with Ax1/GB/W. Pain managed with scheduled flexeril and PRN oxycodone.  Plan is for ARU when bed available.

## 2018-03-17 NOTE — PROVIDER NOTIFICATION
"Page to hospitalist regarding pt report of intermittent blurry vision and reports of seeing \"bugs crawl on pillow\" Pt c/o of left arm coolness. Writer did full neuro exam, intermittent blurry vision (pt does report wearing glasses for reading) so is unsure if its due to straining eyes while in hospital and L arm slightly cooler than R. Per hospitalist no additional follow up needed. Pt evaluated by psych and if any additional changes or increase, update MD. Nursing will continue to monitor.    Addendum: Pt reports blurry vision is slightly improved. BUE are warm and equal to touch  "

## 2018-03-17 NOTE — PLAN OF CARE
Problem: Patient Care Overview  Goal: Plan of Care/Patient Progress Review  Outcome: No Change  Pt alert and oriented. Up with 1. Flexeril and oxy for pain, 1 dose IV dilaudid given for breakthrough pain 10/10. Pt seems anxious, refuses ativan and clonipin. Wearing neck brace continuously. Unable to get comfortable in bed despite frequent repositioning. L sided weakness, Numbness in R index finger and L hand, unchanged per pt. Regular diet but poor appetite.

## 2018-03-17 NOTE — PLAN OF CARE
Problem: Patient Care Overview  Goal: Plan of Care/Patient Progress Review  OT: Discharge Planner OT   Patient plan for discharge: ARU  Current status: pt is SBA with bed mobility, demo's decreased L UE FMC, mod I with LE dressing and SBA with toilet transfer  Barriers to return to prior living situation: fall risk, decreased balance, increased level of assist. Pt states her  is not helpful  Recommendations for discharge: ARU  Rationale for recommendations: pt would benefit from continued intense rehab and is motivated to get back to PLOF       Entered by: Alaina Caro 03/17/2018 2:16 PM

## 2018-03-18 ENCOUNTER — HOSPITAL ENCOUNTER (INPATIENT)
Facility: CLINIC | Age: 58
LOS: 10 days | Discharge: HOME | DRG: 949 | End: 2018-03-28
Attending: PHYSICAL MEDICINE & REHABILITATION | Admitting: PHYSICAL MEDICINE & REHABILITATION
Payer: COMMERCIAL

## 2018-03-18 VITALS
TEMPERATURE: 97.5 F | OXYGEN SATURATION: 95 % | RESPIRATION RATE: 16 BRPM | SYSTOLIC BLOOD PRESSURE: 128 MMHG | DIASTOLIC BLOOD PRESSURE: 92 MMHG | HEART RATE: 71 BPM

## 2018-03-18 DIAGNOSIS — K59.00 CONSTIPATION, UNSPECIFIED CONSTIPATION TYPE: ICD-10-CM

## 2018-03-18 DIAGNOSIS — F41.9 ANXIETY: ICD-10-CM

## 2018-03-18 DIAGNOSIS — Z72.0 TOBACCO ABUSE: ICD-10-CM

## 2018-03-18 DIAGNOSIS — Z98.1 STATUS POST CERVICAL SPINAL FUSION: Primary | ICD-10-CM

## 2018-03-18 DIAGNOSIS — R11.2 NON-INTRACTABLE VOMITING WITH NAUSEA, UNSPECIFIED VOMITING TYPE: ICD-10-CM

## 2018-03-18 DIAGNOSIS — I10 BENIGN ESSENTIAL HYPERTENSION: ICD-10-CM

## 2018-03-18 DIAGNOSIS — Z98.84 S/P GASTRIC BYPASS: ICD-10-CM

## 2018-03-18 DIAGNOSIS — K44.9 HIATAL HERNIA: ICD-10-CM

## 2018-03-18 PROBLEM — S14.109A: Status: ACTIVE | Noted: 2018-03-18

## 2018-03-18 PROCEDURE — 25000132 ZZH RX MED GY IP 250 OP 250 PS 637: Performed by: INTERNAL MEDICINE

## 2018-03-18 PROCEDURE — 25000132 ZZH RX MED GY IP 250 OP 250 PS 637: Performed by: PHYSICIAN ASSISTANT

## 2018-03-18 PROCEDURE — 12800006 ZZH R&B REHAB

## 2018-03-18 PROCEDURE — 99232 SBSQ HOSP IP/OBS MODERATE 35: CPT | Performed by: INTERNAL MEDICINE

## 2018-03-18 PROCEDURE — 25000132 ZZH RX MED GY IP 250 OP 250 PS 637: Performed by: PHYSICAL MEDICINE & REHABILITATION

## 2018-03-18 PROCEDURE — 25000132 ZZH RX MED GY IP 250 OP 250 PS 637: Performed by: NURSE PRACTITIONER

## 2018-03-18 RX ORDER — PANTOPRAZOLE SODIUM 20 MG/1
20 TABLET, DELAYED RELEASE ORAL DAILY
Status: DISCONTINUED | OUTPATIENT
Start: 2018-03-19 | End: 2018-03-28 | Stop reason: HOSPADM

## 2018-03-18 RX ORDER — POLYETHYLENE GLYCOL 3350 17 G/17G
17 POWDER, FOR SOLUTION ORAL DAILY
Status: DISCONTINUED | OUTPATIENT
Start: 2018-03-18 | End: 2018-03-28 | Stop reason: HOSPADM

## 2018-03-18 RX ORDER — NALOXONE HYDROCHLORIDE 0.4 MG/ML
.1-.4 INJECTION, SOLUTION INTRAMUSCULAR; INTRAVENOUS; SUBCUTANEOUS
Status: DISCONTINUED | OUTPATIENT
Start: 2018-03-18 | End: 2018-03-28 | Stop reason: HOSPADM

## 2018-03-18 RX ORDER — LISINOPRIL 5 MG/1
5 TABLET ORAL DAILY
Status: DISCONTINUED | OUTPATIENT
Start: 2018-03-19 | End: 2018-03-28 | Stop reason: HOSPADM

## 2018-03-18 RX ORDER — BISACODYL 10 MG
10 SUPPOSITORY, RECTAL RECTAL DAILY PRN
Status: DISCONTINUED | OUTPATIENT
Start: 2018-03-18 | End: 2018-03-28 | Stop reason: HOSPADM

## 2018-03-18 RX ORDER — OXYCODONE HYDROCHLORIDE 5 MG/1
5-10 TABLET ORAL EVERY 4 HOURS PRN
Status: DISCONTINUED | OUTPATIENT
Start: 2018-03-18 | End: 2018-03-23

## 2018-03-18 RX ORDER — GABAPENTIN 300 MG/1
300 CAPSULE ORAL 3 TIMES DAILY
Status: DISCONTINUED | OUTPATIENT
Start: 2018-03-18 | End: 2018-03-20

## 2018-03-18 RX ORDER — CYCLOBENZAPRINE HCL 5 MG
10 TABLET ORAL 3 TIMES DAILY PRN
Status: DISCONTINUED | OUTPATIENT
Start: 2018-03-18 | End: 2018-03-20

## 2018-03-18 RX ORDER — ACETAMINOPHEN 325 MG/1
975 TABLET ORAL EVERY 8 HOURS
Qty: 100 TABLET | Status: ON HOLD | DISCHARGE
Start: 2018-03-18 | End: 2018-03-27

## 2018-03-18 RX ORDER — QUETIAPINE FUMARATE 25 MG/1
25 TABLET, FILM COATED ORAL 3 TIMES DAILY PRN
Status: DISCONTINUED | OUTPATIENT
Start: 2018-03-18 | End: 2018-03-20

## 2018-03-18 RX ORDER — AMOXICILLIN 250 MG
2 CAPSULE ORAL 2 TIMES DAILY
Status: DISCONTINUED | OUTPATIENT
Start: 2018-03-18 | End: 2018-03-28 | Stop reason: HOSPADM

## 2018-03-18 RX ORDER — LISINOPRIL 10 MG/1
5 TABLET ORAL DAILY
Qty: 30 TABLET | Refills: 3 | Status: ON HOLD | DISCHARGE
Start: 2018-03-18 | End: 2018-03-27

## 2018-03-18 RX ORDER — LISINOPRIL 2.5 MG/1
5 TABLET ORAL DAILY
Status: DISCONTINUED | OUTPATIENT
Start: 2018-03-18 | End: 2018-03-18 | Stop reason: HOSPADM

## 2018-03-18 RX ORDER — ACETAMINOPHEN 325 MG/1
975 TABLET ORAL EVERY 8 HOURS
Status: DISCONTINUED | OUTPATIENT
Start: 2018-03-18 | End: 2018-03-18 | Stop reason: HOSPADM

## 2018-03-18 RX ORDER — ACETAMINOPHEN 325 MG/1
975 TABLET ORAL EVERY 8 HOURS
Status: DISCONTINUED | OUTPATIENT
Start: 2018-03-18 | End: 2018-03-19

## 2018-03-18 RX ADMIN — OXYCODONE HYDROCHLORIDE 5 MG: 5 TABLET ORAL at 01:57

## 2018-03-18 RX ADMIN — PANTOPRAZOLE SODIUM 20 MG: 20 TABLET, DELAYED RELEASE ORAL at 08:22

## 2018-03-18 RX ADMIN — OXYCODONE HYDROCHLORIDE 10 MG: 5 TABLET ORAL at 14:35

## 2018-03-18 RX ADMIN — GABAPENTIN 300 MG: 300 CAPSULE ORAL at 14:35

## 2018-03-18 RX ADMIN — QUETIAPINE FUMARATE 25 MG: 25 TABLET ORAL at 22:45

## 2018-03-18 RX ADMIN — CYCLOBENZAPRINE HYDROCHLORIDE 10 MG: 5 TABLET, FILM COATED ORAL at 22:44

## 2018-03-18 RX ADMIN — GABAPENTIN 300 MG: 300 CAPSULE ORAL at 20:15

## 2018-03-18 RX ADMIN — CYCLOBENZAPRINE HYDROCHLORIDE 10 MG: 5 TABLET, FILM COATED ORAL at 15:08

## 2018-03-18 RX ADMIN — OXYCODONE HYDROCHLORIDE 10 MG: 5 TABLET ORAL at 08:22

## 2018-03-18 RX ADMIN — OXYCODONE HYDROCHLORIDE 10 MG: 5 TABLET ORAL at 20:15

## 2018-03-18 RX ADMIN — CYCLOBENZAPRINE HYDROCHLORIDE 10 MG: 10 TABLET, FILM COATED ORAL at 08:23

## 2018-03-18 RX ADMIN — SENNOSIDES AND DOCUSATE SODIUM 2 TABLET: 8.6; 5 TABLET ORAL at 20:15

## 2018-03-18 RX ADMIN — NICOTINE 1 PATCH: 21 PATCH, EXTENDED RELEASE TRANSDERMAL at 08:27

## 2018-03-18 RX ADMIN — POLYETHYLENE GLYCOL 3350 17 G: 17 POWDER, FOR SOLUTION ORAL at 15:11

## 2018-03-18 RX ADMIN — BISACODYL 10 MG: 10 SUPPOSITORY RECTAL at 20:15

## 2018-03-18 RX ADMIN — LISINOPRIL 5 MG: 2.5 TABLET ORAL at 08:22

## 2018-03-18 ASSESSMENT — ACTIVITIES OF DAILY LIVING (ADL)
COGNITION: 0 - NO COGNITION ISSUES REPORTED
ADLS_ACUITY_SCORE: 10
RETIRED_EATING: 0-->INDEPENDENT
BATHING: 0-->INDEPENDENT
AMBULATION: 0-->INDEPENDENT
DRESS: 0-->INDEPENDENT
WHICH_OF_THE_ABOVE_FUNCTIONAL_RISKS_HAD_A_RECENT_ONSET_OR_CHANGE?: AMBULATION;TRANSFERRING;TOILETING;BATHING;DRESSING;COMMUNICATION/SPEECH
RETIRED_COMMUNICATION: 0-->UNDERSTANDS/COMMUNICATES WITHOUT DIFFICULTY
ADLS_ACUITY_SCORE: 10
TRANSFERRING: 0-->INDEPENDENT
ADLS_ACUITY_SCORE: 10
NUMBER_OF_TIMES_PATIENT_HAS_FALLEN_WITHIN_LAST_SIX_MONTHS: 4
TOILETING: 0-->INDEPENDENT
FALL_HISTORY_WITHIN_LAST_SIX_MONTHS: YES
SWALLOWING: 0-->SWALLOWS FOODS/LIQUIDS WITHOUT DIFFICULTY
ADLS_ACUITY_SCORE: 10

## 2018-03-18 ASSESSMENT — VISUAL ACUITY
OU: NORMAL ACUITY

## 2018-03-18 NOTE — IP AVS SNAPSHOT
MRN:[id number]                      After Visit Summary   3/18/2018    Antoinette Romero    MRN: [id number]           Thank you!     Thank you for choosing [facility] for your care. Our goal is always to provide you with excellent care. Hearing back from our patients is one way we can continue to improve our services. Please take a few minutes to complete the written survey that you may receive in the mail after you visit with us. Thank you!        Patient Information     Date Of Birth          1960        About your hospital stay     You were admitted on:  March 18, 2018 You last received care in the:  [facility]    You were discharged on:  March 28, 2018        Reason for your hospital stay       You were admitted to Acute Inpatient Rehab after a spinal cord injury, you improved and will go home with outpatient therapy. PT/OT                  Who to Call     For medical emergencies, please call 911.  For non-urgent questions about your medical care, please call your primary care provider or clinic, [phone]          Attending Provider     Provider Specialty    Irasema Santana MD Physical Medicine and Rehab    [facility] Rehab    [facility], Deena Fatima MD Physical Medicine and Rehab       Primary Care Provider Office Phone # Fax #    Umu Roberto PA-C [phone]      After Care Instructions     Activity       Your activity upon discharge: activity as tolerated, no lifting more than 10 lbs until cleared by neurosurgery, also no excessive bending or twisting in the neck, wear your aspen collar 24/7 until seen by neurosurgery, and no driving until cleared by a physician to do so            Diet       Follow this diet upon discharge: t Regular Diet Adult;                  Follow-up Appointments     Adult [facility] Follow-up and recommended labs and tests       Follow up with primary care provider, Umu Roberto, within 7  days to evaluate medication change.  No follow up labs or test are needed.    Follow-up with Neurosurgery in 8-12 weeks with   Maynor Daley MD, MS, FAANS  Neurosurgeon  [facility]  [facility]  [address], [address]  [place]  [address]  Tel [phone]      Follow-up with PM&R Dr. Santana in 6-8 weeks as well.     Appointments on [place] and/or [facility] (with [facility] or [facility] provider or service). Call [phone] if you haven't heard regarding these appointments within 7 days of discharge.                  Your next 10 appointments already scheduled     Apr 10, 2018  9:00 AM CDT   [facility] with Umu Roberto [facility]-C   [facility]    [address] [place] [address] [phone]           Bring a current list of meds and any records pertaining to this visit. For Physicals, please bring immunization records and any forms needing to be filled out. Please arrive 10 minutes early to complete paperwork.            Apr 19, 2018 10:20 AM CDT   (Arrive by 10:05 AM)   XR CERVICAL SPINE 2/3 VIEWS with [id number]   [facility] ([facility])    [address]  [address]  [facility] [id number] [phone]           Please bring a list of your current medicines to your exam. (Include vitamins, minerals and over-thecounter medicines.) Leave your valuables at home.  Tell your doctor if there is a chance you may be pregnant.  You do not need to do anything special for this exam.            Apr 19, 2018 10:40 AM CDT   Return Visit with MARILIN Altamirano CNP   [facility] ([facility])    [address] [place] [address]  [facility] [id number] [phone]            Apr 23, 2018  8:00 AM CDT   Evaluation with HERNÁN De Leon   [facility] Occupational Therapy ([facility])    [address]  "[facility] [address]   [phone]            Apr 23, 2018  9:00 AM CDT   Ortho Eval with Rigo Rosenthal, PT   [facility] Physical Therapy ([facility])    [address] [address]   [phone]              Additional Services     Occupational Therapy Referral       *This therapy referral will be filtered to a centralized scheduling office at [facility] and the patient will receive a call to schedule an appointment at a [place] location most convenient for them. *     [facility] provides Occupational Therapy evaluation and treatment and many specialty services across the [facility].  If requesting a specialty program, please choose from the list below.    If you have not heard from the scheduling office within 2 business days, please call [phone] for all locations, with the exception of [place], please call [phone] and [facility], please call [phone]    Treatment: Evaluation & Treatment  Special Instructions/Modalities: schedule patient at [facility] for OT      Please be aware that coverage of these services is subject to the terms and limitations of your health insurance plan.  Call member services at your health plan with any benefit or coverage questions.      **Note to Provider:  If you are referring outside of [place] for the therapy appointment, please list the name of the location in the \"special instructions\" above, print the referral and give to the patient to schedule the appointment.            Physical Therapy Referral       *This therapy referral will be filtered to a centralized scheduling office at [facility] and the patient will receive a call to schedule an appointment at a [place] location most convenient for them. *     [facility] provides Physical Therapy evaluation and treatment and many specialty services across the [facility]" "system.  If requesting a specialty program, please choose from the list below.    If you have not heard from the scheduling office within 2 business days, please call [phone] for all locations, with the exception of [place], please call [phone] and [place], please call [phone]  Treatment: Evaluation & Treatment  Special Instructions/Modalities: Schedule patient for PT at [facility]       Please be aware that coverage of these services is subject to the terms and limitations of your health insurance plan.  Call member services at your health plan with any benefit or coverage questions.      **Note to Provider:  If you are referring outside of [place] for the therapy appointment, please list the name of the location in the \"special instructions\" above, print the referral and give to the patient to schedule the appointment.                  Further instructions from your care team       Follow up appointments:    -- Follow up with primary care provider within 1-2 weeks  You are scheduled to see AMRIK He  on April 10th at 9:00 am.    Address  [address]   Phone   [phone]    -- Follow up with surgeon.  You are scheduled to see Dr. Mojgan Callejas on April 19th at 10:00 am for X-Ray and 10:30 am follow up.    Address  [facility]                          [address], [address]                          [place], [place]  Phone   [phone]    -- Follow up with Dr. Irasema Santana, PM&R, within 6-8 weeks  You are scheduled to see Dr. Irasema Santana on June 5th at 4:30 pm.    Address  [facility]                          Physical Medicine and Rehabilitation Clinic    [address] [place] [address]; [address]    [place] [address]    Phone   [place]ottie [phone]                          Maira [phone]      Pending Results     No orders found from " "3/16/2018 to 3/19/2018.            Statement of Approval     Ordered          18 0954  I have reviewed and agree with all the recommendations and orders detailed in this document.  EFFECTIVE NOW     Approved and electronically signed by:  Deena Sales MD             Admission Information     Date & Time Provider Department Dept. Phone    3/18/2018 Deena Sales MD  ACUTE REHAB [phone]      Your Vitals Were     Blood Pressure Pulse Temperature Respirations Height Weight    110/74 106 98.1  F (36.7  C) (Oral) 16 1.727 m (5' 8\") 56.6 kg (124 lb 12.8 oz)    Last Period Pulse Oximetry BMI (Body Mass Index)             2010 99% 18.98 kg/m2         [employer] Information     [employer] lets you send messages to your doctor, view your test results, renew your prescriptions, schedule appointments and more. To sign up, go to [email or web address] . Click on \"Log in\" on the left side of the screen, which will take you to the Welcome page. Then click on \"Sign up Now\" on the right side of the page.     You will be asked to enter the access code listed below, as well as some personal information. Please follow the directions to create your username and password.     Your access code is: [email or web address]  Expires: 2018  1:49 PM     Your access code will  in 90 days. If you need help or a new code, please call your [place] clinic or [phone].        Care EveryWhere ID     This is your Care EveryWhere ID. This could be used by other organizations to access your [place] medical records  [id number]        Equal Access to Services     [facility] : Hadii kiley Szymanski, federica carroll, enrike pearson. So [facility] [phone].    ATENCIÓN: Si habla español, tiene a shah disposición servicios gratuitos de asistencia lingüística. Llame al [phone].    We comply with applicable federal civil rights laws and [place] " laws. We do not discriminate on the basis of race, color, national origin, age, disability, sex, sexual orientation, or gender identity.               Review of your medicines      START taking        Dose / Directions    bisacodyl 10 MG Suppository   Commonly known as:  DULCOLAX   Used for:  Constipation, unspecified constipation type        Dose:  10 mg   Place 1 suppository (10 mg) rectally daily as needed for constipation   Quantity:  10 suppository   Refills:  0       busPIRone 7.5 MG tablet   Commonly known as:  BUSPAR   Used for:  Anxiety        Dose:  7.5 mg   Take 1 tablet (7.5 mg) by mouth 2 times daily For first 5 days, take 1 tab (7.5 mg) in morning and then two tabs (15 mg ) in pm Then after 5 days, take two tabs (15 mg) in am and pm   Quantity:  120 tablet   Refills:  0       gabapentin 600 MG tablet   Commonly known as:  NEURONTIN   Used for:  Status post cervical spinal fusion        Dose:  600 mg   Take 1 tablet (600 mg) by mouth 3 times daily   Quantity:  90 tablet   Refills:  0       polyethylene glycol Packet   Commonly known as:  MIRALAX/GLYCOLAX   Used for:  Constipation, unspecified constipation type        Dose:  17 g   Take 17 g by mouth daily   Quantity:  30 packet   Refills:  0       senna-docusate 8.6-50 MG per tablet   Commonly known as:  SENOKOT-S;PERICOLACE   Used for:  Constipation, unspecified constipation type        Dose:  2 tablet   Take 2 tablets by mouth 2 times daily   Quantity:  120 tablet   Refills:  0         CONTINUE these medicines which may have CHANGED, or have new prescriptions. If we are uncertain of the size of tablets/capsules you have at home, strength may be listed as something that might have changed.        Dose / Directions    acetaminophen 325 MG tablet   Commonly known as:  TYLENOL   This may have changed:  Another medication with the same name was removed. Continue taking this medication, and follow the directions you see here.   Used for:  Status post  cervical spinal fusion        Dose:  650 mg   Take 2 tablets (650 mg) by mouth every 4 hours as needed for mild pain   Quantity:  100 tablet   Refills:  0       oxyCODONE IR 5 MG tablet   Commonly known as:  ROXICODONE   This may have changed:  when to take this   Used for:  Status post cervical spinal fusion        Dose:  5-10 mg   Take 1-2 tablets (5-10 mg) by mouth every 6 hours as needed for moderate to severe pain   Quantity:  30 tablet   Refills:  0         CONTINUE these medicines which have NOT CHANGED        Dose / Directions    cyclobenzaprine 10 MG tablet   Commonly known as:  FLEXERIL   Used for:  Status post cervical spinal fusion        Dose:  10 mg   Take 1 tablet (10 mg) by mouth 3 times daily as needed for muscle spasms   Quantity:  60 tablet   Refills:  0       lisinopril 10 MG tablet   Commonly known as:  PRINIVIL/ZESTRIL   Used for:  Benign essential hypertension        Dose:  5 mg   Take 0.5 tablets (5 mg) by mouth daily   Quantity:  30 tablet   Refills:  0       multivitamin, therapeutic with minerals Tabs tablet   Used for:  Alcohol dependence, daily use (H)        Dose:  1 tablet   Take 1 tablet by mouth daily   Quantity:  30 each   Refills:  0       nicotine 7 MG/24HR 24 hr patch   Commonly known as:  NICODERM CQ   Used for:  Tobacco abuse        Dose:  1 patch   Place 1 patch onto the skin every 24 hours   Quantity:  30 patch   Refills:  1       pantoprazole 20 MG EC tablet   Commonly known as:  PROTONIX   Used for:  Non-intractable vomiting with nausea, unspecified vomiting type, S/P gastric bypass, Hiatal hernia, Ulcer (H)        Dose:  20 mg   Take 1 tablet (20 mg) by mouth daily Take by mouth 30-60 minutes before a meal.   Quantity:  30 tablet   Refills:  0         STOP taking     benzocaine-menthol 6-10 MG lozenge   Commonly known as:  CHLORASEPTIC           clonazePAM 0.5 MG tablet   Commonly known as:  klonoPIN           QUEtiapine 25 MG tablet   Commonly known as:  SEROquel                 Where to get your medicines      These medications were sent to [facility] [place], [place] - [address]  [address], [facility] [address]     Phone:  [phone]     bisacodyl 10 MG Suppository    gabapentin 600 MG tablet    lisinopril 10 MG tablet    pantoprazole 20 MG EC tablet    polyethylene glycol Packet    senna-docusate 8.6-50 MG per tablet         Some of these will need a paper prescription and others can be bought over the counter. Ask your nurse if you have questions.     Bring a paper prescription for each of these medications     busPIRone 7.5 MG tablet    cyclobenzaprine 10 MG tablet    oxyCODONE IR 5 MG tablet                Protect others around you: Learn how to safely use, store and throw away your medicines at www.disposemymeds.org.        Information about OPIOIDS     PRESCRIPTION OPIOIDS: WHAT YOU NEED TO KNOW    Prescription opioids can be used to help relieve moderate to severe pain and are often prescribed following a surgery or injury, or for certain health conditions. These medications can be an important part of treatment but also come with serious risks. It is important to work with your health care provider to make sure you are getting the safest, most effective care.    WHAT ARE THE RISKS AND SIDE EFFECTS OF OPIOID USE?  Prescription opioids carry serious risks of addiction and overdose, especially with prolonged use. An opioid overdose, often marked by slowed breathing can cause sudden death. The use of prescription opioids can have a number of side effects as well, even when taken as directed:      Tolerance - meaning you might need to take more of a medication for the same pain relief    Physical dependence - meaning you have symptoms of withdrawal when a medication is stopped    Increased sensitivity to pain    Constipation    Nausea, vomiting, and dry mouth    Sleepiness and dizziness    Confusion    Depression    Low levels of  testosterone that can result in lower sex drive, energy, and strength    Itching and sweating    RISKS ARE GREATER WITH:    History of drug misuse, substance use disorder, or overdose    Mental health conditions (such as depression or anxiety)    Sleep apnea    Older age (65 years or older)    Pregnancy    Avoid alcohol while taking prescription opioids.   Also, unless specifically advised by your health care provider, medications to avoid include:    Benzodiazepines (such as Xanax or Valium)    Muscle relaxants (such as Soma or Flexeril)    Hypnotics (such as Ambien or Lunesta)    Other prescription opioids    KNOW YOUR OPTIONS:  Talk to your health care provider about ways to manage your pain that do not involve prescription opioids. Some of these options may actually work better and have fewer risks and side effects:    Pain relievers such as acetaminophen, ibuprofen, and naproxen    Some medications that are also used for depression or seizures    Physical therapy and exercise    Cognitive behavioral therapy, a psychological, goal-directed approach, in which patients learn how to modify physical, behavioral, and emotional triggers of pain and stress    IF YOU ARE PRESCRIBED OPIOIDS FOR PAIN:    Never take opioids in greater amounts or more often than prescribed    Follow up with your primary health care provider and work together to create a plan on how to manage your pain.    Talk about ways to help manage your pain that do not involve prescription opioids    Talk about all concerns and side effects    Help prevent misuse and abuse    Never sell or share prescription opioids    Never use another person's prescription opioids    Store prescription opioids in a secure place and out of reach of others (this may include visitors, children, friends, and family)    Visit www.cdc.gov/drugoverdose to learn about risks of opioid abuse and overdose    If you believe you may be struggling with addiction, tell your health  care provider and ask for guidance or call [facility]'s National Helpline at 4-779-202-HELP    LEARN MORE / www.cdc.gov/drugoverdose/prescribing/guideline.html    Safely dispose of unused prescription opioids: Find your local drug take-back programs and more information about the importance of safe disposal at www.doseofreality.[place].gov             Medication List: This is a list of all your medications and when to take them. Check marks below indicate your daily home schedule. Keep this list as a reference.      Medications           Morning Afternoon Evening Bedtime As Needed    acetaminophen 325 MG tablet   Commonly known as:  TYLENOL   Take 2 tablets (650 mg) by mouth every 4 hours as needed for mild pain   Last time this was given:  650 mg on 3/28/2018  2:11 AM                                bisacodyl 10 MG Suppository   Commonly known as:  DULCOLAX   Place 1 suppository (10 mg) rectally daily as needed for constipation   Last time this was given:  10 mg on 3/27/2018 10:15 AM                                busPIRone 7.5 MG tablet   Commonly known as:  BUSPAR   Take 1 tablet (7.5 mg) by mouth 2 times daily For first 5 days, take 1 tab (7.5 mg) in morning and then two tabs (15 mg ) in pm Then after 5 days, take two tabs (15 mg) in am and pm   Last time this was given:  7.5 mg on 3/28/2018  8:07 AM                                cyclobenzaprine 10 MG tablet   Commonly known as:  FLEXERIL   Take 1 tablet (10 mg) by mouth 3 times daily as needed for muscle spasms   Last time this was given:  10 mg on 3/28/2018  8:08 AM                                gabapentin 600 MG tablet   Commonly known as:  NEURONTIN   Take 1 tablet (600 mg) by mouth 3 times daily   Last time this was given:  600 mg on 3/28/2018  8:07 AM                                lisinopril 10 MG tablet   Commonly known as:  PRINIVIL/ZESTRIL   Take 0.5 tablets (5 mg) by mouth daily   Last time this was given:  5 mg on 3/28/2018  8:57 AM                                 multivitamin, therapeutic with minerals Tabs tablet   Take 1 tablet by mouth daily                                nicotine 7 MG/24HR 24 hr patch   Commonly known as:  NICODERM CQ   Place 1 patch onto the skin every 24 hours   Last time this was given:  1 patch on 3/28/2018  8:07 AM                                oxyCODONE IR 5 MG tablet   Commonly known as:  ROXICODONE   Take 1-2 tablets (5-10 mg) by mouth every 6 hours as needed for moderate to severe pain   Last time this was given:  10 mg on 3/28/2018 11:15 AM                                pantoprazole 20 MG EC tablet   Commonly known as:  PROTONIX   Take 1 tablet (20 mg) by mouth daily Take by mouth 30-60 minutes before a meal.   Last time this was given:  20 mg on 3/28/2018  8:07 AM                                polyethylene glycol Packet   Commonly known as:  MIRALAX/GLYCOLAX   Take 17 g by mouth daily   Last time this was given:  17 g on 3/27/2018  8:07 AM                                senna-docusate 8.6-50 MG per tablet   Commonly known as:  SENOKOT-S;PERICOLACE   Take 2 tablets by mouth 2 times daily   Last time this was given:  2 tablets on 3/28/2018  8:07 AM

## 2018-03-18 NOTE — H&P
[facility]   Acute Rehabilitation Unit  Admission History and Physical    chief complaint   Spinal Cord Injury     History of Present illness  Antoinette Romero is a 57 year old right hand dominant female with past medical history of tobacco use, migraines, morbid obesity with previous bypass surgery, and migraines, and chronic worsening hip and leg pain from OA,  who had a fall on 3/9/18 evening which resulted in Spinal cord injury    Per the patient and her chart, she reported having 2 glasses of wine the night prior, woke up in the middle of the night to go to the restroom and had a fall. After the fall she noted a hard time walking and difficulty with her hand.  She did not wish to come into the hospital at that time so she went back to bed. When she awoke in the morning she had difficulty walking and reports her  called for assistance.     Initially went to [facility] but give possible spinal cord injury she was sent to [facility].  She underwent imaging at that time which revealed cervical spine contusion at C5-6 with disruption of posterior ligaments, Paraspinous edema and C7-T1 anterolisthesis and C5-6 severe spinal canal stenosis. She had right hand weakness, right foot weakness and difficulty with ambulation. CT head, Tib-fib x-ray, elbow x-ray, humerus x-ray, Hand x-ray, Pelvis/hip x-ray, and CT abd/pelvis without acute fracture or process.     On initial assessment to ED she had no  strength in the left hand but can move the arm.  She could not dorsiflex or plantar flex her left foot. She could wiggle her toes and lift her leg off the bed. It was unclear at that time if her symptoms were worsening of stable, as she was given 10 mg decadron and multiple iv analgesics prior to transfer.      MRI Cervical spine on 3/10/18 revealed spinal cord contusion at C5-C6 without hemorrhage; disruption of ligaments at C5-C6 anteriorly and posteriorly with fluid in  the facet joints and in the disc space suggesting motion at this level; fairly extensive edema, but no discrete hematoma in the paraspinous musculature more on the right than on the left and in the prevertebral space; extensive degenerative changes. Pt transferred to [facility] 3/10.  She was then seen by Neurosurgery and noted LUE > RUE weakness with loss of left hand function as well as BLE weakness L > R. Pt underwent C3-T1 ACDF on 3/11/2018. Did well with the procedure and progressed through hospital stay.      On admission to ARU, the patient has significant pain in neck, down bilateral arms and to hands and through trunk to groin level.  She reports it as burning and more neuropathic in nature.  We discussed the types of pain that can be experienced and decided to start gabapentin for neuropathic pain management. She also reported that she had some issues swallowing harder bights of food since surgery and that if it is a larger bolus it is tougher, but she has no issues with softer foods. She also had multiple times of emotional lability and was tearful multiple times during my exam.  She admits to struggling with this new injury and would like to see psychology.  Otherwise she denies f,c,n,v, she has a history of HA/migraines but does not take medication for them.  She denies CP, SPB, but does have some abdominal discomfort as she has not had a BM for 4-5 days.  No other symptoms other than what was mentioned.      During his acute hospitalization, patient was seen and evaluated by PT, and OT.  Both specialties collectively recommended that patient would benefit from ongoing therapies in the acute inpatient rehabilitation setting.     Functionally, the patient SBA with bed mobility, demo's decreased L UE FMC, mod I with LE dressing and SBA with toilet transfer.  Supine to sit with SBA. Sit to/from stand with CGA. Pt needs CGA/min A for bathroom negotiation. Cues needed to keep using walker instead of abandoning. Pt  ambulates 400' with FWW and CGA. Decreased gait speed. >5 rest breaks needed this AM. She has difficulty with balance, deconditioning, and handling stairs.      Of Note, oover the past year she has lost her brother and sister to opioid overdoses. This has caused a great deal of grief. She was prescribed Buspar as an outpatient but never took this. She has not seen a counselor. She has turned to drinking 1-2 glasses per wine each evening starting at 6:30.  She takes care of her 19 month old grandson during the day.  She lives with her  and 20 year old son.  He will likely need assistance with appropriate pain management and resources/education to assist with alcohol use.      Currently, the patient is medically appropriate and is assessed to have needs and will benefit from an inpatient acute rehabilitation comprehensive program working with PT and  OT, and will also benefit from supervision and management of Rehab Nursing and Rehab MD.       PAST Medical History  Past Medical History:   Diagnosis Date     Anastomotic ulcer 3/28/2014    smoking history; 1PPD     Anxiety      Arthritis      Migraine      Morbid obesity (H)     s/p gastric bypass       Surgical History  Past Surgical History:   Procedure Laterality Date     DECOMPRESSION, FUSION CERVICAL ANTERIOR THREE+ LEVELS, COMBINED N/A 3/11/2018    Procedure: COMBINED DECOMPRESSION, FUSION CERVICAL ANTERIOR THREE+ LEVELS;   INTRA-OPERATIVE SPINAL CORD MONITIORING, ANTERIOR CERVICAL DECOMPRESSION AND FUSION C3-T1 ;  Surgeon: Maynor Daley MD;  Location:  OR     ENDOSCOPIC UPPER GASTROINTESTINAL, REMOVE SUTURE N/A 1/17/2015    Procedure: ENDOSCOPIC UPPER GASTROINTESTINAL, REMOVE SUTURE;  Surgeon: Parviz Martinez MD;  Location: [facility] OR     ESOPHAGOSCOPY, GASTROSCOPY, DUODENOSCOPY (EGD), COMBINED  2/18/2014    Procedure: COMBINED ESOPHAGOSCOPY, GASTROSCOPY, DUODENOSCOPY (EGD);  Esophagoscopy,Gastroscopy,Duodenoscopy;  Surgeon: Neo Sher  MD Karel;  Location:  GI     ESOPHAGOSCOPY, GASTROSCOPY, DUODENOSCOPY (EGD), COMBINED  5/7/2014    Procedure: COMBINED ESOPHAGOSCOPY, GASTROSCOPY, DUODENOSCOPY (EGD), BIOPSY SINGLE OR MULTIPLE;  Surgeon: Jose Antonio Valencia MD;  Location:  GI     GYN SURGERY       LAPAROSCOPIC BYPASS GASTRIC  3/25/2013    Procedure: LAPAROSCOPIC BYPASS GASTRIC;  Laparoscopic Nawaf En Y Gastric Bypass. Hiatel hernia repair;  Surgeon: Parviz Martinez MD;  Location: [facility] OR     LAPAROSCOPIC CHOLECYSTECTOMY WITH CHOLANGIOGRAMS  3/28/2014    Procedure: LAPAROSCOPIC CHOLECYSTECTOMY WITH CHOLANGIOGRAMS;;  Surgeon: Jose Antonio Valencia MD;  Location: [facility] OR     LAPAROSCOPY DIAGNOSTIC (GENERAL)  3/28/2014    Procedure: LAPAROSCOPY DIAGNOSTIC (GENERAL);  Diagnostic Laparoscopy, laparoscopic cholecystectomy, EGD, repair of johnston defect;  Surgeon: Jose Antonio Valencia MD;  Location: [facility] OR     LAPAROSCOPY OPERATIVE ADULT N/A 1/17/2015    Procedure: LAPAROSCOPY OPERATIVE ADULT;  Surgeon: Parviz Martinez MD;  Location:  OR       SOCIAL HISTORY  Marital Status:  -  present  Living situation: Lives in a mobile home with 5 steps to enter, she takes care of all the house activities such as cleaning, cooking and also takes care of 19 month old grandson.   Family support: minimal she reports  not very helpful  Tobacco use: Quit 6 years ago  Alcohol use: daily prior to admission  Illicit drug use: cannabis  Social History     Social History     Marital status:      Spouse name: Preston     Number of children: 2     Years of education: N/A     Occupational History      None      Social History Main Topics     Smoking status: Current Every Day Smoker     Packs/day: 1.00     Types: Cigarettes     Smokeless tobacco: Never Used      Comment: quite 1 week ago     Alcohol use No     Drug use: No     Sexual activity: Not Currently     Partners: Male     Other Topics Concern     Not on file     Social History  Narrative       FAMILY HISTORY  Family History   Problem Relation Age of Onset     Respiratory Mother      DIABETES Mother      Arthritis Mother      OA     Neurologic Disorder Father      CANCER Maternal Grandfather      Arthritis Maternal Grandmother      RA     Breast Cancer No family hx of      Ovarian Cancer No family hx of          Prior Functional history   Pt was independent with all ADLs/IADLs, transfers, mobility and gait.      Medications  Prescriptions Prior to Admission   Medication Sig Dispense Refill Last Dose     lisinopril (PRINIVIL/ZESTRIL) 10 MG tablet Take 0.5 tablets (5 mg) by mouth daily 30 tablet 3      acetaminophen (TYLENOL) 325 MG tablet Take 3 tablets (975 mg) by mouth every 8 hours 100 tablet       benzocaine-menthol (CHLORASEPTIC) 6-10 MG lozenge Place 1 lozenge inside cheek every hour as needed for sore throat (dry/sore throat without fever) 84 lozenge       acetaminophen (TYLENOL) 325 MG tablet Take 2 tablets (650 mg) by mouth every 4 hours as needed for mild pain 100 tablet       clonazePAM (KLONOPIN) 0.5 MG tablet Take 0.5 tablets (0.25 mg) by mouth 3 times daily as needed for anxiety 30 tablet 0      QUEtiapine (SEROQUEL) 25 MG tablet Take 1 tablet (25 mg) by mouth 3 times daily as needed (Agitation or insomnia) 60 tablet       multivitamin, therapeutic with minerals (THERA-VIT-M) TABS tablet Take 1 tablet by mouth daily 30 each 0      cyclobenzaprine (FLEXERIL) 10 MG tablet Take 1 tablet (10 mg) by mouth 3 times daily as needed for muscle spasms 42 tablet       oxyCODONE IR (ROXICODONE) 5 MG tablet Take 1-2 tablets (5-10 mg) by mouth every 4 hours as needed for moderate to severe pain 30 tablet 0      pantoprazole (PROTONIX) 20 MG EC tablet Take 1 tablet (20 mg) by mouth daily Take by mouth 30-60 minutes before a meal. 90 tablet 3 3/10/2018 at am     nicotine (NICODERM CQ) 7 MG/24HR 24 hr patch Place 1 patch onto the skin every 24 hours 30 patch 1 not started yet       ALLERGIES    "  Allergies   Allergen Reactions     Keflex [Cephalexin-Fd&C Yellow #6] Anaphylaxis     Unasyn Anaphylaxis and Swelling     Azithromycin      Heart palpitations     Compazine [Prochlorperazine] Other (See Comments)     headaches     Zofran [Ondansetron] Other (See Comments)     headaches         Review of Systems  A 10 point ROS was performed and negative unless otherwise noted in HPI.     Constitutional: denies any fevers, chills.   Eyes: denies changes in visual acuity   Ears, Nose, Throat: has some minor difficulty with larger food boluses since surgery - improving  Cardiovascular: denies any exertional chest pain or palpitation   Respiratory: denies dyspnea   Gastrointestinal: denies any nausea, vomiting, abdominal pain, diarrhea, but does have some constipation LBM 5 days ago per patient  Genitourinary: denies any dysuria, hematuria, frequency or urgency, starts and stops more frequently though since surgery  Musculoskeletal: denies any muscle pain, joint pain, neck pain or back pain other than what was mentioned above  Neurologic: denies any headache, or vertigo  Psychiatric: is more emotionally labile since surgery and has some issues with anxiety and depression    Physical Exam  VITAL SIGNS:  [facility] 03/01/2010  BMI:  Estimated body mass index is 20.52 kg/(m^2) as calculated from the following:    Height as of 3/8/18: 1.702 m (5' 7\").    Weight as of 3/8/18: 59.4 kg (131 lb).     General: NAD, pleasant and cooperative, sitting up in chair  HEENT: ATNC, oropharynx clear with MMM, EOMI, PERRL, Aspen Collar on, incision open to air anterior neck   Pulmonary: clear breath sounds b/l, no rales/wheezing    Cardiovascular: RRR, no murmur   Abdominal: soft, non-tender, non-distended   Extremities: warm, well perfused, no edema in bilateral lower extremities, no tenderness in calves   MSK/neuro:   Mental Status:  alert and oriented x3    Cranial Nerves: grossly normal   1. 2nd CN: Pupils equal, round, reactive to light " and accomodation. and visual fields intact to confrontation.   2. 3rd,4th,6th CN:  EOMI, appropriate pupillary responses  3. 5th CN: facial sensation intact   4. 7th CN: face symmetrical   5. 8th CN: functional hearing bilaterally  6. 9th, 10th CN: palate elevates symmetrically   7. 11th CN: sternocleidomastoids and trapezii strong   8. 12th CN: tongue midline and without fasciculations     Sensory: light touch reported the same but with numbness and tingling on the left UE compared to right   Strength:    SF  EF  EE  WE  G  I  HF  KE  DF  EHL  PF   R  4+/5 4+/5 3+/5 4/5 4/5 4-/5 4/5 4-/5 4/5 4/5 4/5   L  4+/5 4/5 3/5 3/5 1/5 2/5 3/5 3/5 3+/5 3+/5 3+/5    Reflexes: Present and symmetrical Hypereflexic   Hernandez's test: negative bilaterally    Babinski reflex: downgoing bilaterally    Tone per modified Yimi Scale: 0   Abnormal movements: None    Coordination: No dysmetria on finger to nose b/l weak but controllable   Speech: clear and fluent   Cognition: baseline and intact   Gait: did not assess    Skin: normal skin color, texture, turgor      Labs  Pertinent labs reviewed    Lab Results   Component Value Date    WBC 9.8 03/13/2018    HGB 11.2 (L) 03/13/2018    HCT 35.2 03/13/2018    MCV 84 03/13/2018     03/13/2018     Lab Results   Component Value Date     03/13/2018    POTASSIUM 3.4 03/13/2018    CHLORIDE 106 03/13/2018    CO2 27 03/13/2018    GLC 91 03/13/2018     Lab Results   Component Value Date    GFRESTIMATED >90 03/13/2018    GFRESTBLACK >90 03/13/2018     Lab Results   Component Value Date    AST 48 (H) 03/11/2018    ALT 31 03/11/2018    ALKPHOS 117 03/11/2018    BILITOTAL 0.8 03/11/2018     Lab Results   Component Value Date    INR 0.91 03/11/2018     Lab Results   Component Value Date    BUN 18 03/13/2018    CR 0.50 (L) 03/13/2018         ASSESSMENT/PLAN:  Antoinette Romero is a 57 year old right hand dominant female with past medical history of tobacco use, migraines, morbid obesity with  previous bypass surgery, and migraines, and chronic worsening hip and leg pain from OA,  who had a fall on 3/9/18 evening which resulted in Spinal cord injury.  She had C3-T1 ACDF on 3/11/2018, and was admitted to ARU on 3/18/18    Admission to acute inpatient rehab Cervical Spinal Cord Injury.    Impairment group code:       1. PT, and OT for 90 minutes of each on a daily basis, in addition to rehab nursing and close management of physiatrist.      2. Impairment of ADL's: daily OT for 90 minutes daily to work on ADL re-training such as grooming, self cares and bathing, using UE's as she ahs a cervical spinal cord injury with limited use of UE's with L> R deficits     3. Impairment of mobility:  Daily PT for 90 minutes daily to work on neuromuscular re-education focusing on strength, balance, coordination, and endurance.  To enhance patients ability to ambulate as she has decreased strength in LE's bilaterally L>R    4. Rehab RN for med administration, bowel regimen, glucose monitoring and wound care.      5. Medical Conditions  # Traumatic cervical spine injury with resulting C3-T1 severe stenosis with spinal cord compression and myelomalacia, C5-6 ruptured anterior longitudinal ligament and disk with a anterior osteophyte fracture and C7-T1 anterior subluxation with severe bilateral foraminal stenosis - s/p C3-T1 ACDF on 3/11/2018.  * Pt with h/o EtOH and multiple falls recently. The patient woke up am 3/10 and had difficulty with walking and left hand and left foot weakness; had pain in neck, hip and hand after consuming some alcohol, going to bed and having a fall when arising to use the restroom overnight.  She presented to [facility] 3/10 and x-rays of hand, hip, tib-fib were negative. CT abdomen and pelvis 3/10 no acute findings.   - MRI Cervical spine on 3/10/18 revealed spinal cord contusion at C5-C6 without hemorrhage; disruption of ligaments at C5-C6 anteriorly and posteriorly with fluid in the facet  "joints and in the disc space suggesting motion at this level; fairly extensive edema, but no discrete hematoma in the paraspinous musculature more on the right than on the left and in the prevertebral space; extensive degenerative changes. Pt transferred to [facility] 3/10.  * Seen by Neurosurgery and noted with LUE > RUE weakness with loss of left hand function as well as BLE weakness L > R. Pt underwent C3-T1 ACDF on 3/11/2018. She has progressed well with therapies.  - pain management  - scheduled Tylenol 975 mg po TID and prn, scheduled Flexeril 10 mg po TID, Roxicodone 5-10 mg po q4h prn  - keep neck collar in place until follow-up   - F/u Neurosurgery - When?      # Pain: neuropathic in nature as she has numbness tingling, fire type lightning bolt pain.  - Started gabapentin 300 mg TID on admission.   - Cont. Oxycodone 5-10 mg Q4H with plan to wean off of this medication  - Cont. Tylenol     # Alcohol use/abuse/dependence with EtOH w/d.  Improved. CIWA d/c'ed 3/14. Pt said that she was \"done\" with alcohol given above issues.  - will continue to monitor here on ARU   - WIll ask Dr. Paulino to see her as well.       # Confusion/encephalopathy (suspect toxic).  Pt confused 3/12. Had been getting PRN IV lorazepam for EtOH w/o as well as PRN IV Dilaudid for pain. Improved overall starting on 3/13-0 now back to baseline.  - Will cont. To monitor for any cognitive decline.       # Elevated BP's, suspect related pain, anxiety and underlying essential HTN. - resolving  - she has h/o HTN and has been on lisinopril in the past   - started lisinopril 10 mg po daily on 3/14, blood pressures started to regulate, decrease lisinopril to 5 mg daily  - Will address need for continued Lisinopril while here at ARU      # Anxiety/depression, worsened related to problem #1.  Pt with prior h/o depression/anxiety and had been on multiple meds in the past. Seen by Psychiatry 3/12 and PRN Seroquel added. Pt more emotionally labile and " pessimistic 3/13; Reluctant to take Seroquel but did later take some doses. PRN clonazepam added 3/14; Pt seen by Psychiatry again 3/15 and offered to start Trintellix, but pt declined.  - PTA Buspar stopped prior to admission   - Continue PRN clonazepam 0.25 mg TID - stopped at time of admission to ARU - was not taking as prn med  - Continue PRN Seroquel  - WIll see our inpatient psychologist Dr. Paulino - consult placed     # History of morbid obesity with previous gastric bypass and anastomotic ulcer.  - Continue Protonix 20 mg daily.      # Tobacco use.   - Continue nicotine patch.    6. Adjustment to disability:  Clinical psychology to eval and treat will have Dr. Paulino see patient, consult placed.   7. FEN: reg with thins  8. Bowel: bowel meds senna 2 tabs bid  9. Bladder: PVRs x 2   10. DVT Prophylaxis: ambulating   11. GI Prophylaxis: protonix  12. Code: Full  13. Disposition: Home with support  14. ELOS:  1-2 wk .  15. Rehab prognosis:  Good   16. Follow up Appointments on Discharge: Neurosurgery, PCP, and PM&R    Pardeep Crawford MD PGY-2  Physical Medicine & Rehabilitation  Pager: [phone]    Physician Attestation   I, Leonardo Lopez, personally examined and evaluated Antoinette on admission.  I discussed the patient with the resident and care team, and agree with the assessment and plan of care as documented in the resident s note.      I personally reviewed vital signs, medications, labs and imaging.    Key findings: cervical myelopathy, incomplete central cord pattern, RASHARD D. Weakness 4 limbs, arms bit worse than legs.  She is starting to do some ambulation though still with some assistance.  Upper extremities with definite weakness in hands and some proximally.    Optimistic given some of the functional improvement so far that she will continue to have further neurologic improvements.  Still very early overall in the recovery however and hard to know the completeness of her recovery potential   Estimated  length of stay: One week inpatient that I expect she will have a longer course of outpatient therapy    She will benefit from intensive rehabilitation includin minutes each of PT and OT, Rehabilitation nursing, Close management by physiatry  Has moderate anxiety/depression adjustment disorder NOS, tobacco use disorder, hypertension, neuropathic and postoperative pain.  A lot of psychosocial stressors.  already saw on admit day, talked about may of the issues with [facility] psychology consult and social work to follow.  I provided a lot of education about her situation.  Reassurance with some of my optimism she will get back to some tasks she is highly motivated about, in particular caring for her 19-month-old grandchild.  While possibly need some augment support in the short-term, I speculate she will be able to regain caregiving capacity.    Leonardo Lopez  Date of Service (when I saw the patient): 18

## 2018-03-18 NOTE — PLAN OF CARE
Problem: Patient Care Overview  Goal: Plan of Care/Patient Progress Review  A&O. Soft BP's, all other VSS on room air. CMS intact except for left hand tingling and right index finger tingling. Neuro intact except LUE and LLE hemiparesis. Aspen collar on. Up w/ SBA w/ walker and gaitbelt. C/o of neck pain, 5 mg PO oxycodone given. Possible discharge today pending ARU bed placement.

## 2018-03-18 NOTE — PROGRESS NOTES
SW:  D: Pt has an [facility] per [facility] at [facility]  I:SW confirmed with pt that she is agreeable to discharge to [facility] and that her  is able to transport pt.  Pt will ask  to be at hospital at 11:30 for discharge. SW will follow up with  to provide directions and contact information for  Acute Rehab unit.  Pt and  instructed to go directly to  Acute from the hospital and call the unit on arrival for assistance to get up to the 5th floor.  RN,CC,HUC,BB, physician  notified of discharge plan.  P: Discharge to  ARU today at noon via .    GATITO Staples  [facility]  Phone: [phone]

## 2018-03-18 NOTE — PROGRESS NOTES
[facility]    Hospitalist Progress Note      Assessment & Plan   Ms. Antoinette Romero is a 57 year old female, with past medical history including tobacco use, marijuana use, migraines and morbid obesity with previous gastric bypass surgery, who presented 3/10 to OS with recent falls and focal weakness, with pain including in neck and hip, and was found with spinal cord contusion and disruption of spine ligaments. Transferred to [facility] 3/10 for management.    1. Traumatic cervical spine injury with resulting C3-T1 severe stenosis with spinal cord compression and myelomalacia, C5-6 ruptured anterior longitudinal ligament and disk with a anterior osteophyte fracture and C7-T1 anterior subluxation with severe bilateral foraminal stenosis - s/p C3-T1 ACDF on 3/11/2018.  * Pt with h/o EtOH and multiple falls recently. The patient woke up am 3/10 and had difficulty with walking and left hand and left foot weakness; had pain in neck, hip and hand. Presented to [facility] 3/10 and x-rays of hand, hip tib-fib were negative. CT abdomen and pelvis 3/10 no acute findings. MRI Cervical spine on 3/10/18 revealed spinal cord contusion at C5-C6 without hemorrhage; disruption of ligaments at C5-C6 anteriorly and posteriorly with fluid in the facet joints and in the disc space suggesting motion at this level; fairly extensive edema, but no discrete hematoma in the paraspinous musculature more on the right than on the left and in the prevertebral space; extensive degenerative changes. Pt transferred to [facility] 3/10.  * Seen by Neurosurgery and noted with LUE > RUE weakness with loss of left hand function as well as BLE weakness L > R. Pt underwent C3-T1 ACDF on 3/11/2018.  - pain management- scheduled Tylenol 975 mg po TID, scheduled Flexeril 10 mg po TID, Roxicodone 5-10 mg po TID prn, Dilaudid iv prn  - PT/OT- rec d/c to ARF  - keep collar in place  - F/u outpatient with Neurosurgery.     2. Alcohol use/abuse/dependence with  "EtOH w/d.  Improved. CIWA d/c'ed 3/14. Pt said that she was \"done\" with alcohol given above issues.     3. Confusion/encephalopathy (suspect toxic).  Pt confused 3/12. Had been getting PRN IV lorazepam for EtOH w/o as well as PRN IV Dilaudid for pain. Improved overall 3/13 and 3/14.  - Minimize sedative meds.     4. Elevated BP's, suspect related pain, anxiety and underlying essential HTN.  - she stated she h/o HTN and has been on lisinopril in the past. Started lisinopril 10 mg po daily on 3/14.   - BP much better now, actually on lower side SBP in 's  - decrease lisinopril to 5 mg daily.  - Continue PRN IV hydralazine and PRN IV labetalol.  - Treat anxiety as below.      5. Anxiety/depression, worsened related to problem #1.  Pt with prior h/o depression/anxiety and had been on multiple meds in the past. Seen by Psychiatry 3/12 and PRN Seroquel added. Pt more emotionally labile and pessimistic 3/13; Reluctant to take Seroquel but did later take some doses. PRN clonazepam added 3/14; Pt seen by Psychiatry again 3/15 and offered to start Trintellix, but pt declined.  - Continue PRN clonazepam 0.25 mg TID.  - Continue PRN Seroquel.  - PTA Buspar stopped     6. History of morbid obesity with previous gastric bypass and anastomotic ulcer.  - Continue Protonix 20 mg daily      7. Tobacco use.  - Continue PRN nicotine patch.        # Pain Assessment:   Current Pain Score 3/18/2018 3/18/2018 3/18/2018   Patient currently in pain? yes yes yes   Pain score (0-10) 9 3 9   Pain location Neck Neck Neck   Pain descriptors - - -   - Antoinette is experiencing neck pain and back pain due to above mentioned problem. Pain management was discussed and the plan was created in a collaborative fashion.  Antoinette's response to the current recommendations: engaged  - Opioid regimen: scheduled Tylenol TID and prn, Flexeril 10 mg po TID, Oxycodone 5-10 mg po q3h prn, Dilaudid 0.3-0.5 mg ivq2h  - Response to opioid medications: Reduction of " symptoms  - Bowel regimen: senna and bisacodyl suppository      Prophylaxis.  - PCD's, ambulation.    Code Status: Full Code    Disposition: d/c to ARF today    Ana Stauffer    Interval History   Doing fine, mildly anxious, still reports some neck pain; reports improvement of LUE strength but today reports some numbness of left thumb and 2nd finger (yesterday reported right thumb numbness); denies chest pain, no SOB, no abd pain, no N/V    -Data reviewed today: I reviewed all new labs and imaging results over the last 24 hours. I personally reviewed no images or EKG's today.    Physical Exam   Temp: 97.5  F (36.4  C) Temp src: Tympanic BP: (!) 128/92   Heart Rate: 93 Resp: 16 SpO2: 95 % O2 Device: None (Room air)    There were no vitals filed for this visit.  Vital Signs with Ranges  Temp:  [97.2  F (36.2  C)-98.5  F (36.9  C)] 97.5  F (36.4  C)  Heart Rate:  [] 93  Resp:  [16] 16  BP: ()/(48-92) 128/92  SpO2:  [93 %-96 %] 95 %  I/O last 3 completed shifts:  In: 240 [P.O.:240]  Out: 200 [Urine:200]    Constitutional: Awake, alert, mildly anxious  Respiratory:Diminished air entry bilateral lungs, no wheezing, no rales, no crackles  Cardiovascular: S1S2, RRR, no murmurs, no rubs  GI: abd soft, nonT, nonD, BS present  Skin/Integumen: intact, no rashes, no cyanosis  Extremities- no leg swellings     Medications       lisinopril  5 mg Oral Daily     acetaminophen  975 mg Oral Q8H     nicotine   Transdermal Q8H     nicotine   Transdermal Daily     nicotine  1 patch Transdermal Daily     cyclobenzaprine  10 mg Oral TID     multivitamin, therapeutic with minerals  1 tablet Oral Daily     pantoprazole  20 mg Oral Daily       Data     Recent Labs  Lab 03/13/18  0835   WBC 9.8   HGB 11.2*   MCV 84         POTASSIUM 3.4   CHLORIDE 106   CO2 27   BUN 18   CR 0.50*   ANIONGAP 7   BESSY 7.7*   GLC 91       No results found for this or any previous visit (from the past 24 hour(s)).

## 2018-03-18 NOTE — H&P
Post Admission Physician Evaluation:    I have compared Antoinette's condition on admission to acute rehabilitation to that outlined in the preadmission screen. History and physical exam performed by me. No significant differences are identified and the patient remains appropriate for an inpatient rehabilitation facility level of care to manage medical issues and address functional impairments due to (rehabilitation diagnosis) cervical myelopathy, incomplete central cord pattern, RASHARD D.    Comorbid medical conditions being managed: Moderate anxiety/depression adjustment disorder NOS, tobacco use disorder, hypertension, neuropathic and postoperative pain.    Prior functional level: Fully independent    Present function: Weakness 4 limbs, arms bit worse than legs.  She is starting to do some ambulation though still with some assistance.  Upper extremities with definite weakness in hands and some proximally.    Anticipated rehabilitation course:   Will benefit from intensive rehabilitation includin minutes each of PT and OT  Rehabilitation nursing  Close management by physiatry    Prognosis: Optimistic given some of the functional improvement so far that she will continue to have further neurologic improvements.  Still very early overall in the recovery however and hard to know the completeness of her recovery potential    Estimated length of stay: One week inpatient that I expect she will have a longer course of outpatient therapy

## 2018-03-18 NOTE — IP AVS SNAPSHOT
[facility]    [address] [place] [id number]    Phone:  [phone]                                       After Visit Summary   3/18/2018    Antoinette Romero    MRN: [id number]           After Visit Summary Signature Page     I have received my discharge instructions, and my questions have been answered. I have discussed any challenges I see with this plan with the nurse or doctor.    ..........................................................................................................................................  Patient/Patient Representative Signature      ..........................................................................................................................................  Patient Representative Print Name and Relationship to Patient    ..................................................               ................................................  Date                                            Time    ..........................................................................................................................................  Reviewed by Signature/Title    ...................................................              ..............................................  Date                                                            Time

## 2018-03-18 NOTE — PLAN OF CARE
Problem: Patient Care Overview  Goal: Plan of Care/Patient Progress Review  Physical Therapy Discharge Summary    Reason for therapy discharge:    Discharged to acute rehabilitation facility.    Progress towards therapy goal(s). See goals on Care Plan in [facility] electronic health record for goal details.  Goals not met.  Barriers to achieving goals:   discharge from facility.    Therapy recommendation(s):    Continued therapy is recommended.  Rationale/Recommendations:  Decreased IND/tolerance for functional mobility.

## 2018-03-18 NOTE — PLAN OF CARE
Problem: Patient Care Overview  Goal: Plan of Care/Patient Progress Review  Outcome: Improving  A&O. CMS intact except L hand tingling, R index finger tingling. Bowel sounds active, +flatus, regular diet. Neuro's intact except LUE and LLE hempereis. VSS. incision LATASHA, Aspen collar at all times. UP SBA/GB/walker. C/o pain to neck rating 9-10/10. Decreased with PRN oxy and scheduled flexeril. Spiritual health to consult tomorrow. Discharge to ARU pending bed.

## 2018-03-18 NOTE — PROGRESS NOTES
"   18 1700   Values Beliefs and Spiritual Care   C: Community: In support of your spiritual health, is there someone we may contact for you? (identify all that apply) ([facility])   Visit Information   Visit Made By Staff    Type of Visit Initial;Staff consultation/triage;Distress   Distress Emotional;Zoroastrian/Spiritual;Relational   Visited Patient   Interventions   Plan of Care Review   With patient/family/proxy;With interdisciplinary team   Basic Spiritual Interventions    introduction/orientation to Spiritual Health Services;Assessment of spiritual needs/resources;Reflective conversation;Life Review   Advanced Assessments/Interventions   Presenting Concerns/Issues Spiritual/Yazdanism/emotional support;Grief and adjustment issues;Family dynamics;Self-concept disturbance;Challenged coping;Stress/self-care;Social isolation   Healing Modalities Provided Meditation/Mindfulness Practice;Guided Imagery;Relaxation techniques  (Body Scan)   SPIRITUAL HEALTH SERVICES  SPIRITUAL  ASSESSMENT Progress Note  [facility] 5R    PRIMARY FOCUS    Symptom/pain management    Emotional/spiritual/Yazdanism distress    Support for coping    ILLNESS CIRCUMSTANCES:   Reviewed documentation. Responded to referral based on EPIC consult order.  Reflective conversation shared with Antoinette which integrated elements of illness and family narratives.    Context of Serious Illness/Sympton(s)- Antoinette shared the narrative of the night of her two falls, following a large glass of wine. After the second fall she asked God to help her call out, and He did.  Then she got help from her  and 911 was called. Antoinette said that she likes to draw. \"I think I'm an artist.\" She is originally from [place]. \"We came out here to be with our dtr who was having medical problems. Her fiance  of an aneurysm.\"     Resources for Support - Unclear. Antoinette described her , Preston, as bi-polar and totally unhelpful; his mood going " "up and down constantly. Her son, also named Preston, stays up all night on his computer and sleeps all day.  He has problems with anger. Her daughter has health issues and Antoinette has been caring for her grandson.    DISTRESS:     Emotional, Spiritual, Existential Distress - Antoinette said, \"I thought God had given up on me.\" She shared that she and her  had smoked crack, and pot, and had been drinkers.  Also she smoked cigarettes.  At one point she said, they had completely stopped and life was much better, but then in the last four years, she started again. \"I smoked crack with my mother.\" (Her mother has passed away.) At one time she went to therapy with her . The therapist told her that she was bi-polar and that her  was as well.  Antoinette said that the therapist said that her  was borderline schizophrenic as well.    Methodist Distress - Antoinette spoke about God \"giving up on her\".     Social/Cultural/Economic- Antoinette named that she has no car, she has no money and that she feels trapped in a marriage she no longer wants and she doesn't know what to do.  \"I don't blame my dtr for wanting to get away from us.  We were bad parents.\"    SPIRITUAL/Alevism COPING):     Confucianist/Vi - Islam.    Spiritual Practice(s) - Antoinette mentioned calling out to God. I led her through a body scan and she reported feeling warmth coming down through her head into her chest. \"I feel warm now\", she said.  I let her know that she can practice this by herself.    Emotional, Relational,Existential Connections- Antoinette is connected to God by her own account-calling out to him-saying that He hasn't given up on me yet. Relations with her  are problematic according to Antoinette.  She said that she is also concerned about her son's well-being.    GOALS OF CARE:    Goals of Care - To be able to care for her grandson.    Meaning/Sense-Making- Antoinette is considering whether it's possible that God is giving her a chance to rest " after all the stress and trouble at home.    PLAN: Consulted with Dr. Lopez to give him Antoinette's background.  Will see Antoinette next week on ARU.    Rev. [facility]  Staff   [phone]  * [facility] remains available 24/7 for emergent requests/referrals, either by having the switchboard page the on-call  or by entering an ASAP/STAT consult in Epic (this will also page the on-call ).*

## 2018-03-18 NOTE — PROGRESS NOTES
Pt does not report any hallucinations or changes in vision. Pt updating spouse of plan of care of today.

## 2018-03-18 NOTE — PLAN OF CARE
Problem: Individualization  Goal: Patient Individualization  Outcome: Improving  FOCUS/GOAL  Bladder management, Pain management and Medical management    ASSESSMENT, INTERVENTIONS AND CONTINUING PLAN FOR GOAL:  Pt arrived to facility at 1230 from [facility] 73 s/p C3-T1 ACDF after falling which resulted in Spinal cord injury , was transported by , arrived to floor via w/c, assisted to and from w/c with assist of one, gait belt and walker. Alert & oriented, speech clear, very anxious and teary upon arrival, one - one and active listening provided with very short time effect. Continent of bladder with use of toilet. Has not had a bowel movement since 3/15 (per report). Pt wears Aspen collar at all times, anterior neck incision CDI and LATASHA. Pt reported tingling to right and left thumb and index finger with occasional numbness to left forearm, left sided weakness noted. Oxycodone PRn given at 1430 for upper back pain. General orientation given, demonstrated understanding with no questions at this time.

## 2018-03-18 NOTE — PLAN OF CARE
Problem: Patient Care Overview  Goal: Plan of Care/Patient Progress Review  Outcome: No Change  A&O. CMS intact except L hand tingling, R index finger tingling. Bowel sounds active, +flatus, regular diet. Neuro's intact except LUE and LLE hempereis. VSS. incision LATASHA, Aspen collar at all times. UP SBA/GB/walker. C/o pain to neck rating 9-10/10. Decreased with PRN oxy and scheduled flexeril. Spiritual health to consult tomorrow. Discharge to ARU pending bed.

## 2018-03-19 LAB
ANION GAP SERPL CALCULATED.3IONS-SCNC: 6 MMOL/L (ref 3–14)
BASOPHILS # BLD AUTO: 0 10E9/L (ref 0–0.2)
BASOPHILS NFR BLD AUTO: 0.4 %
BUN SERPL-MCNC: 20 MG/DL (ref 7–30)
CALCIUM SERPL-MCNC: 8.5 MG/DL (ref 8.5–10.1)
CHLORIDE SERPL-SCNC: 104 MMOL/L (ref 94–109)
CO2 SERPL-SCNC: 29 MMOL/L (ref 20–32)
CREAT SERPL-MCNC: 0.53 MG/DL (ref 0.52–1.04)
DIFFERENTIAL METHOD BLD: ABNORMAL
EOSINOPHIL # BLD AUTO: 0.2 10E9/L (ref 0–0.7)
EOSINOPHIL NFR BLD AUTO: 1.8 %
ERYTHROCYTE [DISTWIDTH] IN BLOOD BY AUTOMATED COUNT: 14 % (ref 10–15)
GFR SERPL CREATININE-BSD FRML MDRD: >90 ML/MIN/1.7M2
GLUCOSE SERPL-MCNC: 102 MG/DL (ref 70–99)
HCT VFR BLD AUTO: 37.1 % (ref 35–47)
HGB BLD-MCNC: 11.6 G/DL (ref 11.7–15.7)
IMM GRANULOCYTES # BLD: 0 10E9/L (ref 0–0.4)
IMM GRANULOCYTES NFR BLD: 0.1 %
LACTATE BLD-SCNC: 0.9 MMOL/L (ref 0.4–1.9)
LYMPHOCYTES # BLD AUTO: 2.3 10E9/L (ref 0.8–5.3)
LYMPHOCYTES NFR BLD AUTO: 23.1 %
MCH RBC QN AUTO: 26.8 PG (ref 26.5–33)
MCHC RBC AUTO-ENTMCNC: 31.3 G/DL (ref 31.5–36.5)
MCV RBC AUTO: 86 FL (ref 78–100)
MONOCYTES # BLD AUTO: 1.2 10E9/L (ref 0–1.3)
MONOCYTES NFR BLD AUTO: 12.4 %
NEUTROPHILS # BLD AUTO: 6.1 10E9/L (ref 1.6–8.3)
NEUTROPHILS NFR BLD AUTO: 62.2 %
NRBC # BLD AUTO: 0 10*3/UL
NRBC BLD AUTO-RTO: 0 /100
PLATELET # BLD AUTO: 286 10E9/L (ref 150–450)
POTASSIUM SERPL-SCNC: 4.3 MMOL/L (ref 3.4–5.3)
RBC # BLD AUTO: 4.33 10E12/L (ref 3.8–5.2)
SODIUM SERPL-SCNC: 139 MMOL/L (ref 133–144)
TROPONIN I SERPL-MCNC: <0.015 UG/L (ref 0–0.04)
WBC # BLD AUTO: 9.9 10E9/L (ref 4–11)

## 2018-03-19 PROCEDURE — 97161 PT EVAL LOW COMPLEX 20 MIN: CPT | Mod: GP

## 2018-03-19 PROCEDURE — 40000193 ZZH STATISTIC PT WARD VISIT

## 2018-03-19 PROCEDURE — 25000132 ZZH RX MED GY IP 250 OP 250 PS 637: Performed by: PHYSICAL MEDICINE & REHABILITATION

## 2018-03-19 PROCEDURE — 97530 THERAPEUTIC ACTIVITIES: CPT | Mod: GO | Performed by: STUDENT IN AN ORGANIZED HEALTH CARE EDUCATION/TRAINING PROGRAM

## 2018-03-19 PROCEDURE — 85025 COMPLETE CBC W/AUTO DIFF WBC: CPT | Performed by: PHYSICAL MEDICINE & REHABILITATION

## 2018-03-19 PROCEDURE — 40000133 ZZH STATISTIC OT WARD VISIT: Performed by: STUDENT IN AN ORGANIZED HEALTH CARE EDUCATION/TRAINING PROGRAM

## 2018-03-19 PROCEDURE — 97167 OT EVAL HIGH COMPLEX 60 MIN: CPT | Mod: GO | Performed by: STUDENT IN AN ORGANIZED HEALTH CARE EDUCATION/TRAINING PROGRAM

## 2018-03-19 PROCEDURE — 97530 THERAPEUTIC ACTIVITIES: CPT | Mod: GP

## 2018-03-19 PROCEDURE — 97535 SELF CARE MNGMENT TRAINING: CPT | Mod: GO | Performed by: STUDENT IN AN ORGANIZED HEALTH CARE EDUCATION/TRAINING PROGRAM

## 2018-03-19 PROCEDURE — 36415 COLL VENOUS BLD VENIPUNCTURE: CPT | Performed by: PHYSICAL MEDICINE & REHABILITATION

## 2018-03-19 PROCEDURE — 80048 BASIC METABOLIC PNL TOTAL CA: CPT | Performed by: PHYSICAL MEDICINE & REHABILITATION

## 2018-03-19 PROCEDURE — 97110 THERAPEUTIC EXERCISES: CPT | Mod: GP

## 2018-03-19 PROCEDURE — 84484 ASSAY OF TROPONIN QUANT: CPT | Performed by: PHYSICAL MEDICINE & REHABILITATION

## 2018-03-19 PROCEDURE — 97116 GAIT TRAINING THERAPY: CPT | Mod: GP

## 2018-03-19 PROCEDURE — 97750 PHYSICAL PERFORMANCE TEST: CPT | Mod: GP

## 2018-03-19 PROCEDURE — 12800006 ZZH R&B REHAB

## 2018-03-19 PROCEDURE — 83605 ASSAY OF LACTIC ACID: CPT | Performed by: PHYSICAL MEDICINE & REHABILITATION

## 2018-03-19 RX ORDER — ACETAMINOPHEN 325 MG/1
975 TABLET ORAL EVERY 8 HOURS PRN
Status: DISCONTINUED | OUTPATIENT
Start: 2018-03-19 | End: 2018-03-28 | Stop reason: HOSPADM

## 2018-03-19 RX ADMIN — NICOTINE 1 PATCH: 7 PATCH, EXTENDED RELEASE TRANSDERMAL at 09:34

## 2018-03-19 RX ADMIN — CYCLOBENZAPRINE HYDROCHLORIDE 10 MG: 5 TABLET, FILM COATED ORAL at 22:50

## 2018-03-19 RX ADMIN — CYCLOBENZAPRINE HYDROCHLORIDE 10 MG: 5 TABLET, FILM COATED ORAL at 19:09

## 2018-03-19 RX ADMIN — OXYCODONE HYDROCHLORIDE 10 MG: 5 TABLET ORAL at 03:02

## 2018-03-19 RX ADMIN — OXYCODONE HYDROCHLORIDE 10 MG: 5 TABLET ORAL at 07:57

## 2018-03-19 RX ADMIN — GABAPENTIN 300 MG: 300 CAPSULE ORAL at 07:56

## 2018-03-19 RX ADMIN — OXYCODONE HYDROCHLORIDE 5 MG: 5 TABLET ORAL at 14:55

## 2018-03-19 RX ADMIN — SENNOSIDES AND DOCUSATE SODIUM 2 TABLET: 8.6; 5 TABLET ORAL at 19:10

## 2018-03-19 RX ADMIN — POLYETHYLENE GLYCOL 3350 17 G: 17 POWDER, FOR SOLUTION ORAL at 07:58

## 2018-03-19 RX ADMIN — PANTOPRAZOLE SODIUM 20 MG: 20 TABLET, DELAYED RELEASE ORAL at 07:58

## 2018-03-19 RX ADMIN — OXYCODONE HYDROCHLORIDE 5 MG: 5 TABLET ORAL at 22:50

## 2018-03-19 RX ADMIN — OXYCODONE HYDROCHLORIDE 10 MG: 5 TABLET ORAL at 19:09

## 2018-03-19 RX ADMIN — QUETIAPINE FUMARATE 25 MG: 25 TABLET ORAL at 22:52

## 2018-03-19 RX ADMIN — GABAPENTIN 300 MG: 300 CAPSULE ORAL at 13:56

## 2018-03-19 RX ADMIN — CYCLOBENZAPRINE HYDROCHLORIDE 10 MG: 5 TABLET, FILM COATED ORAL at 09:35

## 2018-03-19 RX ADMIN — LISINOPRIL 5 MG: 5 TABLET ORAL at 07:57

## 2018-03-19 RX ADMIN — GABAPENTIN 300 MG: 300 CAPSULE ORAL at 19:09

## 2018-03-19 NOTE — PLAN OF CARE
Problem: Goal/Outcome  Goal: Goal Outcome Summary  FOCUS/GOAL  Pain management    ASSESSMENT, INTERVENTIONS AND CONTINUING PLAN FOR GOAL:  Patient is alert and oriented and able to make her needs known. Patient did ambulate to the bathroom once this shift, requiring assist of one and a walker. Patient has Aspen collar on at all times, which is uncomfortable for her. Patient requested PRN Oxycodone around 0300 this morning for pain to neck, which was effective for her. Patient has been sleeping without difficulty during overnight rounds. Patient stated that she did not want to take scheduled Tylenol because it causes her to experience upset stomach. Scheduled dose not administered this morning.

## 2018-03-19 NOTE — PLAN OF CARE
"Problem: Patient Care Overview  Goal: Plan of Care/Patient Progress Review  FOCUS/GOAL  Bowel management, Medical management, Reinforcement of self-care/ADL and Psychosocial needs    ASSESSMENT, INTERVENTIONS AND CONTINUING PLAN FOR GOAL:  Patient is alert/oriented x4, this am patient had walked to door of room without assist and was very anxious and diaphoretic.  Charge nurse assisted patient back into room and vitals were taken at that time, patient had elevated HR and RR so charge nurse paged MD and writer was able to stay with patient.  Patient was also having significant pain at this time and received  PRN Oxy 10mg with scheduled medications.  Patient was able to calm down prior to and especially after the MD came in to see her, labs were collected and also flagged for Lactic acid protocol.  Troponin levels were WNL and lactic acid was as well, patient reported that \"I think it was mostly me getting myself worked up but also that pain felt different and it was so hot in here!\" Psych consult was placed for patient and will follow up on anxiety and depression as indicated, patient was tearful later and is aware of her own tendency to have anxiety.  Vitals are stable but still tachycardia noted, continue to monitor.  Patient reported that had bm today and did not want to take any of the scheduled bowel meds so those were held.  Patient has declined PRN pain medications the rest of this shift, requested to get Flexaril after last therapy of the day, no additional care concerns at this time, continue with POC.          "

## 2018-03-19 NOTE — PLAN OF CARE
Problem: Patient Care Overview  Goal: Plan of Care/Patient Progress Review  Pt scored 13/24 on DGI, but results are impacted by cervical precautions (gait with head turns). Pt would benefit from higher level dynamic standing balance activities, B glute med strengthening due to presence of compensated R Trendelenburg and uncompensated L Trendelenburg.

## 2018-03-19 NOTE — PLAN OF CARE
Problem: Goal/Outcome  Goal: Goal Outcome Summary  FOCUS/GOAL  Bowel management, Pain management, Equipment/Assistive devices and Psychosocial needs    ASSESSMENT, INTERVENTIONS AND CONTINUING PLAN FOR GOAL:  Patient is alert and oriented x 4 and is able to make needs known by using call light and verbalizing needs.  Patient tearful throughout shift.  Stating frustrations with members of support system and situation.  1:1 given and emotional support provided.  Rowe at facility today and visited with patient, patient stated this was helpful.  Patient skin check completed, some small bruises and scabs on extremities stated by patient from fall.  Surgical incision to right anterior neck, edges approximated and closed with liquid bandage.  Aspen collar on at all times.  Patient had c/o tingling to left thumb and right thumb and pointer finger.  Patient has continuous pain to upper back and neck.  Analgesics partially effective.  PRN Oxy given with helpful results.  Patient having abdominal discomfort, complaining of constipation.  Writer administered scheduled bowel medications and also gave PRN Dulcolax suppository with small results and large amount of flatus passed.  Patient has hemorrhoids with bright red blood on toilet paper after small BM.  Patient verbalized that hemorrhoids are old and is wearing a pad in underwear to protect clothing.  Writer educated patient on importance of calling for assistance when getting out of bed to use bathroom.  Patient verbalized understanding and will use call light to request help.  Patient has generalized weakness and is able to perform transfers and bed mobility with CGA from writer and supervision for safety using gait belt and walker when appropriate.  Patient also stated difficulty with sleeping and requested Flexeril and Seroquel at HS, adminstered at 22:44.  Results pending.

## 2018-03-19 NOTE — PROGRESS NOTES
"CLINICAL NUTRITION SERVICES - ASSESSMENT NOTE     Nutrition Prescription    RECOMMENDATIONS FOR MDs/PROVIDERS TO ORDER:  -Monitor diet tolerance and potential need for SLP evaluation per MD discretion.      Malnutrition Status:    Non-severe malnutrition in the context of acute illness.      Recommendations already ordered by Registered Dietitian (RD):  -Ordered Boost Plus (chocolate) bid between meals at 10 am and HS daily per pt agreement. Offered to send a cup of ice along with supplement, but pt declined stating she would ask for this as needed.  -Ordered weight recheck.  -Entered Room Service Appropriate order    Future/Additional Recommendations:  -Encourage intakes of tid meals including protein sources plus supplements between.  Pt prefers Boost Plus cold or over ice.      -Monitor po intakes and weight trends.  -Consider resume as able vitamin/mineral supplementation per [facility] GB guidelines.     REASON FOR ASSESSMENT  Antoinette Romero is a/an 57 year old female assessed by the dietitian for Admission Nutrition Risk Screen for unintentional loss of 10# or more in the past two months and MD consult for:  \"Need assessment for diet rich in protein for healing. and possible need for supplements\"    NUTRITION HISTORY  Per chart review, pt was seen by hospital RD for LOS on 3/17/18 with no nutrition dx:    \"Patient reports that she does not eat much at baseline.  \"This is normal for me.\"  Denies any decreased appetite.  Used to take oral nutritional supplements at home - \"not anymore\".      Per RD note:  \"Was able to consume 50% of a sandwich with chocolate milk for lunch.  Willing to do some Ensure supplements - \"then I don't have to do that stupid Greek yogurt for breakfast\".  Ensure Plus ordered bid between meals per pt agreement.    Per chart, hx morbid obesity with bypass surgery.  Pt was seen by RD 6/27/13, noted to be 3 months s/p R[facility] GB.  Pt was taking the following vitamins/minerals: MVI/minerals BID, 500 mg " "Calcium Citrate + Vitamin D TID, Vitamin B12.    Pt today reports has had difficulty complying with recommended gastric bypass follow ups due to insurance issues with 's frequent job changes.  She states has not been taking recommended vitamin/mineral supplements.  Per pt was not drinking the Ensure Plus supplements at the hospital due to dislikes these warm.      CURRENT NUTRITION ORDERS  Diet: Regular with Thin Liquids  No Room Service order    Intake/Tolerance: Pt just admitted-po intake 50% supper last evening.  Per pt she has been eating more here than usually at home, but unable to eat large amounts due to hx gastric bypass.  She reports some difficulty swallowing tougher foods such as meats and larger pills.  She states softer foods go down ok.  Pt questions accuracy of weight on ARU admission and would like this rechecked tomorrow am.      LABS  Labs reviewed  Checking Vitamin B12 and Folate levels per chart review.    MEDICATIONS  Medications reviewed    ANTHROPOMETRICS  Height: 172.7 cm (5' 8\")  Most Recent Weight: 55.8 kg (123 lb)-standing scale  IBW: 140 lbs  % IBW:  88%  BMI: Normal BMI  (but 18.74 kg/m2 is low end of range)  Weight History:   No weight obtained during hospitalization. Per hx below, ARU admission weight appears decreased 8 lbs or 6% x 10 days. Per previous RD notes, pt reported stable weight for past year PTA.  Pre-gastric bypass weight 291 lbs (~3/2013).  Wt Readings from Last 10 Encounters:   03/18/18 55.8 kg (123 lb)   03/08/18 59.4 kg (131 lb)   08/17/17 58.7 kg (129 lb 6.4 oz)   01/03/17 57.4 kg (126 lb 9.6 oz)   06/17/15 61.6 kg (135 lb 11.2 oz)   05/03/15 60.1 kg (132 lb 9.6 oz)   04/24/15 61.2 kg (135 lb)   04/23/15 66.2 kg (146 lb)   02/26/15 66.2 kg (146 lb)   01/17/15 65.4 kg (144 lb 3.2 oz)       Dosing Weight: 56 kg (current weight)    ASSESSED NUTRITION NEEDS  Estimated Energy Needs: 4695-4169 kcals/day (30 - 35 kcals/kg )  Justification: repletion  Estimated " Protein Needs: 67-84 grams protein/day (1.2-1.5 grams of pro/kg)  Justification: repletion  Estimated Fluid Needs: (1 mL/kcal)   Justification: Per provider pending fluid status    PHYSICAL FINDINGS  See malnutrition section below.  Small BM noted 3/18 (last 5 days ago per chart)  No edema  Poor muscle bulk  Per H&P pt reported had some issues with swallowing harder bites of food or larger bolus since surgery, but no issues with softer foods.    MALNUTRITION  % Intake: < 75% for > 7 days (non-severe)  % Weight Loss: > 2% in 1 week (severe)  Subcutaneous Fat Loss: Upper arm:  Moderate (unable to view lower extremities)  Muscle Loss: Upper arm:  Moderate (unable to view lower extremities)  Fluid Accumulation/Edema: None noted  Malnutrition Diagnosis: Non-severe malnutrition in the context of acute illness.      NUTRITION DIAGNOSIS  Inadequate oral intakes related to swallowing difficulty post surgery and early satiety with hx gastric bypass  as evidenced by pt report of requiring softer foods and able to eat only small amounts at meals with possible 6% weight loss over past 10 days.        INTERVENTIONS  Implementation  Nutrition Education: Encouraged intakes of tid meals including protein sources.  Discussed softer, protein options such as cottage cheese, yogurt, casseroles.  Gave protein list for Room Service and reviewed goals.  Discussed supplement options and ordered Ensure Plus chocolate at 10 am and 8 pm per pt agreement.      Ordered weight recheck.      Goals  1. Patient to consume % of nutritionally adequate meal trays TID, or the equivalent with supplements/snacks.  2. No further weight loss beyond 56 kg (current weight).     Monitoring/Evaluation  Progress toward goals will be monitored and evaluated per protocol.    Esme Simmons RD, LD

## 2018-03-19 NOTE — PROGRESS NOTES
03/19/18 1400   Signing Clinician's Name / Credentials   Signing clinician's name / credentials REGINA Junior   Dynamic Gait Index (Shaggy and Reynoso Jason, 1995)   Gait Level Surface 2   Change in Gait Speed 2   Gait and Horizontal Head Turns 0  (d/t cervical precautions - unable to turn head)   Gait with Vertical Head Turns 0  (d/t cervical precautions - unable to turn head)   Gait and Pivot Turns 1   Step Over Obstacle 3   Step Around Obstacles 3   Steps 2   Total Dynamic Gait Index Score  (A score of 19 or less has been correlated to an increased risk of falls in community dwelling older adults, patients with vestibular disorders, and patients with MS.)   Total Score (out of 24) 13     Dynamic Gait Index (DGI):The DGI is a measure of balance during gait that is reliable and valid for the elderly and individuals with Parkinson's disease, MS, vestibular disorders, or s/p stroke. Gait assistive device used: FWW     Patient score: 13/24  Scores ?19/24 indicate an increased risk for falls according to Radha et al 2000  Minimal Detectable Change = 2.9 in community dwelling elderly according to Suleiman et al 2011    Assessment (rationale for performing, application to patient s function & care plan): requires FWW to amb safely with assist; however, score also impacted by cervical precautions (unable to turn head).    Minutes billed as physical performance test: 20

## 2018-03-19 NOTE — PROGRESS NOTES
" 03/19/18 0818   Quick Adds   Type of Visit Initial PT Evaluation   Living Environment   Lives With spouse   Living Arrangements mobile home   Home Accessibility stairs to enter home   Number of Stairs to Enter Home 5   Number of Stairs Within Home 0   Stair Railings at Home outside, present at both sides   Transportation Available car;family or friend will provide   Living Environment Comment Mobile home with 2 \"wobbly\" railings to enter. Cares for 19 month old grandson ~4x/week.    Self-Care   Dominant Hand right   Usual Activity Tolerance good   Regular Exercise no   Equipment Currently Used at Home none   Activity/Exercise/Self-Care Comment Indep with self cares   Functional Level Prior   Ambulation 0-->independent   Transferring 0-->independent   Toileting 0-->independent   Bathing 0-->independent   Dressing 0-->independent   Eating 0-->independent   Communication 0-->understands/communicates without difficulty   Swallowing 0-->swallows foods/liquids without difficulty   Cognition 0 - no cognition issues reported   Fall history within last six months yes   Number of times patient has fallen within last six months 4   Which of the above functional risks had a recent onset or change? ambulation;transferring   Prior Functional Level Comment Indep with functional mobility. 2 falls as result of tripping over rug.    General Information   Onset of Illness/Injury or Date of Surgery - Date 03/18/18   Referring Physician Irasema Santana MD   Patient/Family Goals Statement \"to be able to walk normally again\"    Pertinent History of Current Problem (include personal factors and/or comorbidities that impact the POC) Antoinette Romero is a 57 year old right hand dominant female with past medical history of tobacco use, migraines, morbid obesity with previous bypass surgery, and migraines, and chronic worsening hip and leg pain from OA,  who had a fall on 3/9/18 evening which resulted in Spinal cord injury.  She had C3-T1 ACDF " on 3/11/2018, and was admitted to ARU on 3/18/18   Precautions/Limitations spinal precautions;fall precautions   Weight-Bearing Status - LUE other (see comments)  (no lifting >10#)   Weight-Bearing Status - RUE other (see comments)  (no lifting >10#)   General Observations Lamonte rivera donyana   Cognitive Status Examination   Orientation orientation to person, place and time   Level of Consciousness alert   Follows Commands and Answers Questions 100% of the time;able to follow multistep instructions   Personal Safety and Judgment intact   Memory intact   Pain Assessment   Patient Currently in Pain No   Integumentary/Edema   Integumentary/Edema no deficits were identifed   Posture    Posture Not impaired   Range of Motion (ROM)   ROM Comment BLE WFL    MMT: Hip, Rehab Eval   Hip Flexion - Left Side (3-/5) fair minus, left   Hip ABduction - Left Side (L hip drop in SLS, not formally tested)   Hip Flexion - Right Side (4-/5) good minus, right   MMT: Knee, Rehab Eval   Knee Flexion - Left Side (4/5) good, left   Knee Extension - Left Side (4/5) good, left   Knee Flexion - Right Side (5/5) normal,right   Knee Extension - Right Side (5/5) normal,right   MMT: Ankle, Rehab Eval   Ankle Dorsiflexion - Left Side (4/5) good, left   Ankle Plantarflexion - Left Side (4/5) good, left   Ankle Dorsiflexion - Right Side 5/5) normal,left   Ankle Plantarflexion - Right Side (4/5) good, left   Bed Mobility   Bed Mobility Comments sup <> sit SBA bed rail    Transfer Skills   Transfer Comments Sit <> stand SBA   Gait   Gait Comments CGA with  feet, CGA without AD 50 feet   Balance   Balance Comments Standing static balance good; dynamic balance fair   Sensory Examination   Sensory Perception no deficits were identified   Sensory Perception Comments Pt denies numbness/tingling throughout BLE   Coordination   Coordination Comments Proprioception intact bilaterally. Rozina intact B UE and B LE    Muscle Tone   Muscle Tone no deficits  were identified   Modality Interventions   Planned Modality Interventions Cryotherapy;Electrical Stimulation/Russion Stimulation   General Therapy Interventions   Planned Therapy Interventions balance training;bed mobility training;gait training;neuromuscular re-education;strengthening;transfer training;home program guidelines;risk factor education;progressive activity/exercise   Clinical Impression   Criteria for Skilled Therapeutic Intervention yes, treatment indicated   PT Diagnosis Impaired functional mobility   Influenced by the following impairments Weakness, decreased tolerance to activity, impaired balance   Functional limitations due to impairments Decreased indep with functional mobility    Clinical Presentation Stable/Uncomplicated   Clinical Presentation Rationale Uncomplicated surgery, significant PMH affecting PT POC   Clinical Decision Making (Complexity) Low complexity   Therapy Frequency` daily   Predicted Duration of Therapy Intervention (days/wks) 1 week   Anticipated Equipment Needs at Discharge front wheeled walker   Anticipated Discharge Disposition Home with Assist;Home with Home Therapy;Home with Outpatient Therapy  (TBD)   Risk & Benefits of therapy have been explained Yes   Patient, Family & other staff in agreement with plan of care Yes   Clinical Impression Comments Patient is presenting with L LE weakness, impaired balance, impaired neuromuscular control of L LE and overall decreased tolerance to activity. Patient would benefit from skilled IP PT to address these deficits and to increase indep and safety with functional mobility.    Total Evaluation Time   Total Evaluation Time (Minutes) 20

## 2018-03-19 NOTE — PROGRESS NOTES
"Patient seen and examined   Coordinated with psychology Dr Arenas.   Following a long discussion today, patient is open to seeing both Psychology and Psychiatry.   She reports a long standing history of addiction to cocaine and smoking   She has a history of severe anxiety, but was not taking either ativan or klonopin. She is currently on Seroquel. Continues  to be opposed to antidepressants.   Will consult psychiatry  Anxiety about co payments and other finances.     She is s/p GB surgery. We will check Vitamin B12 and folate. Additional alcohol dependence history may contribute.   Gabapentin helpful for pain, continue at current dose.   Some higher PVR's, suspect neurogenic bladder. Allow scheduled voids and double voiding. UA negative in the recent past.     On examination  Vital signs:  Temp: 96.3  F (35.7  C) Temp src: Oral BP: 104/67 Pulse: 130   Resp: 20 SpO2: 98 % O2 Device: None (Room air)   Height: 172.7 cm (5' 8\") Weight: 55.8 kg (123 lb)  Estimated body mass index is 18.7 kg/(m^2) as calculated from the following:    Height as of this encounter: 1.727 m (5' 8\").    Weight as of this encounter: 55.8 kg (123 lb).    She is awake and alert  Anxious   Poor muscle bulk.   Emotionally labile  LUE has antigravity strength but incoordinated   LLE with antigravity strength   RLE/UE 4/5     Current Facility-Administered Medications   Medication     acetaminophen (TYLENOL) tablet 975 mg     benzocaine-menthol (CHLORASEPTIC) 6-10 MG lozenge 1 lozenge     cyclobenzaprine (FLEXERIL) tablet 10 mg     lisinopril (PRINIVIL/ZESTRIL) tablet 5 mg     nicotine (NICODERM CQ) 7 MG/24HR 24 hr patch 1 patch     oxyCODONE IR (ROXICODONE) tablet 5-10 mg     pantoprazole (PROTONIX) EC tablet 20 mg     QUEtiapine (SEROquel) tablet 25 mg     gabapentin (NEURONTIN) capsule 300 mg     senna-docusate (SENOKOT-S;PERICOLACE) 8.6-50 MG per tablet 2 tablet     polyethylene glycol (MIRALAX/GLYCOLAX) Packet 17 g     nicotine patch REMOVAL "     nicotine Patch in Place     naloxone (NARCAN) injection 0.1-0.4 mg     bisacodyl (DULCOLAX) Suppository 10 mg     Also see separate note by Dr Crawford for details.     Total of 25 min  Spent in this encounter which includes counseling and coordination on the floor.

## 2018-03-19 NOTE — PLAN OF CARE
Problem: Patient Care Overview  Goal: Plan of Care/Patient Progress Review   Occupational Therapy Discharge Summary    Reason for therapy discharge:    Discharged to acute rehabilitation facility.    Progress towards therapy goal(s). See goals on Care Plan in [facility] record for goal details.  Goals partially met.  Barriers to achieving goals:   discharge from facility.    Therapy recommendation(s)continued OT recommended in ARU setting to address underlying deficits affecting baseline ADL function  Continued therapy is recommended.  Rationale/Recommendations:  continued OT recommended in ARU setting to address underlying deficits affecting baseline ADL function.

## 2018-03-19 NOTE — PROVIDER NOTIFICATION
Social Work: Initial Assessment with Discharge Plan    Patient Name: Antoinette Romero  : 1960  Age: 57 year old  MRN: [id number]  Completed assessment with: patient  Admitted to ARU: 3/18/17    Presenting Information   Date of SW assessment: 2018  Health Care Directive: Patient considering completing  Primary Health Care Agent: default to next of kin  Secondary Health Care Agent: NA  Living Situation: resides with her  and 20 yr old son  Previous Functional Status: previously independent of cares  DME available: see therapy notes  Patient and family understanding of hospitalization: Yes  Cultural/Language/Spiritual Considerations: English speaking      Physical Health  Reason for admission: SCI - Traumatic    Provider Information   Primary Care Physician:Umu Roberto MD  : none    Mental Health/Chemical Dependency:   Diagnosis: denied  Alcohol/Tobacco/Narcotis: history of tobacco use  Support/Services in Place: none  Services Needed/Recommended: none  Sexuality/Intimacy: denied concerns    Support System  Marital Status: ,  [place]  Family support: pt has two adult children  Other support available: none    Community Resources  Current in home services: none  Previous services: none    Financial/Employment/Education  Employment Status: not working  Income Source:  wages  Education: unknown  Financial Concerns:  denied  Insurance: [employer]      Discharge Plan   Patient and family discharge goal: Goal to return home with as independent as possible.   Provided Education on discharge plan: Yes  Patient agreeable to discharge plan:  Yes  Provided education and attained signature for Medicare IM and IRF Patient Rights and Privacy Information provided to patient : No  Provided patient with [place] [facility] [place] Resources: No  Barriers to discharge: Medically stable and progress with therapies    Discharge Recommendations   Disposition: Goal to return  home with continued support from family  Transportation Needs: family will provide  Name of Transportation Company and Phone: NA    Additional comments   Pt is a 57 yr old female admitted for an acute rehab stay following a SCI - traumatic following a fall. Pt resides in [place] with her , Preston and 20 yr old son. Pt reported that her  and son wouldn't be of any assistance upon returning home. Pt shared that she is unhappy in her marriage, however unable to leave due finances. Talked further with pt about options and her concerns in relation to her son and his behaviors, most of which is was aware of. Sw will continue to assist as needed.     Please invite to Care Conference:  Meggan (daughter) - [phone]      PRESTON Marie, [facility]   Phone: [phone]  Pager: [phone]         03/19/18 1549   Living Arrangements   Lives With spouse   Living Arrangements mobile home   Able to Return to Prior Living Arrangements yes   Home Safety   Patient Feels Safe Living in Home? yes   Discharge Planning   Anticipated Discharge Disposition home   Discharge Needs Assessment   Patient/family verbalizes understanding of discharge plan recommendations? Yes   Medical Team notified of plan? yes   Readmission Within The Last 30 Days no previous admission in last 30 days   Equipment Currently Used at Home none   Transportation Available family or friend will provide

## 2018-03-19 NOTE — PLAN OF CARE
"Problem: Patient Care Overview  Goal: Plan of Care/Patient Progress Review  PT evaluation complete. Patient moving very well despite weakness and poor endurance especially affecting L UE and L LE. Patient ambulates using FWW  feet x2, able to negotiate 12 stairs with B HR and CGA (limited by L LE fatigue and buckling). Progressed to ambulating short distances without using walker, although recommend continued use of walker at this time d/t fatigue; pt demos shuffling gait when fatigued.     ELOS: 1 week to meet goals. May benefit from FWW versus cane upon discharge home. Recommended to patient to have family reinforce stair railings at home as patient describes them as \"wobbly\". Patient would likely benefit from OP PT at discharge.       "

## 2018-03-19 NOTE — PROGRESS NOTES
" 03/19/18 1025   Quick Adds   Type of Visit Initial Occupational Therapy Evaluation   Living Environment   Lives With spouse   Living Arrangements mobile home   Number of Stairs to Enter Home 5   Number of Stairs Within Home 0   Transportation Available car;family or friend will provide   Living Environment Comment Pt lives in mobile home, has a walk in shower, No DME. Son lives there as well. Family is persent but works and is not very helpful with chores.  works at a new job. Son neely snot come out of his room much.    Self-Care   Dominant Hand right   Usual Activity Tolerance good   Equipment Currently Used at Home none   Activity/Exercise/Self-Care Comment PLOF was I with self cares. Pt cleans house daily, she takes care of her 20 mon old grandson, drives, does yard work. Pt reports she does not sit still. Pt reports her  is not very helpful.    Functional Level Prior   Ambulation 0-->independent   Transferring 0-->independent   Toileting 0-->independent   Bathing 0-->independent   Dressing 0-->independent   Eating 0-->independent   Communication 0-->understands/communicates without difficulty   Swallowing 0-->swallows foods/liquids without difficulty   Cognition 0 - no cognition issues reported   Fall history within last six months yes   Number of times patient has fallen within last six months 4   Prior Functional Level Comment PLOF was I with self cares, pt is active in her home   General Information   Onset of Illness/Injury or Date of Surgery - Date 03/18/18   Referring Physician Dr Santana   Patient/Family Goals Statement to be able to walk on her own and use her hands better   Additional Occupational Profile Info/Pertinent History of Current Problem Per chart \"had a fall on 3/9/18 evening which resulted in Spinal cord injury.  She had C3-T1 ACDF on 3/11/2018, and was admitted to ARU on 3/18/18\"   Precautions/Limitations fall precautions;spinal precautions   Weight-Bearing Status - JW (10# " limit)   Weight-Bearing Status - RUE (10# limit)   General Observations Pt up in her room without assist. Educated her that she still needs to call for help and wait for staff to assist.    Cognitive Status Examination   Orientation orientation to person, place and time   Level of Consciousness alert   Able to Follow Commands WNL/WFL   Memory impaired   Cognitive Comment Pt reports her mememory is poor since her accident, pt uses list to keep track of information. Pt reports she has been getting disoriented at night. will continue to monitor   Visual Perception   Visual Perception Comments Pt wears glasses and neely snot have them here. Pt repoirts that is seems worse now.    Sensory Examination   Sensory Comments Pt has numbness in thumb and index finger on L side and in index finger on right side.    Pain Assessment   Patient Currently in Pain Yes, see Vital Sign flowsheet  (4/10 in neck ad back)   Range of Motion (ROM)   ROM Comment ROM WNL   Strength   Strength Comments LUE deficits greater than RUE. Pt limited in strength 3-/5 for shoulder, 3+/4 in bicep/tricep, 3+/5 in R wrist, 3-/5 in L wrist. Strength is estimated due to spinal precautions limiting MMT.    Hand Strength   Left hand  (pounds) 0 pounds   Right hand  (pounds) 30 pounds   Hand Strength Comments fair  in R hand, poor  in L hand   Coordination   Left hand, nine hole peg test (seconds) 120   Right hand, nine hole peg test (seconds) 30   Left hand, Box and Block test (cubes transferred in 1 minute) 30   Right hand, Box and Block test (cubes transerred in 1 minute) 56   Coordination Comments Deficits in LUE quality of movement and strength. Pt unable to hold and manipulate items with her L hand.    ARC Assessment Only   Acute Rehab FIM See FIM scores for Mobility/ADL Assessment   Instrumental Activities of Daily Living (IADL)   Previous Responsibilities meal prep;laundry;housekeeping;shopping;medication management;driving;    IADL Comments takes care of grandson who is very young   Activities of Daily Living Analysis   Impairments Contributing to Impaired Activities of Daily Living balance impaired;cognition impaired;coordination impaired;fear and anxiety;pain;post surgical precautions;ROM decreased;sensation decreased;strength decreased   General Therapy Interventions   Planned Therapy Interventions ADL retraining;IADL retraining;cognition;fine motor coordination training;strengthening;transfer training;progressive activity/exercise;home program guidelines   Clinical Impression   Criteria for Skilled Therapeutic Interventions Met yes, treatment indicated   OT Diagnosis ADl and functional mobility and IADL deficits   Influenced by the following impairments balance, strength, sensation, cooridnation, cognition, pain, post op precautions, anxiety   Assessment of Occupational Performance 5 or more Performance Deficits   Identified Performance Deficits Deficits impacting all aspects of activity and self care.    Clinical Decision Making (Complexity) High complexity   Therapy Frequency daily   Predicted Duration of Therapy Intervention (days/wks) 7 days   Anticipated Equipment Needs at Discharge (TBD)   Anticipated Discharge Disposition Home with Assist;Home with Outpatient Therapy   Risks and Benefits of Treatment have been explained. Yes   Patient, Family & other staff in agreement with plan of care Yes   Total Evaluation Time   Total Evaluation Time (Minutes) 30

## 2018-03-19 NOTE — PROGRESS NOTES
"  [facility]   Acute Rehabilitation Unit  Daily progress note    interval history  Antoinette Romero was seen and examined at bedside.     No acute events overnight, slept pretty well with pain controlled for the most part.  Gabapentin seems to be helping more but she does report it makes her a little sleepy.  Early this am she had an episode where she was very diaphoretic, tachycardic and had some global chest tightness, she was very anxious and by the time I went to her room, she was already feeling much better and her anxiety was down, the nurse was with her and she reported that she had no further symptoms.  We did run labs which were negative for any cardiac issues or infection.  She admitted that she has been having a lot of anxiety this am and that she felt overwhelmed with everything for that short period of time this am.     We had a long talk this am about anxiety and pain, she is open to seeing psychiatry as well as our inpatient psychologist.  We also discussed her pain being neuropathic in nature and agree that the gabapentin is starting to work better.  At present, she denies f,c,n,v, chest pain, SOB, or ABD pain but does have a mild HA.  No other new symptoms this am.       Functionally, Initial assessments to take place today and will know more with results of those.          medications  Scheduled meds    lisinopril  5 mg Oral Daily     nicotine  1 patch Transdermal Q24H     pantoprazole  20 mg Oral Daily     gabapentin  300 mg Oral TID     senna-docusate  2 tablet Oral BID     polyethylene glycol  17 g Oral Daily     nicotine   Transdermal Daily     nicotine   Transdermal Q8H       PRN meds:  acetaminophen, benzocaine-menthol, cyclobenzaprine, oxyCODONE IR, QUEtiapine, naloxone, bisacodyl      physical exam  /67 (BP Location: Right arm)  Pulse 130  Temp 96.3  F (35.7  C) (Oral)  Resp 20  Ht 1.727 m (5' 8\")  Wt 55.8 kg (123 lb)  LMP 03/01/2010  SpO2 " 98%  BMI 18.7 kg/m2  Gen: pleasant, tearful, anxious, but comfortable, and currently not in acute distress  HEENT: NC/AT, has Aspen collar on, tolerating ok  Cardio: Tachycardic, no murmurs, normal S1S2  Pulm: CTAB, no crackles or wheeze  Abd: soft, NT/ND, BS+,   Ext: no edema, no tenderness in calves  Neuro/MSK: Has some global weakness with more prevalence in the UE's and in the L>R.  But his is improving from injury date.  She has some loss of sensation in LUE, especially in the forearm and hand.  She is able to ambulate and is alert and oriented and able to make needs known but has significant anxiety./     labs  Reviewed this am, trops negative lactic acid negative,     assessment and plan  Antoinette Romero is a 57 year old right hand dominant female with past medical history of tobacco use, migraines, morbid obesity with previous bypass surgery, and migraines, and chronic worsening hip and leg pain from OA,  who had a fall on 3/9/18 evening which resulted in Spinal cord injury.  She had C3-T1 ACDF on 3/11/2018, and was admitted to ARU on 3/18/18     Admission to acute inpatient rehab Cervical Spinal Cord Injury.       Changes to care plan: 3/19  - No medication changes, started gabapentin 300 TID on admission, helping with neuropathic pain  - Had anxiety attack this am, psychology to see patient today,   - Psychiatry consult placed today.      1. PT, and OT for 90 minutes of each on a daily basis, in addition to rehab nursing and close management of physiatrist.       2. Impairment of ADL's: daily OT for 90 minutes daily to work on ADL re-training such as grooming, self cares and bathing, using UE's as she ahs a cervical spinal cord injury with limited use of UE's with L> R deficits      3. Impairment of mobility:  Daily PT for 90 minutes daily to work on neuromuscular re-education focusing on strength, balance, coordination, and endurance.  To enhance patients ability to ambulate as she has decreased strength  in LE's bilaterally L>R     4. Rehab RN for med administration, bowel regimen, glucose monitoring and wound care.       5. Medical Conditions  # Traumatic cervical spine injury with resulting C3-T1 severe stenosis with spinal cord compression and myelomalacia, C5-6 ruptured anterior longitudinal ligament and disk with a anterior osteophyte fracture and C7-T1 anterior subluxation with severe bilateral foraminal stenosis - s/p C3-T1 ACDF on 3/11/2018.  * Pt with h/o EtOH and multiple falls recently. The patient woke up am 3/10 and had difficulty with walking and left hand and left foot weakness; had pain in neck, hip and hand after consuming some alcohol, going to bed and having a fall when arising to use the restroom overnight.  She presented to [facility] 3/10 and x-rays of hand, hip, tib-fib were negative. CT abdomen and pelvis 3/10 no acute findings.   - MRI Cervical spine on 3/10/18 revealed spinal cord contusion at C5-C6 without hemorrhage; disruption of ligaments at C5-C6 anteriorly and posteriorly with fluid in the facet joints and in the disc space suggesting motion at this level; fairly extensive edema, but no discrete hematoma in the paraspinous musculature more on the right than on the left and in the prevertebral space; extensive degenerative changes. Pt transferred to [facility] 3/10.  * Seen by Neurosurgery and noted with LUE > RUE weakness with loss of left hand function as well as BLE weakness L > R. Pt underwent C3-T1 ACDF on 3/11/2018. She has progressed well with therapies.  - pain management  - scheduled Tylenol 975 mg po TID and prn, scheduled Flexeril 10 mg po TID, Roxicodone 5-10 mg po q4h prn  - keep neck collar in place until follow-up   - F/u Neurosurgery - When?      # Pain: neuropathic in nature as she has numbness tingling, fire type lightning bolt pain.  - Started gabapentin 300 mg TID on admission.   - Cont. Oxycodone 5-10 mg Q4H with plan to wean off of this medication  - Cont. Tylenol      #  "Alcohol use/abuse/dependence with EtOH w/d.  Improved. CIWA d/c'ed 3/14. Pt said that she was \"done\" with alcohol given above issues.  - will continue to monitor here on ARU   - WIll ask Dr. Paulino to see her as well.       # Confusion/encephalopathy (suspect toxic).  Pt confused 3/12. Had been getting PRN IV lorazepam for EtOH w/o as well as PRN IV Dilaudid for pain. Improved overall starting on 3/13-0 now back to baseline.  - Will cont. To monitor for any cognitive decline.       # Elevated BP's, suspect related pain, anxiety and underlying essential HTN. - resolving  - she has h/o HTN and has been on lisinopril in the past   - started lisinopril 10 mg po daily on 3/14, blood pressures started to regulate, decrease lisinopril to 5 mg daily  - Will address need for continued Lisinopril while here at ARU      # Anxiety/depression, worsened related to problem #1.  Pt with prior h/o depression/anxiety and had been on multiple meds in the past. Seen by Psychiatry 3/12 and PRN Seroquel added. Pt more emotionally labile and pessimistic 3/13; Reluctant to take Seroquel but did later take some doses. PRN clonazepam added 3/14; Pt seen by Psychiatry again 3/15 and offered to start Trintellix, but pt declined.  - PTA Buspar stopped prior to admission   - Continue PRN clonazepam 0.25 mg TID - stopped at time of admission to ARU - was not taking as prn med  - Continue PRN Seroquel  - WIll see our inpatient psychologist Dr. Paulino - consult placed  - Patient had a short panic attack on am of 3/19, negative cardiac workup, Psychiatry consult placed on 3/19 - will see patient, appreciate recs for treatment options for anxiety      # History of morbid obesity with previous gastric bypass and anastomotic ulcer.  - Continue Protonix 20 mg daily.      # Tobacco use.   - Continue nicotine patch.     6. Adjustment to disability:  Clinical psychology to eval and treat will have Dr. Paulino see patient, consult placed. Psychiatry to see " patient as well.   7. FEN: reg with thins  8. Bowel: bowel meds senna 2 tabs bid  9. Bladder: PVRs x 2   10. DVT Prophylaxis: ambulating   11. GI Prophylaxis: protonix  12. Code: Full  13. Disposition: Home with support  14. ELOS:  1-2 wk .  15. Rehab prognosis:  Good   16. Follow up Appointments on Discharge: Neurosurgery, PCP, and PM&R      Pardeep Crawford MD PGY-2  Physical Medicine & Rehabilitation  Pager: [phone]

## 2018-03-19 NOTE — PLAN OF CARE
Problem: Patient Care Overview  Goal: Plan of Care/Patient Progress Review  Discharge Planner OT   Patient plan for discharge: Home with assist and OP OT  Current status: CGA for mobility using walker, SBA/CGA for self cares.   Barriers to return to prior living situation: Needs to progress to MOD I for self cares and be able to do light IADL. Pt has limited support from family.   Recommendations for discharge: Recommend assist with IADL as needed and OP OT.   Rationale for recommendations: Currently level of care, precautions post surgery and pt will need to continue with OT to work on UE function. ELOS 1 week.        Entered by: Madina Gold 03/19/2018 3:09 PM

## 2018-03-20 PROCEDURE — 40000193 ZZH STATISTIC PT WARD VISIT: Performed by: PHYSICAL THERAPIST

## 2018-03-20 PROCEDURE — 97110 THERAPEUTIC EXERCISES: CPT | Mod: GP | Performed by: PHYSICAL THERAPIST

## 2018-03-20 PROCEDURE — 12800006 ZZH R&B REHAB

## 2018-03-20 PROCEDURE — 25000132 ZZH RX MED GY IP 250 OP 250 PS 637: Performed by: PHYSICAL MEDICINE & REHABILITATION

## 2018-03-20 PROCEDURE — 99221 1ST HOSP IP/OBS SF/LOW 40: CPT | Performed by: PSYCHIATRY & NEUROLOGY

## 2018-03-20 PROCEDURE — 97530 THERAPEUTIC ACTIVITIES: CPT | Mod: GP | Performed by: PHYSICAL THERAPIST

## 2018-03-20 PROCEDURE — 97535 SELF CARE MNGMENT TRAINING: CPT | Mod: GO | Performed by: OCCUPATIONAL THERAPIST

## 2018-03-20 PROCEDURE — 40000133 ZZH STATISTIC OT WARD VISIT: Performed by: OCCUPATIONAL THERAPIST

## 2018-03-20 PROCEDURE — 40000133 ZZH STATISTIC OT WARD VISIT: Performed by: STUDENT IN AN ORGANIZED HEALTH CARE EDUCATION/TRAINING PROGRAM

## 2018-03-20 PROCEDURE — 97112 NEUROMUSCULAR REEDUCATION: CPT | Mod: GP | Performed by: PHYSICAL THERAPIST

## 2018-03-20 PROCEDURE — 97530 THERAPEUTIC ACTIVITIES: CPT | Mod: GO | Performed by: STUDENT IN AN ORGANIZED HEALTH CARE EDUCATION/TRAINING PROGRAM

## 2018-03-20 RX ORDER — CYCLOBENZAPRINE HCL 5 MG
10 TABLET ORAL 3 TIMES DAILY
Status: DISCONTINUED | OUTPATIENT
Start: 2018-03-20 | End: 2018-03-28 | Stop reason: HOSPADM

## 2018-03-20 RX ORDER — GABAPENTIN 300 MG/1
300 CAPSULE ORAL 2 TIMES DAILY
Status: DISCONTINUED | OUTPATIENT
Start: 2018-03-21 | End: 2018-03-22

## 2018-03-20 RX ORDER — GABAPENTIN 300 MG/1
600 CAPSULE ORAL AT BEDTIME
Status: DISCONTINUED | OUTPATIENT
Start: 2018-03-20 | End: 2018-03-22

## 2018-03-20 RX ORDER — DIAZEPAM 5 MG
5 TABLET ORAL ONCE
Status: COMPLETED | OUTPATIENT
Start: 2018-03-20 | End: 2018-03-20

## 2018-03-20 RX ORDER — QUETIAPINE FUMARATE 25 MG/1
25 TABLET, FILM COATED ORAL AT BEDTIME
Status: DISCONTINUED | OUTPATIENT
Start: 2018-03-20 | End: 2018-03-23

## 2018-03-20 RX ORDER — ANALGESIC BALM 1.74; 4.06 G/29G; G/29G
OINTMENT TOPICAL EVERY 6 HOURS PRN
Status: DISCONTINUED | OUTPATIENT
Start: 2018-03-20 | End: 2018-03-28 | Stop reason: HOSPADM

## 2018-03-20 RX ORDER — DULOXETIN HYDROCHLORIDE 20 MG/1
20 CAPSULE, DELAYED RELEASE ORAL 2 TIMES DAILY
Status: DISCONTINUED | OUTPATIENT
Start: 2018-03-20 | End: 2018-03-21

## 2018-03-20 RX ADMIN — CYCLOBENZAPRINE HYDROCHLORIDE 10 MG: 5 TABLET, FILM COATED ORAL at 21:00

## 2018-03-20 RX ADMIN — SENNOSIDES AND DOCUSATE SODIUM 2 TABLET: 8.6; 5 TABLET ORAL at 21:00

## 2018-03-20 RX ADMIN — PANTOPRAZOLE SODIUM 20 MG: 20 TABLET, DELAYED RELEASE ORAL at 08:03

## 2018-03-20 RX ADMIN — OXYCODONE HYDROCHLORIDE 10 MG: 5 TABLET ORAL at 15:41

## 2018-03-20 RX ADMIN — GABAPENTIN 600 MG: 300 CAPSULE ORAL at 21:57

## 2018-03-20 RX ADMIN — DIAZEPAM 5 MG: 5 TABLET ORAL at 02:56

## 2018-03-20 RX ADMIN — QUETIAPINE FUMARATE 25 MG: 25 TABLET ORAL at 21:57

## 2018-03-20 RX ADMIN — OXYCODONE HYDROCHLORIDE 10 MG: 5 TABLET ORAL at 20:59

## 2018-03-20 RX ADMIN — GABAPENTIN 300 MG: 300 CAPSULE ORAL at 08:03

## 2018-03-20 RX ADMIN — OXYCODONE HYDROCHLORIDE 10 MG: 5 TABLET ORAL at 11:04

## 2018-03-20 RX ADMIN — SENNOSIDES AND DOCUSATE SODIUM 2 TABLET: 8.6; 5 TABLET ORAL at 08:02

## 2018-03-20 RX ADMIN — NICOTINE 1 PATCH: 7 PATCH, EXTENDED RELEASE TRANSDERMAL at 08:03

## 2018-03-20 RX ADMIN — GABAPENTIN 300 MG: 300 CAPSULE ORAL at 14:12

## 2018-03-20 RX ADMIN — ACETAMINOPHEN 975 MG: 325 TABLET ORAL at 01:57

## 2018-03-20 RX ADMIN — OXYCODONE HYDROCHLORIDE 10 MG: 5 TABLET ORAL at 02:56

## 2018-03-20 RX ADMIN — POLYETHYLENE GLYCOL 3350 17 G: 17 POWDER, FOR SOLUTION ORAL at 08:03

## 2018-03-20 RX ADMIN — LISINOPRIL 5 MG: 5 TABLET ORAL at 08:03

## 2018-03-20 RX ADMIN — OXYCODONE HYDROCHLORIDE 10 MG: 5 TABLET ORAL at 07:00
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
## 2018-03-20 NOTE — PLAN OF CARE
"Problem: Goal/Outcome  Goal: Goal Outcome Summary  FOCUS/GOAL  Bowel management, Bladder management, Medication management, Pain management, Mobility, Cognition/Memory/Judgment/Problem solving and Psychosocial needs    ASSESSMENT, INTERVENTIONS AND CONTINUING PLAN FOR GOAL:  Pt is alert and oriented. Continent of bladder. No BM overnight. Medications taken whole with water. Pain difficult to manage this shift. Pt awoke at 0145 crying out in pain; stating it \"feels like my shoulder is broken in 20 different places\". Pt upset, believing staff not doing anything to help her. Assured pt that staff was doing all they can to help make her more comfortable. It was too early for oxycodone, flexaril or Seroquel. Offered and gave pt Tylenol until other medications were available. Call placed to Dr. Crawford as pt was requesting an xray. Order received for one time dose of Valium; this was given along with available oxycodone. Request also made for Bengay, sticky note left for MD. Pt found to be sleeping soundly within 30-45 minutes following medication. Pt uses call light appropriately, able to make needs known. Will continue with POC.        "

## 2018-03-20 NOTE — PLAN OF CARE
Problem: Goal/Outcome  Goal: Goal Outcome Summary  Outcome: Improving  FOCUS/GOAL  Bowel management, Bladder management, Pain management and Wound care management    ASSESSMENT, INTERVENTIONS AND CONTINUING PLAN FOR GOAL:  Pt had some senna and miralax this AM but no results yet, used the toilet a couple of times and managed perineal cares with SBA and clothing as well, and transferred with CGA x1 with a walker. Pt is on PRN pain med but wants to take her meds around the clock for better functioning and participate therapies well. Pt decline shower this AM even with the second trail. Pt had visits  From psychologist and psychiatrist and she will have a continuity of nursing and therapy cares until d/c. ASPAN collar on, skin intact and patent, and pt calls to make her needs known, poor appetite and encouraged with oral intakes.   Nursing will cont POC

## 2018-03-20 NOTE — PLAN OF CARE
Problem: Patient Care Overview  Goal: Plan of Care/Patient Progress Review  PT: Pt amb halls with 4WW and SBA, progressed to no AD over 50' distances with min A. Facilitated proximal hip strengthening for improved stability and IND with transfers and gait. Continue to recommend pt up with FWW and Ax1 for safety.

## 2018-03-20 NOTE — CONSULTS
"  Health Psychology                  Clinic    Department of Medicine  Amy Arenas, Ph.D., L.P. [phone]                          [facility]  [facility] Daniel Kearney, Ph.D.,  L.P. [phone]                 3rd Floor  [place] Mail Code 741   Oumar Montgomery, Ph.D., TUTU., L.P. [phone]     [address] Deepa Murphy, Ph.D., L.P. [phone]            [place] [address]           Isis Goodwin, Ph.D., L.P. [phone]     Inpatient Health Psychology Consultation*    Clinical Information:  Ms Romero was highly distressed this morning secondary to difficult interactions with nursing staff on overnights. I had stopped by to introduce myself on Monday, when we made plan to meet today. On Monday she had shared with me her history of crack cocaine use and how important it is to her that she is able to maintain her sobriety. She had stopped drinking alcohol but began that again following the death of two siblings related to opiate use. In addition to substance use issues, she has a history of generalized anxiety disorder, depression, and also appears to fit a diagnosis of PTSD. Because of multiple psychiatric evaluations during this hospitalization, and because of her distress this morning, I chose to focus on making a therapeutic connection with Ms Romero in order to allow focus on tools and skills that may help her manage her anxiety reactions and make more healthy choices.  Ms Romero is currently dealing with high levels of pain and this can trigger anxiety which then gets into a cycle of one exacerbating the other. Last night this because so difficult and her pain became so high that she \"would have taken anything to stop the pain\" and the result was a one time dose of Valium, which is certainly a medication that would not be recommended with her history. Her fear of using a psychotropic " medication to assist with her anxiety is a concern. She has agreed to several different medications and then changed her mind.   Focused on psycho education regarding the functioning of the stress response system and how meditation and other activities can be a powerful tool to allow greater control of anxiety reactions. Taught her a short self-compassion practice that she responded to very positively, and provided it for her in written form. Encouraged her to use commercial breaks on TV to be a reminder to practice this so that she will be practicing multiple times/day and work toward making it an automatic habit.  Psychosocial situation very fraught, high levels of stress. Little to no support at home either emotionally or concretely. Good support from daughter who works many hours and Ms Romero cares for her [id number] grandson, which she is happy to do but is very stressful.  Assessment: Unfortunately this concern about use of medications for anxiety in the context of her severe pain leads to some decisions that may actually be leaving her more vulnerable to having less control of both anxiety and pain. She agreed to a trial of Cymbalta and then refused when the nurse brought the medication.   Diagnosis: (Per Dr Villalba, Psychiatry)  1. Generalized anxiety disorder   2. Posttraumatic stress disorder  3. Major depressive disorder, recurrent without psychotic features  4. Alcohol use disorder, moderate.  Recommendations/Plan: Will continue to meet with Ms Romero with focus on both behavioral tools that could allow increased control of anxiety and pain as well as focus on information that may allow her to feel more comfortable with use of medications for anxiety.  Time Spent with Patient: 55 minutes  Service Provided: Individual psychotherapy   Provider: Amy Arenas, Ph.D,    Provider Pager: [phone]   Provider Phone: [phone]          *In accordance with the Rules of the [place] [employer], it is  noted that psychological descriptions and scientific procedures underlying psychological evaluations have limitations.  Absolute predictions cannot be made based on information in this report.

## 2018-03-20 NOTE — PROGRESS NOTES
[facility]   Acute Rehabilitation Unit  Daily progress note    interval history  Antoinette Romero was seen and examined at bedside.     Overnight the patient had an episode of intense pain that was not controlled with her current medication at that time.  She reported around 2 am that she was having uncontrolled pain in her shoulder and neck which was way worse than prior episodes.  She did not report any worsening difficulty moving her arms or legs or new neurologic symptoms and only noted the pain to be worse.  She also admitted to having a high amount of anxiety around her pain as well.  The on-call PM&R resident gave a 1 time dose of valium to help with muscle relaxation, and pain as well as her anxiety level.  This substantially reduced her pain and she was able to sleep for the rest of the night without discomfort and has been doing well with her regular scheduled medication since that time.      Today she met with psychology as well as our inpatient psychiatrist.  After long talks with both of them it was decided that we would make some changes to her medications to better address both her neuropathic pain and her anxiety/depression.  We discussed Cymbalta being a medication that would be able to help with all of these in conjunction with increasing gabapentin and limiting the oxycodone, valium, and Seroquel.     I personally spent a good deal of time educating her on the type of pain she was having and why oxycodone and dilaudid were not as affective as well as why gabapentin and Cymbalta are more affective and better medications for her.  After this she was much more calm, demonstrated understanding of her pain and agreed to with the plan to increase, gabapentin, start Cymbalta, schedule Seroquel for QHS and continue to take oxycodone as needed but at a lower dose as well as continue to take flexeril TID. The goal will be to get her neuropathic and post surgical pain  "under control and then wean these medications as able.  The patient is in agreement with all of the above and would like to be on as few medications as possible at the time of DC.    She is also very worried about hospital bills, affording the medication that she needs and her anxiety.  I will also have  see her to make sure that her questions are answered.       Functionally, Pt scored 13/24 on DGI, but results are impacted by cervical precautions (gait with head turns). Pt would benefit from higher level dynamic standing balance activities, B glute med strengthening due to presence of compensated R Trendelenburg and uncompensated L Trendelenburg. She also missed some therapy today due to psychiatry consult and the need to discuss her medications and plan for pain.         medications  Scheduled meds    lisinopril  5 mg Oral Daily     nicotine  1 patch Transdermal Q24H     pantoprazole  20 mg Oral Daily     gabapentin  300 mg Oral TID     senna-docusate  2 tablet Oral BID     polyethylene glycol  17 g Oral Daily     nicotine   Transdermal Daily     nicotine   Transdermal Q8H       PRN meds:  methyl salicylate-menthol, acetaminophen, benzocaine-menthol, cyclobenzaprine, oxyCODONE IR, QUEtiapine, naloxone, bisacodyl      physical exam  /84 (BP Location: Left arm)  Pulse 106  Temp 96.6  F (35.9  C) (Oral)  Resp 16  Ht 1.727 m (5' 8\")  Wt 55.4 kg (122 lb 3.2 oz)  LMP 03/01/2010  SpO2 98%  BMI 18.58 kg/m2  Gen: pleasant, anxious, tearful, worried, in mild distress due to pain and anxiety  HEENT: NC/AT, has Aspen collar on, tolerating ok  CV: breathing comfortably on RA, normal WOB, no distress  Abd: soft, NT/ND, BS+,   Ext: no edema, no tenderness in calves  Neuro/MSK: No change in neuro exam today   - Has some global weakness with more prevalence in the UE's and in the L>R.  But his is improving from injury date.  She has some loss of sensation in LUE, especially in the forearm and hand.  She " is able to ambulate and is alert and oriented and able to make needs known but has significant anxiety./     labs  Reviewed     assessment and plan  Antoinette Romero is a 57 year old right hand dominant female with past medical history of tobacco use, migraines, morbid obesity with previous bypass surgery, and migraines, and chronic worsening hip and leg pain from OA,  who had a fall on 3/9/18 evening which resulted in Spinal cord injury.  She had C3-T1 ACDF on 3/11/2018, and was admitted to ARU on 3/18/18     Admission to acute inpatient rehab Cervical Spinal Cord Injury.       Changes to care plan: 3/20  - Started Cymbalta 20 mg BID  - Scheduled Seroquel QHS   - increased QHS gabapentin to 600 mg and continued  mg   - Oxycodone to stay at 5-10 mg today, will adjust once gabapentin and Cymbalta on board.  - changed Flexeril to scheduled   - psychiatry consulted - saw patient today, see consult note for details.   - psychology saw patient today, believe patient would benefit from scheduled and consistent therapy times to be able to pre-medicate for those times ain hopes of achieving better pain control.        1. PT, and OT for 90 minutes of each on a daily basis, in addition to rehab nursing and close management of physiatrist.       2. Impairment of ADL's: daily OT for 90 minutes daily to work on ADL re-training such as grooming, self cares and bathing, using UE's as she ahs a cervical spinal cord injury with limited use of UE's with L> R deficits      3. Impairment of mobility:  Daily PT for 90 minutes daily to work on neuromuscular re-education focusing on strength, balance, coordination, and endurance.  To enhance patients ability to ambulate as she has decreased strength in LE's bilaterally L>R     4. Rehab RN for med administration, bowel regimen, glucose monitoring and wound care.       5. Medical Conditions  # Traumatic cervical spine injury with resulting C3-T1 severe stenosis with spinal cord  "compression and myelomalacia, C5-6 ruptured anterior longitudinal ligament and disk with a anterior osteophyte fracture and C7-T1 anterior subluxation with severe bilateral foraminal stenosis - s/p C3-T1 ACDF on 3/11/2018.  * Pt with h/o EtOH and multiple falls recently. The patient woke up am 3/10 and had difficulty with walking and left hand and left foot weakness; had pain in neck, hip and hand after consuming some alcohol, going to bed and having a fall when arising to use the restroom overnight.  She presented to [facility] 3/10 and x-rays of hand, hip, tib-fib were negative. CT abdomen and pelvis 3/10 no acute findings.   - MRI Cervical spine on 3/10/18 revealed spinal cord contusion at C5-C6 without hemorrhage; disruption of ligaments at C5-C6 anteriorly and posteriorly with fluid in the facet joints and in the disc space suggesting motion at this level; fairly extensive edema, but no discrete hematoma in the paraspinous musculature more on the right than on the left and in the prevertebral space; extensive degenerative changes. Pt transferred to [facility] 3/10.  * Seen by Neurosurgery and noted with LUE > RUE weakness with loss of left hand function as well as BLE weakness L > R. Pt underwent C3-T1 ACDF on 3/11/2018. She has progressed well with therapies.  - pain management  - scheduled Tylenol 975 mg po TID and prn, scheduled Flexeril 10 mg po TID, Roxicodone 5-10 mg po q4h prn  - keep neck collar in place until follow-up   - F/u Neurosurgery - When?      # Pain: neuropathic in nature as she has numbness tingling, fire type lightning bolt pain.  - Started gabapentin 300 mg TID on admission, increased to 600 mg  evening dose  300-300-600  - Cont. Oxycodone 5-10 mg Q4H with plan to wean off of this medication  - Cont. Tylenol  Prn  - Flexeril TID   - Cymbalta 20 mg BID     # Alcohol use/abuse/dependence with EtOH w/d.  Improved. CARLOS ENRIQUE d/c'ed 3/14. Pt said that she was \"done\" with alcohol given above issues.  - will " continue to monitor here on ARU   - WIll ask Dr. Paulino to see her as well.       # Confusion/encephalopathy (suspect toxic).  Pt confused 3/12. Had been getting PRN IV lorazepam for EtOH w/o as well as PRN IV Dilaudid for pain. Improved overall starting on 3/13-0 now back to baseline.  - Will cont. To monitor for any cognitive decline.       # Elevated BP's, suspect related pain, anxiety and underlying essential HTN. - resolving  - she has h/o HTN and has been on lisinopril in the past   - started lisinopril 10 mg po daily on 3/14, blood pressures started to regulate, decrease lisinopril to 5 mg daily  - Will address need for continued Lisinopril while here at ARU      # Anxiety/depression, worsened related to problem #1.  Pt with prior h/o depression/anxiety and had been on multiple meds in the past. Seen by Psychiatry 3/12 and PRN Seroquel added. Pt more emotionally labile and pessimistic 3/13; Reluctant to take Seroquel but did later take some doses. PRN clonazepam added 3/14; Pt seen by Psychiatry again 3/15 and offered to start Trintellix, but pt declined.  - PTA Buspar stopped prior to admission   - Continue PRN clonazepam 0.25 mg TID - stopped at time of admission to ARU - was not taking as prn med  - Continue PRN Seroquel  - Starting Cymbalta 20 mg BID  - WIll see our inpatient psychologist Dr. Pualino - consult placed  - Patient had a short panic attack on am of 3/19, negative cardiac workup, Psychiatry consult placed on 3/19 - will see patient, appreciate recs for treatment options for anxiety      # History of morbid obesity with previous gastric bypass and anastomotic ulcer.  - Continue Protonix 20 mg daily.      # Tobacco use.   - Continue nicotine patch.     6. Adjustment to disability:  Clinical psychology to eval and treat will have Dr. Paulino see patient, consult placed. Psychiatry to see patient as well.   7. FEN: reg with thins  8. Bowel: bowel meds senna 2 tabs bid  9. Bladder: PVRs x 2   10. DVT  Prophylaxis: ambulating   11. GI Prophylaxis: protonix  12. Code: Full  13. Disposition: Home with support  14. ELOS:  1-2 wk .  15. Rehab prognosis:  Good   16. Follow up Appointments on Discharge: Neurosurgery, PCP, and PM&R      Pardeep Crawford MD PGY-2  Physical Medicine & Rehabilitation  Pager: [phone]

## 2018-03-20 NOTE — PLAN OF CARE
Problem: Patient Care Overview  Goal: Plan of Care/Patient Progress Review  Missed 45 minutes due to need to see psychiatist and PMR resident.

## 2018-03-20 NOTE — PLAN OF CARE
Problem: Patient Care Overview  Goal: Plan of Care/Patient Progress Review  OT: Pt refused shower. Pt c/o increased pain in R UE shoulder impacting her ability to reach up and out. -20 min Psychiatry consult.

## 2018-03-20 NOTE — PLAN OF CARE
Problem: Goal/Outcome  Goal: Goal Outcome Summary  FOCUS/GOAL  Bowel management, Bladder management, Nutrition/Feeding/Swallowing precautions, Pain management, Wound care management, Mobility and Equipment/Assistive devices    ASSESSMENT, INTERVENTIONS AND CONTINUING PLAN FOR GOAL:    Patient is A&Ox4, continent of bowel/bladder. No BM this shift. Very poor appetite. Patient says her neck hurts when she eats. PRN Oxy and Flexeril given 2x for neck, arm and shoulder pain. Patient displayed anxiety and tearfulness due to the pain. Anterior neck incision is approximated and LATASHA. [place] collar on at all times. Patient c/o collar sticking her in the back. Writer placed a foam cube under the anterior bottom of collar which has helped reduce the impact on the patient's back. Transfers SBA with a belt/walker. Aqua K pad was ordered and placed on patient's back which has eased the pain. Continue POC.

## 2018-03-21 PROCEDURE — 97110 THERAPEUTIC EXERCISES: CPT | Mod: GP | Performed by: PHYSICAL THERAPIST

## 2018-03-21 PROCEDURE — 40000193 ZZH STATISTIC PT WARD VISIT: Performed by: PHYSICAL THERAPIST

## 2018-03-21 PROCEDURE — 97535 SELF CARE MNGMENT TRAINING: CPT | Mod: GO

## 2018-03-21 PROCEDURE — 40000193 ZZH STATISTIC PT WARD VISIT

## 2018-03-21 PROCEDURE — 12800006 ZZH R&B REHAB

## 2018-03-21 PROCEDURE — 97110 THERAPEUTIC EXERCISES: CPT | Mod: GP

## 2018-03-21 PROCEDURE — 97116 GAIT TRAINING THERAPY: CPT | Mod: GP | Performed by: PHYSICAL THERAPIST

## 2018-03-21 PROCEDURE — 25000132 ZZH RX MED GY IP 250 OP 250 PS 637: Performed by: PHYSICAL MEDICINE & REHABILITATION

## 2018-03-21 PROCEDURE — 97112 NEUROMUSCULAR REEDUCATION: CPT | Mod: GP | Performed by: PHYSICAL THERAPIST

## 2018-03-21 PROCEDURE — 97112 NEUROMUSCULAR REEDUCATION: CPT | Mod: GO

## 2018-03-21 PROCEDURE — 40000133 ZZH STATISTIC OT WARD VISIT

## 2018-03-21 RX ADMIN — OXYCODONE HYDROCHLORIDE 10 MG: 5 TABLET ORAL at 04:35

## 2018-03-21 RX ADMIN — LISINOPRIL 5 MG: 5 TABLET ORAL at 07:35

## 2018-03-21 RX ADMIN — SENNOSIDES AND DOCUSATE SODIUM 2 TABLET: 8.6; 5 TABLET ORAL at 20:27

## 2018-03-21 RX ADMIN — GABAPENTIN 300 MG: 300 CAPSULE ORAL at 13:04

## 2018-03-21 RX ADMIN — OXYCODONE HYDROCHLORIDE 10 MG: 5 TABLET ORAL at 08:28

## 2018-03-21 RX ADMIN — CYCLOBENZAPRINE HYDROCHLORIDE 10 MG: 5 TABLET, FILM COATED ORAL at 20:26

## 2018-03-21 RX ADMIN — GABAPENTIN 600 MG: 300 CAPSULE ORAL at 22:05

## 2018-03-21 RX ADMIN — POLYETHYLENE GLYCOL 3350 17 G: 17 POWDER, FOR SOLUTION ORAL at 07:35

## 2018-03-21 RX ADMIN — PANTOPRAZOLE SODIUM 20 MG: 20 TABLET, DELAYED RELEASE ORAL at 07:36

## 2018-03-21 RX ADMIN — OXYCODONE HYDROCHLORIDE 10 MG: 5 TABLET ORAL at 13:04

## 2018-03-21 RX ADMIN — CYCLOBENZAPRINE HYDROCHLORIDE 10 MG: 5 TABLET, FILM COATED ORAL at 07:35

## 2018-03-21 RX ADMIN — GABAPENTIN 300 MG: 300 CAPSULE ORAL at 07:35

## 2018-03-21 RX ADMIN — QUETIAPINE FUMARATE 25 MG: 25 TABLET ORAL at 22:05

## 2018-03-21 RX ADMIN — OXYCODONE HYDROCHLORIDE 10 MG: 5 TABLET ORAL at 20:27

## 2018-03-21 RX ADMIN — NICOTINE 1 PATCH: 7 PATCH, EXTENDED RELEASE TRANSDERMAL at 07:35

## 2018-03-21 RX ADMIN — CYCLOBENZAPRINE HYDROCHLORIDE 10 MG: 5 TABLET, FILM COATED ORAL at 13:04

## 2018-03-21 RX ADMIN — SENNOSIDES AND DOCUSATE SODIUM 2 TABLET: 8.6; 5 TABLET ORAL at 07:36

## 2018-03-21 NOTE — PLAN OF CARE
FOCUS/GOAL  Bowel management, Bladder management, Pain management, Wound care management and Cognition/Memory/Judgment/Problem solving    ASSESSMENT, INTERVENTIONS AND CONTINUING PLAN FOR GOAL:  Pt is alert and oriented, reported pain in bilateral shoulders and was given 10 mg Oxycodone, denied SOB, CP, lightheadedness and dizziness, transferred to toilet with CGA to void, no BM during this shift, slept the majority of the night, collar on at all times, no further care concerns at this time continue with POC.

## 2018-03-21 NOTE — PLAN OF CARE
FOCUS/GOAL  Bowel management and Pain management    ASSESSMENT, INTERVENTIONS AND CONTINUING PLAN FOR GOAL:  Pt alert and oriented x4. Transfers CGA with walker. Anxiety seemed better this shift. Requests medications be given as close to scheduled time as possible for pain control. Pain has been controlled tonight with scheduled medications and PRN oxycodone. Wants to try bengay ointment before going to sleep. Cervical collar on at all times. Neck incision CDI, and open to air. Refused cymbalta tonight after reading about side effects. Sticky note left for MD to discuss with patient or d/c.

## 2018-03-21 NOTE — PLAN OF CARE
Problem: Patient Care Overview  Goal: Plan of Care/Patient Progress Review  OT: better pain control throughout the day, able to participate in full shower with little minimal overall assistance needed and progress well with balance with use of FWW.

## 2018-03-21 NOTE — PLAN OF CARE
Problem: Individualization  Goal: Patient Individualization  Outcome: Improving  Focus: Physio  D: Lots of pain this am but better after dose of oxycodone. Refused Cymbalta and tylenol. She states she is having difficulty swallowing and feels like there is something stuck in her throat. ( She was eating scrambled eggs and hashbrowns at the time) She also did have some emesis with this.  I did let calvin know from speech and they will see pt. To evaluate her. P: Continue to monitor swallowing.     14:00 Took all medications with applesauce this afternoon. Did have some difficulties swallowing the neurontin. SLP consult ordered. Still feels a bit constipated. May want a dulcolax suppository to night.

## 2018-03-21 NOTE — PLAN OF CARE
Problem: Physician Care Plan Review  Goal: Physician Review  Outcome: Therapy, progress toward functional goals as expected  I reviewed and agree with the plan of care  Madelyn Martinez

## 2018-03-21 NOTE — PLAN OF CARE
Problem: Patient Care Overview  Goal: Plan of Care/Patient Progress Review  PT: progressing well with functional mobility. BUE pain limits function. Session focused on nerve gliding and PROM to BUE for pain relief. Pt responding well.

## 2018-03-21 NOTE — PROGRESS NOTES
[facility]   Acute Rehabilitation Unit  Daily progress note    interval history  Antoinette Romero was seen and examined at bedside.   Antoinette was seated today, reported that she did not take Cymbalta, 20 mg BID as recommended by Psychiatry. She cited the various side effects of the medications. I counseled her on the side effects which are variable based on patient to patient. She acknowledged that she does get excruciating pan, at which time she did take valium, a one dose. She is against taking medications. She felt she could take Buspar but only for one dose. Reviewed Psychiatry  Reviewed her chart, she has been taking flexeril, MAR review shows 50-60 mg daily dosages. Not groggy   Also on oxycodone q3-4 hrs prn, which she is utilizing around the clock.   Continues to be on Neurontin as well.   She did have bilateral shoulder pain, and was administered oxycodone.       Functionally, she is limited by pain, anxiety, impaired balance and depression. She tends to miss her therapy sessions either due to her anxiety/pain or need to see psychiatry. However she is making gains, and should continue rehab. Also recommend being seen by Dr Arenas while inpatient, as she is restricted to pursue outpatient therapy due to high co-pays.   We will continue to provide her with support as she participates in rehabilitation.         medications  Scheduled meds    gabapentin  300 mg Oral BID     gabapentin  600 mg Oral At Bedtime     cyclobenzaprine  10 mg Oral TID     QUEtiapine  25 mg Oral At Bedtime     DULoxetine  20 mg Oral BID     lisinopril  5 mg Oral Daily     nicotine  1 patch Transdermal Q24H     pantoprazole  20 mg Oral Daily     senna-docusate  2 tablet Oral BID     polyethylene glycol  17 g Oral Daily     nicotine   Transdermal Daily     nicotine   Transdermal Q8H       PRN meds:  methyl salicylate-menthol, acetaminophen, benzocaine-menthol, oxyCODONE IR, naloxone,  "bisacodyl      physical exam  /82 (BP Location: Left arm)  Pulse 121  Temp 96.6  F (35.9  C) (Oral)  Resp 18  Ht 1.727 m (5' 8\")  Wt 55.4 kg (122 lb 3.2 oz)  LMP 03/01/2010  SpO2 98%  BMI 18.58 kg/m2  Gen: sitting in chair  Somewhat labile   HEENT: NC/AT, Aspen collar on  CV: CTA  Abd: soft, NT/ND, BS+ benign   Ext: no edema, no tenderness in calves  Neuro/MSK: generalized atrophy     assessment and plan  Antoinette Romero is a 57 year old right hand dominant female with past medical history of tobacco use, migraines, morbid obesity with previous bypass surgery, and migraines, and chronic worsening hip and leg pain from OA,  who had a fall on 3/9/18 evening which resulted in Spinal cord injury.    She had C3-T1 ACDF on 3/11/2018, and was admitted to ARU on 3/18/18     Admission to acute inpatient rehab Cervical Spinal Cord Injury.  Number of days in the ARU: 3   Expected LOS on admission: 1 week.        Changes to care plan: 3/20  - refused Cymbalta 20 mg BID last evening after reading the patient drug information papers, concerns about side effect   - Scheduled Seroquel QHS at a low dose. Effective   - on increased QHS gabapentin to 600 mg and continued  mg; today she reports that she refuses to take the medications, counseled patient on the need to taper.   - Oxycodone to stay at 5-10 mg today, will adjust once gabapentin and Cymbalta on board.  - changed Flexeril to scheduled as patient reports most effective. I did not see any sedative side effects   - psychiatry consulted - appreciate input, but she refused Cymbalta, open to taking Buspar for one day.   - psychology ongoing support. Discussed with Dr Arenas        Tobacco use.   - Continue nicotine patch.    1. PT, and OT for 90 minutes of each on a daily basis, in addition to rehab nursing and close management of physiatrist.       2. Impairment of ADL's: daily OT for 90 minutes daily to work on ADL re-training such as grooming, self cares " and bathing, using UE's as she ahs a cervical spinal cord injury with limited use of UE's with L> R deficits      3. Impairment of mobility:  Daily PT for 90 minutes daily to work on neuromuscular re-education focusing on strength, balance, coordination, and endurance.  To enhance patients ability to ambulate as she has decreased strength in LE's bilaterally L>R     4. Rehab RN for med administration, bowel regimen, glucose monitoring and wound care. On  appropriate bowel program.       Total of 25 min spent in this encounter, more than 50% in counseling

## 2018-03-21 NOTE — CONSULTS
"  Health Psychology                  Clinic    Department of Medicine  Amy Arenas, Ph.D., L.P. [phone]                          [facility]  [facility] Daniel Kearney, Ph.D.,  L.P. [phone]                 3rd Floor  [place] Mail Code 747   Oumar Montgomery, Ph.D., TUTU., L.P. [phone]     [address] Deepa Murphy, Ph.D., L.P. [phone]            [place] [address]           Isis Goodwin, Ph.D., L.P. [phone]     Inpatient Health Psychology Consultation*    Clinical Information:  Ms Romero reports feeling much calmer and more positive today. Has been using the compassion practice that I taught her yesterday and found that she was left feeling much calmer every time that she uses it. Talked at length about additional resources that she can practice with, including Сергей Lock's Relaxation Response. Showed her website that Shilpa Monaco has and the guided meditations on that resource that are available to listen to for free.  She is very eager to understand her own patterns of behavior. She talked at some length about her childhood experiences of trauma that extended into her teens, being in the \"[facility]\" and foster homes for years yet wanting to be back with her parents despite the traumatic nature of those relationships. Asked why it is that intrusive thoughts that are anxious and negative come into her mind so easily and we talked about the fact that this is a pattern of behavior that she has practiced for decades, so that it makes sense that it will require significant practice to learn to do it differently. She was able to connect this information with the fact that she experienced trauma repeatedly in her early childhood and throughout her teens and that she had to watch out for herself without adults who were focused on her safety and wellbeing.  She is making a plan to " clear away other responsibilities to be able to focus on self-care when she discharges. Her daughter has found a  for Ms Romero's grandson so that she will not be responsible for childcare. She wants to return to drawing, which she used to do a lot and found therapeutic and left her able to sleep much more effectively.  Talked about how much money she will be able to save by remaining a non-cigarette smoker, and she immediately recognized that this would allow her to afford the co-pays that have prevented her from continuing the psychotherapy that she engaged in for 3 months and found very helpful. She made a plan to reconnect with that provider, whom I am somewhat familiar with and who has an expertise in working with women who have experienced trauma.  Agreed that we will meet again on Friday and that I will do a personalized guided meditation with her that she will be able to record and use independently.  Assessment: Very motivated to engage in self-care and is making concrete plans, asking for and using very concrete behavioral tools.  Diagnosis: (Per Dr Villalba, Psychiatry)  1. Generalized anxiety disorder   2. Posttraumatic stress disorder  3. Major depressive disorder, recurrent without psychotic features  4. Alcohol use disorder, moderate.  Recommendations/Plan: Will continue to meet with Ms Romero with focus on both behavioral tools that could allow increased control of anxiety and pain as well as focus on information that may allow her to feel more comfortable with use of medications for anxiety.  Time Spent with Patient: 60 minutes  Service Provided: Individual psychotherapy   Provider: Amy Arenas, Ph.D,    Provider Pager: [phone]   Provider Phone: [phone]      *In accordance with the Rules of the [place] [employer], it is noted that psychological descriptions and scientific procedures underlying psychological evaluations have limitations.  Absolute predictions cannot be made  based on information in this report.

## 2018-03-22 PROCEDURE — 97530 THERAPEUTIC ACTIVITIES: CPT | Mod: GP | Performed by: PHYSICAL THERAPIST

## 2018-03-22 PROCEDURE — 97110 THERAPEUTIC EXERCISES: CPT | Mod: GP | Performed by: PHYSICAL THERAPIST

## 2018-03-22 PROCEDURE — 25000132 ZZH RX MED GY IP 250 OP 250 PS 637: Performed by: PHYSICAL MEDICINE & REHABILITATION

## 2018-03-22 PROCEDURE — 40000193 ZZH STATISTIC PT WARD VISIT: Performed by: PHYSICAL THERAPIST

## 2018-03-22 PROCEDURE — 40000133 ZZH STATISTIC OT WARD VISIT

## 2018-03-22 PROCEDURE — 92610 EVALUATE SWALLOWING FUNCTION: CPT | Mod: GN | Performed by: SPEECH-LANGUAGE PATHOLOGIST

## 2018-03-22 PROCEDURE — 40000225 ZZH STATISTIC SLP WARD VISIT: Performed by: SPEECH-LANGUAGE PATHOLOGIST

## 2018-03-22 PROCEDURE — 97535 SELF CARE MNGMENT TRAINING: CPT | Mod: GO

## 2018-03-22 PROCEDURE — 12800006 ZZH R&B REHAB

## 2018-03-22 RX ORDER — GABAPENTIN 250 MG/5ML
600 SOLUTION ORAL AT BEDTIME
Status: DISCONTINUED | OUTPATIENT
Start: 2018-03-22 | End: 2018-03-23

## 2018-03-22 RX ORDER — BUSPIRONE HYDROCHLORIDE 5 MG/1
5 TABLET ORAL 3 TIMES DAILY
Status: DISCONTINUED | OUTPATIENT
Start: 2018-03-22 | End: 2018-03-25

## 2018-03-22 RX ORDER — GABAPENTIN 250 MG/5ML
300 SOLUTION ORAL
Status: DISCONTINUED | OUTPATIENT
Start: 2018-03-22 | End: 2018-03-23

## 2018-03-22 RX ADMIN — NICOTINE 1 PATCH: 7 PATCH, EXTENDED RELEASE TRANSDERMAL at 08:01

## 2018-03-22 RX ADMIN — PANTOPRAZOLE SODIUM 20 MG: 20 TABLET, DELAYED RELEASE ORAL at 08:30

## 2018-03-22 RX ADMIN — CYCLOBENZAPRINE HYDROCHLORIDE 10 MG: 5 TABLET, FILM COATED ORAL at 21:15

## 2018-03-22 RX ADMIN — LISINOPRIL 5 MG: 5 TABLET ORAL at 08:05

## 2018-03-22 RX ADMIN — CYCLOBENZAPRINE HYDROCHLORIDE 10 MG: 5 TABLET, FILM COATED ORAL at 14:37

## 2018-03-22 RX ADMIN — OXYCODONE HYDROCHLORIDE 10 MG: 5 TABLET ORAL at 18:25

## 2018-03-22 RX ADMIN — BISACODYL 10 MG: 10 SUPPOSITORY RECTAL at 14:37

## 2018-03-22 RX ADMIN — SENNOSIDES AND DOCUSATE SODIUM 2 TABLET: 8.6; 5 TABLET ORAL at 08:29

## 2018-03-22 RX ADMIN — OXYCODONE HYDROCHLORIDE 10 MG: 5 TABLET ORAL at 13:04

## 2018-03-22 RX ADMIN — OXYCODONE HYDROCHLORIDE 10 MG: 5 TABLET ORAL at 02:24

## 2018-03-22 RX ADMIN — OXYCODONE HYDROCHLORIDE 10 MG: 5 TABLET ORAL at 07:48

## 2018-03-22 RX ADMIN — POLYETHYLENE GLYCOL 3350 17 G: 17 POWDER, FOR SOLUTION ORAL at 08:01

## 2018-03-22 RX ADMIN — GABAPENTIN 300 MG: 300 CAPSULE ORAL at 08:30

## 2018-03-22 RX ADMIN — OXYCODONE HYDROCHLORIDE 10 MG: 5 TABLET ORAL at 22:37

## 2018-03-22 RX ADMIN — GABAPENTIN 300 MG: 250 SOLUTION ORAL at 14:37

## 2018-03-22 RX ADMIN — SENNOSIDES AND DOCUSATE SODIUM 2 TABLET: 8.6; 5 TABLET ORAL at 21:15

## 2018-03-22 RX ADMIN — QUETIAPINE FUMARATE 25 MG: 25 TABLET ORAL at 22:37

## 2018-03-22 RX ADMIN — BUSPIRONE HYDROCHLORIDE 5 MG: 5 TABLET ORAL at 14:37

## 2018-03-22 RX ADMIN — CYCLOBENZAPRINE HYDROCHLORIDE 10 MG: 5 TABLET, FILM COATED ORAL at 08:30

## 2018-03-22 RX ADMIN — GABAPENTIN 600 MG: 250 SOLUTION ORAL at 21:15

## 2018-03-22 RX ADMIN — BUSPIRONE HYDROCHLORIDE 5 MG: 5 TABLET ORAL at 21:15

## 2018-03-22 NOTE — PLAN OF CARE
Problem: Physician Care Plan Review  Goal: Physician Review  I reviewed and agree with the plan of care  Madelyn Martinez

## 2018-03-22 NOTE — PROGRESS NOTES
[facility]   Acute Rehabilitation Unit  Daily progress note      Attestation:  Physician Attestation   I, Madelyn Martinez, saw this patient with the resident and agree with the resident s findings and plan of care as documented in the resident s note.       I personally reviewed vital signs, medications, labs and imaging.     Key findings: agreeable to start Buspar, endorsed that feelings of depression evident. Benefits from psychology daily, notes that outpatient co-pays limit her ability to have outpatient follow up.   Team rounds today see separate note for details.     Madelyn Martinez  Date of Service (when I saw the patient): 03/22/18  Total of 25 min spent in this encounter more than 50% in coordination on the floor       interval history  Antoinette Romero was seen and examined at bedside.     No acute events overnight, she reports that she did slept alright but that she also woke up last night in singificant pain and took the oxycodone which did help a little bit.  Otherwise she reports that she is getting benefit from the gabapentin and although she does not want to start the Cymbalta, should would like to start the buspar.  She otherwise has started to get better control of her pain, she is participating well in therapy, working hard despite her issues with pain.  No other concerns today.     Had a long talk today again about pain, the different types and why we are recommending certain medications over others.  We discussed side effects as well as plan for weaning and being off in the future.  Biggest concern for patient is not being on all these medications for ever and knowing that our goal will be to wean them as able.     Functionally, Patient made independent in room during the day and call light at night, still working on developing better UE control on left side.        medications  Scheduled meds    gabapentin  300 mg Oral 2 times per day      "gabapentin  600 mg Oral At Bedtime     cyclobenzaprine  10 mg Oral TID     QUEtiapine  25 mg Oral At Bedtime     lisinopril  5 mg Oral Daily     nicotine  1 patch Transdermal Q24H     pantoprazole  20 mg Oral Daily     senna-docusate  2 tablet Oral BID     polyethylene glycol  17 g Oral Daily     nicotine   Transdermal Daily     nicotine   Transdermal Q8H       PRN meds:  methyl salicylate-menthol, acetaminophen, benzocaine-menthol, oxyCODONE IR, naloxone, bisacodyl      physical exam  /80 (BP Location: Right arm)  Pulse 110  Temp 96.8  F (36  C) (Oral)  Resp 16  Ht 1.727 m (5' 8\")  Wt 55.4 kg (122 lb 3.2 oz)  LMP 03/01/2010  SpO2 98%  BMI 18.58 kg/m2  Gen: pleasant, anxious but comfortable today, on nustep  HEENT: NC/AT, has Aspen collar on, tolerating ok  CV: breathing comfortably on RA, normal WOB, no distress  Abd: soft, NT/ND no discomfort reported.   Ext: no edema, no tenderness in calves  Neuro/MSK: No change in neuro exam today   - Has some global weakness with more prevalence in the UE's and in the L>R.  She is able to manipulate the UE's but in left hand she has no sensation at present. She has started to get more sensation in her LUE in the past few days, with this sensation her nerve pain has also increased some. She is able to ambulate and is alert and oriented and able to make needs known but has significant anxiety.    labs  Reviewed     assessment and plan  Antoinette Romero is a 57 year old right hand dominant female with past medical history of tobacco use, migraines, morbid obesity with previous bypass surgery, and migraines, and chronic worsening hip and leg pain from OA,  who had a fall on 3/9/18 evening which resulted in Spinal cord injury.  She had C3-T1 ACDF on 3/11/2018, and was admitted to ARU on 3/18/18     Admission to acute inpatient rehab Cervical Spinal Cord Injury.       Changes to care plan: 3/22  - DC Cymbalta as patient does not want to take this medication   - " Scheduled Seroquel QHS - will look to make this prn tomorrow   - Starting BuSpar 5 mg t.i.d. Today - patient asked about this and wants to try   - QHS gabapentin to 600 mg and continued  mg - on 3/20 - may increase morning dose to 600 on 3/23 if needed   - Oxycodone to stay at 5-10 mg today, will adjust once gabapentin and BuSpar on board     1. PT, and OT for 90 minutes of each on a daily basis, in addition to rehab nursing and close management of physiatrist.       2. Impairment of ADL's: daily OT for 90 minutes daily to work on ADL re-training such as grooming, self cares and bathing, using UE's as she ahs a cervical spinal cord injury with limited use of UE's with L> R deficits      3. Impairment of mobility:  Daily PT for 90 minutes daily to work on neuromuscular re-education focusing on strength, balance, coordination, and endurance.  To enhance patients ability to ambulate as she has decreased strength in LE's bilaterally L>R     4. Rehab RN for med administration, bowel regimen, glucose monitoring and wound care.       5. Medical Conditions  # Traumatic cervical spine injury with resulting C3-T1 severe stenosis with spinal cord compression and myelomalacia, C5-6 ruptured anterior longitudinal ligament and disk with a anterior osteophyte fracture and C7-T1 anterior subluxation with severe bilateral foraminal stenosis - s/p C3-T1 ACDF on 3/11/2018.  * Pt with h/o EtOH and multiple falls recently. The patient woke up am 3/10 and had difficulty with walking and left hand and left foot weakness; had pain in neck, hip and hand after consuming some alcohol, going to bed and having a fall when arising to use the restroom overnight.  She presented to [facility] 3/10 and x-rays of hand, hip, tib-fib were negative. CT abdomen and pelvis 3/10 no acute findings.   - MRI Cervical spine on 3/10/18 revealed spinal cord contusion at C5-C6 without hemorrhage; disruption of ligaments at C5-C6 anteriorly and posteriorly  "with fluid in the facet joints and in the disc space suggesting motion at this level; fairly extensive edema, but no discrete hematoma in the paraspinous musculature more on the right than on the left and in the prevertebral space; extensive degenerative changes. Pt transferred to [facility] 3/10.  * Seen by Neurosurgery and noted with LUE > RUE weakness with loss of left hand function as well as BLE weakness L > R. Pt underwent C3-T1 ACDF on 3/11/2018. She has progressed well with therapies.  - pain management  - scheduled Tylenol 975 mg po TID and prn, scheduled Flexeril 10 mg po TID, Roxicodone 5-10 mg po q4h prn  - keep neck collar in place until follow-up   - F/u Neurosurgery - When?      # Pain: neuropathic in nature as she has numbness tingling, fire type lightning bolt pain.  - Started gabapentin 300 mg TID on admission, increased to 600 mg  evening dose  300-300-600  - Cont. Oxycodone 5-10 mg Q4H with plan to wean off of this medication  - Cont. Tylenol  Prn  - Flexeril TID   - BuSpar TID     # Alcohol use/abuse/dependence with EtOH w/d.  Improved. CIWA d/c'ed 3/14. Pt said that she was \"done\" with alcohol given above issues.  - will continue to monitor here on ARU   - WIll ask Dr. Paulino to see her as well.       # Confusion/encephalopathy (suspect toxic).  Pt confused 3/12. Had been getting PRN IV lorazepam for EtOH w/o as well as PRN IV Dilaudid for pain. Improved overall starting on 3/13-0 now back to baseline.  - Will cont. To monitor for any cognitive decline.       # Elevated BP's, suspect related pain, anxiety and underlying essential HTN. - resolving  - she has h/o HTN and has been on lisinopril in the past   - started lisinopril 10 mg po daily on 3/14, blood pressures started to regulate, decrease lisinopril to 5 mg daily  - Will address need for continued Lisinopril while here at ARU      # Anxiety/depression, worsened related to problem #1.  Pt with prior h/o depression/anxiety and had been on multiple " meds in the past. Seen by Psychiatry 3/12 and PRN Seroquel added. Pt more emotionally labile and pessimistic 3/13; Reluctant to take Seroquel but did later take some doses. PRN clonazepam added 3/14; Pt seen by Psychiatry again 3/15 and offered to start Trintellix, but pt declined.  - PTA Buspar stopped prior to admission   - Continue PRN clonazepam 0.25 mg TID - stopped at time of admission to ARU - was not taking as prn med  - Continue PRN Seroquel  - Starting Cymbalta 20 mg BID  - WIll see our inpatient psychologist Dr. Paulino - consult placed  - Patient had a short panic attack on am of 3/19, negative cardiac workup, Psychiatry consult placed on 3/19 - will see patient, appreciate recs for treatment options for anxiety      # History of morbid obesity with previous gastric bypass and anastomotic ulcer.  - Continue Protonix 20 mg daily.      # Tobacco use.   - Continue nicotine patch.     6. Adjustment to disability:  Clinical psychology to eval and treat will have Dr. Paulino see patient, consult placed. Psychiatry to see patient as well.   7. FEN: reg with thins  8. Bowel: bowel meds senna 2 tabs bid  9. Bladder: PVRs x 2   10. DVT Prophylaxis: ambulating   11. GI Prophylaxis: protonix  12. Code: Full  13. Disposition: Home with support  14. ELOS:  1-2 wk .  15. Rehab prognosis:  Good   16. Follow up Appointments on Discharge: Neurosurgery, PCP, and PM&R      Pardeep Crawford MD PGY-2  Physical Medicine & Rehabilitation  Pager: [phone]

## 2018-03-22 NOTE — PLAN OF CARE
"Problem: Goal/Outcome  Goal: Goal Outcome Summary  Outcome: Improving  FOCUS/GOAL  Bowel management, Bladder management, Pain management and Wound care management    ASSESSMENT, INTERVENTIONS AND CONTINUING PLAN FOR GOAL:  Pt took 10mg of Oxy x2, encouraged to tapered it down but she wants to cont with 10mg during her rehab stay. Pt said, \" I won't have any Oxy Rx upon d/c!\"  Pt is MOD I with a walker and CC Tuesday and planning to d/c to home on Wed March 28th. Pt didn't have a BM x4 days, had some stool meds but no luck until now and at this time D suppository offered and pt was able to apply to herself after instructions given from the writer. Result pending. Neck incision LATASHA, Aspan collar on at all times. Pt's spouse was here and had a good time per pt. He brought a britany for her.  Nursing will cont POC        "

## 2018-03-22 NOTE — PROGRESS NOTES
"   03/22/18 1300   General Information   Onset Date 03/10/18   Start of Care Date 03/22/18   Referring Physician Dr. Anamika Haji   Patient Profile Review/OT: Additional Occupational Profile Info See Profile for full history and prior level of function   Patient/Family Goals Statement SLP: pt would like her swallowing to \"get back to normal\". pt reports intermittent difficulty with food (ie hashbrowns day prior, pills/ She avoids some harder foods since surgery, also states that if she takes lots of water , feels like bolus may \"come back up\" pt does have history of gastric bypass surgery as well.   Swallowing Evaluation Bedside swallow evaluation   Behaviorial Observations Other (Comment)  (anxiety noted and reported, mild confusion initially w/quest)   Mode of current nutrition Oral diet   Type of oral diet Regular;Thin liquid   Respiratory Status Room air   Comments Antoinette Romero is a 57 year old right hand dominant female with past medical history of tobacco use, migraines, morbid obesity with previous bypass surgery, and migraines, and chronic worsening hip and leg pain from OA,  who had a fall on 3/9/18 evening which resulted in Spinal cord injury.  She had C3-T1 ACDF on 3/11/2018, and was admitted to ARU on 3/18/18.   Clinical Swallow Evaluation   Oral Musculature generally intact   Structural Abnormalities none present  (wears [place] J collar)   Dentition present and adequate   Oral Labial Strength and Mobility WFL   Lingual Strength and Mobility WFL   Laryngeal Function Cough;Throat clear;Swallow;Voicing initiated   Clinical Swallow Eval: Thin Liquid Texture Trial   Mode of Presentation, Thin Liquids cup;straw;self-fed   Volume of Liquid or Food Presented 4-6 oz   Oral Phase of Swallow WFL   Pharyngeal Phase of Swallow intact   Diagnostic Statement SLP: did not note any difficulty with swallowing liquids   Clinical Swallow Eval: Puree Solid Texture Trial   Mode of Presentation, Puree spoon;self-fed " "  Volume of Puree Presented 2 oz with pills,2-3 oz oatmeal   Oral Phase, Puree WFL   Pharyngeal Phase, Puree intact   Diagnostic Statement SLP: appears WNL for pureed - pt had difficulty swallowing one pill, took sips water and applesauce, eventually she reported it cleared.   Clinical Swallow Eval: Solid Food Texture Trial   Mode of Presentation, Solid self-fed   Volume of Solid Food Presented bite brenner , toast, pineapple, medications   Oral Phase, Solid WFL   Pharyngeal Phase, Solid feeling of something stuck in throat;repeated swallows   Successful Strategies Trialed During Procedure, Solid hard swallow;other (see comments)  (small bites, following with liquid, multiple swallows)   Diagnostic Statement SLP: pt took few bites of solid food, chews thoroughly, followed with sip liquid/pureed for pills, and brenner. She reported food went  down ok, but larger pill \"got stuck\", pt took applesauce and water, but felt \"it came back up.If I drink too much\". eventually she reported this larger capsule went down. RN will look into changing this medication into 3 smaller pills. She also states some \"dry\" pills may also get sligthly stuck Discussed with pt recommendations for soft, moist foods.  She states she generally has been self selecting these types, but possibly starchy foods (such as bread, potatoes) may also be troublesome.   Esophageal Phase of Swallow   Esophageal comments SLP: denies GERD but states since her gastric bypass, \"sometimes if I eat too quickly or too much\", it comes back upl   General Therapy Interventions   Planned Therapy Interventions Dysphagia Treatment   Dysphagia treatment Oropharyngeal exercise training;Modified diet education;Instruction of safe swallow strategies;Compensatory strategies for swallowing   Intervention Comments SLP:pt would like to stay with regular diet for now. discussed outcome with MD,(? any medications if swelling suspected)   Swallow Eval: Clinical Impressions   Skilled " "Criteria for Therapy Intervention Skilled criteria met.  Treatment indicated.   Functional Assessment Scale (FAS) 5   Treatment Diagnosis mild oropharyngeal dysphagia 2/2 ACDF   Diet texture recommendations Regular diet;Thin liquids   Recommended Feeding/Eating Techniques alternate between small bites and sips of food/liquid;hard swallow w/ each bite or sip;small sips/bites;other (see comments)  (moist, soft foods,pills with pureed PRN, slow pace, snacks)   Therapy Frequency daily   Predicted Duration of Therapy Intervention (days/wks) estimated 1 week   Anticipated Discharge Disposition home w/ outpatient services   Risks and Benefits of Treatment have been explained. Yes   Patient, family and/or staff in agreement with Plan of Care Yes   Clinical Impression Comments SLP: pt seen for swallowing evaluation.  RN noted pt had difficulty swallowing one medication, and food item day prior (aaliyah),  Upon interview she states she has had some difficulty post ACDF, choosing mostly softer foods.  She states that some food items \"get stuck\", she takes drink to help, but at times \"comes back up\".  At times during initial questioning, answers were not clear/consistent (?confusion) but did improve during session.  Pt seen during [facility] medication administration,. Pt swallowed smaller pills with water and appeared WNL.  She had difficulty with larger capsule (Gabapentin), reporting felt it was stuck in throat.  She took several swallows liquid, pureed and eventually stated it went down. At one point, she did try regurgitate it back up.  Pt did ok with pureed, and small pieces fruit/brenner,toast.  Noted careful mastication as well as effortful, several swallows. with effort.  she did not report pharyngeal residue.  She does go on to say that she does avoid some hard, starchy items at this time.  Discussed option of changing to softer diet, but she did not want to at this time.  Pt is noted with mild oropharyngeal dysphagia " probable 2/2 ACDF (swelling?). Continue with regular diet, thin fluids, choosing softer ,moister items.  Slow pace, small bites and sips, swallow extra, hard swallows for each bolus. Alternate solids/liquids.  RN will look into modifying large pills (ie taking smaller pills).  Also may do better with more frequent, smaller meals.  Will plan continue to follow.  Pt is below baseline at this time and would beneift from skilled intervention to improve swallowing.   Total Evaluation Time   Total Evaluation Time (Minutes) 60

## 2018-03-22 NOTE — PLAN OF CARE
Problem: Goal/Outcome  Goal: Goal Outcome Summary  Outcome: No Change  See care rounds documentation

## 2018-03-22 NOTE — PLAN OF CARE
"Problem: Patient Care Overview  Goal: Plan of Care/Patient Progress Review  SLP: pt seen for swallowing evaluation.  RN noted pt had difficulty swallowing one medication, and food item day prior (kashbromateusz),  Upon interview she states she has had some difficulty post ACDF, choosing mostly softer foods.  She states that some food items \"get stuck\", she takes drink to help, but at times \"comes back up\".  At times during initial questioning, answers were not clear/consistent (?confusion) but did improve during session.  Pt seen during meal and medication administration,. Pt swallowed smaller pills with water and generally WNL.  She had difficulty with larger capsule (Gabapentin), reporting felt it was stuck in throat.  She took several swallows liquid, pureed and eventually stated it went down. At one point, she did try regurgitate it back up.  Pt did ok with pureed, and small pieces fruit/brenner,toast.  Noted careful mastication as well as effortful, several swallows. with effort.  she did not report pharyngeal residue.  She does go on to say that she does avoid some hard, starchy items at this time.  Discussed option of changing to softer diet, but she did not want to at this time.  Pt is noted with mild oropharyngeal dysphagia probable 2/2 ACDF (swelling/displacement?). Continue with regular diet, thin fluids, choosing softer ,moister items.  Slow pace, small bites and sips, swallow extra, hard swallows for each bolus. Alternate solids/liquids.  RN will look into modifying large pills (ie taking smaller pills) take with pureed food PRN.  Also may do better with more frequent, smaller meals.  Will plan continue to follow.  Pt is below baseline at this time and would beneift from skilled intervention to improve swallowing.      "

## 2018-03-22 NOTE — PLAN OF CARE
Problem: Goal/Outcome  Goal: Goal Outcome Summary  FOCUS/GOAL  Bowel management, Pain management and Psychosocial needs    ASSESSMENT, INTERVENTIONS AND CONTINUING PLAN FOR GOAL:  Patient is alert/oriented x 4, noted anxiety. Complains of pain in neck/shoulders, took PRN oxycodone x 1 with minimal relief. Offered tylenol but patient refused due to hx of ETOH abuse. No BM for 4 days, bowel sounds active x 4, patient reports she is not passing flatus but does not feel any abdominal discomfort. Declined suppository tonight due to exhaustion, but may wish to try tomorrow.

## 2018-03-22 NOTE — PLAN OF CARE
Acute Rehab Care Conference/Team Rounds      Type: Team Rounds    Present: Dr Madelyn Crawford resident, Tiesha Ward LICSW, Madina Gold OT, Misa French PT, Carolann Noble SLP, Sara Baker dietician, Claudia Thrasher/Dang Brenner , TATIANA Simms       Discharge Barriers/Treatment/Education    Rehab Diagnosis: Traumatic SCI     Active Medical Co-morbidities/Prognosis: anxiety, depression, pain    Safety:Alert and oriented X 4. Able to make needs known. Using call light appropriately. No bed alarms on.    Pain: C/O severe neck, shoulder pain. Pain partially managed with Oxycodone 10 mg PO Q 4 hours PRN.  Declined the Tylenol.    Medications, Skin, Tubes/Lines: Neck incision clean, dry and intact  Aspen collar on. No lines or tubes present.    Swallowing/Nutrition:SLP:l pt seen for swallowing evaluation.  Noting c/o reduced pharyngeal peristalsis, likely 2/2 recent cervical surgery.  Worked on offering strategies (ie softer,moister foods, slowing pace, frequent sips) will continue to follow. For now pt wants to stay on regular diet.    Bowel/Bladder: Continent of bowel and bladder. Last BM on 3/19. On  bowel meds  Patient considering have a suppository  this morning per report form evening RN.   Psychosocial: Pt resides with her  and their 20 yr old son. Pts goal to return home with continued support. Team working towards a safe d/c plan.     ADLs/IADLs: Pt is I for self cares. Pt has intermittent pain in UE which can be limiting for tasks involving reaching higher than 90 degrees. Working on durability of ADL routine, IADL and UE/ hand strength. Pt needs to be able to manage home as well as she has limited support from family. Recommend OP OT at DC.     Mobility: Up in room w/o device, walker for longer distances.  Anticipate progressing to cane for open environments.  6MWT at 40% of predicted distance. Recommend OP f/u at time of discharge.      Cognition/Language:S:LP pt seen for swallowing evaluation this date, see note above.  AT times pt's answers to questions were not complete, some mild confusion, but did improve as session progressed (?anxiety vs any deficit) Will continue watch and consult with team    Community Re-Entry: ambulatory w/ device, limited distances in open environments    Transportation: Not a  due to cervical collar and restrictions, car transfer not a barrier    Decision maker: self    Plan of Care and goals reviewed and updated.    Discharge Plan/Recommendations    Fall Precautions: continue    Patient/Family input to goals: interested in starting on Buspar after much discussion     Anticipated rehab needs following discharge: OP PT    Anticipated care giver support after discharge: none     Estimated length of stay: 3/28    Overall plan for the patient:   She is limited by anxiety, and depression as well as labile mood. She is being start on Buspar. She is walking with a walker, plan to advance to a cane. Anticipate independence in room during the day, today.   Plan for discharge on 3/28. Care conference planned to be on Tuesday, 3/27th       Utilization Review and Continued Stay Justification    Medical Necessity Criteria:    For any criteria that is not met, please document reason and plan for discharge, transfer, or modification of plan of care to address.    Requires intensive rehabilitation program to treat functional deficits?: Yes    Requires 3x per week or greater involvement of rehabilitation physician to oversee rehabilitation program?: Yes    Requires rehabilitation nursing interventions?: Yes    Patient is making functional progress?: Yes    There is a potential for additional functional progress? Yes    Patient is participating in therapy 3 hours per day a minimum of 5 days per week or 15 hours per week in 7 day period?:Yes    Has discharge needs that require coordinated discharge planning  approach?:Yes      Final Physician Sign off    Statement of Approval:   I reviewed and agree with the plan of care  Madelyn Martinez      Patient Goals        1. Pt will d/c home/community setting upon completion of therapy/RN goals.  2. Pt and family will be involved in d/c planning and will be made aware of services available to additional support.         OT goal: hygiene/grooming: modified independent, while standing  OT goal: upper body dressing: Modified independent, including set-up/clothing retrieval  OT goal: lower body dressing: Modified independent, including set-up/clothing retrieval  OT goal: upper body bathing: Modified independent, within precautions  OT goal: lower body bathing: Modified independent, with precautions  OT Goal: transfer: Modified independent, within precautions (walk in shower transfer)  OT goal: toilet transfer/toileting: Modified independent, cleaning and garment management, toilet transfer  OT goal: meal preparation: Modified independent, with simple meal preparation, ambulatory level  OT goal: home management: Modified independent, with light demand household tasks, ambulatory level  OT goal 1: Pt will be I with UE HEP with focus on hand and arm strength and cooridnation to improve function for ADL and IADl.  OT/LATASHA face-to-face collaboration occurred, patient progress and plan of care reviewed.    PT target date for goal attainment: 03/26/18  PT Frequency: 60-90 minutes daily  PT goal: bed mobility:  (met)  PT goal: transfers:  (met)  PT goal: gait: Independent, 100 feet  PT goal: stairs: Modified independent, 5 stairs, Rail on both sides        PT goal 1: Patient will be able to verbalize 3 fall prevention strategies to increase safety of environment and decrease risk for falls   PT Goal 2: car transfer ind  PT Goal 3: floor transfer w/ furniture assist  PT Goal 4: Ind w/ written HEP for LE strengthening and dynamic balance.           Patient Goal:  Pain Management: pt is  showing progress on managing her pain but at times didnt call   Goal: Mobility: pt is MOD I as of today

## 2018-03-23 LAB
ANION GAP SERPL CALCULATED.3IONS-SCNC: 4 MMOL/L (ref 3–14)
BUN SERPL-MCNC: 11 MG/DL (ref 7–30)
CALCIUM SERPL-MCNC: 8.5 MG/DL (ref 8.5–10.1)
CHLORIDE SERPL-SCNC: 105 MMOL/L (ref 94–109)
CO2 SERPL-SCNC: 29 MMOL/L (ref 20–32)
CREAT SERPL-MCNC: 0.6 MG/DL (ref 0.52–1.04)
ERYTHROCYTE [DISTWIDTH] IN BLOOD BY AUTOMATED COUNT: 14.1 % (ref 10–15)
GFR SERPL CREATININE-BSD FRML MDRD: >90 ML/MIN/1.7M2
GLUCOSE SERPL-MCNC: 94 MG/DL (ref 70–99)
HCT VFR BLD AUTO: 34.6 % (ref 35–47)
HGB BLD-MCNC: 10.9 G/DL (ref 11.7–15.7)
MCH RBC QN AUTO: 26.9 PG (ref 26.5–33)
MCHC RBC AUTO-ENTMCNC: 31.5 G/DL (ref 31.5–36.5)
MCV RBC AUTO: 85 FL (ref 78–100)
PLATELET # BLD AUTO: 322 10E9/L (ref 150–450)
POTASSIUM SERPL-SCNC: 4.3 MMOL/L (ref 3.4–5.3)
RBC # BLD AUTO: 4.05 10E12/L (ref 3.8–5.2)
SODIUM SERPL-SCNC: 138 MMOL/L (ref 133–144)
WBC # BLD AUTO: 7.5 10E9/L (ref 4–11)

## 2018-03-23 PROCEDURE — 25000132 ZZH RX MED GY IP 250 OP 250 PS 637: Performed by: PHYSICAL MEDICINE & REHABILITATION

## 2018-03-23 PROCEDURE — 97530 THERAPEUTIC ACTIVITIES: CPT | Mod: GO | Performed by: STUDENT IN AN ORGANIZED HEALTH CARE EDUCATION/TRAINING PROGRAM

## 2018-03-23 PROCEDURE — 97530 THERAPEUTIC ACTIVITIES: CPT | Mod: GP

## 2018-03-23 PROCEDURE — 85027 COMPLETE CBC AUTOMATED: CPT | Performed by: PHYSICAL MEDICINE & REHABILITATION

## 2018-03-23 PROCEDURE — 12800006 ZZH R&B REHAB

## 2018-03-23 PROCEDURE — 97110 THERAPEUTIC EXERCISES: CPT | Mod: GP | Performed by: PHYSICAL THERAPIST

## 2018-03-23 PROCEDURE — 40000225 ZZH STATISTIC SLP WARD VISIT: Performed by: SPEECH-LANGUAGE PATHOLOGIST

## 2018-03-23 PROCEDURE — 40000193 ZZH STATISTIC PT WARD VISIT

## 2018-03-23 PROCEDURE — 40000193 ZZH STATISTIC PT WARD VISIT: Performed by: PHYSICAL THERAPIST

## 2018-03-23 PROCEDURE — 97535 SELF CARE MNGMENT TRAINING: CPT | Mod: GO | Performed by: STUDENT IN AN ORGANIZED HEALTH CARE EDUCATION/TRAINING PROGRAM

## 2018-03-23 PROCEDURE — 40000133 ZZH STATISTIC OT WARD VISIT: Performed by: STUDENT IN AN ORGANIZED HEALTH CARE EDUCATION/TRAINING PROGRAM

## 2018-03-23 PROCEDURE — 80048 BASIC METABOLIC PNL TOTAL CA: CPT | Performed by: PHYSICAL MEDICINE & REHABILITATION

## 2018-03-23 PROCEDURE — 92526 ORAL FUNCTION THERAPY: CPT | Mod: GN | Performed by: SPEECH-LANGUAGE PATHOLOGIST

## 2018-03-23 PROCEDURE — 36415 COLL VENOUS BLD VENIPUNCTURE: CPT | Performed by: PHYSICAL MEDICINE & REHABILITATION

## 2018-03-23 RX ORDER — GABAPENTIN 250 MG/5ML
600 SOLUTION ORAL 2 TIMES DAILY
Status: DISCONTINUED | OUTPATIENT
Start: 2018-03-23 | End: 2018-03-24

## 2018-03-23 RX ORDER — GABAPENTIN 250 MG/5ML
300 SOLUTION ORAL DAILY
Status: DISCONTINUED | OUTPATIENT
Start: 2018-03-24 | End: 2018-03-24

## 2018-03-23 RX ORDER — OXYCODONE HYDROCHLORIDE 5 MG/1
5 TABLET ORAL EVERY 4 HOURS PRN
Status: DISCONTINUED | OUTPATIENT
Start: 2018-03-23 | End: 2018-03-24

## 2018-03-23 RX ADMIN — OXYCODONE HYDROCHLORIDE 10 MG: 5 TABLET ORAL at 08:24

## 2018-03-23 RX ADMIN — OXYCODONE HYDROCHLORIDE 5 MG: 5 TABLET ORAL at 18:09

## 2018-03-23 RX ADMIN — CYCLOBENZAPRINE HYDROCHLORIDE 10 MG: 5 TABLET, FILM COATED ORAL at 08:11

## 2018-03-23 RX ADMIN — BUSPIRONE HYDROCHLORIDE 5 MG: 5 TABLET ORAL at 14:34

## 2018-03-23 RX ADMIN — PANTOPRAZOLE SODIUM 20 MG: 20 TABLET, DELAYED RELEASE ORAL at 08:11

## 2018-03-23 RX ADMIN — NICOTINE 1 PATCH: 7 PATCH, EXTENDED RELEASE TRANSDERMAL at 08:11

## 2018-03-23 RX ADMIN — SENNOSIDES AND DOCUSATE SODIUM 2 TABLET: 8.6; 5 TABLET ORAL at 08:11

## 2018-03-23 RX ADMIN — GABAPENTIN 300 MG: 250 SOLUTION ORAL at 08:10

## 2018-03-23 RX ADMIN — BUSPIRONE HYDROCHLORIDE 5 MG: 5 TABLET ORAL at 08:11

## 2018-03-23 RX ADMIN — GABAPENTIN 300 MG: 250 SOLUTION ORAL at 14:34

## 2018-03-23 RX ADMIN — POLYETHYLENE GLYCOL 3350 17 G: 17 POWDER, FOR SOLUTION ORAL at 08:11

## 2018-03-23 RX ADMIN — OXYCODONE HYDROCHLORIDE 10 MG: 5 TABLET ORAL at 03:57

## 2018-03-23 RX ADMIN — BUSPIRONE HYDROCHLORIDE 5 MG: 5 TABLET ORAL at 20:29

## 2018-03-23 RX ADMIN — LISINOPRIL 5 MG: 5 TABLET ORAL at 08:11

## 2018-03-23 RX ADMIN — GABAPENTIN 600 MG: 250 SOLUTION ORAL at 22:08

## 2018-03-23 RX ADMIN — CYCLOBENZAPRINE HYDROCHLORIDE 10 MG: 5 TABLET, FILM COATED ORAL at 20:29

## 2018-03-23 RX ADMIN — OXYCODONE HYDROCHLORIDE 10 MG: 5 TABLET ORAL at 13:33

## 2018-03-23 RX ADMIN — OXYCODONE HYDROCHLORIDE 5 MG: 5 TABLET ORAL at 22:08

## 2018-03-23 RX ADMIN — CYCLOBENZAPRINE HYDROCHLORIDE 10 MG: 5 TABLET, FILM COATED ORAL at 14:34

## 2018-03-23 RX ADMIN — SENNOSIDES AND DOCUSATE SODIUM 2 TABLET: 8.6; 5 TABLET ORAL at 20:29

## 2018-03-23 RX ADMIN — BISACODYL 10 MG: 10 SUPPOSITORY RECTAL at 15:46

## 2018-03-23 NOTE — PROGRESS NOTES
"  [facility]   Acute Rehabilitation Unit  Daily progress note    interval history  Antoinette Romero was seen and examined at bedside.     No acute events overnight, still having difficulty with pain control, discussed her medications again today, she is still stating that she is wanting to go home with no medication.  We discussed today that her pain will need to be treated for at the very least the short term and then wean off, we will look to wean her oxycodone first and in conjunction hope to ramp up her gabapentin as able.  The goal is to optimize the least amount of medication to treat her pain most effectively, which she demonstrated some understanding of.  No other concerns today.     Functionally, Patient is improving, she was made independent in room during the day and call light at night, still working on developing better UE control on left side.  Continues to improve, pain is also a limiting factor in this,.       medications  Scheduled meds    gabapentin  300 mg Oral 2 times per day     gabapentin  600 mg Oral At Bedtime     busPIRone  5 mg Oral TID     cyclobenzaprine  10 mg Oral TID     QUEtiapine  25 mg Oral At Bedtime     lisinopril  5 mg Oral Daily     nicotine  1 patch Transdermal Q24H     pantoprazole  20 mg Oral Daily     senna-docusate  2 tablet Oral BID     polyethylene glycol  17 g Oral Daily     nicotine   Transdermal Daily     nicotine   Transdermal Q8H       PRN meds:  methyl salicylate-menthol, acetaminophen, benzocaine-menthol, oxyCODONE IR, naloxone, bisacodyl      physical exam  /77 (BP Location: Right arm)  Pulse 115  Temp 97.3  F (36.3  C) (Oral)  Resp 16  Ht 1.727 m (5' 8\")  Wt 55.4 kg (122 lb 3.2 oz)  LMP 03/01/2010  SpO2 98%  BMI 18.58 kg/m2  Gen: pleasant, anxious, but comfortable, NAD  HEENT: NC/AT, has Aspen collar on, tolerating ok  CV: breathing comfortably on RA, normal WOB, no distress  Abd: soft, NT/ND no discomfort " reported. Had BM yesterday   Ext: no edema, no tenderness in calves  Neuro/MSK: unchanged -   - Has some global weakness with more prevalence in the UE's and in the L>R.  She is able to manipulate the UE's but in left hand she has no sensation at present. She has started to get more sensation in her LUE in the past few days, with this sensation her nerve pain has also increased some. She is able to ambulate and is alert and oriented and able to make needs known but has significant anxiety.    labs  Reviewed     assessment and plan  Antoinette Romero is a 57 year old right hand dominant female with past medical history of tobacco use, migraines, morbid obesity with previous bypass surgery, and migraines, and chronic worsening hip and leg pain from OA,  who had a fall on 3/9/18 evening which resulted in Spinal cord injury.  She had C3-T1 ACDF on 3/11/2018, and was admitted to ARU on 3/18/18     Admission to acute inpatient rehab Cervical Spinal Cord Injury.       Changes to care plan: 3/23  - DC Seroquel QHS dose as it was causing bad dreams and not helping per patient.   -  BuSpar 5 mg t.i.d. On 3/22 - will see if pt tolerates this medication   - Increased gabapentin to 600 - 300 - 600 mg for the weekend   - Decreased dose of oxycodone to 5mg Q4H prn      1. PT, and OT for 90 minutes of each on a daily basis, in addition to rehab nursing and close management of physiatrist.       2. Impairment of ADL's: daily OT for 90 minutes daily to work on ADL re-training such as grooming, self cares and bathing, using UE's as she ahs a cervical spinal cord injury with limited use of UE's with L> R deficits      3. Impairment of mobility:  Daily PT for 90 minutes daily to work on neuromuscular re-education focusing on strength, balance, coordination, and endurance.  To enhance patients ability to ambulate as she has decreased strength in LE's bilaterally L>R     4. Rehab RN for med administration, bowel regimen, glucose monitoring  and wound care.       5. Medical Conditions  # Traumatic cervical spine injury with resulting C3-T1 severe stenosis with spinal cord compression and myelomalacia, C5-6 ruptured anterior longitudinal ligament and disk with a anterior osteophyte fracture and C7-T1 anterior subluxation with severe bilateral foraminal stenosis - s/p C3-T1 ACDF on 3/11/2018.  * Pt with h/o EtOH and multiple falls recently. The patient woke up am 3/10 and had difficulty with walking and left hand and left foot weakness; had pain in neck, hip and hand after consuming some alcohol, going to bed and having a fall when arising to use the restroom overnight.  She presented to [facility] 3/10 and x-rays of hand, hip, tib-fib were negative. CT abdomen and pelvis 3/10 no acute findings.   - MRI Cervical spine on 3/10/18 revealed spinal cord contusion at C5-C6 without hemorrhage; disruption of ligaments at C5-C6 anteriorly and posteriorly with fluid in the facet joints and in the disc space suggesting motion at this level; fairly extensive edema, but no discrete hematoma in the paraspinous musculature more on the right than on the left and in the prevertebral space; extensive degenerative changes. Pt transferred to [facility] 3/10.  * Seen by Neurosurgery and noted with LUE > RUE weakness with loss of left hand function as well as BLE weakness L > R. Pt underwent C3-T1 ACDF on 3/11/2018. She has progressed well with therapies.  - pain management  - scheduled Tylenol 975 mg po TID and prn, scheduled Flexeril 10 mg po TID, Roxicodone 5-10 mg po q4h prn  - keep neck collar in place until follow-up   - F/u Neurosurgery - When?      # Pain: neuropathic in nature as she has numbness tingling, fire type lightning bolt pain.  - Started gabapentin 300 mg TID on admission, increased to 600 mg  evening dose  300-300-600, increased again on 3/23 to 600-300-600.   - Cont. Oxycodone 5 mg Q4H with plan to wean off of this medication  - Cont. Tylenol  Prn  - Flexeril TID  "  - BuSpar TID     # Alcohol use/abuse/dependence with EtOH w/d.  Improved. CIWA d/c'ed 3/14. Pt said that she was \"done\" with alcohol given above issues.  - will continue to monitor here on ARU   - WIll ask Dr. Paulino to see her as well.       # Confusion/encephalopathy (suspect toxic).  Pt confused 3/12. Had been getting PRN IV lorazepam for EtOH w/o as well as PRN IV Dilaudid for pain. Improved overall starting on 3/13-0 now back to baseline.  - Will cont. To monitor for any cognitive decline.       # Elevated BP's, suspect related pain, anxiety and underlying essential HTN. - resolving  - she has h/o HTN and has been on lisinopril in the past   - started lisinopril 10 mg po daily on 3/14, blood pressures started to regulate, decrease lisinopril to 5 mg daily  - Will address need for continued Lisinopril while here at ARU      # Anxiety/depression, worsened related to problem #1.  Pt with prior h/o depression/anxiety and had been on multiple meds in the past. Seen by Psychiatry 3/12 and PRN Seroquel added. Pt more emotionally labile and pessimistic 3/13; Reluctant to take Seroquel but did later take some doses. PRN clonazepam added 3/14; Pt seen by Psychiatry again 3/15 and offered to start Trintellix, but pt declined.  - PTA Buspar stopped prior to admission   - Continue PRN clonazepam 0.25 mg TID - stopped at time of admission to ARU - was not taking as prn med  - Continue PRN Seroquel  - Starting Cymbalta 20 mg BID  - WIll see our inpatient psychologist Dr. Paulino - consult placed  - Patient had a short panic attack on am of 3/19, negative cardiac workup, Psychiatry consult placed on 3/19 - will see patient, appreciate recs for treatment options for anxiety      # History of morbid obesity with previous gastric bypass and anastomotic ulcer.  - Continue Protonix 20 mg daily.      # Tobacco use.   - Continue nicotine patch.     6. Adjustment to disability:  Clinical psychology to eval and treat will have Dr." Jefferson see patient, consult placed. Psychiatry to see patient as well.   7. FEN: reg with thins  8. Bowel: bowel meds senna 2 tabs bid  9. Bladder: PVRs x 2   10. DVT Prophylaxis: ambulating   11. GI Prophylaxis: protonix  12. Code: Full  13. Disposition: Home with support  14. ELOS:  1-2 wk .  15. Rehab prognosis:  Good   16. Follow up Appointments on Discharge: Neurosurgery, PCP, and PM&R      Pardeep Crawford MD PGY-2  Physical Medicine & Rehabilitation  Pager: [phone]        I, Deena Sales, personally examined and evaluated this patient on 3/23/18. I discussed the patient with my resident Dr. Crawford and care team, and agree with the assessment and plan of care as documented in his note above.    I personally reviewed the chart (vitals signs, medications, labs and imaging).     My key findings and key management decisions made by me:     Patient is progressing well in OT and SLP. Appetite is very poor, which is impacting her participation in SLP. We have been presenting her with multiple food options and providing significant encouragement to increase po intake. Will continue to monitor.     Anxiety is quite high. She was in tears today over what appears to be a scam call from the [employer] telling her that she owes money. Tried calling back the number and it did not appear to be a real number. Patient also showing significant concerns about medication regimen. Provided patient with reassurance about all of these issues. Buspar started on 3/22/18 in hopes of controlling her anxiety.     Pain suboptimally controlled on current pain regimen. It appears majority of her pain is neuropathic, and therefore, gabapentin has been increased today to 600, 300, 600 and oxycodone decreased from 10 mg q4h to 5 mg q4h. If pain is an issue over the weekend, would change oxycodone to 5-10 mg q4h prn.       I spent a total of 35 minutes face-to-face and managing the care of Antoinetteclotilde Romero. Over 50% of my time on the unit was  spent counseling the patient and coordinating care. See note for details.

## 2018-03-23 NOTE — PLAN OF CARE
Problem: Patient Care Overview  Goal: Plan of Care/Patient Progress Review  Discharge Planner OT   Patient plan for discharge: home with assist  Current status: I in the room and with ADL with minor issued with hair care. Working on IADL and durability of routines  Barriers to return to prior living situation: Need to be durable to with routines and IADL  Recommendations for discharge: OP OT  Rationale for recommendations: To continue working on hand and UE function       Entered by: Madina Gold 03/23/2018 10:15 AM

## 2018-03-23 NOTE — PLAN OF CARE
Problem: Goal/Outcome  Goal: Goal Outcome Summary  Outcome: Improving  FOCUS/GOAL  Bowel management, Pain management and Medical management    ASSESSMENT, INTERVENTIONS AND CONTINUING PLAN FOR GOAL:  Pt will have D. Suppository around 1525h today after her therapies. Pt had Oxy 10mg x2 but this evening will be changed to 5mg, Gabapentin increased to 600mg BID. Pt is MOD I in her room and calls at Hs. Neck incision intact, LATASHA, Sutton collar on all the time.   Nursing will cnt POC

## 2018-03-23 NOTE — CONSULTS
Health Psychology                  Clinic    Department of Medicine  Amy Arenas, Ph.D., L.P. [phone]                          Clinics and Surgery Center  [facility] Daniel Kearney, Ph.D.,  L.P. [phone]                 3rd Floor  [place] Mail [address]   Oumar Montgomery, Ph.D., TUTU., L.P. [phone]     [address] [place] [address] Deepa Murphy, Ph.D., L.P. [phone]            [id number]  [place] [address]           Isis Goodwin, Ph.D., L.P. [phone]     Inpatient Health Psychology Consultation*    DOS: 3/23/18    Clinical Information:  Ms Romero was not feeling well today, and we chose not to engage in making a guided meditation track as there were things she wanted to discuss. Focused on importance of reestablishing care with her psychotherapist now that she has had insight that becoming a non-smoker is going to allow her the financial ability to pay her co-pays for psychotherapy. She talked very positively about her therapist, and I reinforced the trust that she has established with that provider as the reason to allow herself to return and continue in order to keep building on the positive gains that she has made. Also talked about pain management and my belief that trying to get off all pain medication would be a way of sabotaging herself and leaving herself vulnerable to relapsing with cigarettes and alcohol. She agreed with these reminders and recommendations.   Affect varied, congruent with content of conversation.  Assessment: Very motivated to engage in self-care and is making concrete plans, asking for and using very concrete behavioral tools. Had contact information for her therapist, Izzy Montalvo at [facility] out when I said goodbye and indicated her intention of calling to leave a voicemail as soon as I left the room.  Diagnosis: (Per Dr Villalba, Psychiatry)  1. Generalized anxiety disorder   2. Posttraumatic  stress disorder  3. Major depressive disorder, recurrent without psychotic features  4. Alcohol use disorder, moderate.  Recommendations/Plan: Ms Romero will reestablish psychotherapy with Izzy Montalvo at the [facility] in [place]. She also indicated her intention to use the guided meditation tracks available to stream without payment on Shilpa Monaco's website, [email or web address]  Time Spent with Patient: 39 minutes  Service Provided: Individual psychotherapy   Provider: Amy Arenas, Ph.D,    Provider Pager: [phone]   Provider Phone: [phone]      *In accordance with the Rules of the [place] [employer], it is noted that psychological descriptions and scientific procedures underlying psychological evaluations have limitations.  Absolute predictions cannot be made based on information in this report.

## 2018-03-23 NOTE — PLAN OF CARE
"Problem: Patient Care Overview  Goal: Plan of Care/Patient Progress Review  SLP: pt seen for swallowing. Much of session spent further assessing/interviewing pt to better determine issues re:eating/swallowing - appears she is avoiding few foods 2/2 \"gets stuck in my throat.\" - eggs, hash browns, chicken  But most other foods avoided 2/2 history GERD, gastric bypass, and pt stating \"that doesn't appeal to me - ie meats).  Pt refused to eat cookie, chicken, bread - ok with green beans, peaches.  Difficult to fully determine extent of dysphagia/reduced peristalsis.  At this time she would like to continue on regular diet, self choosing items, as well as trying to drink supplements.  Will continue to tx pt, working on strategies regarding dysphagia sx.      "

## 2018-03-23 NOTE — PLAN OF CARE
"FOCUS/GOAL  Bowel management, Bladder management, Medication management, Pain management and Cognition/Memory/Judgment/Problem solving    ASSESSMENT, INTERVENTIONS AND CONTINUING PLAN FOR GOAL:  Pt is A&Ox4, she report significant pain in her Rt shoulder that is moderately relieved with 10mg Oxy, which she requested every 4 hours. Pt transfers Mod-I during the day and calls for assistance throughout evening and night shifts. Pt is continent of bowel and bladder using the toilet in the bathroom, Pt did have a BM this shift that she states was small, so she would like to continue with the suppository program tomorrow morning. Pt reported some concerns regarding urinary urgency but she is unable to urinate \"for a while\" when she reaches the bathroom, sticky note left for MD.     "

## 2018-03-23 NOTE — PLAN OF CARE
FOCUS/GOAL  Bowel management, Bladder management, Pain management, Skin integrity and Cognition/Memory/Judgment/Problem solving    ASSESSMENT, INTERVENTIONS AND CONTINUING PLAN FOR GOAL:  Pt is alert and oriented, reported pain at 4am and was given prn 10mg oxycodone and aqua k pad with partial relief, transferred to toilet to void x 1 with continence, Mod I in room during the day, requested suppository in am due to constipation, is having hesitancy when voiding, pt is worried that her Seroquel is causing bad dreams and is interested in getting off of the medication if this could be the case, no further care concerns at this time continue with POC.

## 2018-03-23 NOTE — PLAN OF CARE
Problem: Goal/Outcome  Goal: Goal Outcome Summary  Connected with pt following a PT session due to concerns of d/c, finances and transportation. Met with pt and talked about her d/c that is planned for Wednesday, 3/28. We are planning to have care conference on Tuesday and including her  with that. Discussed further regarding insurance coverage and out of pocket expenses. She shared that she is concerned about co-payment for follow-up appointments. After lengthy conversation, pt felt much better and planning to d/c home on Wednesday. Sw will continue to assist as needed.

## 2018-03-24 PROCEDURE — 40000193 ZZH STATISTIC PT WARD VISIT

## 2018-03-24 PROCEDURE — 40000133 ZZH STATISTIC OT WARD VISIT: Performed by: STUDENT IN AN ORGANIZED HEALTH CARE EDUCATION/TRAINING PROGRAM

## 2018-03-24 PROCEDURE — 97110 THERAPEUTIC EXERCISES: CPT | Mod: GP

## 2018-03-24 PROCEDURE — 97530 THERAPEUTIC ACTIVITIES: CPT | Mod: GP

## 2018-03-24 PROCEDURE — 92526 ORAL FUNCTION THERAPY: CPT | Mod: GN | Performed by: SPEECH-LANGUAGE PATHOLOGIST

## 2018-03-24 PROCEDURE — 25000132 ZZH RX MED GY IP 250 OP 250 PS 637: Performed by: PHYSICAL MEDICINE & REHABILITATION

## 2018-03-24 PROCEDURE — 97112 NEUROMUSCULAR REEDUCATION: CPT | Mod: GO | Performed by: STUDENT IN AN ORGANIZED HEALTH CARE EDUCATION/TRAINING PROGRAM

## 2018-03-24 PROCEDURE — 12800006 ZZH R&B REHAB

## 2018-03-24 PROCEDURE — 97535 SELF CARE MNGMENT TRAINING: CPT | Mod: GO | Performed by: STUDENT IN AN ORGANIZED HEALTH CARE EDUCATION/TRAINING PROGRAM

## 2018-03-24 PROCEDURE — 40000225 ZZH STATISTIC SLP WARD VISIT: Performed by: SPEECH-LANGUAGE PATHOLOGIST

## 2018-03-24 RX ORDER — OXYCODONE HYDROCHLORIDE 5 MG/1
5-10 TABLET ORAL EVERY 4 HOURS PRN
Status: DISCONTINUED | OUTPATIENT
Start: 2018-03-24 | End: 2018-03-28 | Stop reason: HOSPADM

## 2018-03-24 RX ORDER — GABAPENTIN 300 MG/1
300 CAPSULE ORAL DAILY
Status: DISCONTINUED | OUTPATIENT
Start: 2018-03-25 | End: 2018-03-26

## 2018-03-24 RX ORDER — GABAPENTIN 600 MG/1
600 TABLET ORAL 2 TIMES DAILY
Status: DISCONTINUED | OUTPATIENT
Start: 2018-03-24 | End: 2018-03-26

## 2018-03-24 RX ORDER — LIDOCAINE 4 G/G
1 PATCH TOPICAL
Status: DISCONTINUED | OUTPATIENT
Start: 2018-03-25 | End: 2018-03-28 | Stop reason: HOSPADM

## 2018-03-24 RX ADMIN — OXYCODONE HYDROCHLORIDE 5 MG: 5 TABLET ORAL at 06:09

## 2018-03-24 RX ADMIN — OXYCODONE HYDROCHLORIDE 5 MG: 5 TABLET ORAL at 15:23

## 2018-03-24 RX ADMIN — BUSPIRONE HYDROCHLORIDE 5 MG: 5 TABLET ORAL at 14:02

## 2018-03-24 RX ADMIN — BUSPIRONE HYDROCHLORIDE 5 MG: 5 TABLET ORAL at 08:16

## 2018-03-24 RX ADMIN — CYCLOBENZAPRINE HYDROCHLORIDE 10 MG: 5 TABLET, FILM COATED ORAL at 08:15

## 2018-03-24 RX ADMIN — OXYCODONE HYDROCHLORIDE 5 MG: 5 TABLET ORAL at 10:32

## 2018-03-24 RX ADMIN — SENNOSIDES AND DOCUSATE SODIUM 2 TABLET: 8.6; 5 TABLET ORAL at 20:31

## 2018-03-24 RX ADMIN — NICOTINE 1 PATCH: 7 PATCH, EXTENDED RELEASE TRANSDERMAL at 08:16

## 2018-03-24 RX ADMIN — ACETAMINOPHEN 325 MG: 325 TABLET ORAL at 05:09

## 2018-03-24 RX ADMIN — SENNOSIDES AND DOCUSATE SODIUM 2 TABLET: 8.6; 5 TABLET ORAL at 08:15

## 2018-03-24 RX ADMIN — OXYCODONE HYDROCHLORIDE 10 MG: 5 TABLET ORAL at 20:31

## 2018-03-24 RX ADMIN — OXYCODONE HYDROCHLORIDE 5 MG: 5 TABLET ORAL at 02:08

## 2018-03-24 RX ADMIN — LISINOPRIL 5 MG: 5 TABLET ORAL at 08:16

## 2018-03-24 RX ADMIN — ACETAMINOPHEN 650 MG: 325 TABLET ORAL at 05:17

## 2018-03-24 RX ADMIN — BISACODYL 10 MG: 10 SUPPOSITORY RECTAL at 17:07

## 2018-03-24 RX ADMIN — BUSPIRONE HYDROCHLORIDE 5 MG: 5 TABLET ORAL at 20:31

## 2018-03-24 RX ADMIN — GABAPENTIN 600 MG: 250 SOLUTION ORAL at 08:17

## 2018-03-24 RX ADMIN — CYCLOBENZAPRINE HYDROCHLORIDE 10 MG: 5 TABLET, FILM COATED ORAL at 14:02

## 2018-03-24 RX ADMIN — GABAPENTIN 600 MG: 600 TABLET, FILM COATED ORAL at 20:31

## 2018-03-24 RX ADMIN — CYCLOBENZAPRINE HYDROCHLORIDE 10 MG: 5 TABLET, FILM COATED ORAL at 20:31

## 2018-03-24 RX ADMIN — GABAPENTIN 300 MG: 250 SOLUTION ORAL at 14:02

## 2018-03-24 RX ADMIN — OXYCODONE HYDROCHLORIDE 5 MG: 5 TABLET ORAL at 16:13

## 2018-03-24 RX ADMIN — ANALGESIC BALM: 1.74; 4.06 OINTMENT TOPICAL at 02:11

## 2018-03-24 RX ADMIN — POLYETHYLENE GLYCOL 3350 17 G: 17 POWDER, FOR SOLUTION ORAL at 08:16

## 2018-03-24 RX ADMIN — PANTOPRAZOLE SODIUM 20 MG: 20 TABLET, DELAYED RELEASE ORAL at 08:16

## 2018-03-24 NOTE — PLAN OF CARE
Problem: Patient Care Overview  Goal: Plan of Care/Patient Progress Review  OT: Pt c/o dizziness at start of session and exhaustion. Vitals stable. Modified activity to in room with focus on hand function. Pt very tearful during session and reports being very depressed. Pt declined all suggestions for keeping self busy in room such as coloring/drawing which she has done in the past at home.

## 2018-03-24 NOTE — PLAN OF CARE
FOCUS/GOAL  Bowel management, Bladder management, Pain management and Cognition/Memory/Judgment/Problem solving    ASSESSMENT, INTERVENTIONS AND CONTINUING PLAN FOR GOAL:  Pt is A&Ox4 , she exhibits moderate anxiety and reports pain in her left shoulder moderately relieved by Oxy 5mg, Pt requested every 4 hours. Pt also reported sharp pain that comes and goes suddenly in her Lt hip. Pt is continent of bowel and bladder using toilet in her bathroom, but report urinary urgency and then hesitancy when she tries to urinate, PVRs recorded (see I/O flowsheet) continue to monitor. Pt received supp, no resulting BM, fluids and activity encouraged.

## 2018-03-24 NOTE — PROGRESS NOTES
[facility], [place]   Physical Medicine and Rehabilitation Daily Note           Assessment and Plan of Care:     Antoinette Romero is a 57 year old right hand dominant female with past medical history of tobacco use, migraines, morbid obesity with previous bypass surgery, and migraines, and chronic worsening hip and leg pain from OA,  who had a fall on 3/9/18 evening which resulted in Spinal cord injury.  She had C3-T1 ACDF on 3/11/2018, and was admitted to ARU on 3/18/18    --Vitals stable. No lab today.  --Continue ongoing medical management.  --Adjusted pain medication back to dosing of 5-10mg q4H prn. Patient to make attempts decrease down to 5mg q4H over the weekend. Further adjustments/discussion with primary team on Monday.  --Continue therapies and plan of care.         Interval history:     -Pain increased this morning due to recent change from 5-10mg Q4H to 5mg Q4H.   -Patient requesting to return to original oxycodone dosing for weekend and then to discuss with primary team on Monday.  -Slept poorly  -Worried about constipation per nursing reports, but patient did not bring this up   -Has prn bowel meds with last BM 3/22         Physical Exam:     Vitals:    03/22/18 1631 03/23/18 0809 03/23/18 1802 03/24/18 0813   BP: 107/67 127/77 118/77 121/87   BP Location: Right arm Right arm Right arm Right arm   Pulse: 101 115 101 102   Resp: 16 16 16 16   Temp: 97.9  F (36.6  C) 97.3  F (36.3  C) 99  F (37.2  C) 97.5  F (36.4  C)   TempSrc: Oral Oral Oral Oral   SpO2: 99% 98% 100% 99%   Weight:       Height:         Gen: NAD, up out of bed  HEENT: Wearing cervical collar  Lungs: breathing on room air, respirations are non-labored  Ext:  no edema in BLE, moves all limbs  MSK/neuro: alert and oriented. speech fluent. moves BUE and BLE volitionally.          Data:   Scheduled meds    gabapentin  600 mg Oral BID     gabapentin  300 mg Oral Daily     busPIRone  5 mg Oral TID      cyclobenzaprine  10 mg Oral TID     lisinopril  5 mg Oral Daily     nicotine  1 patch Transdermal Q24H     pantoprazole  20 mg Oral Daily     senna-docusate  2 tablet Oral BID     polyethylene glycol  17 g Oral Daily     nicotine   Transdermal Daily     nicotine   Transdermal Q8H     PRN meds:  oxyCODONE IR, methyl salicylate-menthol, acetaminophen, benzocaine-menthol, naloxone, bisacodyl    Kayleen Valentin, DO  PM&R Resident, PGY-4    The patient was seen and examined with staff, Dr. Barker.    Patient seen by me and discussed with Dr Valentin. I agree with her note, exam and plan for this patient.    Total time spent > 25 min with chart review, patient exam, rehab plan and dictation. >50% time spent face to face and with patient care coordination.    Shoaib Tinajero MD

## 2018-03-24 NOTE — PLAN OF CARE
Problem: Patient Care Overview  Goal: Plan of Care/Patient Progress Review  Pt seen for swallow tx- Reviewed swallow guidelines and strategies with pt again: order softer foods/moister foods, small bitesand sips, extra sips of liquids in between bites, smaller more frequent meals, extra hard swallows, taking time and eating slowly-- pt was able to state thes back to SLP. Pt is tolerating softer foods- tolerated sandwich and pasta and regular hard fruit- with using extra hard swallows- no sensation fo pharyngeal retention Recommending continue with regular diet with thin ilquids but pt to self select softer foods. Also introduced and complete oral pharygneal excs- left sheet in room. SLP to f/u for diet tolerance/ education

## 2018-03-24 NOTE — PLAN OF CARE
Problem: Goal/Outcome  Goal: Goal Outcome Summary  Outcome: Improving  FOCUS/GOAL  Bowel management, Bladder management, Pain management and Medical management    ASSESSMENT, INTERVENTIONS AND CONTINUING PLAN FOR GOAL:  Pt didn't have any BM today and no luck from yesterday's suppository. Pt was re-educated to try prune juice but didn't like it and pt was thinking coffee might help but wants also to take naps b/n her therapies. Pt c/o pain over the night and wanted back to 5-10mg Oyx, and decrease Gabapentin to 300mg, changes were made but pt took 5mg of Oxy so far and has been helpful. Neck incision intact and pt calls if needs some cares. Pt is MOD I during day time.  Nursing will cnt POC

## 2018-03-24 NOTE — PLAN OF CARE
Problem: Goal/Outcome  Goal: Goal Outcome Summary  Outcome: Improving  FOCUS/GOAL  Medical management    ASSESSMENT, INTERVENTIONS AND CONTINUING PLAN FOR GOAL:  Patient woke up around 0200 c/o pain right neck and shoulder pain, gave her Oxycodone 5 mg and applied Bengay to right neck and shoulder. She was able to sleep for about 3 hours, around 0500, she woke up c/o a lot of pain in same areas. She was not yet due to Oxycodone, gave her Tylenol while she waits for Oxycodone to be due. She is resting now, will give her Oxycodone at 0610. She said she requested Oxycodone dose to be reduced yesterday because she is afraid of being addicted to it, she wants to go back to her previous Oxycodone dose since  5 mg was not effective.   She is also afraid of being constipated due to Oxycodone, she is taking Senna and Miralax. She is afraid of taking Tylenol because she is afraid it will damage her liver. She wants to talk to the doctor as early as  possible about: pain medication, tylenol and constipation.   She is  independent in the room without AD during the day, needs assistance during the night. She called when she needed to go to the bathroom, she ambulated to and from the bathroom with SBA, gait is steady. She had blood on toilet tissues from hemorrhoids.   Continue to assess and treat pain as needed.

## 2018-03-25 PROCEDURE — 92526 ORAL FUNCTION THERAPY: CPT | Mod: GN | Performed by: SPEECH-LANGUAGE PATHOLOGIST

## 2018-03-25 PROCEDURE — 97150 GROUP THERAPEUTIC PROCEDURES: CPT | Mod: GO

## 2018-03-25 PROCEDURE — 97530 THERAPEUTIC ACTIVITIES: CPT | Mod: GP

## 2018-03-25 PROCEDURE — 40000193 ZZH STATISTIC PT WARD VISIT

## 2018-03-25 PROCEDURE — 97110 THERAPEUTIC EXERCISES: CPT | Mod: GO

## 2018-03-25 PROCEDURE — 40000133 ZZH STATISTIC OT WARD VISIT

## 2018-03-25 PROCEDURE — 25000132 ZZH RX MED GY IP 250 OP 250 PS 637: Performed by: PHYSICAL MEDICINE & REHABILITATION

## 2018-03-25 PROCEDURE — 97112 NEUROMUSCULAR REEDUCATION: CPT | Mod: GP

## 2018-03-25 PROCEDURE — 40000225 ZZH STATISTIC SLP WARD VISIT: Performed by: SPEECH-LANGUAGE PATHOLOGIST

## 2018-03-25 PROCEDURE — 25000132 ZZH RX MED GY IP 250 OP 250 PS 637: Performed by: PAIN MEDICINE

## 2018-03-25 PROCEDURE — 12800006 ZZH R&B REHAB

## 2018-03-25 RX ORDER — BUSPIRONE HYDROCHLORIDE 7.5 MG/1
7.5 TABLET ORAL 2 TIMES DAILY
Status: DISCONTINUED | OUTPATIENT
Start: 2018-03-25 | End: 2018-03-28 | Stop reason: HOSPADM

## 2018-03-25 RX ORDER — OXYCODONE HYDROCHLORIDE 5 MG/1
10 TABLET ORAL DAILY
Status: DISCONTINUED | OUTPATIENT
Start: 2018-03-26 | End: 2018-03-28 | Stop reason: HOSPADM

## 2018-03-25 RX ADMIN — SENNOSIDES AND DOCUSATE SODIUM 2 TABLET: 8.6; 5 TABLET ORAL at 20:35

## 2018-03-25 RX ADMIN — GABAPENTIN 600 MG: 600 TABLET, FILM COATED ORAL at 20:35

## 2018-03-25 RX ADMIN — BUSPIRONE HYDROCHLORIDE 5 MG: 5 TABLET ORAL at 07:57

## 2018-03-25 RX ADMIN — LIDOCAINE 1 PATCH: 560 PATCH PERCUTANEOUS; TOPICAL; TRANSDERMAL at 08:09

## 2018-03-25 RX ADMIN — GABAPENTIN 300 MG: 300 CAPSULE ORAL at 13:14

## 2018-03-25 RX ADMIN — NICOTINE 1 PATCH: 7 PATCH, EXTENDED RELEASE TRANSDERMAL at 08:04

## 2018-03-25 RX ADMIN — LISINOPRIL 5 MG: 5 TABLET ORAL at 07:57

## 2018-03-25 RX ADMIN — OXYCODONE HYDROCHLORIDE 10 MG: 5 TABLET ORAL at 20:35

## 2018-03-25 RX ADMIN — OXYCODONE HYDROCHLORIDE 10 MG: 5 TABLET ORAL at 16:14

## 2018-03-25 RX ADMIN — OXYCODONE HYDROCHLORIDE 10 MG: 5 TABLET ORAL at 02:19

## 2018-03-25 RX ADMIN — CYCLOBENZAPRINE HYDROCHLORIDE 10 MG: 5 TABLET, FILM COATED ORAL at 13:14

## 2018-03-25 RX ADMIN — BUSPIRONE HYDROCHLORIDE 7.5 MG: 7.5 TABLET ORAL at 20:35

## 2018-03-25 RX ADMIN — PANTOPRAZOLE SODIUM 20 MG: 20 TABLET, DELAYED RELEASE ORAL at 07:57

## 2018-03-25 RX ADMIN — CYCLOBENZAPRINE HYDROCHLORIDE 10 MG: 5 TABLET, FILM COATED ORAL at 07:57

## 2018-03-25 RX ADMIN — OXYCODONE HYDROCHLORIDE 10 MG: 5 TABLET ORAL at 11:48

## 2018-03-25 RX ADMIN — SENNOSIDES AND DOCUSATE SODIUM 2 TABLET: 8.6; 5 TABLET ORAL at 08:04

## 2018-03-25 RX ADMIN — GABAPENTIN 600 MG: 600 TABLET, FILM COATED ORAL at 08:03

## 2018-03-25 RX ADMIN — POLYETHYLENE GLYCOL 3350 17 G: 17 POWDER, FOR SOLUTION ORAL at 09:47

## 2018-03-25 RX ADMIN — CYCLOBENZAPRINE HYDROCHLORIDE 10 MG: 5 TABLET, FILM COATED ORAL at 20:35

## 2018-03-25 RX ADMIN — OXYCODONE HYDROCHLORIDE 10 MG: 5 TABLET ORAL at 07:50

## 2018-03-25 NOTE — PLAN OF CARE
Problem: Patient Care Overview  Goal: Plan of Care/Patient Progress Review  Pt seen for swallowing- meal f/u on current regular diet- have added more challenging items from regular diet list- pt tolerating withotu overt difficulty- no s/s of aspiration and no sensation of pharygneal retention when pt uses safe swallow strategies. Pt is using safe swallow strategies independently-- slow rate, double hard swallow, extra sips of liquids in betwen bites, paces self and when needed self- selects softer more moist items from the regualr diet menu. Further pt is doing swallow excs on her own independently- has sheet in her room. As pt is tolerating current regular diet and is using safe swallow strategies independently- goals met for swallowing- will d/c from sptx  Speech Language Therapy Discharge Summary    Reason for therapy discharge:    All goals and outcomes met, no further needs identified.    Progress towards therapy goal(s). See goals on Care Plan in [facility] electronic health record for goal details.  Goals met    Therapy recommendation(s):    No further therapy is recommended.See above

## 2018-03-25 NOTE — PLAN OF CARE
Problem: Patient Care: Nursing Focus  Goal: Pain Management  FOCUS/GOAL  Pain management    ASSESSMENT, INTERVENTIONS AND CONTINUING PLAN FOR GOAL:  Upset with writer this morning, answered call light first when stepped onto floor after morning report. Patient stating needing pain pills. Spoke with MD et now has scheduled oxycodone 7 am before therapy's. Patient informed et no longer upset. Quite rest of day ambulating in room.

## 2018-03-25 NOTE — PLAN OF CARE
Problem: Goal/Outcome  Goal: Goal Outcome Summary  Outcome: Improving  FOCUS/GOAL  Bowel management, Pain management and Mobility    ASSESSMENT, INTERVENTIONS AND CONTINUING PLAN FOR GOAL:  Pt was tearful at start of shift due to pain, additional dose of oxycodone 5mg given with good relief.   Pt able to take a shower independently when provided all supplies. Ambulated in rowe with cane. Independent for functional mobility in room.   Pt inserted suppository independently and had large hard stool. Voiding spontaneously, still having urgency and hesitancy per pt. Pt drinking well and plans to drink prune juice for constipation.   Cervical collar on. Plan to try lidocaine patch tomorrow am to manage pain more effectively.

## 2018-03-25 NOTE — PROGRESS NOTES
Antoinette is progressing well in PT with increasing endurance, strength and L sided stability with ambulation. Currently she is utilizing SEC with nursing staff and no AD in PT and no LOB. She continues to have shoulder pain and today c/o L deep hip pain. Overall she is progressing with dynamic balance as well. Continue per POC and progress towards goals. Met goal of 15 min continuous activity by 5 min over goal, see flowsheet TE.

## 2018-03-25 NOTE — PROGRESS NOTES
[facility], [place]   Physical Medicine and Rehabilitation Daily Note           Assessment and Plan of Care:     Antoinette Romero is a 57 year old right hand dominant female with past medical history of tobacco use, migraines, morbid obesity with previous bypass surgery, and migraines, and chronic worsening hip and leg pain from OA,  who had a fall on 3/9/18 evening which resulted in Spinal cord injury.  She had C3-T1 ACDF on 3/11/2018, and was admitted to ARU on 3/18/18    --Vitals stable. No lab today.  --Continue ongoing medical management.  --Continue therapies and plan of care.    Changes to medical care:    # Pain: neuropathic in nature as she has numbness tingling, fire type lightning bolt pain. 3/24-25 has had ongoing challenges with pain control over the weekend. Returned back to oxycodone 5-10mg Q4h prn 3/24 due to poor tolerance with dose adjustment over the weekend. Encouraged her to attempt 5mg as tolerated with self weaning from medication. Also with worsening shoulder and back pain--added prn lidocaine patch 3/24 evening. 3/25 am - patient tearful and upset about not getting her pain medication as requested during shift change. Discussed with nursing, added scheduled oxycodone 10mg at 0700. Modified mood medications per pharm and note 3/8/18 for conventional dosing.  - Started gabapentin 300 mg TID on admission, increased to 600 mg  evening dose  300-300-600, increased again on 3/23 to 600-300-600.   - Cont. Oxycodone 5-10 mg Q4H with plan to wean off of this medication. Sched. 10mg at 0700. Additional adjustment per primary team Monday.  - Cont. Tylenol  Prn  - Flexeril TID   - Adjustment to BuSpar 5mg TID to BuSpar 7.5mg bid (3/25). Discussed change with patient.  - Lidocaine patch to back/shoulder daily for pain. Intention for this to help patient with weaning oxycodone.         Interval history:     -Pain management continues to be challenging for patient, tearful this  morning.   -Patient upset that pain medications delayed. Offered to schedule morning dose to help with preparation for therapies, patient agreeable to this.  -Added lidocaine patch to help assist with weaning oxycodone.  -Last BM 3/24  -No additional complaints  -Nursing notes reviewed.         Physical Exam:     Vitals:    03/23/18 1802 03/24/18 0813 03/24/18 1124 03/24/18 1641   BP: 118/77 121/87 128/81 139/89   BP Location: Right arm Right arm Right arm Right arm   Pulse: 101 102 118 107   Resp: 16 16  16   Temp: 99  F (37.2  C) 97.5  F (36.4  C)  98.1  F (36.7  C)   TempSrc: Oral Oral  Oral   SpO2: 100% 99%  100%   Weight:       Height:         Gen: NAD, sitting out of bed  HEENT: Wearing cervical collar  Lungs: breathing on room air, respirations are non-labored  Ext:  no edema in BLE, moves all limbs  MSK/neuro: alert and oriented. speech fluent. moves BUE and BLE volitionally.  Psych: Tearful          Data:   Scheduled meds    [START ON 3/26/2018] oxyCODONE IR  10 mg Oral Daily     busPIRone  7.5 mg Oral BID     gabapentin  300 mg Oral Daily     gabapentin  600 mg Oral BID     lidocaine  1 patch Transdermal Q24H     lidocaine   Transdermal Q24h     lidocaine   Transdermal Q8H     cyclobenzaprine  10 mg Oral TID     lisinopril  5 mg Oral Daily     nicotine  1 patch Transdermal Q24H     pantoprazole  20 mg Oral Daily     senna-docusate  2 tablet Oral BID     polyethylene glycol  17 g Oral Daily     nicotine   Transdermal Daily     nicotine   Transdermal Q8H     PRN meds:  oxyCODONE IR, methyl salicylate-menthol, acetaminophen, benzocaine-menthol, naloxone, bisacodyl    Kayleen Valentin, DO  PM&R Resident, PGY-4    The patient was seen and examined with staff, Dr. Barker.    Patient seen by me and discussed with Dr Valentin. I agree with her note, exam and plan for this patient.      Total time spent > 25 min with chart review, patient exam, rehab plan and dictation. >50% time spent face to face and with patient  care coordination.      Shoaib Tinajero MD

## 2018-03-25 NOTE — PLAN OF CARE
Problem: Individualization  Goal: Patient Individualization  Outcome: Improving  Alert and oriented to self, place and time. Amb to the BR with SBA, continent of bladder on the toilet, SBA with pericares and clothing management.C/o right neck and shoulder pain, oxycodone 10 mg administered with good effect. Slept most of the night, will continue POC

## 2018-03-25 NOTE — PLAN OF CARE
Problem: Patient Care Overview  Goal: Plan of Care/Patient Progress Review  OT: Pt reporting some nerve symptoms in L forearm upon waking this morning; educated pt on sleeping position (woke up on L side) and effect on cervical spine. Pt less anxious upon learning potential cause of L forearm symptoms and encouragement to continue sleeping on back.     Comments: OT: Pt reporting some nerve symptoms in L forearm upon waking this morning; educated pt on sleeping position (woke up on L side) and effect on cervical spine. Pt less anxious upon learning potential cause of L forearm symptoms and encouragement to continue sleeping on back.

## 2018-03-25 NOTE — PROGRESS NOTES
Pt attended seated yoga exercise class today with group of 3 patients.  Pt selected for class due to documented strength and mobility deficits.  Class includes education in movement and breath awareness, and time spent doing both seated and standing yoga exercises lead by staff. Pt was able to participate well through modified techniques. Patient expressed decreased pain and decreased anxiety post session.

## 2018-03-26 PROCEDURE — 97110 THERAPEUTIC EXERCISES: CPT | Mod: GO

## 2018-03-26 PROCEDURE — 97112 NEUROMUSCULAR REEDUCATION: CPT | Mod: GP

## 2018-03-26 PROCEDURE — 97110 THERAPEUTIC EXERCISES: CPT | Mod: GO | Performed by: STUDENT IN AN ORGANIZED HEALTH CARE EDUCATION/TRAINING PROGRAM

## 2018-03-26 PROCEDURE — 40000193 ZZH STATISTIC PT WARD VISIT

## 2018-03-26 PROCEDURE — 97110 THERAPEUTIC EXERCISES: CPT | Mod: GP

## 2018-03-26 PROCEDURE — 40000133 ZZH STATISTIC OT WARD VISIT

## 2018-03-26 PROCEDURE — 25000132 ZZH RX MED GY IP 250 OP 250 PS 637: Performed by: PAIN MEDICINE

## 2018-03-26 PROCEDURE — 97530 THERAPEUTIC ACTIVITIES: CPT | Mod: GO | Performed by: STUDENT IN AN ORGANIZED HEALTH CARE EDUCATION/TRAINING PROGRAM

## 2018-03-26 PROCEDURE — 97750 PHYSICAL PERFORMANCE TEST: CPT | Mod: GP

## 2018-03-26 PROCEDURE — 97530 THERAPEUTIC ACTIVITIES: CPT | Mod: GO

## 2018-03-26 PROCEDURE — 25000132 ZZH RX MED GY IP 250 OP 250 PS 637: Performed by: PHYSICAL MEDICINE & REHABILITATION

## 2018-03-26 PROCEDURE — 40000133 ZZH STATISTIC OT WARD VISIT: Performed by: STUDENT IN AN ORGANIZED HEALTH CARE EDUCATION/TRAINING PROGRAM

## 2018-03-26 PROCEDURE — 12800006 ZZH R&B REHAB

## 2018-03-26 PROCEDURE — 97116 GAIT TRAINING THERAPY: CPT | Mod: GP

## 2018-03-26 PROCEDURE — 97530 THERAPEUTIC ACTIVITIES: CPT | Mod: GP

## 2018-03-26 RX ORDER — GABAPENTIN 600 MG/1
600 TABLET ORAL 3 TIMES DAILY
Status: DISCONTINUED | OUTPATIENT
Start: 2018-03-26 | End: 2018-03-28 | Stop reason: HOSPADM

## 2018-03-26 RX ADMIN — GABAPENTIN 600 MG: 600 TABLET, FILM COATED ORAL at 20:14

## 2018-03-26 RX ADMIN — OXYCODONE HYDROCHLORIDE 10 MG: 5 TABLET ORAL at 20:19

## 2018-03-26 RX ADMIN — CYCLOBENZAPRINE HYDROCHLORIDE 10 MG: 5 TABLET, FILM COATED ORAL at 08:04

## 2018-03-26 RX ADMIN — GABAPENTIN 600 MG: 600 TABLET, FILM COATED ORAL at 13:44

## 2018-03-26 RX ADMIN — POLYETHYLENE GLYCOL 3350 17 G: 17 POWDER, FOR SOLUTION ORAL at 08:03

## 2018-03-26 RX ADMIN — OXYCODONE HYDROCHLORIDE 10 MG: 5 TABLET ORAL at 07:03

## 2018-03-26 RX ADMIN — GABAPENTIN 600 MG: 600 TABLET, FILM COATED ORAL at 08:04

## 2018-03-26 RX ADMIN — OXYCODONE HYDROCHLORIDE 10 MG: 5 TABLET ORAL at 03:11

## 2018-03-26 RX ADMIN — SENNOSIDES AND DOCUSATE SODIUM 2 TABLET: 8.6; 5 TABLET ORAL at 08:04

## 2018-03-26 RX ADMIN — CYCLOBENZAPRINE HYDROCHLORIDE 10 MG: 5 TABLET, FILM COATED ORAL at 20:14

## 2018-03-26 RX ADMIN — LISINOPRIL 5 MG: 5 TABLET ORAL at 08:04

## 2018-03-26 RX ADMIN — NICOTINE 1 PATCH: 7 PATCH, EXTENDED RELEASE TRANSDERMAL at 08:03

## 2018-03-26 RX ADMIN — SENNOSIDES AND DOCUSATE SODIUM 2 TABLET: 8.6; 5 TABLET ORAL at 20:19

## 2018-03-26 RX ADMIN — CYCLOBENZAPRINE HYDROCHLORIDE 10 MG: 5 TABLET, FILM COATED ORAL at 13:44

## 2018-03-26 RX ADMIN — OXYCODONE HYDROCHLORIDE 10 MG: 5 TABLET ORAL at 15:14

## 2018-03-26 RX ADMIN — PANTOPRAZOLE SODIUM 20 MG: 20 TABLET, DELAYED RELEASE ORAL at 08:04

## 2018-03-26 RX ADMIN — BUSPIRONE HYDROCHLORIDE 7.5 MG: 7.5 TABLET ORAL at 08:04

## 2018-03-26 RX ADMIN — BUSPIRONE HYDROCHLORIDE 7.5 MG: 7.5 TABLET ORAL at 20:19

## 2018-03-26 NOTE — PLAN OF CARE
Problem: Patient Care Overview  Goal: Plan of Care/Patient Progress Review  FOCUS/GOAL  Pain management    ASSESSMENT, INTERVENTIONS AND CONTINUING PLAN FOR GOAL:  Pt denied pain and need for medication pain interventions during shift - aqua k pad & pump used by patient intermittently. Pt reported neck and right arm aching pain at end of shift - PRN oxycodone given, per pt request - continue to monitor pain. Continue to provide pain management education, needs reinforcement post education at end of shift. Gabapentin frequency increase to TID - scheduled doses given during shift. Pt using call light appropriately to anticipate needs. Independent in room. Continue with POC.

## 2018-03-26 NOTE — PROGRESS NOTES
CLINICAL NUTRITION SERVICES - REASSESSMENT NOTE    RECOMMENDATIONS FOR MDs/PROVIDERS TO ORDER:  Recommend outpatient follow up with bariatric RD to help maximize nutrition status including vitamin and mineral supplementation post RNY GB.     Malnutrition Status:    Non-severe malnutrition in the context of acute illness.       Recommendations already ordered by Registered Dietitian (RD):  -Reordered Boost Plus chocolate at 10 am daily per pt agreement.     Future/Additional Recommendations:  -Encourage intakes of tid meals including protein sources plus supplements between.  Pt prefers Boost Plus cold or over ice.      -Monitor po intakes and weight trends.     EVALUATION OF THE PROGRESS TOWARD GOALS   Diet: Regular with Thin Liquids  Room Service Appropriate    Intake: Pt previously receiving Boost Plus bid, but asked to have discontinued on 3/23. PO intakes have  50-75% of recorded meals.  Per review of selections in [facility], pt is ordering well for tid meals, estimate meeting about 90% of assessed calorie and protein needs (lower end). Per pt she is eating well.  She reports willing to drink the Boost Plus 1x per day.   She states tolerating the diet ok.      NEW FINDINGS   Current weight (3/26)  124 lbs 12.8 oz.  Weight has been stable since ARU admission, but overall decreased about 6 lbs or 5% from prior to hospital admission.  Pt is underweight at 89% IBW.    03/08/18 59.4 kg (131 lb)     Per chart review, pt now dc'd from speech therapy due to tolerating Regular diet and is using safe swallowing strategies independently. Has been selecting softer, more moist items from regular menu.    MALNUTRITION  % Intake: Decreased intake does not meet criteria  % Weight Loss: None noted (previously >2% x 1 week)  Subcutaneous Fat Loss: unable to assess today due to pending therapy-previously noted 3/19: Upper arm:  Moderate (unable to view lower extremities)  Muscle Loss:   unable to assess today-previously  noted 3/19  Upper arm:  Moderate (unable to view lower extremities)  Fluid Accumulation/Edema: None noted  Malnutrition Diagnosis: Non-severe malnutrition in the context of acute illness.      Previous Goals   Patient to consume % of nutritionally adequate meal trays TID, or the equivalent with supplements/snacks.  Evaluation: Not met    Previous Nutrition Diagnosis  Inadequate oral intakes related to swallowing difficulty post surgery and early satiety with hx gastric bypass  as evidenced by pt report of requiring softer foods and able to eat only small amounts at meals with possible 6% weight loss over past 10 days.     Evaluation: Improving/updated below    CURRENT NUTRITION DIAGNOSIS  Inadequate oral intakes related to swallowing difficulty post surgery and early satiety with gastric bypass as evidenced by pt eating 50-75% of meals, estimate meeting about 90% lower end assessed needs, with underweight status.    INTERVENTIONS  Implementation  Nutrition Education:  Encouraged intakes of tid meals.  Discussed supplements and reordered Boost Plus chocolate x 1 at 10 am daily per pt agreement.  Pt also has 3 Boost Plus in unit refrigerator and was informed of this.      Goals  Patient to consume % of nutritionally adequate meal trays TID, or the equivalent with supplements/snacks.    Monitoring/Evaluation  Progress toward goals will be monitored and evaluated per protocol.    Esme Simmons, RD, LD

## 2018-03-26 NOTE — PROGRESS NOTES
[facility], [place]    Physical Medicine and Rehabilitation Daily Note     Assessment and Plan:    Antoinette Romero is a 57 year old right hand dominant female with past medical history of tobacco use, migraines, morbid obesity with previous bypass surgery, and migraines, and chronic worsening hip and leg pain from OA,  who had a fall on 3/9/18 evening which resulted in Spinal cord injury.  She had C3-T1 ACDF on 3/11/2018, and was admitted to ARU on 3/18/18    Interval History:    Patient seen on rounds, making gains with therapies. Some difficulties with problem solving and memory. Needs some asst with dressing. Mod I with transfers. Ambulates to/from shower room with a cane. Pain control OK this am with scheduled Oxycodone at 7 am. Continue therapies and discharge planning. Pain medication modification per primary team before discharge.          Review of Systems:    8 systems reviewed, see interval history for details.     Medications:    See chart     Physical Exam:      All vitals stable  Constitutional: Alert, NAD    Lungs:  Clear    Cardiovascular:  RRR   Abdomen: Soft    Genitounirinary:  Cont urine    Musculoskeletal: No joint swelling or redness    Neurologic:  No new focal changes      Total time spent >25 min with chart review, patient exam, rehab plan and dictation. >50% time spent face to face and with patient care coordination.     TONI BEARD

## 2018-03-26 NOTE — PLAN OF CARE
Problem: Patient Care Overview  Goal: Plan of Care/Patient Progress Review  Pt frustrated and anxious today regarding pain medication and the number of things needed to complete prior to d/c. Reviewed floor transfer and car transfer and pt is at mod I level due to verbal cues/demo needed for sequencing. Pt demonstrates glute med weakness L>R, and HEP given focusing on hip strengthening.

## 2018-03-26 NOTE — PLAN OF CARE
Problem: Patient Care Overview  Goal: Plan of Care/Patient Progress Review  PT: Focus on dynamic and static balance activities, issued HEP. Pt w/ inc difficulty on stairs this day. Repeat of DGI; pt scored 12/24, limited by cx precautions.

## 2018-03-26 NOTE — PLAN OF CARE
Problem: Goal/Outcome  Goal: Goal Outcome Summary  FOCUS/GOAL  Bowel management, Bladder management, Pain management, Wound care management, Discharge planning and Mobility    ASSESSMENT, INTERVENTIONS AND CONTINUING PLAN FOR GOAL:    Patient is A&Ox4, continent of bowel/bladder. No BM this shift. MOD I in her room during the day. Patient c/o right shoulder/arm pain. PRN Oxy given 2x which provided relief. Aqua K heating pad also used. Anterior neck incision is approximated and LATASHA. [place] J collar on AAT. DC planned for 3/28. Continue to monitor pain.

## 2018-03-27 PROCEDURE — 40000187 ZZH STATISTIC PATIENT MED CONFERENCE < 30 MIN: Performed by: STUDENT IN AN ORGANIZED HEALTH CARE EDUCATION/TRAINING PROGRAM

## 2018-03-27 PROCEDURE — 25000132 ZZH RX MED GY IP 250 OP 250 PS 637: Performed by: PHYSICAL MEDICINE & REHABILITATION

## 2018-03-27 PROCEDURE — 97530 THERAPEUTIC ACTIVITIES: CPT | Mod: GP | Performed by: PHYSICAL THERAPIST

## 2018-03-27 PROCEDURE — 40000133 ZZH STATISTIC OT WARD VISIT: Performed by: STUDENT IN AN ORGANIZED HEALTH CARE EDUCATION/TRAINING PROGRAM

## 2018-03-27 PROCEDURE — 12800006 ZZH R&B REHAB

## 2018-03-27 PROCEDURE — 97530 THERAPEUTIC ACTIVITIES: CPT | Mod: GO | Performed by: OCCUPATIONAL THERAPIST

## 2018-03-27 PROCEDURE — 99366 TEAM CONF W/PAT BY HC PROF: CPT | Performed by: PHYSICAL THERAPIST

## 2018-03-27 PROCEDURE — 97110 THERAPEUTIC EXERCISES: CPT | Mod: GP | Performed by: PHYSICAL THERAPIST

## 2018-03-27 PROCEDURE — 40000193 ZZH STATISTIC PT WARD VISIT

## 2018-03-27 PROCEDURE — 25000132 ZZH RX MED GY IP 250 OP 250 PS 637: Performed by: PAIN MEDICINE

## 2018-03-27 PROCEDURE — 97110 THERAPEUTIC EXERCISES: CPT | Mod: GP

## 2018-03-27 PROCEDURE — 97110 THERAPEUTIC EXERCISES: CPT | Mod: GO | Performed by: STUDENT IN AN ORGANIZED HEALTH CARE EDUCATION/TRAINING PROGRAM

## 2018-03-27 PROCEDURE — 97530 THERAPEUTIC ACTIVITIES: CPT | Mod: GP

## 2018-03-27 PROCEDURE — 40000193 ZZH STATISTIC PT WARD VISIT: Performed by: PHYSICAL THERAPIST

## 2018-03-27 PROCEDURE — 40000133 ZZH STATISTIC OT WARD VISIT: Performed by: OCCUPATIONAL THERAPIST

## 2018-03-27 PROCEDURE — 97535 SELF CARE MNGMENT TRAINING: CPT | Mod: GO | Performed by: STUDENT IN AN ORGANIZED HEALTH CARE EDUCATION/TRAINING PROGRAM

## 2018-03-27 RX ORDER — PANTOPRAZOLE SODIUM 20 MG/1
20 TABLET, DELAYED RELEASE ORAL DAILY
Qty: 30 TABLET | Refills: 0 | Status: SHIPPED | OUTPATIENT
Start: 2018-03-27 | End: 2018-04-10

## 2018-03-27 RX ORDER — LISINOPRIL 10 MG/1
5 TABLET ORAL DAILY
Qty: 30 TABLET | Refills: 0 | Status: SHIPPED | OUTPATIENT
Start: 2018-03-27 | End: 2018-04-10

## 2018-03-27 RX ORDER — GABAPENTIN 600 MG/1
600 TABLET ORAL 3 TIMES DAILY
Qty: 90 TABLET | Refills: 0 | Status: SHIPPED | OUTPATIENT
Start: 2018-03-27 | End: 2018-04-10

## 2018-03-27 RX ORDER — OXYCODONE HYDROCHLORIDE 5 MG/1
5-10 TABLET ORAL EVERY 6 HOURS PRN
Qty: 30 TABLET | Refills: 0 | Status: SHIPPED | OUTPATIENT
Start: 2018-03-27 | End: 2018-04-10

## 2018-03-27 RX ORDER — AMOXICILLIN 250 MG
2 CAPSULE ORAL 2 TIMES DAILY
Qty: 120 TABLET | Refills: 0 | Status: SHIPPED | OUTPATIENT
Start: 2018-03-27 | End: 2018-04-19

## 2018-03-27 RX ORDER — CYCLOBENZAPRINE HCL 10 MG
10 TABLET ORAL 3 TIMES DAILY PRN
Qty: 60 TABLET | Refills: 0 | Status: SHIPPED | OUTPATIENT
Start: 2018-03-27 | End: 2018-04-10

## 2018-03-27 RX ORDER — BUSPIRONE HYDROCHLORIDE 7.5 MG/1
7.5 TABLET ORAL 2 TIMES DAILY
Qty: 120 TABLET | Refills: 0 | Status: SHIPPED | OUTPATIENT
Start: 2018-03-27 | End: 2018-04-10

## 2018-03-27 RX ORDER — POLYETHYLENE GLYCOL 3350 17 G/17G
17 POWDER, FOR SOLUTION ORAL DAILY
Qty: 30 PACKET | Refills: 0 | Status: SHIPPED | OUTPATIENT
Start: 2018-03-28 | End: 2018-04-19

## 2018-03-27 RX ORDER — BISACODYL 10 MG
10 SUPPOSITORY, RECTAL RECTAL DAILY PRN
Qty: 10 SUPPOSITORY | Refills: 0 | Status: SHIPPED | OUTPATIENT
Start: 2018-03-27 | End: 2018-04-19

## 2018-03-27 RX ORDER — ACETAMINOPHEN 325 MG/1
650 TABLET ORAL EVERY 4 HOURS PRN
Qty: 100 TABLET | Refills: 0 | Status: CANCELLED | OUTPATIENT
Start: 2018-03-27

## 2018-03-27 RX ADMIN — GABAPENTIN 600 MG: 600 TABLET, FILM COATED ORAL at 13:51

## 2018-03-27 RX ADMIN — PANTOPRAZOLE SODIUM 20 MG: 20 TABLET, DELAYED RELEASE ORAL at 08:07

## 2018-03-27 RX ADMIN — OXYCODONE HYDROCHLORIDE 10 MG: 5 TABLET ORAL at 12:30

## 2018-03-27 RX ADMIN — LISINOPRIL 5 MG: 5 TABLET ORAL at 08:11

## 2018-03-27 RX ADMIN — BISACODYL 10 MG: 10 SUPPOSITORY RECTAL at 10:15

## 2018-03-27 RX ADMIN — NICOTINE 1 PATCH: 7 PATCH, EXTENDED RELEASE TRANSDERMAL at 08:08

## 2018-03-27 RX ADMIN — POLYETHYLENE GLYCOL 3350 17 G: 17 POWDER, FOR SOLUTION ORAL at 08:07

## 2018-03-27 RX ADMIN — CYCLOBENZAPRINE HYDROCHLORIDE 10 MG: 5 TABLET, FILM COATED ORAL at 13:51

## 2018-03-27 RX ADMIN — GABAPENTIN 600 MG: 600 TABLET, FILM COATED ORAL at 20:15

## 2018-03-27 RX ADMIN — SENNOSIDES AND DOCUSATE SODIUM 2 TABLET: 8.6; 5 TABLET ORAL at 08:07

## 2018-03-27 RX ADMIN — CYCLOBENZAPRINE HYDROCHLORIDE 10 MG: 5 TABLET, FILM COATED ORAL at 20:15

## 2018-03-27 RX ADMIN — ACETAMINOPHEN 650 MG: 325 TABLET ORAL at 16:03

## 2018-03-27 RX ADMIN — GABAPENTIN 600 MG: 600 TABLET, FILM COATED ORAL at 08:07

## 2018-03-27 RX ADMIN — OXYCODONE HYDROCHLORIDE 10 MG: 5 TABLET ORAL at 20:15

## 2018-03-27 RX ADMIN — CYCLOBENZAPRINE HYDROCHLORIDE 10 MG: 5 TABLET, FILM COATED ORAL at 08:07

## 2018-03-27 RX ADMIN — BUSPIRONE HYDROCHLORIDE 7.5 MG: 7.5 TABLET ORAL at 08:07

## 2018-03-27 RX ADMIN — OXYCODONE HYDROCHLORIDE 10 MG: 5 TABLET ORAL at 02:43

## 2018-03-27 RX ADMIN — BUSPIRONE HYDROCHLORIDE 7.5 MG: 7.5 TABLET ORAL at 20:15

## 2018-03-27 RX ADMIN — SENNOSIDES AND DOCUSATE SODIUM 2 TABLET: 8.6; 5 TABLET ORAL at 20:15

## 2018-03-27 RX ADMIN — OXYCODONE HYDROCHLORIDE 10 MG: 5 TABLET ORAL at 06:56

## 2018-03-27 NOTE — CARE CONFERENCE
Acute Rehab Care Conference/Team Rounds      Type: Patient Conference    Present: Dr Deena Sales, Tiesha Ward LICSW, Antoinette Romero patient, Misa Manolo PT, Madina Gold OT, Tammi Marcial RN.       Discharge Barriers/Treatment/Education    Rehab Diagnosis: Cervical myelopathy s/p C3-T1 ACDF    Active Medical Co-morbidities/Prognosis: Anxiety, tobacco abuse, migraines, osteoarthritis    Safety: Pt is alert and oriented, uses call light and verbally expressess needs, independent in room during the day and calling for assistance at night, pt is very anxious about d/c and how she will manage her pain and current level of function, is wearing collar at all times.    Pain: Pt reports pain in neck and bilateral shoulders, using AquaK heat pad and taking prn Oxycodone with partial relief.     Medications, Skin, Tubes/Lines: Pt would likely benefit from MAP program prior to d/c, incision on anterior neck is scabbed, CDI, and approximated, no tubes line or drains in place.     Bowel/Bladder: Continent of bowel and bladder, transfers to toilet to void and defecate , LBM 3/24/17 and is using suppositories and prn bowel medications to assist with relief of constipation.      Psychosocial: Pt resides with her  and their 20 yr old son. Pts goal to return home with continued family support. Team working towards a safe d/c plan.     ADLs/IADLs: Pt is I/MOD I with self cares and light IADL. Pt has decreased strength and function in UE however she manages well despite limitations. Pain a factor but so far she has been able to get what she needs to done. Recommend shower chair for EC and safety when bathing and reacher. Pt on track to DC with family support as needed. Recommend OP OT at [facility].     Mobility: Up w/o device.  Improved 6MWT from 227 meters to 320 meters, this is 54% of predicted.  Recommend OP f/u for ongoing gait and balance.  Declined SEC for community mobility.     Community Re-Entry:  Ambulatory, ind  limited community mobility    Transportation:  Not a  due to cervical restrictions and UE limitations, car transfers not a barrier    Decision maker: self    Plan of Care and goals reviewed and updated.    Discharge Plan/Recommendations    Fall Precautions: continue    Patient/Family input to goals: Daughter will be available to assist intermittently    Overall plan for the patient: Antoinette has been making good progress in therapies with improving strength endurance and functioning. Has been made independent in room. Continues to have significant anxiety and pain issues, improving with increase in gabapentin. Patient on track to discharge home tomorrow.       Utilization Review and Continued Stay Justification    Medical Necessity Criteria:    For any criteria that is not met, please document reason and plan for discharge, transfer, or modification of plan of care to address.    Requires intensive rehabilitation program to treat functional deficits?: Yes    Requires 3x per week or greater involvement of rehabilitation physician to oversee rehabilitation program?: Yes    Requires rehabilitation nursing interventions?: Yes    Patient is making functional progress?: Yes    There is a potential for additional functional progress? Yes    Patient is participating in therapy 3 hours per day a minimum of 5 days per week or 15 hours per week in 7 day period?:Yes    Has discharge needs that require coordinated discharge planning approach?:Yes      Final Physician Sign off    Statement of Approval: I agree with all the recommendations detailed in this document.    Patient Goals    OT goal: hygiene/grooming: modified independent, while standing  OT goal: upper body dressing: Modified independent, including set-up/clothing retrieval  OT goal: lower body dressing: Modified independent, including set-up/clothing retrieval  OT goal: upper body bathing: Modified independent, within precautions  OT goal: lower body bathing:  Modified independent, with precautions  OT Goal: transfer: Modified independent, within precautions (walk in shower transfer)  OT goal: toilet transfer/toileting: Modified independent, cleaning and garment management, toilet transfer  OT goal: meal preparation: Modified independent, with simple meal preparation, ambulatory level  OT goal: home management: Modified independent, with light demand household tasks, ambulatory level  OT goal 1: Pt will be I with UE HEP with focus on hand and arm strength and cooridnation to improve function for ADL and IADl.  OT/LATASHA face-to-face collaboration occurred, patient progress and plan of care reviewed.    PT target date for goal attainment: 03/26/18  PT Frequency: 60-90 minutes daily  PT goal: bed mobility:  (met)  PT goal: transfers:  (met)  PT goal: gait:  (met)  PT goal: stairs:  (met)        PT goal 1:  (met)  Falls education  PT Goal 2:  (met)  Car transfer  PT Goal 3:  (met)  Floor transfer  PT Goal 4: Ind w/ written HEP for LE strengthening and dynamic balance.

## 2018-03-27 NOTE — PLAN OF CARE
Problem: Patient Care Overview  Goal: Plan of Care/Patient Progress Review  -15 minutes, discussing meds and d/c with MD; toileting as well.

## 2018-03-27 NOTE — PLAN OF CARE
Problem: Goal/Outcome  Goal: Goal Outcome Summary  Patient is alert and oriented x 4. Complained of neck pain radiating to right shoulder, prn oxycodone administered with good relief. Declined to use her Lidoderm patch. Neck incision is healing well, open to air. Had a care conference today. Pt on track to dc home tomorrow. Continues to be ind in room during day, no issues noted. Will continue to foster independence while assisting pt as needed. Receive prn suppository with a result of a lg bm,continues to be ind with toileting.

## 2018-03-27 NOTE — PLAN OF CARE
Problem: Patient Care Overview  Goal: Plan of Care/Patient Progress Review  Physical Therapy Discharge Summary    Reason for therapy discharge:    Discharged to home with outpatient therapy.    Progress towards therapy goal(s). See goals on Care Plan in [facility] electronic health record for goal details.  Goals met    Therapy recommendation(s):    Continued therapy is recommended.  Rationale/Recommendations:  Has met goals to allow for safe discharge to home.  Recommend ongoing OP PT services for gait, balance, and limb/trunk strengthening. .

## 2018-03-27 NOTE — PROGRESS NOTES
"  [facility]   Acute Rehabilitation Unit  Daily progress note    interval history  Atnoinette Romero was seen and examined at bedside.     No acute events overnight, slept pretty good, still needing oxycodone for pain.  She has constipation and has not had a BM for past 3 days, has some LUQ abdominal pain, will have suppository today with hopeful results and relieves discomfort.   Otherwise denies any f,c,n,v, or new pain, numbness, tingling or weakness.   Care conference today, please see note for full details.  Patient looking forward to DC tomorrow although she has some anxiety associated with that.      Functionally, Patient is improving, Currently she is utilizing SEC with nursing staff and no AD in PT and no LOB. She continues to have shoulder pain and today c/o L deep hip pain. Overall she is progressing with dynamic balance as well. Continue per POC and progress towards goals. Met goal of 15 min continuous activity by 5 min over goal, see flowsheet TE.       medications  Scheduled meds    gabapentin  600 mg Oral TID     oxyCODONE IR  10 mg Oral Daily     busPIRone  7.5 mg Oral BID     lidocaine  1 patch Transdermal Q24H     lidocaine   Transdermal Q24h     lidocaine   Transdermal Q8H     cyclobenzaprine  10 mg Oral TID     lisinopril  5 mg Oral Daily     nicotine  1 patch Transdermal Q24H     pantoprazole  20 mg Oral Daily     senna-docusate  2 tablet Oral BID     polyethylene glycol  17 g Oral Daily     nicotine   Transdermal Daily     nicotine   Transdermal Q8H       PRN meds:  oxyCODONE IR, methyl salicylate-menthol, acetaminophen, benzocaine-menthol, naloxone, bisacodyl      physical exam  /86 (BP Location: Right arm)  Pulse 103  Temp 98  F (36.7  C) (Oral)  Resp 17  Ht 1.727 m (5' 8\")  Wt 56.6 kg (124 lb 12.8 oz)  LMP 03/01/2010  SpO2 91%  BMI 18.98 kg/m2  Gen: pleasant, anxious, comfortable, NAD  HEENT: NC/AT, has Aspen collar on, tolerating ok  CV: " breathing comfortably on RA, normal WOB, no distress  Abd: soft, guarded in LUQ with firm bowel, felt, no BM for 3 days, otherwise no tenderness,   Ext: no edema, no tenderness in calves  Neuro/MSK: Alert and oriented, improving in strength and sensation, ambulating well and is independent, continues to have L> R weakness in UE's and still having decreased sensation in LUE.  She continues to have significant anxiety.    labs  Reviewed     assessment and plan  Antoinette Romero is a 57 year old right hand dominant female with past medical history of tobacco use, migraines, morbid obesity with previous bypass surgery, and migraines, and chronic worsening hip and leg pain from OA,  who had a fall on 3/9/18 evening which resulted in Spinal cord injury.  She had C3-T1 ACDF on 3/11/2018, and was admitted to ARU on 3/18/18     Admission to acute inpatient rehab Cervical Spinal Cord Injury.       Changes to care plan: 3/27  - Recent changes in medication:   --Buspar now on taper up currently at 7.5 mg BID for total duration of 3 days (today is day 3) and then will ramp up to 10 mg BID for 3 days, then 12.5 mg BID for 3 days and then 15 mg BID with this being goal dose.  She has developed some reported blurry vision with starting this medication but is willing to tolerate this at the moment so long as it doesn't worsen.    ---Gabapentin increased yesterday to 600 TID and will go home on this dose.   - Oxycodone scheduled 10 mg in am and then 10 mg Q5H, will go home with some oxycodone to allow for tapering.   - Plan to DC home tomorrow,   - Med rec and DC planning will be completed today.   - CC today see note for full details      1. PT, and OT for 90 minutes of each on a daily basis, in addition to rehab nursing and close management of physiatrist.       2. Impairment of ADL's: daily OT for 90 minutes daily to work on ADL re-training such as grooming, self cares and bathing, using UE's as she ahs a cervical spinal cord  injury with limited use of UE's with L> R deficits      3. Impairment of mobility:  Daily PT for 90 minutes daily to work on neuromuscular re-education focusing on strength, balance, coordination, and endurance.  To enhance patients ability to ambulate as she has decreased strength in LE's bilaterally L>R     4. Rehab RN for med administration, bowel regimen, glucose monitoring and wound care.       5. Medical Conditions  # Traumatic cervical spine injury with resulting C3-T1 severe stenosis with spinal cord compression and myelomalacia, C5-6 ruptured anterior longitudinal ligament and disk with a anterior osteophyte fracture and C7-T1 anterior subluxation with severe bilateral foraminal stenosis - s/p C3-T1 ACDF on 3/11/2018.  * Pt with h/o EtOH and multiple falls recently. The patient woke up am 3/10 and had difficulty with walking and left hand and left foot weakness; had pain in neck, hip and hand after consuming some alcohol, going to bed and having a fall when arising to use the restroom overnight.  She presented to [facility] 3/10 and x-rays of hand, hip, tib-fib were negative. CT abdomen and pelvis 3/10 no acute findings.   - MRI Cervical spine on 3/10/18 revealed spinal cord contusion at C5-C6 without hemorrhage; disruption of ligaments at C5-C6 anteriorly and posteriorly with fluid in the facet joints and in the disc space suggesting motion at this level; fairly extensive edema, but no discrete hematoma in the paraspinous musculature more on the right than on the left and in the prevertebral space; extensive degenerative changes. Pt transferred to [facility] 3/10.  * Seen by Neurosurgery and noted with LUE > RUE weakness with loss of left hand function as well as BLE weakness L > R. Pt underwent C3-T1 ACDF on 3/11/2018. She has progressed well with therapies.  - pain management  - scheduled Tylenol 975 mg po TID and prn, scheduled Flexeril 10 mg po TID, Roxicodone 5-10 mg po q4h prn  - keep neck collar in place  "until follow-up   - F/u Neurosurgery - 3 months -     Maynor Daley MD, MS, FAANS  Neurosurgeon  [facility]  [facility]  [address], [address]  [place]  [address]  Tel [phone]      # Pain: neuropathic in nature as she has numbness tingling, fire type lightning bolt pain.  - Started gabapentin 300 mg TID on admission, increased to 600 mg  evening dose  300-300-600, increased again on 3/23 to 600-300-600, and again on 3/26 to 600 tid. Will go home on this dose. Could go up higher if needed with max dose of 900 tid, would ramp this up if needed by 300 mg every 2-3 days.   - Cont. Oxycodone 5-10 mg Q5H with plan to wean off of this medication  - Cont. Tylenol  Prn  - Flexeril TID   - Buspar now on taper up currently at 7.5 mg BID for total duration of 3 days (today is day 3) and then will ramp up to 10 mg BID for 3 days, then 12.5 mg BID for 3 days and then 15 mg BID with this being goal dose.  She has developed some reported blurry vision with starting this medication but is willing to tolerate this at the moment so long as it doesn't worsen.      # Alcohol use/abuse/dependence with EtOH w/d.  Improved. CIWA d/c'ed 3/14. Pt said that she was \"done\" with alcohol given above issues.  - will continue to monitor here on ARU   - WIll ask Dr. Paulino to see her as well.       # Confusion/encephalopathy (suspect toxic).  Pt confused 3/12. Had been getting PRN IV lorazepam for EtOH w/o as well as PRN IV Dilaudid for pain. Improved overall starting on 3/13-0 now back to baseline.  - Will cont. To monitor for any cognitive decline.       # Elevated BP's, suspect related pain, anxiety and underlying essential HTN. - resolving  - she has h/o HTN and has been on lisinopril in the past   - started lisinopril 10 mg po daily on 3/14, blood pressures started to regulate, decrease lisinopril to 5 mg daily  - Will address need for continued Lisinopril while here at ARU      # Anxiety/depression, " worsened related to problem #1.  Pt with prior h/o depression/anxiety and had been on multiple meds in the past. Seen by Psychiatry 3/12 and PRN Seroquel added. Pt more emotionally labile and pessimistic 3/13; Reluctant to take Seroquel but did later take some doses. PRN clonazepam added 3/14; Pt seen by Psychiatry again 3/15 and offered to start Trintellix, but pt declined.  - PTA Buspar stopped prior to admission   - Continue PRN clonazepam 0.25 mg TID - stopped at time of admission to ARU - was not taking as prn med  - Buspar - see above  - inpatient psychologist Dr. Paulino following and has seen patient multiple times with good plan in place.   - Patient had a short panic attack on am of 3/19, negative cardiac workup, Psychiatry consult placed on 3/19 - will see patient, appreciate recs for treatment options for anxiety      # History of morbid obesity with previous gastric bypass and anastomotic ulcer.  - Continue Protonix 20 mg daily.      # Tobacco use.   - Continue nicotine patch.     6. Adjustment to disability:  Clinical psychology to eval and treat will have Dr. Paulino see patient, consult placed. Psychiatry to see patient as well.   7. FEN: reg with thins  8. Bowel: bowel meds senna 2 tabs bid  9. Bladder: PVRs x 2   10. DVT Prophylaxis: ambulating   11. GI Prophylaxis: protonix  12. Code: Full  13. Disposition: Home with support  14. ELOS:  1-2 wk .  15. Rehab prognosis:  Good   16. Follow up Appointments on Discharge: Neurosurgery, PCP, and PM&R      Pardeep Crawford MD PGY-2  Physical Medicine & Rehabilitation  Pager: [phone]    I, Deena Sales, saw this patient with my resident Dr. Crawford and agree with his findings and plan of care as documented in his note.     I personally reviewed the chart (vitals signs, medications, labs and imaging).     Please see today's care conference note.       I spent a total of 60 minutes face-to-face and managing the care of Antoinetteclotilde Romero. Over 50% of my  time on the unit was spent counseling the patient and coordinating care. Please see note above as well as care conference note for details.

## 2018-03-27 NOTE — PLAN OF CARE
Problem: Goal/Outcome  Goal: Goal Outcome Summary  FOCUS/GOAL  Pain management, Medical management and Psychosocial needs    ASSESSMENT, INTERVENTIONS AND CONTINUING PLAN FOR GOAL:  Patient is alert and oriented x 4 and is able to make needs known by using call light and verbalizing needs.  Patient intermittently throughout shift was tearful regarding current health status and anxious about discharge and managing medications once home.  Writer gave 1:1 and encouraged patient to verbalize feelings and concerns and educated on different medications during administration.  AquaK pad in use and helpful.  Patient also requested and received PRN Oxycodone at 20:19 which was partially effective.  Patient remains independent in room and is continent using toilet in bathroom.  Writer discussed with patient possibility of using Tylenol also for pain management.  Patient receptive and agreed to try it, though when offered, patient declined and stated she would use it later at night if Oxycodone was not effective.

## 2018-03-28 VITALS
TEMPERATURE: 98.1 F | RESPIRATION RATE: 16 BRPM | BODY MASS INDEX: 18.91 KG/M2 | DIASTOLIC BLOOD PRESSURE: 74 MMHG | SYSTOLIC BLOOD PRESSURE: 110 MMHG | HEIGHT: 68 IN | WEIGHT: 124.8 LBS | OXYGEN SATURATION: 99 % | HEART RATE: 106 BPM

## 2018-03-28 PROCEDURE — 25000132 ZZH RX MED GY IP 250 OP 250 PS 637: Performed by: PHYSICAL MEDICINE & REHABILITATION

## 2018-03-28 PROCEDURE — 25000132 ZZH RX MED GY IP 250 OP 250 PS 637: Performed by: PAIN MEDICINE

## 2018-03-28 RX ADMIN — OXYCODONE HYDROCHLORIDE 10 MG: 5 TABLET ORAL at 06:54

## 2018-03-28 RX ADMIN — CYCLOBENZAPRINE HYDROCHLORIDE 10 MG: 5 TABLET, FILM COATED ORAL at 08:08

## 2018-03-28 RX ADMIN — GABAPENTIN 600 MG: 600 TABLET, FILM COATED ORAL at 08:07

## 2018-03-28 RX ADMIN — PANTOPRAZOLE SODIUM 20 MG: 20 TABLET, DELAYED RELEASE ORAL at 08:07

## 2018-03-28 RX ADMIN — SENNOSIDES AND DOCUSATE SODIUM 2 TABLET: 8.6; 5 TABLET ORAL at 08:07

## 2018-03-28 RX ADMIN — NICOTINE 1 PATCH: 7 PATCH, EXTENDED RELEASE TRANSDERMAL at 08:07

## 2018-03-28 RX ADMIN — ACETAMINOPHEN 650 MG: 325 TABLET ORAL at 02:11

## 2018-03-28 RX ADMIN — BUSPIRONE HYDROCHLORIDE 7.5 MG: 7.5 TABLET ORAL at 08:07

## 2018-03-28 RX ADMIN — OXYCODONE HYDROCHLORIDE 10 MG: 5 TABLET ORAL at 00:42

## 2018-03-28 RX ADMIN — OXYCODONE HYDROCHLORIDE 10 MG: 5 TABLET ORAL at 11:15

## 2018-03-28 RX ADMIN — LISINOPRIL 5 MG: 5 TABLET ORAL at 08:57

## 2018-03-28 NOTE — DISCHARGE SUMMARY
[facility]   Acute Rehabilitation Unit  Discharge summary     Date of Admission: 3/18/2018  Date of Discharge: 3/28/18  Disposition: Home with family  Primary Care Physician: Umu Roberto  Attending physician: Dr. Deena Sales   Other significant physician provider(s): Dr. Irasema Santana, Dr. Roberto Ty       discharge diagnosis      Spinal Cord Injury s/p C3-T1 ACDF on 3/11/2018       Anxiety, Depression, chronic pain, HTN, Tobacco use, alcohol use,       brief summary  (Copied from [facility])  Antoinette Romero is a 57 year old right hand dominant female with past medical history of tobacco use, migraines, morbid obesity with previous bypass surgery, and migraines, and chronic worsening hip and leg pain from OA,  who had a fall on 3/9/18 evening which resulted in Spinal cord injury     Per the patient and her chart, she reported having 2 glasses of wine the night prior, woke up in the middle of the night to go to the restroom and had a fall. After the fall she noted a hard time walking and difficulty with her hand.  She did not wish to come into the hospital at that time so she went back to bed. When she awoke in the morning she had difficulty walking and reports her  called for assistance.      Initially went to [facility] but give possible spinal cord injury she was sent to [facility].  She underwent imaging at that time which revealed cervical spine contusion at C5-6 with disruption of posterior ligaments, Paraspinous edema and C7-T1 anterolisthesis and C5-6 severe spinal canal stenosis. She had right hand weakness, right foot weakness and difficulty with ambulation. CT head, Tib-fib x-ray, elbow x-ray, humerus x-ray, Hand x-ray, Pelvis/hip x-ray, and CT abd/pelvis without acute fracture or process.      On initial assessment to ED she had no  strength in the left hand but can move the arm.  She could not dorsiflex or plantar flex her left foot. She  could wiggle her toes and lift her leg off the bed. It was unclear at that time if her symptoms were worsening of stable, as she was given 10 mg decadron and multiple iv analgesics prior to transfer.       MRI Cervical spine on 3/10/18 revealed spinal cord contusion at C5-C6 without hemorrhage; disruption of ligaments at C5-C6 anteriorly and posteriorly with fluid in the facet joints and in the disc space suggesting motion at this level; fairly extensive edema, but no discrete hematoma in the paraspinous musculature more on the right than on the left and in the prevertebral space; extensive degenerative changes. [facility]  She was then seen by Neurosurgery and noted LUE > RUE weakness with loss of left hand function as well as BLE weakness L > R. Pt underwent C3-T1 ACDF on 3/11/2018. Did well with the procedure and progressed through hospital stay.       On admission to ARU, the patient has significant pain in neck, down bilateral arms and to hands and through trunk to groin level.  She reports it as burning and more neuropathic in nature.  We discussed the types of pain that can be experienced and decided to start gabapentin for neuropathic pain management. She also reported that she had some issues swallowing harder bights of food since surgery and that if it is a larger bolus it is tougher, but she has no issues with softer foods. She also had multiple times of emotional lability and was tearful multiple times during my exam.  She admits to struggling with this new injury and would like to see psychology.  Otherwise she denies f,c,n,v, she has a history of HA/migraines but does not take medication for them.  She denies CP, SPB, but does have some abdominal discomfort as she has not had a BM for 4-5 days.  No other symptoms other than what was mentioned.       During his acute hospitalization, patient was seen and evaluated by PT, and OT.  Both specialties collectively recommended that patient  would benefit from ongoing therapies in the acute inpatient rehabilitation setting.        rehabilitaiton course  ADLs/IADLs: Pt is I/MOD I with self cares and light IADL. Pt has decreased strength and function in UE however she manages well despite limitations. Pain a factor but so far she has been able to get what she needs to done. Recommend shower chair for EC and safety when bathing and reacher. Pt on track to DC with family support as needed. Recommend OP OT at [facility].      Mobility: Up w/o device.  Improved 6MWT from 227 meters to 320 meters, this is 54% of predicted.  Recommend OP f/u for ongoing gait and balance.  Declined SEC for community mobility.       mEDICAL COURSE  # Traumatic cervical spine injury with resulting C3-T1 severe stenosis with spinal cord compression and myelomalacia, C5-6 ruptured anterior longitudinal ligament and disk with a anterior osteophyte fracture and C7-T1 anterior subluxation with severe bilateral foraminal stenosis - s/p C3-T1 ACDF on 3/11/2018.  * Pt had fall at home and had trauma to her head/neck, she went to be and when awoke, she had difficulty with walking and left hand and left foot weakness along with pain in neck, hip and hand   - She presented to [facility] 3/10 and x-rays of hand, hip, tib-fib were negative. CT abdomen and pelvis 3/10 no acute findings.   - MRI Cervical spine on 3/10/18 revealed spinal cord contusion at C5-C6 without hemorrhage; disruption of ligaments at C5-C6 anteriorly and posteriorly with fluid in the facet joints and in the disc space suggesting motion at this level; fairly extensive edema, but no discrete hematoma in the paraspinous musculature more on the right than on the left and in the prevertebral space; extensive degenerative changes. Pt transferred to [facility] 3/10.  * Seen by Neurosurgery and noted with LUE > RUE weakness with loss of left hand function as well as BLE weakness L > R. Pt underwent C3-T1 ACDF on 3/11/2018. She has progressed  "well with therapies.  - keep neck collar in place until follow-up   - F/u Neurosurgery - 3 months -      Maynor Daley MD, MS, FAANS  Neurosurgeon  [facility]  [facility]  [address], [address]  [place]  [address]  Tel [phone]      # Pain: neuropathic in nature as she has numbness tingling, fire type lightning bolt pain.  - On admission started gabapentin 300 mg TID ramped up during her stay to 600 tid. Went home on this dose. Could go up higher if needed with max dose of 900 tid, would ramp this up if needed by 300 mg every 2-3 days.   - Cont. Oxycodone 5-10 mg Q5H with plan to wean off of this medication with PCP 30 pills on DC  - Cont. Tylenol  Prn  - Flexeril TID prn 1 month supply, wean as able   - Buspar now on taper up she will take 7.5 in am and 15 mg in pm for 5 days and then 15 BID as target.  F/u with pcp and psychology.       # Alcohol use/abuse/dependence with EtOH w/d.  Improved. CIWA d/c'ed 3/14. Pt said that she was \"done\" with alcohol given above issues.  - will continue to monitor here on ARU   - Dr. Paulino saw her during admission, good plan in place for outpatient cares.       # Confusion/encephalopathy (suspect toxic).  Pt confused 3/12. Had been getting PRN IV lorazepam for EtOH w/o as well as PRN IV Dilaudid for pain. Improved overall starting on 3/13-0 now back to baseline at time of DC.        # Elevated BP's, suspect related pain, anxiety and underlying essential HTN. - resolving  - she has h/o HTN and has been on lisinopril in the past   - started lisinopril 10 mg po daily on 3/14, blood pressures started to regulate, decrease lisinopril to 5 mg daily  - DC on 5 mg       # Anxiety/depression, worsened related to problem #1.  Pt with prior h/o depression/anxiety and had been on multiple meds in the past. Seen by Psychiatry 3/12 and PRN Seroquel added. Pt more emotionally labile and pessimistic 3/13; Reluctant to take Seroquel but did later take " some doses. PRN clonazepam added 3/14;  - Continue PRN clonazepam 0.25 mg TID - stopped at time of admission to ARU - was not taking as prn med  - inpatient psychologist Dr. Paulino following and has seen patient multiple times with good plan in place.   - Patient had a short panic attack on am of 3/19, negative cardiac workup, Psychiatry consult placed on 3/19 - saw patient, appreciate recs for treatment options for anxiety with Cymbalta - patient did not want to take this medication due to side effects, discussed Buspar, patient wanting to try this medication instead.        # History of morbid obesity with previous gastric bypass and anastomotic ulcer.  - Continue Protonix 20 mg daily.      # Tobacco use.   - Continue nicotine patch.      dISCHARGE MEDICATIONS  Discharge Medication List as of 3/28/2018 11:15 AM      START taking these medications    Details   busPIRone (BUSPAR) 7.5 MG tablet Take 1 tablet (7.5 mg) by mouth 2 times daily For first 5 days, take 1 tab (7.5 mg) in morning and then two tabs (15 mg ) in pm  Then after 5 days, take two tabs (15 mg) in am and pm, Disp-120 tablet, R-0, Local Print      gabapentin (NEURONTIN) 600 MG tablet Take 1 tablet (600 mg) by mouth 3 times daily, Disp-90 tablet, R-0, E-Prescribe      bisacodyl (DULCOLAX) 10 MG Suppository Place 1 suppository (10 mg) rectally daily as needed for constipation, Disp-10 suppository, R-0, E-Prescribe      polyethylene glycol (MIRALAX/GLYCOLAX) Packet Take 17 g by mouth daily, Disp-30 packet, R-0, E-Prescribe      senna-docusate (SENOKOT-S;PERICOLACE) 8.6-50 MG per tablet Take 2 tablets by mouth 2 times daily, Disp-120 tablet, R-0, E-Prescribe         CONTINUE these medications which have CHANGED    Details   oxyCODONE IR (ROXICODONE) 5 MG tablet Take 1-2 tablets (5-10 mg) by mouth every 6 hours as needed for moderate to severe pain, Disp-30 tablet, R-0, Local Print      lisinopril (PRINIVIL/ZESTRIL) 10 MG tablet Take 0.5 tablets (5 mg) by  mouth daily, Disp-30 tablet, R-0, E-Prescribe      cyclobenzaprine (FLEXERIL) 10 MG tablet Take 1 tablet (10 mg) by mouth 3 times daily as needed for muscle spasms, Disp-60 tablet, R-0, Local PrintTake 3 times a day for first 4 days, then wean to 2 times per day as able (goal is within 2 weeks), when feeling more comfortable on 2 times a day,  reduce dose to 1 time per day, (take remaining as needed) then stop.      pantoprazole (PROTONIX) 20 MG EC tablet Take 1 tablet (20 mg) by mouth daily Take by mouth 30-60 minutes before a meal., Disp-30 tablet, R-0, E-Prescribe         CONTINUE these medications which have NOT CHANGED    Details   acetaminophen (TYLENOL) 325 MG tablet Take 2 tablets (650 mg) by mouth every 4 hours as needed for mild pain, Disp-100 tablet, Transitional      multivitamin, therapeutic with minerals (THERA-VIT-M) TABS tablet Take 1 tablet by mouth daily, Disp-30 each, R-0, Transitional      nicotine (NICODERM CQ) 7 MG/24HR 24 hr patch Place 1 patch onto the skin every 24 hours, Disp-30 patch, R-1, Local Print         STOP taking these medications       benzocaine-menthol (CHLORASEPTIC) 6-10 MG lozenge Comments:   Reason for Stopping:         clonazePAM (KLONOPIN) 0.5 MG tablet Comments:   Reason for Stopping:         QUEtiapine (SEROQUEL) 25 MG tablet Comments:   Reason for Stopping:                 DISCHARGE INSTRUCTIONS AND FOLLOW UP    Discharge Procedure Orders  Physical Therapy Referral   Referral Type: Rehab Therapy Physical Therapy     Occupational Therapy Referral   Referral Type: Occupational Therapy     Reason for your hospital stay   Order Comments: You were admitted to Acute Inpatient Rehab after a spinal cord injury, you improved and will go home with outpatient therapy. PT/OT     Adult [facility] Follow-up and recommended labs and tests   Order Comments: Follow up with primary care provider, Umu Roberto, within 7 days to evaluate medication change.  No follow up labs or test are  needed.    Follow-up with Neurosurgery in 8-12 weeks with   Maynor Daley MD, MS, FAANS  Neurosurgeon  [facility]  [facility]  [address], [address]  [place]  [address]  Tel [phone]      Follow-up with PM&R Dr. Santana in 6-8 weeks as well.     Appointments on [place] and/or [facility] (with [facility] or [facility] provider or service). Call [phone] if you haven't heard regarding these appointments within 7 days of discharge.     Activity   Order Comments: Your activity upon discharge: activity as tolerated, no lifting more than 10 lbs until cleared by neurosurgery, also no excessive bending or twisting in the neck, wear your aspen collar 24/7 until seen by neurosurgery, and no driving until cleared by a physician to do so   Order Specific Question Answer Comments   Is discharge order? Yes      Full Code     Diet   Order Comments: Follow this diet upon discharge: t Regular Diet Adult;   Order Specific Question Answer Comments   Is discharge order? Yes             physical examination    Most recent Vital Signs:   Vitals:    03/27/18 0735 03/27/18 1531 03/28/18 0803 03/28/18 0857   BP: 107/86 122/66 109/70 110/74   BP Location: Right arm Right arm Right arm    Pulse: 103 104 106    Resp: 17 16 16    Temp: 98  F (36.7  C) 98.1  F (36.7  C) 98.1  F (36.7  C)    TempSrc: Oral Oral Oral    SpO2: 91% 97% 99%    Weight:       Height:           Physical Exam:             Discharge summary was forwarded to Umu Roberto (PCP) at the time of discharge, so as to bridge from hospital to outpatient care.     It was our pleasure to care for Antoinette Romero during this hospitalization. Please do not hesitate to contact me should there be questions regarding the hospital course or discharge plan.        I, Deena Sales, saw and evaluated this patient prior to discharge. I discussed the patient with the resident and agree with the plan of care as documented in the resident's  note.    I personally reviewed the chart (vitals signs, medications, labs and imaging), and spent 60 minutes on discharge activities.     Deena Sales MD

## 2018-03-28 NOTE — PLAN OF CARE
Problem: Goal/Outcome  Goal: Goal Outcome Summary  FOCUS/GOAL  Nutrition/Feeding/Swallowing precautions, Medication management, Pain management and Discharge planning    ASSESSMENT, INTERVENTIONS AND CONTINUING PLAN FOR GOAL:  Pt alert and oriented, slightly anxious about discharge tomorrow, pt worrying about controlling pain when at home. Nurse reassuring pt and telling her to remember not to overdo activities and stay within her means. Pt complaining about neck brace pressing on her back and asked nurse to look at site to be sure it wasn't red, Site was WDL without any signs of redness. Pt requesting PRN tylenol at beginning of shift and oxy 10 mg x1 this shift for neck and shoulder pain, as well as a slight headache that went away. Pt taking large meds crushed in applesauce.

## 2018-03-28 NOTE — PLAN OF CARE
Problem: Individualization  Goal: Patient Individualization  Outcome: Improving  Alert and oriented oriented to self, place and time c/o right shoulder pain x 2, oxycodone and tylenol administered with good effect. Patient is vin in her room with her cane, will dc home today, slept most of the night. Will continue POC

## 2018-03-28 NOTE — DISCHARGE INSTRUCTIONS
Follow up appointments:    -- Follow up with primary care provider within 1-2 weeks  You are scheduled to see AMRIK He  on April 10th at 9:00 am.    Address  [facility]                          [id number] [place] [address]   Phone   [phone]    -- Follow up with surgeon.  You are scheduled to see Dr. Mojgan Callejas on April 19th at 10:00 am for X-Ray and 10:30 am follow up.    Address  [facility]                          [address] , [address]                          [place], [place]  Phone   [phone]    -- Follow up with Dr. Irasema Santana, PM&R, within 6-8 weeks  You are scheduled to see Dr. Irasema Santana on June 5th at 4:30 pm.    Address  [facility]                          Physical Medicine and Rehabilitation Clinic    [address] [place] [address]; [address]    [place] [address]    Phone   [place] [phone]                          [place] [phone]

## 2018-03-30 ENCOUNTER — TELEPHONE (OUTPATIENT)
Dept: FAMILY MEDICINE | Facility: CLINIC | Age: 58
End: 2018-03-30

## 2018-03-30 ENCOUNTER — TELEPHONE (OUTPATIENT)
Dept: NEUROSURGERY | Facility: CLINIC | Age: 58
End: 2018-03-30

## 2018-03-30 NOTE — TELEPHONE ENCOUNTER
"Patient is s/p cervical spinal fusion 3/11/2018. Patient feels like she is having increased weakness in left arm and leg. Patient denies any pain. She just notes her leg and arm feel \"heavier.\" She said she feels been more active the past few days so not sure if she over did or not. Due to her reporting increased weakness advised patient go to ER for evaluation and call our clinic on Monday to follow up. Advised to keep collar on and reviewed restrictions. Patient verbalized understanding.     "

## 2018-03-30 NOTE — TELEPHONE ENCOUNTER
REASON FOR CALL: Pt andre she was given nicotine patch at the hospital, however, she read in her discharge instructions that she should not smoke or use nicotine patch. Also c/o increased weakness in her left arm and left leg as well as back pain. Requesting call back today if possible.    Detailed message can be left:  YES

## 2018-03-30 NOTE — TELEPHONE ENCOUNTER
Nicotin patches are better than actual smoking, so if she cannot quit cold turkey then I recommend Nicotin patches.  If she is having problem with bupropion, then I recommend to cut the dose to once daily for 5 days then stop.  Chano King MD  [facility]  [phone]

## 2018-03-30 NOTE — TELEPHONE ENCOUNTER
Patient calling and concerned she is on Nicotine Patch and her discharge papers from [facility] states to not use tobacco, patches, etc as can slow healing.  Now worried about this but has been using daily at hospital and rehab since surgery.      Buspar- feels started 12 days ago or so in acute rehab and states has had dry mouth and sever blurred vision that she can not read anything even with her glasses.  States wants to go off med and wants to know if can just stop it.  States Umu gave RX but she had not started yet til at rehab.      Severe pain middle of back to lower back today.  States extreme weakness on left side and can barely lift arm or leg.  Advised to call surgeon regarding this concern.      Please advise.  Call her back at [phone].  Ivelisse Epperson RN

## 2018-04-04 ENCOUNTER — TELEPHONE (OUTPATIENT)
Dept: NEUROSURGERY | Facility: CLINIC | Age: 58
End: 2018-04-04

## 2018-04-04 NOTE — TELEPHONE ENCOUNTER
Return call placed to Antoinette to clarify follow up plan with Mojgan Callejas NP on 4/19/18 with X-Ray prior.  She will also be following up with PCP as directed after discharge from TCU.      Antoinette reports that she did hear from [employer] representative, Krystal.  If she decides to use this product it will cost her about 500-900$ out of pocket, insurance will only cover a portion of the cost.  She is wondering if Dr. Daley would recommend she move forward and how strongly he feels that this will be beneficial to her.  Will discuss with Dr. Daley.      Return message left for Antoinette, Per Dr. Daley - if [employer] is not covered we will cancel the order as it may or may not be beneficial for her.  Zacarias Rasheed RN did message Krystal via email, order cancelled.  I instructed Antoinette to call with any further questions or concerns.

## 2018-04-10 ENCOUNTER — OFFICE VISIT (OUTPATIENT)
Dept: FAMILY MEDICINE | Facility: CLINIC | Age: 58
End: 2018-04-10
Payer: COMMERCIAL

## 2018-04-10 VITALS
TEMPERATURE: 98.3 F | RESPIRATION RATE: 14 BRPM | SYSTOLIC BLOOD PRESSURE: 132 MMHG | DIASTOLIC BLOOD PRESSURE: 84 MMHG | BODY MASS INDEX: 18.55 KG/M2 | WEIGHT: 122 LBS | HEART RATE: 105 BPM

## 2018-04-10 DIAGNOSIS — Z98.1 STATUS POST CERVICAL SPINAL FUSION: Primary | ICD-10-CM

## 2018-04-10 DIAGNOSIS — R11.2 NON-INTRACTABLE VOMITING WITH NAUSEA, UNSPECIFIED VOMITING TYPE: ICD-10-CM

## 2018-04-10 DIAGNOSIS — Z98.84 S/P GASTRIC BYPASS: ICD-10-CM

## 2018-04-10 DIAGNOSIS — I10 BENIGN ESSENTIAL HYPERTENSION: ICD-10-CM

## 2018-04-10 DIAGNOSIS — F41.9 ANXIETY: ICD-10-CM

## 2018-04-10 DIAGNOSIS — H91.93 DECREASED HEARING OF BOTH EARS: ICD-10-CM

## 2018-04-10 DIAGNOSIS — K44.9 HIATAL HERNIA: ICD-10-CM

## 2018-04-10 PROCEDURE — 99214 OFFICE O/P EST MOD 30 MIN: CPT | Performed by: PHYSICIAN ASSISTANT

## 2018-04-10 RX ORDER — CYCLOBENZAPRINE HCL 10 MG
10 TABLET ORAL 3 TIMES DAILY PRN
Qty: 60 TABLET | Refills: 1 | Status: SHIPPED | OUTPATIENT
Start: 2018-04-10 | End: 2018-11-07

## 2018-04-10 RX ORDER — GABAPENTIN 600 MG/1
600 TABLET ORAL 3 TIMES DAILY
Qty: 90 TABLET | Refills: 0 | Status: SHIPPED | OUTPATIENT
Start: 2018-04-10 | End: 2018-04-19

## 2018-04-10 RX ORDER — DULOXETIN HYDROCHLORIDE 20 MG/1
20 CAPSULE, DELAYED RELEASE ORAL DAILY
Qty: 30 CAPSULE | Refills: 1 | Status: SHIPPED | OUTPATIENT
Start: 2018-04-10 | End: 2018-04-19

## 2018-04-10 RX ORDER — PANTOPRAZOLE SODIUM 20 MG/1
20 TABLET, DELAYED RELEASE ORAL DAILY
Qty: 30 TABLET | Refills: 1 | Status: SHIPPED | OUTPATIENT
Start: 2018-04-10 | End: 2020-01-09

## 2018-04-10 RX ORDER — OXYCODONE HYDROCHLORIDE 5 MG/1
5 TABLET ORAL
Qty: 60 TABLET | Refills: 0 | Status: SHIPPED | OUTPATIENT
Start: 2018-04-10 | End: 2018-04-10

## 2018-04-10 RX ORDER — OXYCODONE HYDROCHLORIDE 5 MG/1
5-10 TABLET ORAL
Qty: 60 TABLET | Refills: 0 | Status: SHIPPED | OUTPATIENT
Start: 2018-04-10 | End: 2018-04-19

## 2018-04-10 RX ORDER — LISINOPRIL 10 MG/1
5 TABLET ORAL DAILY
Qty: 30 TABLET | Refills: 1 | Status: SHIPPED | OUTPATIENT
Start: 2018-04-10 | End: 2018-08-10

## 2018-04-10 NOTE — MR AVS SNAPSHOT
After Visit Summary   4/10/2018    Antoinette Romero    MRN: [id number]           Patient Information     Date Of Birth          1960        Visit Information        Provider Department      4/10/2018 9:00 AM Umu Roberto PA-C [facility]        Today's Diagnoses     Screen for colon cancer        Visit for screening mammogram        Screening for malignant neoplasm of cervix           Follow-ups after your visit        Your next 10 appointments already scheduled     Apr 19, 2018 10:20 AM CDT   (Arrive by 10:05 AM)   XR CERVICAL SPINE 2/3 VIEWS with [id number]   [facility] ([facility])    [address]  [address]  [place] [id number]   [phone]           Please bring a list of your current medicines to your exam. (Include vitamins, minerals and over-thecounter medicines.) Leave your valuables at home.  Tell your doctor if there is a chance you may be pregnant.  You do not need to do anything special for this exam.            Apr 19, 2018 10:40 AM CDT   Return Visit with MARILIN Altamirano CNP   [facility] ([facility])    [address]  [place] [address]   [phone]            Apr 23, 2018  8:00 AM CDT   Evaluation with HERNÁN De Leon   [facility] ([facility])    [address] [id number] [phone]            Apr 23, 2018  9:00 AM CDT   Ortho Eval with Rigo Rosenthal PT   [facility] Physical [facility] ([facility])    [address] [id number] [phone]            Jun 05, 2018  4:30 PM CDT   (Arrive by 4:15 PM)   New Patient Visit with Irasema Santana MD   [facility] ([facility])    [address]  [address] [facility] [address] [phone]              Who to contact   "   If you have questions or need follow up information about today's clinic visit or your schedule please contact [facility] directly at [phone].  Normal or non-critical lab and imaging results will be communicated to you by MyChart, letter or phone within 4 business days after the clinic has received the results. If you do not hear from us within 7 days, please contact the clinic through [employer] or phone. If you have a critical or abnormal lab result, we will notify you by phone as soon as possible.  Submit refill requests through [employer] or call your pharmacy and they will forward the refill request to us. Please allow 3 business days for your refill to be completed.          Additional Information About Your Visit        [employer] Information     [employer] lets you send messages to your doctor, view your test results, renew your prescriptions, schedule appointments and more. To sign up, go to [email or web address] . Click on \"Log in\" on the left side of the screen, which will take you to the Welcome page. Then click on \"Sign up Now\" on the right side of the page.     You will be asked to enter the access code listed below, as well as some personal information. Please follow the directions to create your username and password.     Your access code is: [email or web address]  Expires: 2018  1:49 PM     Your access code will  in 90 days. If you need help or a new code, please call your [facility] clinic or [phone].        Care EveryWhere ID     This is your Care EveryWhere ID. This could be used by other organizations to access your [facility] medical records  [id number]        Your Vitals Were     Pulse Temperature Respirations Last Period BMI (Body Mass Index)       105 98.3  F (36.8  C) (Oral) 14 2010 18.55 kg/m2        Blood Pressure from Last 3 Encounters:   04/10/18 132/84   18 110/74   18 (!) 128/92    Weight from Last 3 Encounters:   04/10/18 122 lb (55.3 kg) "   03/26/18 124 lb 12.8 oz (56.6 kg)   03/08/18 131 lb (59.4 kg)              Today, you had the following     No orders found for display       Primary Care Provider Office Phone # Fax #    Umu Roberto PA-C [phone] [address] [address]        Equal Access to Services     [facility] : Hadii aad ku hadasho Soomaali, waaxda luqadaha, qaybta kaalmada adeegyada, waxay idiin hayaan adeeg kharash la'aan ah. So [facility] [phone].    ATENCIÓN: Si habla espdhaval, tiene a shah disposición servicios gratuitos de asistencia lingüística. Fahad al [phone].    We comply with applicable federal civil rights laws and [place] laws. We do not discriminate on the basis of race, color, national origin, age, disability, sex, sexual orientation, or gender identity.            Thank you!     Thank you for choosing [facility]  for your care. Our goal is always to provide you with excellent care. Hearing back from our patients is one way we can continue to improve our services. Please take a few minutes to complete the written survey that you may receive in the mail after your visit with us. Thank you!             Your Updated Medication List - Protect others around you: Learn how to safely use, store and throw away your medicines at www.disposemymeds.org.          This list is accurate as of 4/10/18  9:12 AM.  Always use your most recent med list.                   Brand Name Dispense Instructions for use Diagnosis    acetaminophen 325 MG tablet    TYLENOL    100 tablet    Take 2 tablets (650 mg) by mouth every 4 hours as needed for mild pain    Status post cervical spinal fusion       bisacodyl 10 MG Suppository    DULCOLAX    10 suppository    Place 1 suppository (10 mg) rectally daily as needed for constipation    Constipation, unspecified constipation type       busPIRone 7.5 MG tablet    BUSPAR    120 tablet    Take 1 tablet (7.5 mg) by mouth 2 times daily For first  5 days, take 1 tab (7.5 mg) in morning and then two tabs (15 mg ) in pm Then after 5 days, take two tabs (15 mg) in am and pm    Anxiety       cyclobenzaprine 10 MG tablet    FLEXERIL    60 tablet    Take 1 tablet (10 mg) by mouth 3 times daily as needed for muscle spasms    Status post cervical spinal fusion       gabapentin 600 MG tablet    NEURONTIN    90 tablet    Take 1 tablet (600 mg) by mouth 3 times daily    Status post cervical spinal fusion       lisinopril 10 MG tablet    PRINIVIL/ZESTRIL    30 tablet    Take 0.5 tablets (5 mg) by mouth daily    Benign essential hypertension       multivitamin, therapeutic with minerals Tabs tablet     30 each    Take 1 tablet by mouth daily    Alcohol dependence, daily use (H)       nicotine 7 MG/24HR 24 hr patch    NICODERM CQ    30 patch    Place 1 patch onto the skin every 24 hours    Tobacco abuse       oxyCODONE IR 5 MG tablet    ROXICODONE    30 tablet    Take 1-2 tablets (5-10 mg) by mouth every 6 hours as needed for moderate to severe pain    Status post cervical spinal fusion       pantoprazole 20 MG EC tablet    PROTONIX    30 tablet    Take 1 tablet (20 mg) by mouth daily Take by mouth 30-60 minutes before a meal.    Non-intractable vomiting with nausea, unspecified vomiting type, S/P gastric bypass, Hiatal hernia, Ulcer (H)       polyethylene glycol Packet    MIRALAX/GLYCOLAX    30 packet    Take 17 g by mouth daily    Constipation, unspecified constipation type       senna-docusate 8.6-50 MG per tablet    SENOKOT-S;PERICOLACE    120 tablet    Take 2 tablets by mouth 2 times daily    Constipation, unspecified constipation type

## 2018-04-10 NOTE — PROGRESS NOTES
SUBJECTIVE:   Antoinette Romero is a 57 year old female who presents to clinic today for the following health issues:          Hospital Follow-up Visit:    Hospital/Nursing Home/IP Rehab Facility: [facility]  Date of Admission: 3/10/2018  Date of Discharge: 3/18/2018  Reason(s) for Admission: fall, cervical spinal fusion, anxiety            Problems taking medications regularly:  None       Medication changes since discharge: None       Problems adhering to non-medication therapy:  None    Summary of hospitalization:  [facility] discharge summary reviewed  Diagnostic Tests/Treatments reviewed.  Follow up needed: medication   Other Healthcare Providers Involved in Patient s Care:         f/u Neurosurgery  Update since discharge: improved.     Post Discharge Medication Reconciliation: discharge medications reconciled, continue medications without change.  Plan of care communicated with patient     Coding guidelines for this visit:  Type of Medical   Decision Making Face-to-Face Visit       within 7 Days of discharge Face-to-Face Visit        within 14 days of discharge   Moderate Complexity [id number]   High Complexity [id number]              Abnormal Mood Symptoms      Duration: years    Description:  Depression: YES  Anxiety: YES  Panic attacks: YES     Accompanying signs and symptoms: see PHQ-9 and RAFAL scores    History (similar episodes/previous evaluation): None    Precipitating or alleviating factors: None    Therapies tried and outcome: Prozac (Fluoxetine) and Wellbutrin (Bupropion)    Hypertension Follow-up      Outpatient blood pressures are not being checked.    Low Salt Diet: not monitoring salt    Feels like she can't hear as well out of both ears. This has been going on since before her fall.     Problem list and histories reviewed & adjusted, as indicated.  Additional history: as documented    Patient Active Problem List   Diagnosis     HTN (hypertension)     Anxiety     HTN,  goal below 140/90     CARDIOVASCULAR SCREENING; LDL GOAL LESS THAN 160     Tobacco abuse     Obesity     Morbid obesity (H)     S/P gastric bypass     Vitamin D deficiency disease     Nausea     Symptomatic cholelithiasis     Cholecystitis     Headache     Scotoma     Hiatal hernia     Abdominal pain     Ulcer (H)     Nausea & vomiting     Bilateral otitis media     Tobacco use disorder     Contusion     Spinal cord injury, C1-C7 (H)     Past Surgical History:   Procedure Laterality Date     DECOMPRESSION, FUSION CERVICAL ANTERIOR THREE+ LEVELS, COMBINED N/A 3/11/2018    Procedure: COMBINED DECOMPRESSION, FUSION CERVICAL ANTERIOR THREE+ LEVELS;   INTRA-OPERATIVE SPINAL CORD MONITIORING, ANTERIOR CERVICAL DECOMPRESSION AND FUSION C3-T1 ;  Surgeon: Maynor Daley MD;  Location:  OR     ENDOSCOPIC UPPER GASTROINTESTINAL, REMOVE SUTURE N/A 1/17/2015    Procedure: ENDOSCOPIC UPPER GASTROINTESTINAL, REMOVE SUTURE;  Surgeon: Parviz Martinez MD;  Location:  OR     ESOPHAGOSCOPY, GASTROSCOPY, DUODENOSCOPY (EGD), COMBINED  2/18/2014    Procedure: COMBINED ESOPHAGOSCOPY, GASTROSCOPY, DUODENOSCOPY (EGD);  Esophagoscopy,Gastroscopy,Duodenoscopy;  Surgeon: Neo Sher MD;  Location:  GI     ESOPHAGOSCOPY, GASTROSCOPY, DUODENOSCOPY (EGD), COMBINED  5/7/2014    Procedure: COMBINED ESOPHAGOSCOPY, GASTROSCOPY, DUODENOSCOPY (EGD), BIOPSY SINGLE OR MULTIPLE;  Surgeon: Jose Antonio [place] MD;  Location:  GI     GYN SURGERY       LAPAROSCOPIC BYPASS GASTRIC  3/25/2013    Procedure: LAPAROSCOPIC BYPASS GASTRIC;  Laparoscopic Nawaf En Y Gastric Bypass. Hiatel hernia repair;  Surgeon: Parviz Martinez MD;  Location:  OR     LAPAROSCOPIC CHOLECYSTECTOMY WITH CHOLANGIOGRAMS  3/28/2014    Procedure: LAPAROSCOPIC CHOLECYSTECTOMY WITH CHOLANGIOGRAMS;;  Surgeon: Jose Antonio [place] MD;  Location:  OR     LAPAROSCOPY DIAGNOSTIC (GENERAL)  3/28/2014    Procedure: LAPAROSCOPY DIAGNOSTIC (GENERAL);   Diagnostic Laparoscopy, laparoscopic cholecystectomy, EGD, repair of johnston defect;  Surgeon: Jose Antonio [place] MD;  Location: [facility] OR     LAPAROSCOPY OPERATIVE ADULT N/A 1/17/2015    Procedure: LAPAROSCOPY OPERATIVE ADULT;  Surgeon: Parviz Martinez MD;  Location: [facility] OR       Social History   Substance Use Topics     Smoking status: Former Smoker     Packs/day: 1.00     Types: Cigarettes     Smokeless tobacco: Never Used      Comment: quit 3/2018     Alcohol use No     Family History   Problem Relation Age of Onset     Respiratory Mother      DIABETES Mother      Arthritis Mother      OA     Neurologic Disorder Father      CANCER Maternal Grandfather      Arthritis Maternal Grandmother      RA     Breast Cancer No family hx of      Ovarian Cancer No family hx of            Reviewed and updated as needed this visit by clinical staff  Tobacco  Allergies  Meds  Med Hx  Surg Hx  Fam Hx  Soc Hx      Reviewed and updated as needed this visit by Provider         ROS:  Constitutional, HEENT, cardiovascular, pulmonary, gi and gu systems are negative, except as otherwise noted.    OBJECTIVE:     /84 (BP Location: Right arm, Patient Position: Chair, Cuff Size: Adult Regular)  Pulse 105  Temp 98.3  F (36.8  C) (Oral)  Resp 14  Wt 122 lb (55.3 kg)  LMP 03/01/2010  BMI 18.55 kg/m2  Body mass index is 18.55 kg/(m^2).  GENERAL APPEARANCE: healthy, alert and no distress  HENT: ear canals and TM's normal and nose and mouth without ulcers or lesions  RESP: lungs clear to auscultation - no rales, rhonchi or wheezes  CV: regular rates and rhythm, normal S1 S2, no S3 or S4 and no murmur, click or rub  ABDOMEN: soft, nontender, without hepatosplenomegaly or masses and bowel sounds normal  SKIN: no suspicious lesions or rashes  PSYCH: mentation appears normal, affect flat and anxious        ASSESSMENT/PLAN:             1. Status post cervical spinal fusion  Continue with medications. Will taper down and off  oxycodone over next month  F/u with Neurosurgery  - gabapentin (NEURONTIN) 600 MG tablet; Take 1 tablet (600 mg) by mouth 3 times daily  Dispense: 90 tablet; Refill: 0  - cyclobenzaprine (FLEXERIL) 10 MG tablet; Take 1 tablet (10 mg) by mouth 3 times daily as needed for muscle spasms  Dispense: 60 tablet; Refill: 1  - oxyCODONE IR (ROXICODONE) 5 MG tablet; Take 1-2 tablets (5-10 mg) by mouth nightly as needed for moderate to severe pain  Dispense: 60 tablet; Refill: 0    2. Ulcer (H)  Stable.   - pantoprazole (PROTONIX) 20 MG EC tablet; Take 1 tablet (20 mg) by mouth daily Take by mouth 30-60 minutes before a meal.  Dispense: 30 tablet; Refill: 1    3. Decreased hearing of both ears  Referral placed.   - OTOLARYNGOLOGY REFERRAL    4. Benign essential hypertension  Stable. Continue.   - lisinopril (PRINIVIL/ZESTRIL) 10 MG tablet; Take 0.5 tablets (5 mg) by mouth daily  Dispense: 30 tablet; Refill: 1    5. Non-intractable vomiting with nausea, unspecified vomiting type  Stable.   - pantoprazole (PROTONIX) 20 MG EC tablet; Take 1 tablet (20 mg) by mouth daily Take by mouth 30-60 minutes before a meal.  Dispense: 30 tablet; Refill: 1    6. S/P gastric bypass  Stable.   - pantoprazole (PROTONIX) 20 MG EC tablet; Take 1 tablet (20 mg) by mouth daily Take by mouth 30-60 minutes before a meal.  Dispense: 30 tablet; Refill: 1    7. Hiatal hernia  Stable.   - pantoprazole (PROTONIX) 20 MG EC tablet; Take 1 tablet (20 mg) by mouth daily Take by mouth 30-60 minutes before a meal.  Dispense: 30 tablet; Refill: 1    8. Anxiety  Long discussion with Antoinette on the benefits.  Agrees to trial and start counseling.  Recheck in 2-3 weeks.   - DULoxetine (CYMBALTA) 20 MG EC capsule; Take 1 capsule (20 mg) by mouth daily  Dispense: 30 capsule; Refill: 1  - MENTAL HEALTH REFERRAL  - Adult; Outpatient Treatment; Individual/Couples/Family/Group Therapy/Health Psychology; [facility]: [facility] [phone]; We will contact you to  schedule the appointment or please call with any questions        Umu Roberto PA-C  [facility]

## 2018-04-19 ENCOUNTER — TELEPHONE (OUTPATIENT)
Dept: FAMILY MEDICINE | Facility: CLINIC | Age: 58
End: 2018-04-19

## 2018-04-19 ENCOUNTER — OFFICE VISIT (OUTPATIENT)
Dept: NEUROSURGERY | Facility: CLINIC | Age: 58
End: 2018-04-19
Attending: NURSE PRACTITIONER
Payer: COMMERCIAL

## 2018-04-19 ENCOUNTER — RADIANT APPOINTMENT (OUTPATIENT)
Dept: GENERAL RADIOLOGY | Facility: CLINIC | Age: 58
End: 2018-04-19
Attending: NURSE PRACTITIONER
Payer: COMMERCIAL

## 2018-04-19 VITALS
HEART RATE: 95 BPM | OXYGEN SATURATION: 100 % | HEIGHT: 67 IN | DIASTOLIC BLOOD PRESSURE: 88 MMHG | SYSTOLIC BLOOD PRESSURE: 141 MMHG | BODY MASS INDEX: 19.15 KG/M2 | WEIGHT: 122 LBS

## 2018-04-19 DIAGNOSIS — Z98.1 STATUS POST CERVICAL SPINAL FUSION: ICD-10-CM

## 2018-04-19 DIAGNOSIS — F41.9 ANXIETY: Primary | ICD-10-CM

## 2018-04-19 DIAGNOSIS — Z98.1 STATUS POST CERVICAL SPINAL FUSION: Primary | ICD-10-CM

## 2018-04-19 PROCEDURE — G0463 HOSPITAL OUTPT CLINIC VISIT: HCPCS | Performed by: NURSE PRACTITIONER

## 2018-04-19 PROCEDURE — 99024 POSTOP FOLLOW-UP VISIT: CPT | Performed by: NURSE PRACTITIONER

## 2018-04-19 PROCEDURE — 72040 X-RAY EXAM NECK SPINE 2-3 VW: CPT

## 2018-04-19 RX ORDER — FLUOXETINE 10 MG/1
10 CAPSULE ORAL DAILY
Qty: 30 CAPSULE | Refills: 1 | Status: SHIPPED | OUTPATIENT
Start: 2018-04-19 | End: 2018-11-07

## 2018-04-19 RX ORDER — OXYCODONE HYDROCHLORIDE 5 MG/1
5-10 TABLET ORAL
Qty: 14 TABLET | Refills: 0 | Status: SHIPPED | OUTPATIENT
Start: 2018-04-19 | End: 2018-04-27

## 2018-04-19 RX ORDER — GABAPENTIN 600 MG/1
900 TABLET ORAL 3 TIMES DAILY
Qty: 150 TABLET | Refills: 3 | Status: SHIPPED | OUTPATIENT
Start: 2018-04-19 | End: 2018-11-07 | Stop reason: DRUGHIGH

## 2018-04-19 ASSESSMENT — PAIN SCALES - GENERAL: PAINLEVEL: MODERATE PAIN (4)

## 2018-04-19 NOTE — TELEPHONE ENCOUNTER
Patient calling back and states she talked to her insurance and they told her the only reason she was given #14 was that we did not do a PA for the Oxycodone and that to get refilled we need to do PA.  Advised we were not advised PA needed and would need PA information to process PA.  States uses our pharmacy and wants to be transferred to pharmacy.  Transferred to pharmacy to see if it is PA that we need.  Ivelisse Epperson RN

## 2018-04-19 NOTE — TELEPHONE ENCOUNTER
Pt calling regarding pain pills       Disp Refills Start End ADÁN   oxyCODONE IR (ROXICODONE) 5 MG tablet 60 tablet 0 4/10/2018  No   Sig: Take 1-2 tablets (5-10 mg) by mouth nightly as needed for moderate to severe pain       States pharmacy would only dispense 14 tabs out of the 60 tabs ordered  Was told she needs new rx, as remaining tabs on rx void   checked, does show only 14 tabs for 7 days dispensed.  Likely insurance limits to weekly fills  Pt willing to pay cash as she can't make it to pharmacy so often due to driving restrictions  Also has call out to neurosurgeon    CB# [phone]    Route to provider to review and advise    Aydee Voss RN Nurse Triage

## 2018-04-19 NOTE — TELEPHONE ENCOUNTER
I am aware of this. The pharmacy called me the first fill to inform me.     Rx In my outbox.   Please call pt to inform of fill when walked to pharmacy.    Umu Roberto PA-C

## 2018-04-19 NOTE — LETTER
4/19/2018         RE: Antoinette Romero  [address]  [place] [address]        Dear Colleague,    Thank you for referring your patient, Antoinette Romero, to the [facility]. Please see a copy of my visit note below.    [facility]  Neurosurgery followup:    HPI: Antoinette Romero is a 57 year old female who was admitted on 3/10/2018 with multiple falls and weakness with incomplete spinal cord injury. She had weaknes in left arm and leg and extreme hypersensitivity in BUE. Subsequently underwent C3-T1 ACDF with Dr. Maynor Daley on 3/11/2018.  Post op she had continued weakness which was to be expected.  She discharged to ARU. She is here to day for her 6 week follow up.   She reports that she is stronger but still has numbness down her arms.     Exam:  Constitutional:  Alert, well nourished, NAD.  HEENT: Normocephalic, atraumatic.   Pulm:  Without shortness of breath   CV:  No pitting edema of BLE.     Neurological:  Awake  Alert  Oriented x 3  Motor exam:     Shoulder Abduction:  Right:  5/5    Left:  5/5  Biceps:                      Right:  5/5    Left:  5/5  Triceps:                     Right:  5/5    Left:  5/5  Wrist Extensors:       Right:  5/5    Left:  5/5  Wrist Flexors:           Right:  5/5    Left:  5/5  Intrinsics:                  Right:  5/5    Left:  5/5     Able to spontaneously move U/E bilaterally  Sensation intact throughout all U/E dermatomes    Incisions:  Cervical incision healed    Imaging:  Cervical xray shows fusion intact C7-T1    A/P: Antoinette Romero is a 57 year old female who was admitted on 3/10/2018 with multiple falls and weakness with incomplete spinal cord injury. She had weaknes in left arm and leg and extreme hypersensitivity in BUE. Subsequently underwent C3-T1 ACDF with Dr. Maynor Daley on 3/11/2018.  Post op she had continued weakness which was to be expected.  She discharged to ARU. She is here to day for her 6  "week follow up.   She reports that she is stronger but still has numbness down her arms. She states that she does not remember anything that happened at [facility] post surgery and does not remember me.  Her restrictions were explained and we will see her back in 6 weeks with another xray.  It was explained that it can take up to a year for the nerve pain to get better but it may not fully resolve. She is on Neurontin.  We will increase her to 900 mg TID.  The pt has full UE strength upon exam. She is very tearful and reports that she would like to get better \"faster\".  It was explained that she needs to allow her body to heal and she needs to continue to follow the restrictions.  She is having difficulty and depression. It was recommended that she return to her PCP regarding this. She reports that she will.      Patient Instructions   - May increase lifting restriction to 20  Pounds    - Continue to wear brace    - followup in 6 weeks  with xray prior     - Call the clinic at [phone] for increasing redness, swelling or pus draining from the incision, increased pain or any other questions and concerns.             Mojgan Callejas CNP  [facility]  [address]  [address] [place] [address]    Tel [phone]  Pager [phone]      Again, thank you for allowing me to participate in the care of your patient.        Sincerely,        MARILIN Altamirano CNP    "

## 2018-04-19 NOTE — PROGRESS NOTES
[facility]  Neurosurgery followup:    HPI: Antoinette Romero is a 57 year old female who was admitted on 3/10/2018 with multiple falls and weakness with incomplete spinal cord injury. She had weaknes in left arm and leg and extreme hypersensitivity in BUE. Subsequently underwent C3-T1 ACDF with Dr. Maynor Daley on 3/11/2018.  Post op she had continued weakness which was to be expected.  She discharged to ARU. She is here to day for her 6 week follow up.   She reports that she is stronger but still has numbness down her arms.     Exam:  Constitutional:  Alert, well nourished, NAD.  HEENT: Normocephalic, atraumatic.   Pulm:  Without shortness of breath   CV:  No pitting edema of BLE.     Neurological:  Awake  Alert  Oriented x 3  Motor exam:     Shoulder Abduction:  Right:  5/5    Left:  5/5  Biceps:                      Right:  5/5    Left:  5/5  Triceps:                     Right:  5/5    Left:  5/5  Wrist Extensors:       Right:  5/5    Left:  5/5  Wrist Flexors:           Right:  5/5    Left:  5/5  Intrinsics:                  Right:  5/5    Left:  5/5     Able to spontaneously move U/E bilaterally  Sensation intact throughout all U/E dermatomes    Incisions:  Cervical incision healed    Imaging:  Cervical xray shows fusion intact C7-T1    A/P: Antoinette Romero is a 57 year old female who was admitted on 3/10/2018 with multiple falls and weakness with incomplete spinal cord injury. She had weaknes in left arm and leg and extreme hypersensitivity in BUE. Subsequently underwent C3-T1 ACDF with Dr. Maynor Daley on 3/11/2018.  Post op she had continued weakness which was to be expected.  She discharged to ARU. She is here to day for her 6 week follow up.   She reports that she is stronger but still has numbness down her arms. She states that she does not remember anything that happened at [facility] post surgery and does not remember me.  Her restrictions were explained and we will see her back in 6 weeks  "with another xray.  It was explained that it can take up to a year for the nerve pain to get better but it may not fully resolve. She is on Neurontin.  We will increase her to 900 mg TID.  The pt has full UE strength upon exam. She is very tearful and reports that she would like to get better \"faster\".  It was explained that she needs to allow her body to heal and she needs to continue to follow the restrictions.  She is having difficulty and depression. It was recommended that she return to her PCP regarding this. She reports that she will.      Patient Instructions   - May increase lifting restriction to 20  Pounds    - Continue to wear brace    - followup in 6 weeks  with xray prior     - Call the clinic at [phone] for increasing redness, swelling or pus draining from the incision, increased pain or any other questions and concerns.             Mojgan Callejas [facility]  [facility]  [address] [address]    Tel [phone]  Pager [phone]    "

## 2018-04-19 NOTE — NURSING NOTE
"Antoinette Romero is a 57 year old female who presents for:  Chief Complaint   Patient presents with     Neurologic Problem     6 week follow up status post cervical fusion DOS 03/11/18, nerve pain and right arm pain         Initial Vitals:  /88 (BP Location: Right arm, Patient Position: Sitting, Cuff Size: Adult Regular)  Pulse 95  Ht 5' 7\" (1.702 m)  Wt 122 lb (55.3 kg)  LMP 03/01/2010  SpO2 100%  BMI 19.11 kg/m2 Estimated body mass index is 19.11 kg/(m^2) as calculated from the following:    Height as of this encounter: 5' 7\" (1.702 m).    Weight as of this encounter: 122 lb (55.3 kg).. Body surface area is 1.62 meters squared. BP completed using cuff size: regular  Moderate Pain (4)    Do you feel safe in your environment?  Yes  Do you need any refills today? No    Nursing Comments: 6 week follow up status post cervical fusion DOS 03/11/18, nerve pain and right arm pain.        5 min. nursing intake time  Dahiana Mosley MA       Discharge plan: - May increase lifting restriction to 20  Pounds    - Continue to wear brace    - followup in 6 weeks  with xray prior     - Increase Neurontin 600 mg to 1.5 tabs three times per day     - Call the clinic at [phone] for increasing redness, swelling or pus draining from the incision, increased pain or any other questions and concerns.  2 min. nursing discharge time  Dahiana Mosley MA        "

## 2018-04-19 NOTE — PATIENT INSTRUCTIONS
- May increase lifting restriction to 20  Pounds    - Continue to wear brace    - followup in 6 weeks  with xray prior     - Increase Neurontin 600 mg to 1.5 tabs three times per day     - Call the clinic at [phone] for increasing redness, swelling or pus draining from the incision, increased pain or any other questions and concerns.

## 2018-04-19 NOTE — TELEPHONE ENCOUNTER
URGENT  PER VO BRODERICK BELL PA-C    Prior Authorization Retail Medication Request    Medication/Dose: oxyCODONE IR (ROXICODONE) 5 MG tablet  ICD code (if different than what is on RX):   Previously Tried and Failed: Tylenol  Rationale:   ibuprofen contraindicated S/P gastric bypass    Member ID: [id number]   Insurance phone #: [phone]         Pharmacy Information (if different than what is on RX)  Name:   Phone:

## 2018-04-19 NOTE — TELEPHONE ENCOUNTER
PA Initiation    Medication: URGENT oxyCODONE IR (ROXICODONE) 5 MG tablet  Insurance Company: [employer] - Phone [phone] Fax [phone]  Pharmacy Filling the Rx: [place], [place] - [address]  Filling Pharmacy Phone: [phone]  Filling Pharmacy Fax:    Start Date: 4/19/2018    THIS HAS BEEN SUBMITTED BY THE PRIOR-AUTHORIZATION TEAM. ANY QUESTIONS PLEASE CALL [phone]. THANK YOU

## 2018-04-19 NOTE — MR AVS SNAPSHOT
After Visit Summary   4/19/2018    Antoinette Romero    MRN: [id number]           Patient Information     Date Of Birth          1960        Visit Information        Provider Department      4/19/2018 10:40 AM Mojgan Callejas APRN CNP [facility]        Today's Diagnoses     Status post cervical spinal fusion    -  1      Care Instructions    - May increase lifting restriction to 20  Pounds    - Continue to wear brace    - followup in 6 weeks  with xray prior     - Increase Neurontin 600 mg to 1.5 tabs three times per day     - Call the clinic at [phone] for increasing redness, swelling or pus draining from the incision, increased pain or any other questions and concerns.          Follow-ups after your visit        Your next 10 appointments already scheduled     Apr 23, 2018  8:00 AM CDT   Evaluation with HERNÁN De Leon   [facility] Occupational Therapy ([facility])    [address] [place] [id number] [phone]            Apr 23, 2018  9:00 AM CDT   Ortho Eval with Rigo Rosenthal PT   [facility] Physical Therapy ([facility])    [address] [place] [id number] [phone]            Apr 24, 2018  9:00 AM CDT   Office Visit with Umu Roberto PA-C   [facility]    [address] [place] [id number] [phone]           Bring a current list of meds and any records pertaining to this visit. For Physicals, please bring immunization records and any forms needing to be filled out. Please arrive 10 minutes early to complete paperwork.            Jun 05, 2018  4:30 PM CDT   (Arrive by 4:15 PM)   New Patient Visit with Irasema Santana MD   [facility] Physical Medicine and Rehabilitation ([facility])    [address] [facility]  [address] [facility] [address] [phone]              Future tests  "that were ordered for you today     Open Future Orders        Priority Expected Expires Ordered    XR Cervical Spine 2/3 Views Routine 2018            Who to contact     If you have questions or need follow up information about today's clinic visit or your schedule please contact [facility] directly at [phone].  Normal or non-critical lab and imaging results will be communicated to you by MyChart, letter or phone within 4 business days after the clinic has received the results. If you do not hear from us within 7 days, please contact the clinic through [employer] or phone. If you have a critical or abnormal lab result, we will notify you by phone as soon as possible.  Submit refill requests through eMeter or call your pharmacy and they will forward the refill request to us. Please allow 3 business days for your refill to be completed.          Additional Information About Your Visit        eMeter Information     eMeter lets you send messages to your doctor, view your test results, renew your prescriptions, schedule appointments and more. To sign up, go to [email or web address] . Click on \"Log in\" on the left side of the screen, which will take you to the Welcome page. Then click on \"Sign up Now\" on the right side of the page.     You will be asked to enter the access code listed below, as well as some personal information. Please follow the directions to create your username and password.     Your access code is: [email or web address]  Expires: 2018  1:49 PM     Your access code will  in 90 days. If you need help or a new code, please call your [facility] clinic or [phone].        Care EveryWhere ID     This is your Care EveryWhere ID. This could be used by other organizations to access your [facility] medical records  [id number]        Your Vitals Were     Pulse Height Last Period Pulse Oximetry BMI (Body Mass Index)       95 5' 7\" (1.702 m) 2010 100% " 19.11 kg/m2        Blood Pressure from Last 3 Encounters:   04/19/18 141/88   04/10/18 132/84   03/28/18 110/74    Weight from Last 3 Encounters:   04/19/18 122 lb (55.3 kg)   04/10/18 122 lb (55.3 kg)   03/26/18 124 lb 12.8 oz (56.6 kg)                 Today's Medication Changes          These changes are accurate as of 4/19/18 10:58 AM.  If you have any questions, ask your nurse or doctor.               These medicines have changed or have updated prescriptions.        Dose/Directions    gabapentin 600 MG tablet   Commonly known as:  NEURONTIN   This may have changed:  how much to take   Used for:  Status post cervical spinal fusion   Changed by:  Mojgan Callejas APRN CNP        Dose:  900 mg   Take 1.5 tablets (900 mg) by mouth 3 times daily   Quantity:  150 tablet   Refills:  3         Stop taking these medicines if you haven't already. Please contact your care team if you have questions.     bisacodyl 10 MG Suppository   Commonly known as:  DULCOLAX   Stopped by:  Mojgan Callejas APRN CNP           polyethylene glycol Packet   Commonly known as:  MIRALAX/GLYCOLAX   Stopped by:  Mojgan Callejas APRN CNP           senna-docusate 8.6-50 MG per tablet   Commonly known as:  SENOKOT-S;PERICOLACE   Stopped by:  Mojgan Callejas APRN CNP                Where to get your medicines      These medications were sent to [facility] [address]  [address] [facility] [address]     Phone:  [phone]     gabapentin 600 MG tablet         Some of these will need a paper prescription and others can be bought over the counter.  Ask your nurse if you have questions.     Bring a paper prescription for each of these medications     oxyCODONE IR 5 MG tablet                Primary Care Provider Office Phone # Fax #    Umu Roberto PA-C [phone] [address] [facility] [address]        Equal Access to Services     MARY GALAN AH: Freddy  kiley Szymanski, wacorinada boraadaha, qaybta kaalmada santamandy, waxalbert carie corriganilanicki dueñas melissa. So [facility] [phone].    ATENCIÓN: Si habla mark, tiene a shah disposición servicios gratuitos de asistencia lingüística. Fahad al [phone].    We comply with applicable federal civil rights laws and [place] laws. We do not discriminate on the basis of race, color, national origin, age, disability, sex, sexual orientation, or gender identity.            Thank you!     Thank you for choosing [facility]  for your care. Our goal is always to provide you with excellent care. Hearing back from our patients is one way we can continue to improve our services. Please take a few minutes to complete the written survey that you may receive in the mail after your visit with us. Thank you!             Your Updated Medication List - Protect others around you: Learn how to safely use, store and throw away your medicines at www.disposemymeds.org.          This list is accurate as of 4/19/18 10:58 AM.  Always use your most recent med list.                   Brand Name Dispense Instructions for use Diagnosis    acetaminophen 325 MG tablet    TYLENOL    100 tablet    Take 2 tablets (650 mg) by mouth every 4 hours as needed for mild pain    Status post cervical spinal fusion       cyclobenzaprine 10 MG tablet    FLEXERIL    60 tablet    Take 1 tablet (10 mg) by mouth 3 times daily as needed for muscle spasms    Status post cervical spinal fusion       DULoxetine 20 MG EC capsule    CYMBALTA    30 capsule    Take 1 capsule (20 mg) by mouth daily    Anxiety       gabapentin 600 MG tablet    NEURONTIN    150 tablet    Take 1.5 tablets (900 mg) by mouth 3 times daily    Status post cervical spinal fusion       lisinopril 10 MG tablet    PRINIVIL/ZESTRIL    30 tablet    Take 0.5 tablets (5 mg) by mouth daily    Benign essential hypertension       multivitamin, therapeutic with minerals Tabs tablet      30 each    Take 1 tablet by mouth daily    Alcohol dependence, daily use (H)       oxyCODONE IR 5 MG tablet    ROXICODONE    14 tablet    Take 1-2 tablets (5-10 mg) by mouth nightly as needed for moderate to severe pain    Status post cervical spinal fusion       pantoprazole 20 MG EC tablet    PROTONIX    30 tablet    Take 1 tablet (20 mg) by mouth daily Take by mouth 30-60 minutes before a meal.    Non-intractable vomiting with nausea, unspecified vomiting type, S/P gastric bypass, Hiatal hernia, Ulcer (H)

## 2018-04-20 NOTE — TELEPHONE ENCOUNTER
Prior Authorization Approval    Authorization Effective Date: 4/20/2018  Authorization Expiration Date: 5/20/2018  Medication: URGENT oxyCODONE IR (ROXICODONE) 5 MG tablet APPROVAL   Approved Dose/Quantity:   Reference #: [id number]   Insurance Company: [employer] - Phone [phone] Fax [phone]  Expected CoPay:       CoPay Card Available:      Foundation Assistance Needed:    Which Pharmacy is filling the prescription (Not needed for infusion/clinic administered): [facility] [place], [place] - [address] [facility]  Pharmacy Notified: Yes  Patient Notified: Yes

## 2018-04-23 ENCOUNTER — HOSPITAL ENCOUNTER (OUTPATIENT)
Dept: PHYSICAL THERAPY | Facility: CLINIC | Age: 58
Setting detail: THERAPIES SERIES
End: 2018-04-23
Attending: PHYSICAL MEDICINE & REHABILITATION
Payer: COMMERCIAL

## 2018-04-23 ENCOUNTER — HOSPITAL ENCOUNTER (OUTPATIENT)
Dept: OCCUPATIONAL THERAPY | Facility: CLINIC | Age: 58
Setting detail: THERAPIES SERIES
End: 2018-04-23
Attending: PHYSICAL MEDICINE & REHABILITATION
Payer: COMMERCIAL

## 2018-04-23 PROCEDURE — 97116 GAIT TRAINING THERAPY: CPT | Mod: GP | Performed by: PHYSICAL THERAPIST

## 2018-04-23 PROCEDURE — 97165 OT EVAL LOW COMPLEX 30 MIN: CPT | Mod: GO | Performed by: OCCUPATIONAL THERAPIST

## 2018-04-23 PROCEDURE — 97162 PT EVAL MOD COMPLEX 30 MIN: CPT | Mod: GP | Performed by: PHYSICAL THERAPIST

## 2018-04-23 PROCEDURE — 97112 NEUROMUSCULAR REEDUCATION: CPT | Mod: GP | Performed by: PHYSICAL THERAPIST

## 2018-04-23 PROCEDURE — 97110 THERAPEUTIC EXERCISES: CPT | Mod: GP | Performed by: PHYSICAL THERAPIST

## 2018-04-23 PROCEDURE — 40000718 ZZHC STATISTIC PT DEPARTMENT ORTHO VISIT: Performed by: PHYSICAL THERAPIST

## 2018-04-23 PROCEDURE — 40000125 ZZHC STATISTIC OT OUTPT VISIT: Performed by: OCCUPATIONAL THERAPIST

## 2018-04-23 PROCEDURE — 97535 SELF CARE MNGMENT TRAINING: CPT | Mod: GO | Performed by: OCCUPATIONAL THERAPIST

## 2018-04-23 ASSESSMENT — ACTIVITIES OF DAILY LIVING (ADL)
IADL_QUICK_ADDS: MEAL PLANNING/PREPARATION;HOME/FINANCIAL/MANAGEMENT;COMMUNICATION/COMPUTER USE;COMMUNITY MOBILITY;CARE OF OTHERS

## 2018-04-23 NOTE — PROGRESS NOTES
" 04/23/18 0858   Quick Adds   Type of Visit Initial OP PT Evaluation   General Information   Start of Care Date 04/23/18   Referring Physician Pardeep Crawford MD   Orders Evaluate and Treat as Indicated   Order Date 03/27/18   Medical Diagnosis Status post cervical spinal fusion Z98.1   Onset of illness/injury or Date of Surgery 03/09/18   Precautions/Limitations fall precautions;spinal precautions  (Aspen cervical collar on at all times yet)   Surgical/Medical history reviewed Yes   Pertinent history of current problem (include personal factors and/or comorbidities that impact the POC) Pt presents to PT for continued strengthening, balance/gait training after sustaining a traumatic cervical spine injury after a fall on 3/9/18, resulting in C3-T1 severe stenosis with spinal cord compression and myelomalacia, 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 Underwent C3-T1 ACDF on 3/11/18.  Was [facility] at [facility] from 3/18-3/28/18, then discharged home.  PMH including anxiety, depression, neuropathic pain, migraines, tobacco use, alcohol use, chronic worsening hip and leg pain from OA, ETOH abuse with multiple fall hx.  She has been wearing an Aspen collar with spinal precautions post op, per most recent neurosurgical notes, continue wearing brace, lifting restriction increased to 20#.  She feels she has somewhat regressed since leaving [facility] due to lack of activity and not recieving therapy, fearful of falling.  She denies any falls since her injury, but feels unsteady and has had several LOB.  She reports hitting her head \"hard\" 2x during falls, feels foggy afterward yet.   Pertinent Visual History  no baseline problems with vision   Prior level of function comment Indep PLOF, no AD since leaving [facility]. Was advised to use a SEC but she declined this.   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 See chart   Previous/Current Treatment Physical Therapy;Occupational Therapy;Medication(s)  (at [facility])   Improvement after PT Significant   Improvement after OT Significant   Improvement after medication Moderate   Current Community Support Family/friend caregiver   Patient role/Employment history Unemployed   [facility]   Home/Community Accessibility Comments Lives in mobile home with her spouse and 20-yr old son, 4 steps to enter with B railings.   Current Assistive Devices (no current AD, was advised to use SEC after leaving [facility])   Assistive Devices Comments Indep ambulation, but is fearful of falling, no AD currently.   Patient/Family Goals Statement Improve her strength, walking stabilitiy, return to ability of lifting her 2yr old grandson and playing/running around with him confidently.   Fall Risk Screen   Fall screen completed by PT   Have you fallen 2 or more times in the past year? Yes   Have you fallen and had an injury in the past year? Yes   Timed Up and Go score (seconds) 11.43   Is patient a fall risk? Yes;Department fall risk interventions implemented   Fall screen comments Hx of falls, pt fearful of falling.   System Outcome Measures   Outcome Measures Concussion (see Concussion Symptom Assessment)  (54%, see flowsheet)   Pain   Patient currently in pain Yes   Pain location R knee   Pain rating (0/10 at rest, increases to 10/10 with movement at times)   Additional pain locations? Pain location 2   Pain location 2 L arm   Pain rating 2 0/10 at rest, increases to 10/10 at times with movement   Pain comments Pt reports shoulder/arm pain intermittent   Cognitive Status Examination   Orientation orientation to person, place and time   Cognitive Comment pt feels memory is  impaired, feels sluggish and 'groggy'.   Integumentary   Integumentary Comments anterior cervical incision appears well healing, no open areas.   Posture   Posture Forward head position   Posture Comments mild FH noted, wearing Aspen cervical collar, able to correct for posture with cues.   Range of Motion (ROM)   ROM Comment AROM shoulders to ~100 deg before pain increases in shoulders, no neck pain reported   Strength   Strength Comments Weakness noted B hips, more pronounced L hip compared to R.  R  weakness per OT assessment and manual assessment during PT eval.  B ankle DF 5/5, R knee ext 5/5, R knee flexion 5/5, L knee extension 4+/5, L knee flexion 3+/5, R hip flexion 4-/5, L hip flexion 3+/5, R hip ABD 4-/5, L hip ABD 3/5.   Bed Mobility   Bed Mobility Comments Indep log roll with Aspen collar donned, modified technique with log roll   Transfer Skills   Transfer Comments Indep sit <> stands, moderate use of hands for support, pt fearful of falling, mild tendency for posterior lean noted at times.   Gait   Gait Comments Amb with slower gait speed, lateral trunk lean during L stance phase due to weakness, compensated trendelenberg R stance phase due to R hip/knee pain at baseline. Impaired stability with turning/pivoting, LOB noted needing touch support on wall.  Unable to complete 4-item DGI due to inability to turn head, compensating with trunk rotation but LOB noted.     Balance   Balance Comments Impaired standing balance and gait stability, challenged with EC, conforming surfaces, and dynamic gait skills.   Balance Special Tests   Balance Special Tests Timed up and go;Romberg   Balance Special Tests Timed Up and Go   Seconds 11.43 Seconds   Comments norm for age/sex is <8 sec   Balance Special Tests Romberg   Seconds 30 Seconds   Comments EO x 30 sec, EC x 5 sec with LOB posteriorly   Sensory Examination   Sensory Perception Comments denies n/t in LEs   Coordination   Coordination no deficits were  identified   Coordination Comments intact finger to nose B   Muscle Tone   Muscle Tone no deficits were identified   Modality Interventions   Planned Modality Interventions Comments as indicated per therapist discretion   Planned Therapy Interventions   Planned Therapy Interventions balance training;gait training;neuromuscular re-education;ROM;strengthening;stretching;transfer training;manual therapy   Clinical Impression   Criteria for Skilled Therapeutic Interventions Met yes, treatment indicated   PT Diagnosis Impaired balance/gait stability, weakness   Influenced by the following impairments Impaired strength at hips, especially LLE, impaired balance, spinal precautions with use of Aspen cervical collar, post concussive symptoms   Functional limitations due to impairments Impaired tolerance and safety with gait skills, stairs, transfers, ADLs, return to leisure/play with grandson, fall risk   Clinical Presentation Evolving/Changing   Clinical Presentation Rationale evolving/changing symptoms post fall/surgery yet, moderate concussion symptom assessment score yet, indep PLOF, fall risk, level of support at home, [facility]   Clinical Decision Making (Complexity) Moderate complexity   Therapy Frequency 2 times/Week  (pt prefers 1x/wk currently due to transportation conflict)   Predicted Duration of Therapy Intervention (days/wks) 10 weeks  (allowing time once out of c-spine collar)   Risk & Benefits of therapy have been explained Yes   Patient, Family & other staff in agreement with plan of care Yes   Education Assessment   Preferred Learning Style Listening;Reading;Demonstration;Pictures/video   Barriers to Learning Cognitive;Emotional  (some post concussive symptoms yet)   GOALS   PT Eval Goals 1;2;3;4   Goal 1   Goal Identifier Gait Stability   Goal Description Antoinette will score 20/24 or greater on the DGI to indicate low fall risk and improved safety/confidence ambulating in busier community environments.   Target  Date 07/06/18   Goal 2   Goal Identifier TUG   Goal Description Antoinette will complete the TUG in < 8 sec to show improving strength, balance, and gait stability necessary for improved mobility about her home and community (baseline score 11.43 sec, norm for age/sex is < 8 sec).   Target Date 07/06/18   Goal 3   Goal Identifier Sit <> Stands    Goal Description Antoinette will demo ability to perform 13 or more sit <> stands from 18'' chair, no use of hands in 30 sec, to show improving strength and balance necessary for improved function about her home and community.   Target Date 07/06/18   Goal 4   Goal Identifier Stairs   Goal Description Antoinette will demo ability to perform stairs without use of railing, reciprocal pattern for improved ability to safely access her home and community environments.   Target Date 07/06/18   Goal 5   Goal Identifier Functional Lifting   Goal Description Antoinette will demo ability to functionally squat/lift > 30# objects from floor surface without LOB so she can safely play with her 2 year old grandson.   Target Date 07/06/18   Goal 6   Goal Identifier HEP   Goal Description Antoinette will demo (I) with HEP for continued management/improvement of condition and optimal level of function.   Target Date 07/06/18   Total Evaluation Time   Total Evaluation Time (Minutes) 30

## 2018-04-27 ENCOUNTER — TELEPHONE (OUTPATIENT)
Dept: FAMILY MEDICINE | Facility: CLINIC | Age: 58
End: 2018-04-27

## 2018-04-27 DIAGNOSIS — Z98.1 STATUS POST CERVICAL SPINAL FUSION: ICD-10-CM

## 2018-04-27 RX ORDER — OXYCODONE HYDROCHLORIDE 5 MG/1
5-10 TABLET ORAL
Qty: 60 TABLET | Refills: 0 | Status: SHIPPED | OUTPATIENT
Start: 2018-04-27 | End: 2018-05-31

## 2018-04-27 NOTE — PROGRESS NOTES
04/23/18 1700   Quick Adds   Quick Adds Concussion  (Pt displays strong sx of concussion, but no offical dx.  )   Type of Visit Initial Outpatient Occupational Therapy Evaluation       Comment Concussion template used to help organize sx on evaluation better despite lack of official diagnosis. (OT does not intend to diagnose,as this is outside scope of practice)   General Information   Start Of Care Date 04/23/18   Referring Physician Pardeep Crawford / Irasema Santana   Orders Evaluate and treat as indicated   Orders Date 03/28/18   Medical Diagnosis Status post cervical spinal fusion   Onset of Illness/Injury or Date of Surgery 03/19/18   Special Instructions No driving, lifting < 20# only. 24 hour [place] Collar Use   Surgical/Medical History Reviewed Yes   Additional Occupational Profile Info/Pertinent History of Current Problem Pt presents to OT for continued strengthening, balance/gait training after sustaining a traumatic cervical spine injury after a fall on 3/9/18, resulting in C3-T1 severe stenosis with spinal cord compression and myelomalacia, 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 Underwent C3-T1 ACDF on 3/11/18.  Was admitted at [facility] from 3/18-3/28/18, then discharged home.  PMH including gastric bypass, anxiety, depression, neuropathic pain, migraines, tobacco use, alcohol use, chronic worsening hip and leg pain from OA, ETOH abuse with multiple fall hx.  She has been wearing an Aspen collar with spinal precautions post op, per most recent neurosurgical notes, continue wearing brace, lifting restriction increased to 20#.  She feels she has somewhat regressed since leaving ARU due to lack of activity and not receiving therapy, fearful of falling.  She denies any falls since her injury, but feels unsteady and has had several LOB, denied need for devices upon d/c from ARU.  " She reports hitting her head \"hard\" 2x during her initial falls (once to the side, striking R temporal and L half of body and 2nd was straight backwards on her back/head/left side of body) as she tried to  the sink while toileting but her hands \"wouldn't hold anything\".  She feels continued sx with foggy with light/sound sensitivity and dizziness at this time.   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     Current Living Environment Other, comments  (trailer home)   Number of Stairs to Enter Home 3 with B rails   Home/Community Accessibility Comments Did not end up getting a shower chair or grab bars, denies need for them .  Rates confidence with showering at 95%.  Managing stiars and slippery surfaces ifs <6% confidence.   Prior Level - Transfers Independent   Prior Level - Ambulation Independent   Prior Level - ADLS Independent   Prior Responsibilities - IADL Meal Preparation;Housekeeping;Laundry;Shopping;Yardwork;Medication management;Finances;Driving;   Prior Level Comments Pt is used to being very independent at a high level.     Patient/family Goals Statement Improve her strength, hand coordination, reduce pain and return to ability of lifting her 1yr old grandson and playing/running around with him confidently.   Vision Interview   Technology Used phone, tablet, PC   Technology Use Increases Symptoms Yes   Technology Use Increases Symptoms Comments tolerates 10-15 mintues at a time. TV is hard to focus on, increases headache   Type of Glasses Single lens   Light Sensitivity/Glare  Indoor;Outdoor   Light Sensitivity/Glare Comments Lighting changes hurt, poor toelrance of screen/glare.   Impaired Vision " Blurred vision;Impaired accommodation   Impaired Vision Comments hard to look at things up close   Brings Print Close to Read yes   Unable to Read Small Print yes   Reported Reading Speed Slowed   Reading Endurance/Fatigue   Complaints of Visual Fatigue Comments tolerates 1015 min max of phone, computer.   Convergence Comments Suspected deficits, will screen further   Pursuits Comments Suspected deficits, will screen further   Difficulties with IADL Performance: Increase in Symptoms with the Following   Difficulty Concentrating at School, Work or Home unable to tolerate stores or visits with > 2 people   Difficulty Multi-Tasking/Planning yes   Sensory Tolerance light and sound sensitive   Startles Easily yes, very fearful of falling again.   Mood Changes   Is Patient Experiencing Changes in Mood? Feeling irritable;Anxiety;Depression   Patient Mood Comments Pt has tried 2 medications recently with poor tolerance Cymbalta ( felt paralyzed x 2 days, very scary epsode for her) and Buspar ( had triple vision).  Starts back on Prozac again this afternoon.  Has had history of depression with tx 10 years ago, but had been well managed up until this point. Pt becomes tearful descibing how much her daughters life has changed since pt's falls/fx, with need to leave her job for a month and find  for her 2 y/o son.   Is Patient Currently Receiving Treatment for Mental Health? Yes   Vestibular Symptoms   Is Patient Experiencing Vestibular Symptoms? Yes   Triggers for Vestibular Symptoms Include: getting up quickly, rolling out of bed, quick head/body turns.   Fatigue   General Fatigue Comments Feels aexhausted, sleeps 1 hour intervals per [place] collar digging in.   Pain   Patient currently in pain Yes   Pain location R Parietal constant HA x last 3 days. R knee OA and R shoulder   Pain rating 6/10   Pain description Heaviness;Throbbing;Burning   Pain description comment Pt describes feeling like she has been shot with a  nail gun in her R deltoid.  Her hands are tingly, newly registering hot/cold; has reminaing skin sensitivity ( covers/fabirc brushing arms is uncomfortable) forearms ache and burn.  L TH/IF/RF fingers are hypersensitive, taking gabapentin for the nerve pain.     Fall Risk Screen   Fall screen completed by PT   Have you fallen 2 or more times in the past year? Yes   Have you fallen and had an injury in the past year? Yes   Timed Up and Go score (seconds) 11.43   Is patient a fall risk? Yes;Department fall risk interventions implemented   Fall screen comments Hx of falls, pt fearful of falling, pt's left foot drags as she walks.   Cognitive Status Examination   Orientation Orientation to person, place and time   Level of Consciousness Lethargic/somnolent   Follows Commands and Answers Questions Able to follow single-step instructions;100% of the time;Able to follow multistep instructions;75% of the time   Personal Safety and Judgment Intact   Memory Impaired   Memory Comments Will assess further with MOCA and or CAM.  Complains of fogginess, losing her place between steps of sequences, leaving her purse/keys in strange places, leaving fridge open.   Attention Quiet environment required;Sustained attention impaired;Selective attention impaired, difficulty ignoring irrelevant stimuli;Alternating attention impaired, difficulty shifting between tasks   Organization/Problem Solving Reports problems with organization;Reports problems with problem solving during evaluation   Executive Function Working memory impaired, decreased storage of information for performing tasks   Sensation   Sensation Comments pt senses all [place] fibers to light touch level for L,R fingers and palmar surfaces.   Posture   Posture Comments Wears Aspen collar 24 hr/day for the next 3 months, pt believes   Range of Motion (ROM)   ROM Comments L UE WNL.  R shoulder limted to 130 of 180 dgrees flexion/abduction.  SH ER and HABD, wrist, elbow  planes are WNL.     Strength   Strength Comments NT per Aspen collar; has 20 # weight restriction   Hand Strength   Hand Dominance Right   Left Hand  (pounds) 22 pounds  (tires quickly; 15 at 2nd trial)   Right Hand  (pounds) 55 pounds   Left Lateral Pinch (pounds) 9 pounds   Right Lateral Pinch (pounds) 16 pounds   Left Three Point Pinch (pounds) 6 pounds   Right Three Point Pinch (pounds) 16 pounds   Hand Strength Comments RUE is WNL for age/gender.  L UE is > 2 SD BNL for /pinch planes.   Muscle Tone   Muscle Tone Comments Visible muscle bulk loss, also has excess skin in UE shafts follwing weight loss.   Coordination   Fine Motor Coordination increased time/effort with buttons, food packaging.   Left Hand, Nine Hole Peg Test (seconds) 25   Right Hand, Nine Hole Peg Test (seconds) 19   Functional Limitations Fine motor ADL performance impaired;Decreased speed   Functional Mobility   Ambulation Walks with no device, wide base of support, left foot dragging, shorter steps, touching walls/furniture.   Transfer Skill   Level of [place]: Transfers independent   Bathing   Level of [place] - Bathing independent   Upper Body Dressing   Upper Body Dressing Comments extra time for fasteners, keeps things simple per UE /R shoulder pain.   Lower Body Dressing   Lower Body Dressing Comments extra time for fasteners.   Toileting   Level of [place]: Toilet independent   Grooming   Grooming Comments has not been doing her hair much, can't comb with R hand. brusihng teeth takes extra time and effort.   Eating/Self-Feeding   Eating/Self Feeding Comments Hasn't had much of an appetite.  Only able to heat items in the microwave.  Not able to pour out a pot of pasta.   Activity Tolerance   Activity Tolerance gettting ready in the morning takes 710 effort.  Standing in ktichen for 10 minutes is 6/10 effort.   Instrumental Activities of Daily Living Assessment   IADL Quick Adds Meal  Planning/Preparation;Home/Financial/Management;Communication/Computer Use;Community Mobility;Care of Others   Meal Planning/Preparation relying on others for high level tasks, Max A   Home/Financial Management tolerates 5-10 minutes of bill paying, makes her head hurt.   Communication/Computer Use tolerates 10-15 min on computer, makes her head hurt.   Community Mobility unable to drive x 3 months per MD.   Care of Others unable to lift  or carry grandson. (was his fulltime  until her falls)   Planned Therapy Interventions   Planned Therapy Interventions ADL training;IADL training;Balance training;Cognitive skills;Cognitive performance testing;Coordination training;Manual therapy;Self care/Home management;Strengthening;Stretching;Therapeutic activities;Visual perception   Adult OT Eval Goals   OT Eval Goals (Adult) 1;2;3;4;5;6;7;8   OT Goal 1   Goal Identifier Sx Mgmt   Goal Description Patient will identify and independently utilize 3 strategies (computer adaptations, self-awareness and self-management of symptoms, use of adaptive techniques, sensory calming etc.) to decrease her post-fall related symptoms of light, sound sensitivity and headaches, to support increased independence during ADL/IADLs.   Target Date 07/22/18   OT Goal 2   Goal Identifier IADL, CAM/MOCA testing   Goal Description Patient and family to verbalize understanding of  IADL screening, [place] Cognitive Assessment and/or Cognitive Assessment of [place] results and identify 3 strategies to increase patient's safety and independence in the home and community setting.   Target Date 07/22/18   OT Goal 3   Goal Identifier L FMC, , pinch   Goal Description Pt to demonstrate improved L  by 20# and pinch by 3 #  fine motor coordination by performing nine hole peg test in 25 seconds for L hand to support improved ability to cut/chop/prepare foods and write legibly.   Target Date 07/22/18   OT Goal 4   Goal Identifier R shoulder AROM   Goal  Description Pt to demosntrate improved SH AROm to 170 of 180 dgrees without painful arc, as needed to put away 5# items  overhead ( hanging laundry and putting away groceries).    Target Date 07/22/18   OT Goal 5   Goal Identifier vision, reaction time   Goal Description OT to complete more in depth screening of virgilio visual skills as further deficits suspected; pt to demonstrate balanced visual awareness of all visual fields by scoring > 9/10 B on Scan Course Task and all modes of Dynavision to WNL.   Target Date 07/22/18   OT Goal 6   Goal Identifier problem solving/Errands   Goal Description Pt to independently complete executive functioning task sequence of 10-12 step Errands checklist ( clinic based series of daily functioning tasks) with >90% accuracy for preparation, temporal awareness, organization/consideration of location, duration, phone/web/mailing/copying and sequence of tasks.   Target Date 07/22/18   OT Goal 7   Goal Identifier money management   Goal Description Patient will demonstrate the ability to complete simple to moderately complex money management tasks with 90% accuracy for increased attention to detail and problem solving skills to resume finances at home and manage money in the community.   Target Date 07/22/18   OT Goal 8   Goal Identifier kitchen mobility   Goal Description Patient to demonstrate safe body mechanics and dynamic balance needed for gathering 2Q casseroles out of oven/draining 2Q pasta pots into the sink, gathering items form low D and high reaches with both hands safely in the kitchen without loss of balance.   Target Date 07/22/18   Clinical Impression   Criteria for Skilled Therapeutic Interventions Met Yes, treatment indicated   OT Diagnosis Decreased ADL/IADL   Influenced by the following impairments Poor sensory tolerance for light/sound in sores/restaurants, limited reading endurance, decreased right shoulder active range of motion, decreased left hand function,  sensory changes in left hand decreased balance decreased problem-solving and attention to detail   Assessment of Occupational Performance 5 or more Performance Deficits   Identified Performance Deficits Dressing, bathing, food preparation, managing bills ,cooking, shopping, driving and home management.   Clinical Decision Making (Complexity) High complexity   Therapy Frequency 2 times per week ×12 weeks   Risks and Benefits of Treatment have been explained. Yes   Patient, Family & other staff in agreement with plan of care Yes   Clinical Impression Comments Patient may be limited on her ability to attend 2 times per week may opt for 1 time per week per transportation and other appointment needs   Education Assessment   Barriers To Learning No Barriers   Preferred Learning Style Listening;Reading;Demonstration;Other;Pictures/video   Total Evaluation Time   Total Evaluation Time 35       Thank you for this thoughtful referral! If you have any questions, please call me [phone].  Nice to share in this patient's care with you.    Sincerely,   Kassidy Thompson, OTR/l

## 2018-04-27 NOTE — ADDENDUM NOTE
Encounter addended by: Kassidy Thompson OTR on: 4/27/2018  5:27 PM<BR>     Actions taken: Sign clinical note, Flowsheet accepted

## 2018-04-27 NOTE — TELEPHONE ENCOUNTER
PCP:    The Pt called in requesting a refill of her oxycodone medication. Per chart review, PA was approved for the medication from 4/20/18-5/20/18.   The Pt is hoping to get a 1 month supply so she doesn't have to make weekly trips to the pharmacy. Looks like based on what the PA notes say, if PA is approved then the Pt can get more than a weeks supply at a time.     Please review pended medication. The Pt would like this script walked over the our  pharmacy today if possible. Please call her when it is ready.     Sissy Reagan, RN -- [facility]

## 2018-04-27 NOTE — TELEPHONE ENCOUNTER
Patient advised of CJ's direction, no questions at this time, verbalized understanding  Jagdish Mejia MA

## 2018-04-27 NOTE — TELEPHONE ENCOUNTER
In my outbox.   Please call Antoinette and let her know her insurance approved 1 month supply and that is what I filled.    She should start cutting back on use and should not need this medication for more than the next month.

## 2018-05-02 ENCOUNTER — HOSPITAL ENCOUNTER (OUTPATIENT)
Dept: PHYSICAL THERAPY | Facility: CLINIC | Age: 58
Setting detail: THERAPIES SERIES
End: 2018-05-02
Attending: PHYSICAL MEDICINE & REHABILITATION
Payer: COMMERCIAL

## 2018-05-02 PROCEDURE — 40000718 ZZHC STATISTIC PT DEPARTMENT ORTHO VISIT: Performed by: PHYSICAL THERAPIST

## 2018-05-02 PROCEDURE — 97110 THERAPEUTIC EXERCISES: CPT | Mod: GP | Performed by: PHYSICAL THERAPIST

## 2018-05-11 ENCOUNTER — TELEPHONE (OUTPATIENT)
Dept: FAMILY MEDICINE | Facility: CLINIC | Age: 58
End: 2018-05-11

## 2018-05-11 NOTE — TELEPHONE ENCOUNTER
Panel Management Review      Patient has the following on her problem list: None      Composite cancer screening  Chart review shows that this patient is due/due soon for the following Colonoscopy and Fecal Colorectal (FIT)  Summary:    Patient is due/failing the following:   COLONOSCOPY and FIT    Action needed:   Patient needs referral/order: Colonoscopy or FIT test    Type of outreach:    Phone, left message for patient to call back.     Questions for provider review:    None                                                                                                                                    Jagdish Mejia MA       Chart routed to Care Team .

## 2018-05-11 NOTE — LETTER
[facility]  [address] [facility] [id number] [phone]  May 18, 2018    Antoinette Romero  [address]  [facility] [place] [facility] [address]    Dear Antoinette,    I care about your health and have reviewed your health plan. I have reviewed your medical conditions, medication list, and lab results and am making recommendations based on this review, to better manage your health.    You are in particular need of attention regarding:  -Colon Cancer Screening    I am recommending that you:  {recommendations:-schedule a COLONOSCOPY to look for colon cancer (due every 10 years or 5 years in higher risk situations.)   Colon cancer is now the second leading cause of death in the [place] for both men and women and there are over 130,000 new cases and 50,000 deaths per year from colon cancer.  Colonoscopies can prevent 90-95% of these deaths.  Problem lesions can be removed before they ever become cancer.  This test is not only looking for cancer, but also getting rid of precancerious lesions.  If you do not wish to do a colonoscopy or cannot afford to do one, at this time, there is another option. It is called a FIT test or Fecal Immunochemical Occult Blood Test (take home stool sample kit).  It does not replace the colonoscopy for colorectal cancer screening, but it can detect hidden bleeding in the lower colon.  It does need to be repeated every year and if a positive result is obtained, you would be referred for a colonoscopy.  If you have completed either one of these tests at another facility, please have the records sent to our clinic so that we can best coordinate your care.    Please call us at [phone] (or use [facility]) to address the above recommendations.     Thank you for trusting [facility] and we appreciate the opportunity to serve you.  We look forward to supporting your healthcare needs in the future.    Healthy  Regards,    Umu Roberto PA-C

## 2018-05-16 ENCOUNTER — HOSPITAL ENCOUNTER (OUTPATIENT)
Dept: OCCUPATIONAL THERAPY | Facility: CLINIC | Age: 58
Setting detail: THERAPIES SERIES
End: 2018-05-16
Attending: PHYSICAL MEDICINE & REHABILITATION
Payer: COMMERCIAL

## 2018-05-16 ENCOUNTER — HOSPITAL ENCOUNTER (OUTPATIENT)
Dept: PHYSICAL THERAPY | Facility: CLINIC | Age: 58
Setting detail: THERAPIES SERIES
End: 2018-05-16
Attending: PHYSICAL MEDICINE & REHABILITATION
Payer: COMMERCIAL

## 2018-05-16 PROCEDURE — 97112 NEUROMUSCULAR REEDUCATION: CPT | Mod: GP | Performed by: PHYSICAL THERAPIST

## 2018-05-16 PROCEDURE — 40000718 ZZHC STATISTIC PT DEPARTMENT ORTHO VISIT: Performed by: PHYSICAL THERAPIST

## 2018-05-16 PROCEDURE — 97535 SELF CARE MNGMENT TRAINING: CPT | Mod: GO | Performed by: OCCUPATIONAL THERAPIST

## 2018-05-16 PROCEDURE — 40000125 ZZHC STATISTIC OT OUTPT VISIT: Performed by: OCCUPATIONAL THERAPIST

## 2018-05-16 PROCEDURE — 97110 THERAPEUTIC EXERCISES: CPT | Mod: GP | Performed by: PHYSICAL THERAPIST

## 2018-05-22 ENCOUNTER — TELEPHONE (OUTPATIENT)
Dept: FAMILY MEDICINE | Facility: CLINIC | Age: 58
End: 2018-05-22

## 2018-05-22 NOTE — TELEPHONE ENCOUNTER
"Pt calling about weaning off the oxycodone IR     Disp Refills Start End      oxyCODONE IR (ROXICODONE) 5 MG tablet 60 tablet 0 4/27/2018     Sig - Route: Take 1-2 tablets (5-10 mg) by mouth nightly as needed for moderate to severe pain - Oral      Currently taking 1 tab at bedtime and then another about 3 am    Suggested skipping the 3 am dose  Wakes due to pain, can't go back to sleep due to pain  Doesn't normally have trouble sleeping, But since surgery and wearing neck brace can't sleep well  C/o nerve pain in rt arm, shoulder, arm weak    Expects to have neck brace removed 5.31.18  Afraid of managing pain without the meds at bedtime  Fearful of withdrawal symptoms \"will be like coming off heroin?\"  Explained that she will be able to tolerate the gradual decrease    Doesn't want more pain pills, just wondering how to wean  Has 5 days of pills left      # [phone]    Route to provider to review and advise    Aydee Voss RN Nurse Triage    "

## 2018-05-31 ENCOUNTER — OFFICE VISIT (OUTPATIENT)
Dept: NEUROSURGERY | Facility: CLINIC | Age: 58
End: 2018-05-31
Attending: NURSE PRACTITIONER
Payer: COMMERCIAL

## 2018-05-31 ENCOUNTER — RADIANT APPOINTMENT (OUTPATIENT)
Dept: GENERAL RADIOLOGY | Facility: CLINIC | Age: 58
End: 2018-05-31
Attending: NURSE PRACTITIONER
Payer: COMMERCIAL

## 2018-05-31 VITALS
DIASTOLIC BLOOD PRESSURE: 90 MMHG | SYSTOLIC BLOOD PRESSURE: 134 MMHG | WEIGHT: 122 LBS | BODY MASS INDEX: 19.15 KG/M2 | HEART RATE: 94 BPM | HEIGHT: 67 IN | OXYGEN SATURATION: 99 %

## 2018-05-31 DIAGNOSIS — Z98.1 STATUS POST CERVICAL SPINAL FUSION: ICD-10-CM

## 2018-05-31 DIAGNOSIS — Z98.1 STATUS POST CERVICAL SPINAL FUSION: Primary | ICD-10-CM

## 2018-05-31 PROCEDURE — G0463 HOSPITAL OUTPT CLINIC VISIT: HCPCS | Performed by: NURSE PRACTITIONER

## 2018-05-31 PROCEDURE — 99024 POSTOP FOLLOW-UP VISIT: CPT | Performed by: NURSE PRACTITIONER

## 2018-05-31 PROCEDURE — 72040 X-RAY EXAM NECK SPINE 2-3 VW: CPT

## 2018-05-31 RX ORDER — OXYCODONE HYDROCHLORIDE 5 MG/1
5-10 TABLET ORAL
Qty: 60 TABLET | Refills: 0 | Status: SHIPPED | OUTPATIENT
Start: 2018-05-31 | End: 2018-06-26

## 2018-05-31 RX ORDER — GABAPENTIN 600 MG/1
1200 TABLET ORAL 3 TIMES DAILY
Qty: 180 TABLET | Refills: 3 | Status: SHIPPED | OUTPATIENT
Start: 2018-05-31 | End: 2018-11-07 | Stop reason: DRUGHIGH

## 2018-05-31 ASSESSMENT — PAIN SCALES - GENERAL: PAINLEVEL: SEVERE PAIN (7)

## 2018-05-31 NOTE — PATIENT INSTRUCTIONS
- May increase lifting restriction to 30  Pounds and slowly increase as tolerated    - Wean off cervical collar    - followup in 3 months  with xray prior     - Neurontin increase by 1/2 tab every 3 days until you are taking 1200 mg three times per day     Starting today take 1.5 in am, 1.5 in pm, 2 tabs at bedtime then in 3 days increase to:    2 in am, 1.5 in pm and 2 at bedtime    Then 3 days after that increase to 2 tabs three times per day     - Continue physical therapy     - Call the clinic at [phone] for increased pain or any other questions and concerns.

## 2018-05-31 NOTE — PROGRESS NOTES
[facility]  Neurosurgery followup:    HPI:  Antoinette Romero is a 57 year old female who was admitted on 3/10/2018 with multiple falls and weakness with incomplete spinal cord injury. She had weaknes in left arm and leg and extreme hypersensitivity in BUE. Subsequently underwent C3-T1 ACDF with Dr. Maynor Daley on 3/11/2018.  Post op she had continued weakness which was to be expected.  She discharged to ARU. She is here to day for her 12 week follow up.  She has been feeling depressed due to injury. She is working with the PCP and trying different medications. She continues to note radicular pain to the right deltoid.  She states that her strength is much better but the pain is still there. She rates her pain 7/10.   She is on Neurontin and feels that it is not helping her pain.  She is taking 900 Mg TID        Exam:  Constitutional:  Alert, well nourished, NAD.  HEENT: Normocephalic, atraumatic.   Pulm:  Without shortness of breath   CV:  No pitting edema of BLE.     Neurological:  Awake  Alert  Oriented x 3  Motor exam:     Shoulder Abduction:  Right:  4/5    Left:  5/5  Biceps:                      Right:  4/5    Left:  5/5  Triceps:                     Right:  4/5    Left:  5/5  Wrist Extensors:       Right:  4/5    Left:  5/5  Wrist Flexors:           Right:  4/5    Left:  5/5  Intrinsics:                  Right:  4/5    Left:  5/5     Able to spontaneously move U/E bilaterally  Sensation intact throughout all U/E dermatomes    Imaging:  Cervical xray shows fusion intact      A/P: Antoinette Romero is a 57 year old female who was admitted on 3/10/2018 with multiple falls and weakness with incomplete spinal cord injury. She had weaknes in left arm and leg and extreme hypersensitivity in BUE. Subsequently underwent C3-T1 ACDF with Dr. Maynor Daley on 3/11/2018.  Post op she had continued weakness which was to be expected.  She discharged to ARU. She is here to day for her 12 week follow up.  She  has been feeling depressed due to injury. She is working with the PCP and trying different medications. She continues to note radicular pain to the right deltoid.  She states that her strength is much better but the pain is still there. She rates her pain 7/10.   She is on Neurontin and feels that it is not helping her pain.  She is taking 900 Mg. We will slowly increase her to 1200 mg three times per day.  She is open to this. We will have her continue with physical therapy as well.  The pt has 4/5 strength to the RUE.  We will have her continue her PT and refill her Oxycodone for pain.  She will contact the clinic if pain persists.          Patient Instructions   - May increase lifting restriction to 30  Pounds and slowly increase as tolerated    - Wean off cervical collar    - followup in 3 months  with xray prior     - Neurontin increase by 1/2 tab every 3 days until you are taking 1200 mg three times per day     Starting today take 1.5 in am, 1.5 in pm, 2 tabs at bedtime then in 3 days increase to:    2 in am, 1.5 in pm and 2 at bedtime    Then 3 days after that increase to 2 tabs three times per day     - Continue physical therapy     - Call the clinic at [phone] for increased pain or any other questions and concerns.               Mojgan Callejas [facility]  [facility]  [address] [address]    Tel [phone]  Pager [phone]

## 2018-05-31 NOTE — NURSING NOTE
"Antoinette Romero is a 57 year old female who presents for:  Chief Complaint   Patient presents with     Neurologic Problem     12 week follow up status post cervical spinal fusion DOS 03/11/18, continues to have neck soreness and pain in the right shoulder and arm, pain in the left hand and thumb        Vitals:    Vitals:    05/31/18 1057   BP: 134/90   BP Location: Left arm   Patient Position: Sitting   Cuff Size: Adult Regular   Pulse: 94   SpO2: 99%   Weight: 122 lb (55.3 kg)   Height: 5' 7\" (1.702 m)       BMI:  Estimated body mass index is 19.11 kg/(m^2) as calculated from the following:    Height as of this encounter: 5' 7\" (1.702 m).    Weight as of this encounter: 122 lb (55.3 kg).    Pain Score:  Severe Pain (7)      Do you feel safe in your environment?  Yes      Dahiana Mosley          "

## 2018-05-31 NOTE — MR AVS SNAPSHOT
After Visit Summary   5/31/2018    Antoinette Romero    MRN: [id number]           Patient Information     Date Of Birth          1960        Visit Information        Provider Department      5/31/2018 11:20 AM Mojgan Callejas APRN CNP [facility]        Today's Diagnoses     Status post cervical spinal fusion    -  1      Care Instructions    - May increase lifting restriction to 30  Pounds and slowly increase as tolerated    - Wean off cervical collar    - followup in 3 months  with xray prior     - Neurontin increase by 1/2 tab every 3 days until you are taking 1200 mg three times per day     Starting today take 1.5 in am, 1.5 in pm, 2 tabs at bedtime then in 3 days increase to:    2 in am, 1.5 in pm and 2 at bedtime    Then 3 days after that increase to 2 tabs three times per day     - Continue physical therapy     - Call the clinic at [phone] for increased pain or any other questions and concerns.              Follow-ups after your visit        Your next 10 appointments already scheduled     May 31, 2018 11:20 AM CDT   Return Visit with MARILIN Altamirano CNP   [facility] ([facility])    [address] [address] [id number] [phone]            Jun 05, 2018  4:30 PM CDT   (Arrive by 4:15 PM)   New Patient Visit with Irasema Santana MD   [facility] ([facility] [place])    [address] [facility]  [address] [facility] [address] [phone]              Who to contact     If you have questions or need follow up information about today's clinic visit or your schedule please contact [facility] directly at [phone].  Normal or non-critical lab and imaging results will be communicated to you by MyChart, letter or phone within 4 business days after the clinic has received the results. If you do not hear from us  "within 7 days, please contact the clinic through [employer] or phone. If you have a critical or abnormal lab result, we will notify you by phone as soon as possible.  Submit refill requests through [employer] or call your pharmacy and they will forward the refill request to us. Please allow 3 business days for your refill to be completed.          Additional Information About Your Visit        [employer] Information     [employer] lets you send messages to your doctor, view your test results, renew your prescriptions, schedule appointments and more. To sign up, go to [email or web address] . Click on \"Log in\" on the left side of the screen, which will take you to the Welcome page. Then click on \"Sign up Now\" on the right side of the page.     You will be asked to enter the access code listed below, as well as some personal information. Please follow the directions to create your username and password.     Your access code is: [email or web address]  Expires: 2018  1:49 PM     Your access code will  in 90 days. If you need help or a new code, please call your [place] clinic or [phone].        Care EveryWhere ID     This is your Care EveryWhere ID. This could be used by other organizations to access your [place] medical records  [id number]        Your Vitals Were     Pulse Height Last Period Pulse Oximetry BMI (Body Mass Index)       94 5' 7\" (1.702 m) 2010 99% 19.11 kg/m2        Blood Pressure from Last 3 Encounters:   18 134/90   18 141/88   04/10/18 132/84    Weight from Last 3 Encounters:   18 122 lb (55.3 kg)   18 122 lb (55.3 kg)   04/10/18 122 lb (55.3 kg)              Today, you had the following     No orders found for display         Today's Medication Changes          These changes are accurate as of 18 11:12 AM.  If you have any questions, ask your nurse or doctor.               These medicines have changed or have updated prescriptions.        Dose/Directions    * " gabapentin 600 MG tablet   Commonly known as:  NEURONTIN   This may have changed:  Another medication with the same name was added. Make sure you understand how and when to take each.   Used for:  Status post cervical spinal fusion   Changed by:  Mojgan Callejas APRN CNP        Dose:  900 mg   Take 1.5 tablets (900 mg) by mouth 3 times daily   Quantity:  150 tablet   Refills:  3       * gabapentin 600 MG tablet   Commonly known as:  NEURONTIN   This may have changed:  You were already taking a medication with the same name, and this prescription was added. Make sure you understand how and when to take each.   Used for:  Status post cervical spinal fusion   Changed by:  Mojgan Callejas APRN CNP        Dose:  1200 mg   Take 2 tablets (1,200 mg) by mouth 3 times daily   Quantity:  180 tablet   Refills:  3       * Notice:  This list has 2 medication(s) that are the same as other medications prescribed for you. Read the directions carefully, and ask your doctor or other care provider to review them with you.         Where to get your medicines      These medications were sent to [facility] [address]  [address] [facility] [address]     Phone:  [phone]     gabapentin 600 MG tablet         Some of these will need a paper prescription and others can be bought over the counter.  Ask your nurse if you have questions.     Bring a paper prescription for each of these medications     oxyCODONE IR 5 MG tablet               Information about OPIOIDS     PRESCRIPTION OPIOIDS: WHAT YOU NEED TO KNOW   You have a prescription for an opioid (narcotic) pain medicine. Opioids can cause addiction. If you have a history of chemical dependency of any type, you are at a higher risk of becoming addicted to opioids. Only take this medicine after all other options have been tried. Take it for as short a time and as few doses as possible.     Do not:    Drive. If you drive while  taking these medicines, you could be arrested for driving under the influence (DUI).    Operate heavy machinery    Do any other dangerous activities while taking these medicines.     Drink any alcohol while taking these medicines.      Take with any other medicines that contain acetaminophen. Read all labels carefully. Look for the word  acetaminophen  or  Tylenol.  Ask your pharmacist if you have questions or are unsure.    Store your pills in a secure place, locked if possible. We will not replace any lost or stolen medicine. If you don t finish your medicine, please throw away (dispose) as directed by your pharmacist. The [place] [employer]ution [employer] has more information about safe disposal: https://www.pca.[facility].us/living-green/managing-unwanted-medications    All opioids tend to cause constipation. Drink plenty of water and eat foods that have a lot of fiber, such as fruits, vegetables, prune juice, apple juice and high-fiber cereal. Take a laxative (Miralax, milk of magnesia, Colace, Senna) if you don t move your bowels at least every other day.          Primary Care Provider Office Phone # Fax #    Umu Roberto PA-C [phone] [address] [facility] [address]        Equal Access to Services     MARY GALAN AH: Hadii kiley schulteo Soamisha, waaxda luqadaha, qaybta kaalmada adejaswantyada, enrike torres. So [facility] [phone].    ATENCIÓN: Si habla español, tiene a shah disposición servicios gratuitos de asistencia lingüística. Fahad al [phone].    We comply with applicable federal civil rights laws and [place] laws. We do not discriminate on the basis of race, color, national origin, age, disability, sex, sexual orientation, or gender identity.            Thank you!     Thank you for choosing [facility]  for your care. Our goal is always to provide you with excellent care. Hearing back from our patients is one way we can  continue to improve our services. Please take a few minutes to complete the written survey that you may receive in the mail after your visit with us. Thank you!             Your Updated Medication List - Protect others around you: Learn how to safely use, store and throw away your medicines at www.disposemymeds.org.          This list is accurate as of 5/31/18 11:12 AM.  Always use your most recent med list.                   Brand Name Dispense Instructions for use Diagnosis    acetaminophen 325 MG tablet    TYLENOL    100 tablet    Take 2 tablets (650 mg) by mouth every 4 hours as needed for mild pain    Status post cervical spinal fusion       cyclobenzaprine 10 MG tablet    FLEXERIL    60 tablet    Take 1 tablet (10 mg) by mouth 3 times daily as needed for muscle spasms    Status post cervical spinal fusion       FLUoxetine 10 MG capsule    PROzac    30 capsule    Take 1 capsule (10 mg) by mouth daily    Anxiety       * gabapentin 600 MG tablet    NEURONTIN    150 tablet    Take 1.5 tablets (900 mg) by mouth 3 times daily    Status post cervical spinal fusion       * gabapentin 600 MG tablet    NEURONTIN    180 tablet    Take 2 tablets (1,200 mg) by mouth 3 times daily    Status post cervical spinal fusion       lisinopril 10 MG tablet    PRINIVIL/ZESTRIL    30 tablet    Take 0.5 tablets (5 mg) by mouth daily    Benign essential hypertension       multivitamin, therapeutic with minerals Tabs tablet     30 each    Take 1 tablet by mouth daily    Alcohol dependence, daily use (H)       oxyCODONE IR 5 MG tablet    ROXICODONE    60 tablet    Take 1-2 tablets (5-10 mg) by mouth nightly as needed for moderate to severe pain    Status post cervical spinal fusion       pantoprazole 20 MG EC tablet    PROTONIX    30 tablet    Take 1 tablet (20 mg) by mouth daily Take by mouth 30-60 minutes before a meal.    Non-intractable vomiting with nausea, unspecified vomiting type, S/P gastric bypass, Hiatal hernia, Ulcer (H)        * Notice:  This list has 2 medication(s) that are the same as other medications prescribed for you. Read the directions carefully, and ask your doctor or other care provider to review them with you.

## 2018-05-31 NOTE — LETTER
5/31/2018         RE: Antoinette Romero  [address]  [facility] [address]        Dear Colleague,    Thank you for referring your patient, Antoinette Romero, to the [facility]. Please see a copy of my visit note below.    [facility]  Neurosurgery followup:    HPI:  Antoinette Romero is a 57 year old female who was admitted on 3/10/2018 with multiple falls and weakness with incomplete spinal cord injury. She had weaknes in left arm and leg and extreme hypersensitivity in BUE. Subsequently underwent C3-T1 ACDF with Dr. Maynor Daley on 3/11/2018.  Post op she had continued weakness which was to be expected.  She discharged to ARU. She is here to day for her 12 week follow up.   She has been feeling depressed due to injury. She is working with the PCP and trying different medications. She continues to note radicular pain to the right deltoid.  She states that her strength is much better but the pain is still there. She rates her pain 7/10.   She is on Neurontin and feels that it is not helping her pain.  She is taking 900 Mg TID        Exam:  Constitutional:  Alert, well nourished, NAD.  HEENT: Normocephalic, atraumatic.   Pulm:  Without shortness of breath   CV:  No pitting edema of BLE.     Neurological:  Awake  Alert  Oriented x 3  Motor exam:     Shoulder Abduction:  Right:  4/5    Left:  5/5  Biceps:                      Right:  4/5    Left:  5/5  Triceps:                     Right:  4/5    Left:  5/5  Wrist Extensors:       Right:  4/5    Left:  5/5  Wrist Flexors:           Right:  4/5    Left:  5/5  Intrinsics:                  Right:  4/5    Left:  5/5     Able to spontaneously move U/E bilaterally  Sensation intact throughout all U/E dermatomes    Imaging:  Cervical xray shows fusion intact      A/P: Antoinette Romero is a 57 year old female who was admitted on 3/10/2018 with multiple falls and weakness with incomplete spinal cord injury. She had weaknes  in left arm and leg and extreme hypersensitivity in BUE. Subsequently underwent C3-T1 ACDF with Dr. Maynor Daley on 3/11/2018.  Post op she had continued weakness which was to be expected.  She discharged to ARU. She is here to day for her 12 week follow up.   She has been feeling depressed due to injury. She is working with the PCP and trying different medications. She continues to note radicular pain to the right deltoid.  She states that her strength is much better but the pain is still there. She rates her pain 7/10.   She is on Neurontin and feels that it is not helping her pain.  She is taking 900 Mg. We will slowly increase her to 1200 mg three times per day.  She is open to this. We will have her continue with physical therapy as well.  The pt has 4/5 strength to the RUE.  We will have her continue her PT and refill her Oxycodone for pain.  She will contact the clinic if pain persists.          Patient Instructions   - May increase lifting restriction to 30  Pounds and slowly increase as tolerated    - Wean off cervical collar    - followup in 3 months  with xray prior     - Neurontin increase by 1/2 tab every 3 days until you are taking 1200 mg three times per day     Starting today take 1.5 in am, 1.5 in pm, 2 tabs at bedtime then in 3 days increase to:    2 in am, 1.5 in pm and 2 at bedtime    Then 3 days after that increase to 2 tabs three times per day     - Continue physical therapy     - Call the clinic at [phone] for increased pain or any other questions and concerns.               Mojgan Callejas CNP  [facility]  [address] [address]    Tel [phone]  Pager [phone]      Again, thank you for allowing me to participate in the care of your patient.        Sincerely,        MARILIN Altamirano CNP

## 2018-06-05 ENCOUNTER — TELEPHONE (OUTPATIENT)
Dept: PHYSICAL MEDICINE AND REHAB | Facility: CLINIC | Age: 58
End: 2018-06-05

## 2018-06-05 NOTE — TELEPHONE ENCOUNTER
[facility] Call Center    Phone Message    May a detailed message be left on voicemail: no    Reason for Call: Other: Pt needs to cancel appointment with Dr Santana that was scheduled for 6/5 due to lack of transportation. The pt will call back to reschedule.     Action Taken: Message routed to:  [facility] ([facility]): PMR clinic coordinators

## 2018-06-21 ENCOUNTER — OFFICE VISIT (OUTPATIENT)
Dept: FAMILY MEDICINE | Facility: CLINIC | Age: 58
End: 2018-06-21
Payer: COMMERCIAL

## 2018-06-21 ENCOUNTER — RADIANT APPOINTMENT (OUTPATIENT)
Dept: GENERAL RADIOLOGY | Facility: CLINIC | Age: 58
End: 2018-06-21
Attending: FAMILY MEDICINE
Payer: COMMERCIAL

## 2018-06-21 VITALS
OXYGEN SATURATION: 99 % | WEIGHT: 129 LBS | DIASTOLIC BLOOD PRESSURE: 80 MMHG | HEART RATE: 95 BPM | BODY MASS INDEX: 20.2 KG/M2 | RESPIRATION RATE: 16 BRPM | TEMPERATURE: 98.9 F | SYSTOLIC BLOOD PRESSURE: 130 MMHG

## 2018-06-21 DIAGNOSIS — S89.91XA KNEE INJURY, RIGHT, INITIAL ENCOUNTER: Primary | ICD-10-CM

## 2018-06-21 DIAGNOSIS — S89.91XA KNEE INJURY, RIGHT, INITIAL ENCOUNTER: ICD-10-CM

## 2018-06-21 DIAGNOSIS — S83.401A SPRAIN OF COLLATERAL LIGAMENT OF RIGHT KNEE, INITIAL ENCOUNTER: ICD-10-CM
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
## 2018-06-21 PROCEDURE — 99214 OFFICE O/P EST MOD 30 MIN: CPT | Performed by: FAMILY MEDICINE

## 2018-06-21 PROCEDURE — 73562 X-RAY EXAM OF KNEE 3: CPT | Mod: RT

## 2018-06-21 NOTE — PATIENT INSTRUCTIONS
Recommend ice for the next few days to help with swelling and then alternating with heat  Tylenol/ibuprofen as needed for pain   Follow up in 2 weeks or sooner if needed  Knee Sprain of the Collateral Ligaments  The knee is a hinge joint supported by four strong ligaments. The two ligaments inside the knee protect this joint from excess forward and backward movement. The ligaments on the outside of the joint prevent side-to-side motion. These are called the collateral ligaments.  The medial collateral ligament is located on the inner side of the joint; and the lateral collateral ligament is on the outer side of the joint.  You have sprained one or both collateral ligaments. A sprain is a tearing of a ligament. The tear may be partial or complete. Diagnosis is made by physical exam. In the case of an acute injury, the knee may be too swollen or painful to examine fully. A more accurate exam can be done after the initial swelling goes down.  Symptoms of a knee sprain include immediate knee swelling, pain, and difficulty walking. Initial treatment includes rest, splinting the knee to reduce movement of the joint, and icing the knee to reduce swelling and pain. You may use over-the-counter pain medicine to control pain, unless another medicine was prescribed. Most sprains will heal in 3 to 6 weeks. A severe injury can take 3 to 4 months to heal. You will also need rehab exercises. Surgery is usually not required for sprains involving only the collateral ligaments.  Home care    Stay off the injured leg as much as possible until you can walk on it without pain. If you have a lot of pain while walking, crutches, or a walker may be prescribed. These can be rented or purchased at many pharmacies and surgical or orthopedic supply stores. Follow your healthcare provider s advice about when to begin bearing weight on that leg.     If you were given a hook-and-loop closure knee brace, you can remove it to bathe. But leave it in  place when walking, sitting, or lying down unless told otherwise.    Apply an ice pack over the injured area for 15 to 20 minutes every 3 to 6 hours. You should do this for the first 24 to 48 hours. You can make an ice pack by filling a plastic bag that seals at the top with ice cubes and then wrapping it with a thin towel. Continue to use ice packs for relief of pain and swelling as needed. As the ice melts, be careful to avoid getting your wrap, splint, or cast wet. After 48 hours, apply heat (warm shower or warm bath) for 15 to 20 minutes several times a day, or alternate ice and heat. You can place the ice pack directly over the splint. If you have to wear a hook-and-loop knee brace, you can open it to apply the ice pack, or heat, directly to the knee. Never put ice directly on the skin. Always wrap the ice in a towel or other type of cloth.    You may use over-the-counter pain medicine to control pain, unless another pain medicine was prescribed. Anti-inflammatory pain medicines, such as ibuprofen or naproxen may be more effective than acetaminophen. If you have chronic liver or kidney disease or ever had a stomach ulcer or GI bleeding, talk with your healthcare provider before using these medicines.  Follow-up care  Follow up with your healthcare provider as advised. Any X-rays you had today don t show any broken bones, breaks, or fractures. Sometimes fractures don t show up on the first X-ray. Bruises and sprains can sometimes hurt as much as a fracture. These injuries can take time to heal completely. If your symptoms don t improve or they get worse, talk with your healthcare provider. You may need a repeat X-ray. If X-rays were taken, you will be told of any new findings that may affect your care.  Call 911  Call 911 if you have:     Shortness of breath     Chest pain  When to seek medical advice  Call your healthcare provider right away if any of these occur:    Pain or swelling increases    Swelling,  redness, or pain in the calf or thigh  Date Last Reviewed: 11/20/2015 2000-2017 [employer]. [address] [place] [address], [place] [address]. All rights reserved. This information is not intended as a substitute for professional medical care. Always follow your healthcare professional's instructions.

## 2018-06-21 NOTE — PROGRESS NOTES
"  SUBJECTIVE:   Antoinette Romero is a 57 year old female who presents to clinic today for the following health issues:      Concern - fainting, knee injury   Onset: 5 days ago    Description:   Fainted outside of home on steps, was \"out for maybe a few seconds\", pain in both knees from fall. States this episode is the 4th within this past month and a half.    Intensity: severe knee pain     Progression of Symptoms:  same    Accompanying Signs & Symptoms:  Swelling in right knee, some bruising in both     Previous history of similar problem:   None     Precipitating factors:   Worsened by: walking     Alleviating factors:  Improved by: rest     Therapies Tried and outcome: none     S/p cervical fusion surgery 3/11/18.   Felt flushed and dizzy while sitting outside and fell hitting her knees. (was this past weekend when temeprature was really hot).       Has noticed her pain medicine is a little too strong and sometimes feels faint so has been cutting it in half/quarter and feels better.         Problem list and histories reviewed & adjusted, as indicated.  Additional history: as documented    Patient Active Problem List   Diagnosis     HTN (hypertension)     Anxiety     HTN, goal below 140/90     CARDIOVASCULAR SCREENING; LDL GOAL LESS THAN 160     Tobacco abuse     Obesity     Morbid obesity (H)     S/P gastric bypass     Vitamin D deficiency disease     Nausea     Symptomatic cholelithiasis     Cholecystitis     Headache     Scotoma     Hiatal hernia     Abdominal pain     Ulcer (H)     Nausea & vomiting     Bilateral otitis media     Tobacco use disorder     Contusion     Spinal cord injury, C1-C7 (H)     Past Surgical History:   Procedure Laterality Date     DECOMPRESSION, FUSION CERVICAL ANTERIOR THREE+ LEVELS, COMBINED N/A 3/11/2018    Procedure: COMBINED DECOMPRESSION, FUSION CERVICAL ANTERIOR THREE+ LEVELS;   INTRA-OPERATIVE SPINAL CORD MONITIORING, ANTERIOR CERVICAL DECOMPRESSION AND FUSION C3-T1 ;  Surgeon: " Maynor Daley MD;  Location:  OR     ENDOSCOPIC UPPER GASTROINTESTINAL, REMOVE SUTURE N/A 1/17/2015    Procedure: ENDOSCOPIC UPPER GASTROINTESTINAL, REMOVE SUTURE;  Surgeon: Parviz Martinez MD;  [facility]     ESOPHAGOSCOPY, GASTROSCOPY, DUODENOSCOPY (EGD), COMBINED  2/18/2014    Procedure: COMBINED ESOPHAGOSCOPY, GASTROSCOPY, DUODENOSCOPY (EGD);  Esophagoscopy,Gastroscopy,Duodenoscopy;  Surgeon: Neo Sher MD;  Location:  GI     ESOPHAGOSCOPY, GASTROSCOPY, DUODENOSCOPY (EGD), COMBINED  5/7/2014    Procedure: COMBINED ESOPHAGOSCOPY, GASTROSCOPY, DUODENOSCOPY (EGD), BIOPSY SINGLE OR MULTIPLE;  Surgeon: Jose Antonio Valencia MD;  Location:  GI     GYN SURGERY       LAPAROSCOPIC BYPASS GASTRIC  3/25/2013    Procedure: LAPAROSCOPIC BYPASS GASTRIC;  Laparoscopic Nawaf En Y Gastric Bypass. Hiatel hernia repair;  Surgeon: Parviz Martinez MD;  [facility]     LAPAROSCOPIC CHOLECYSTECTOMY WITH CHOLANGIOGRAMS  3/28/2014    Procedure: LAPAROSCOPIC CHOLECYSTECTOMY WITH CHOLANGIOGRAMS;;  Surgeon: Jose Antonio Valencia MD;  [facility]     LAPAROSCOPY DIAGNOSTIC (GENERAL)  3/28/2014    Procedure: LAPAROSCOPY DIAGNOSTIC (GENERAL);  Diagnostic Laparoscopy, laparoscopic cholecystectomy, EGD, repair of johnston defect;  Surgeon: Jose Antonio Valencia MD;  [facility]     LAPAROSCOPY OPERATIVE ADULT N/A 1/17/2015    Procedure: LAPAROSCOPY OPERATIVE ADULT;  Surgeon: Parviz Martinez MD;  Location:  OR       Social History   Substance Use Topics     Smoking status: Former Smoker     Packs/day: 1.00     Types: Cigarettes     Smokeless tobacco: Never Used      Comment: quit 3/2018     Alcohol use No     Family History   Problem Relation Age of Onset     Respiratory Mother      Diabetes Mother      Arthritis Mother      OA     Neurologic Disorder Father      Cancer Maternal Grandfather      Arthritis Maternal Grandmother      RA     Breast Cancer No family hx of      Ovarian  Cancer No family hx of            Reviewed and updated as needed this visit by clinical staff  Tobacco  Allergies  Meds  Med Hx  Surg Hx  Fam Hx  Soc Hx      Reviewed and updated as needed this visit by Provider         ROS:  Constitutional, , msk, neuro, psych systems are negative, except as otherwise noted.    OBJECTIVE:     /80 (BP Location: Right arm, Patient Position: Chair, Cuff Size: Adult Regular)  Pulse 95  Temp 98.9  F (37.2  C) (Oral)  Resp 16  Wt 129 lb (58.5 kg)  LMP 03/01/2010  SpO2 99%  BMI 20.2 kg/m2  Body mass index is 20.2 kg/(m^2).  GENERAL: healthy, alert and no distress  MS: right knee- effusion, TTP anteriomedial aspect of knee, pain with extension, antalgic gait; left knee- small brusise lateral knee, FROM  PSYCH: mentation appears normal, affect normal/bright    Diagnostic Test Results:  XR right knee: I independently visualized the xray: degenerative changes. No acute effusion or fracture noted.     ASSESSMENT/PLAN:       1. Knee injury, right, initial encounter  - XR Knee Right 3 Views; Future    2. Fall, initial encounter  - likely dehydration combined with pain meds. Advised to not take narcotic together with gabapentin. Increase fluids especially on hot summer days.     3. Sprain of collateral ligament of right knee, initial encounter  - will place in knee brace for now  - supportive care discussed   - order for DME; Equipment being ordered: right knee brace  Dispense: 1 Package; Refill: 0    See Patient Instructions    Mindy Rhodes MD  [facility]

## 2018-06-21 NOTE — MR AVS SNAPSHOT
After Visit Summary   6/21/2018    Antoinette Romero    MRN: [id number]           Patient Information     Date Of Birth          1960        Visit Information        Provider Department      6/21/2018 10:30 AM Mindy Rhodes MD [facility]        Today's Diagnoses     Knee injury, right, initial encounter    -  1    Fall, initial encounter          Care Instructions    Recommend ice for the next few days to help with swelling and then alternating with heat  Tylenol/ibuprofen as needed for pain   Follow up in 2 weeks or sooner if needed  Knee Sprain of the Collateral Ligaments  The knee is a hinge joint supported by four strong ligaments. The two ligaments inside the knee protect this joint from excess forward and backward movement. The ligaments on the outside of the joint prevent side-to-side motion. These are called the collateral ligaments.  The medial collateral ligament is located on the inner side of the joint; and the lateral collateral ligament is on the outer side of the joint.  You have sprained one or both collateral ligaments. A sprain is a tearing of a ligament. The tear may be partial or complete. Diagnosis is made by physical exam. In the case of an acute injury, the knee may be too swollen or painful to examine fully. A more accurate exam can be done after the initial swelling goes down.  Symptoms of a knee sprain include immediate knee swelling, pain, and difficulty walking. Initial treatment includes rest, splinting the knee to reduce movement of the joint, and icing the knee to reduce swelling and pain. You may use over-the-counter pain medicine to control pain, unless another medicine was prescribed. Most sprains will heal in 3 to 6 weeks. A severe injury can take 3 to 4 months to heal. You will also need rehab exercises. Surgery is usually not required for sprains involving only the collateral ligaments.  Home care    Stay off the injured leg as  much as possible until you can walk on it without pain. If you have a lot of pain while walking, crutches, or a walker may be prescribed. These can be rented or purchased at many pharmacies and surgical or orthopedic supply stores. Follow your healthcare provider s advice about when to begin bearing weight on that leg.     If you were given a hook-and-loop closure knee brace, you can remove it to bathe. But leave it in place when walking, sitting, or lying down unless told otherwise.    Apply an ice pack over the injured area for 15 to 20 minutes every 3 to 6 hours. You should do this for the first 24 to 48 hours. You can make an ice pack by filling a plastic bag that seals at the top with ice cubes and then wrapping it with a thin towel. Continue to use ice packs for relief of pain and swelling as needed. As the ice melts, be careful to avoid getting your wrap, splint, or cast wet. After 48 hours, apply heat (warm shower or warm bath) for 15 to 20 minutes several times a day, or alternate ice and heat. You can place the ice pack directly over the splint. If you have to wear a hook-and-loop knee brace, you can open it to apply the ice pack, or heat, directly to the knee. Never put ice directly on the skin. Always wrap the ice in a towel or other type of cloth.    You may use over-the-counter pain medicine to control pain, unless another pain medicine was prescribed. Anti-inflammatory pain medicines, such as ibuprofen or naproxen may be more effective than acetaminophen. If you have chronic liver or kidney disease or ever had a stomach ulcer or GI bleeding, talk with your healthcare provider before using these medicines.  Follow-up care  Follow up with your healthcare provider as advised. Any X-rays you had today don t show any broken bones, breaks, or fractures. Sometimes fractures don t show up on the first X-ray. Bruises and sprains can sometimes hurt as much as a fracture. These injuries can take time to heal  completely. If your symptoms don t improve or they get worse, talk with your healthcare provider. You may need a repeat X-ray. If X-rays were taken, you will be told of any new findings that may affect your care.  Call 911  Call 911 if you have:     Shortness of breath     Chest pain  When to seek medical advice  Call your healthcare provider right away if any of these occur:    Pain or swelling increases    Swelling, redness, or pain in the calf or thigh  Date Last Reviewed: 11/20/2015 2000-2017 [employer]. [address] [place] [address]. All rights reserved. This information is not intended as a substitute for professional medical care. Always follow your healthcare professional's instructions.                Follow-ups after your visit        Follow-up notes from your care team     Return in about 2 weeks (around 7/5/2018).      Who to contact     If you have questions or need follow up information about today's clinic visit or your schedule please contact [facility] directly at [phone].  Normal or non-critical lab and imaging results will be communicated to you by MyChart, letter or phone within 4 business days after the clinic has received the results. If you do not hear from us within 7 days, please contact the clinic through MyChart or phone. If you have a critical or abnormal lab result, we will notify you by phone as soon as possible.  Submit refill requests through [employer] or call your pharmacy and they will forward the refill request to us. Please allow 3 business days for your refill to be completed.          Additional Information About Your Visit        Care EveryWhere ID     This is your Care EveryWhere ID. This could be used by other organizations to access your [place] medical records  [id number]        Your Vitals Were     Pulse Temperature Respirations Last Period Pulse Oximetry BMI (Body Mass Index)    95 98.9  F (37.2  C) (Oral) 16  03/01/2010 99% 20.2 kg/m2       Blood Pressure from Last 3 Encounters:   06/21/18 130/80   05/31/18 134/90   04/19/18 141/88    Weight from Last 3 Encounters:   06/21/18 129 lb (58.5 kg)   05/31/18 122 lb (55.3 kg)   04/19/18 122 lb (55.3 kg)               Primary Care Provider Office Phone # Fax #    Umu Roberto PA-C [phone] [address] [facility] [address]        Equal Access to Services     [facility]: Hadii aad ku hadasho Soomaali, waaxda luqadaha, qaybta kaalmada adeegyada, enrike dueñas . So [facility] [phone].    ATENCIÓN: Si habla español, tiene a shah disposición servicios gratuitos de asistencia lingüística. [facility] [phone].    We comply with applicable federal civil rights laws and [place] laws. We do not discriminate on the basis of race, color, national origin, age, disability, sex, sexual orientation, or gender identity.            Thank you!     Thank you for choosing [facility]  for your care. Our goal is always to provide you with excellent care. Hearing back from our patients is one way we can continue to improve our services. Please take a few minutes to complete the written survey that you may receive in the mail after your visit with us. Thank you!             Your Updated Medication List - Protect others around you: Learn how to safely use, store and throw away your medicines at www.disposemymeds.org.          This list is accurate as of 6/21/18 11:10 AM.  Always use your most recent med list.                   Brand Name Dispense Instructions for use Diagnosis    acetaminophen 325 MG tablet    TYLENOL    100 tablet    Take 2 tablets (650 mg) by mouth every 4 hours as needed for mild pain    Status post cervical spinal fusion       cyclobenzaprine 10 MG tablet    FLEXERIL    60 tablet    Take 1 tablet (10 mg) by mouth 3 times daily as needed for muscle spasms    Status post cervical spinal fusion        FLUoxetine 10 MG capsule    PROzac    30 capsule    Take 1 capsule (10 mg) by mouth daily    Anxiety       * gabapentin 600 MG tablet    NEURONTIN    150 tablet    Take 1.5 tablets (900 mg) by mouth 3 times daily    Status post cervical spinal fusion       * gabapentin 600 MG tablet    NEURONTIN    180 tablet    Take 2 tablets (1,200 mg) by mouth 3 times daily    Status post cervical spinal fusion       lisinopril 10 MG tablet    PRINIVIL/ZESTRIL    30 tablet    Take 0.5 tablets (5 mg) by mouth daily    Benign essential hypertension       multivitamin, therapeutic with minerals Tabs tablet     30 each    Take 1 tablet by mouth daily    Alcohol dependence, daily use (H)       oxyCODONE IR 5 MG tablet    ROXICODONE    60 tablet    Take 1-2 tablets (5-10 mg) by mouth nightly as needed for moderate to severe pain    Status post cervical spinal fusion       pantoprazole 20 MG EC tablet    PROTONIX    30 tablet    Take 1 tablet (20 mg) by mouth daily Take by mouth 30-60 minutes before a meal.    Non-intractable vomiting with nausea, unspecified vomiting type, S/P gastric bypass, Hiatal hernia, Ulcer (H)       * Notice:  This list has 2 medication(s) that are the same as other medications prescribed for you. Read the directions carefully, and ask your doctor or other care provider to review them with you.

## 2018-06-26 DIAGNOSIS — Z98.1 STATUS POST CERVICAL SPINAL FUSION: ICD-10-CM

## 2018-06-26 RX ORDER — OXYCODONE HYDROCHLORIDE 5 MG/1
5-10 TABLET ORAL
Qty: 60 TABLET | Refills: 0 | Status: SHIPPED | OUTPATIENT
Start: 2018-06-26 | End: 2018-07-26

## 2018-07-26 ENCOUNTER — TELEPHONE (OUTPATIENT)
Dept: NEUROSURGERY | Facility: CLINIC | Age: 58
End: 2018-07-26

## 2018-07-26 DIAGNOSIS — Z98.1 STATUS POST CERVICAL SPINAL FUSION: ICD-10-CM

## 2018-07-26 RX ORDER — OXYCODONE HYDROCHLORIDE 5 MG/1
5-10 TABLET ORAL
Qty: 60 TABLET | Refills: 0 | Status: SHIPPED | OUTPATIENT
Start: 2018-07-26 | End: 2018-08-23

## 2018-07-26 NOTE — TELEPHONE ENCOUNTER
REASON FOR CALL:  Pt requesting script for Oxycodone. If approved, would like to  in [place].    Detailed message can be left:  YES

## 2018-07-26 NOTE — TELEPHONE ENCOUNTER
Please see the below  Previously Rx'd # 60 1-2 tabs nightly as needed for pain on 6/26/18    Same Rx loaded for provider review and convienence, NO refills.  Patient would like to  Rx in [place]    Please advise    Simon Sheldon, RN, BSN

## 2018-07-27 NOTE — TELEPHONE ENCOUNTER
Patient left message on [facility] triage line regarding med request. She is leaving for the weekend and would like to have refill by then. Please call patient back.

## 2018-07-27 NOTE — PLAN OF CARE
Problem: Patient Care Overview  Goal: Plan of Care/Patient Progress Review  Outcome: Improving  POD#2. A&Ox4. LUE 4/5 strength, LLE 4/5 strength. Weak  L hand, weak plantar/dorsiflexion LLE. Numbness/tingling BUE hands/fingers-improving slightly today per pt. Bowel sounds active. Regular diet, very poor appetite. VSS. Dressing CDI. Aspen collar on at all times. Up with one, gait belt to pivot transfer. Pain decreased with dilaudid, pt declining flexeril. Forman d/c'd around 1100, due to void. CIWA scores 7, 4. Pt very agitated and anxious this morning, explained plan of care to pt, declined seroquel. BP elevated, hydralazine and labetalol available-MD aware of BP's. D/c plan pending.     Addendum: Pt voided 450cc, first PVR 167cc.   72

## 2018-07-27 NOTE — TELEPHONE ENCOUNTER
"Patient informed that RX was at [place], she states \"at the pharmacy\"? RN replied yes and then patient hung up  Simon Sheldon RN, BSN    "

## 2018-08-10 DIAGNOSIS — R11.2 NON-INTRACTABLE VOMITING WITH NAUSEA, UNSPECIFIED VOMITING TYPE: ICD-10-CM

## 2018-08-10 DIAGNOSIS — I10 BENIGN ESSENTIAL HYPERTENSION: ICD-10-CM

## 2018-08-11 NOTE — TELEPHONE ENCOUNTER
"Requested Prescriptions   Pending Prescriptions Disp Refills     pantoprazole (PROTONIX) 20 MG EC tablet [Pharmacy Med Name: PANTOPRAZOLE SODIUM 20MG TBEC] 30 tablet 3    Last Written Prescription Date:  04/10/2018  Last Fill Quantity: 30 tablet,  # refills: 1   Last office visit: 6/21/2018 with prescribing provider:  06/21/2018   Future Office Visit:   Next 5 appointments (look out 90 days)     Aug 30, 2018  9:40 AM CDT   Return Visit with MARILIN Altamirano CNP   [facility])    [address]  [place] [address] [phone]                  Sig: TAKE ONE TABLET BY MOUTH 30 TO 60 MINUTES BEFORE A MEAL EVERY DAY    PPI Protocol Passed    8/10/2018  2:31 PM       Passed - Not on Clopidogrel (unless Pantoprazole ordered)       Passed - No diagnosis of osteoporosis on record       Passed - Recent (12 mo) or future (30 days) visit within the authorizing provider's specialty    Patient had office visit in the last 12 months or has a visit in the next 30 days with authorizing provider or within the authorizing provider's specialty.  See \"Patient Info\" tab in inbasket, or \"Choose Columns\" in Meds & Orders section of the refill encounter.           Passed - Patient is age 18 or older       Passed - No active pregnacy on record       Passed - No positive pregnancy test in past 12 months            lisinopril (PRINIVIL/ZESTRIL) 10 MG tablet [Pharmacy Med Name: LISINOPRIL 10MG TABS] 30 tablet 1    Last Written Prescription Date:  04/10/2018  Last Fill Quantity: 30 tablet,  # refills: 1   Last office visit: 6/21/2018 with prescribing provider:  06/21/2018   Future Office Visit:   Next 5 appointments (look out 90 days)     Aug 30, 2018  9:40 AM CDT   Return Visit with MARILIN Altamirano CNP   [facility]    [address]  [place] [address] " "  [phone]                  Sig: TAKE ONE-HALF TABLET (5MG) BY MOUTH DAILY    ACE Inhibitors (Including Combos) Protocol Passed    8/10/2018  2:31 PM       Passed - Blood pressure under 140/90 in past 12 months    BP Readings from Last 3 Encounters:   06/21/18 130/80   05/31/18 134/90   04/19/18 141/88                Passed - Recent (12 mo) or future (30 days) visit within the authorizing provider's specialty    Patient had office visit in the last 12 months or has a visit in the next 30 days with authorizing provider or within the authorizing provider's specialty.  See \"Patient Info\" tab in inbasket, or \"Choose Columns\" in Meds & Orders section of the refill encounter.           Passed - Patient is age 18 or older       Passed - No active pregnancy on record       Passed - Normal serum creatinine on file in past 12 months    Recent Labs   Lab Test  03/23/18   0630   CR  0.60            Passed - Normal serum potassium on file in past 12 months    Recent Labs   Lab Test  03/23/18   0630   POTASSIUM  4.3            Passed - No positive pregnancy test in past 12 months          Javier Fernandes XRT  "

## 2018-08-13 RX ORDER — LISINOPRIL 10 MG/1
TABLET ORAL
Qty: 30 TABLET | Refills: 1 | Status: SHIPPED | OUTPATIENT
Start: 2018-08-13 | End: 2018-11-07

## 2018-08-13 RX ORDER — PANTOPRAZOLE SODIUM 20 MG/1
TABLET, DELAYED RELEASE ORAL
Qty: 30 TABLET | Refills: 3 | Status: SHIPPED | OUTPATIENT
Start: 2018-08-13 | End: 2018-11-07

## 2018-08-13 NOTE — TELEPHONE ENCOUNTER
Prescription approved per [facility].    Carli Hopson, RN  Patient Care Supervisor  [facility]  [phone]

## 2018-08-14 ENCOUNTER — TELEPHONE (OUTPATIENT)
Dept: FAMILY MEDICINE | Facility: CLINIC | Age: 58
End: 2018-08-14

## 2018-08-14 NOTE — TELEPHONE ENCOUNTER
Panel Management Review      Patient has the following on her problem list:     Hypertension   Last three blood pressure readings:  BP Readings from Last 3 Encounters:   06/21/18 130/80   05/31/18 134/90   04/19/18 141/88     Blood pressure: Passed    HTN Guidelines:  Age 18-59 BP range:  Less than 140/90  Age 60-85 with Diabetes:  Less than 140/90  Age 60-85 without Diabetes:  less than 150/90      Composite cancer screening  Chart review shows that this patient is due/due soon for the following Colonoscopy  Summary:    Patient is due/failing the following:   COLONOSCOPY and PAP    Action needed:   Patient needs referral/order: colonoscopy or FIT test and physical with pap.    Type of outreach:    routed to panel pool    Questions for provider review:    None                                                                                                                                    MARGE Marcos       Chart routed to Care Team .

## 2018-08-15 NOTE — TELEPHONE ENCOUNTER
Type of outreach:  Phone, left message for patient to call back.      Aniyah Santana, CMA on 8/15/2018 at 10:07 AM

## 2018-08-23 ENCOUNTER — TELEPHONE (OUTPATIENT)
Dept: NEUROSURGERY | Facility: CLINIC | Age: 58
End: 2018-08-23

## 2018-08-23 DIAGNOSIS — Z98.1 STATUS POST CERVICAL SPINAL FUSION: ICD-10-CM

## 2018-08-23 RX ORDER — OXYCODONE HYDROCHLORIDE 5 MG/1
5 TABLET ORAL
Qty: 30 TABLET | Refills: 0 | Status: SHIPPED | OUTPATIENT
Start: 2018-08-23 | End: 2018-11-07

## 2018-08-23 NOTE — TELEPHONE ENCOUNTER
REASON FOR CALL:  Pt requesting script for Oxycodone. States she would like to pick it up at [facility] location if approved. Also states she has 1 or 2 pills left.    Detailed message can be left:  YES

## 2018-08-23 NOTE — TELEPHONE ENCOUNTER
Please see the below    Ok for last refill of medication and change sig to one pill (5mg) at night, #30- med loaded for provider review    Please let RN know if approved to notify patient    Simon Sheldon RN, BSN

## 2018-08-23 NOTE — TELEPHONE ENCOUNTER
Provider approved medication as loaded below and printed off RX    Patient updated that she needs to wean off medication and that only 30 pills are given    Patient states that she is still having intense pain and would like to know what to do without the pain medication.    Patient was offered an office visit with NP.  Patient already has one scheduled with NP on 8/30/18, she will discuss the above then    Simon Sheldon RN, BSN

## 2018-08-23 NOTE — TELEPHONE ENCOUNTER
Type of outreach:  Phone, spoke to patient.  Pt stated she was not at home to look at her calender, will call back when she is ready to schedule.      Aniyah Santana, CMA on 8/23/2018 at 11:57 AM

## 2018-08-30 ENCOUNTER — RADIANT APPOINTMENT (OUTPATIENT)
Dept: GENERAL RADIOLOGY | Facility: CLINIC | Age: 58
End: 2018-08-30
Attending: NURSE PRACTITIONER
Payer: COMMERCIAL

## 2018-08-30 ENCOUNTER — OFFICE VISIT (OUTPATIENT)
Dept: NEUROSURGERY | Facility: CLINIC | Age: 58
End: 2018-08-30
Attending: NURSE PRACTITIONER
Payer: COMMERCIAL

## 2018-08-30 VITALS
DIASTOLIC BLOOD PRESSURE: 98 MMHG | SYSTOLIC BLOOD PRESSURE: 149 MMHG | OXYGEN SATURATION: 100 % | BODY MASS INDEX: 19.9 KG/M2 | HEIGHT: 67 IN | WEIGHT: 126.8 LBS | HEART RATE: 80 BPM

## 2018-08-30 DIAGNOSIS — Z98.1 STATUS POST CERVICAL SPINAL FUSION: ICD-10-CM

## 2018-08-30 DIAGNOSIS — Z98.1 STATUS POST CERVICAL SPINAL FUSION: Primary | ICD-10-CM

## 2018-08-30 PROCEDURE — 72040 X-RAY EXAM NECK SPINE 2-3 VW: CPT

## 2018-08-30 PROCEDURE — G0463 HOSPITAL OUTPT CLINIC VISIT: HCPCS

## 2018-08-30 PROCEDURE — 99214 OFFICE O/P EST MOD 30 MIN: CPT | Performed by: NURSE PRACTITIONER

## 2018-08-30 ASSESSMENT — PAIN SCALES - GENERAL: PAINLEVEL: MODERATE PAIN (5)

## 2018-08-30 NOTE — NURSING NOTE
"Antoinette Romero is a 57 year old female who presents for:  Chief Complaint   Patient presents with     Neurologic Problem     6 month follow up status post cervical fusion DOS 03/11/18, continues to have pain across the shoulder blades and both arms        Vitals:    Vitals:    08/30/18 0944   BP: (!) 149/98   BP Location: Right arm   Patient Position: Sitting   Cuff Size: Adult Regular   Pulse: 80   SpO2: 100%   Weight: 126 lb 12.8 oz (57.5 kg)   Height: 5' 6.5\" (1.689 m)       BMI:  Estimated body mass index is 20.16 kg/(m^2) as calculated from the following:    Height as of this encounter: 5' 6.5\" (1.689 m).    Weight as of this encounter: 126 lb 12.8 oz (57.5 kg).    Pain Score:  Moderate Pain (5)      Do you feel safe in your environment?  Yes      Dahiana Mosley          "

## 2018-08-30 NOTE — PATIENT INSTRUCTIONS
Patient to follow up with Primary Care provider regarding elevated blood pressure.      1.   Please schedule your EMG. This is a nerve conduction study.  Someone will contact you to schedule. I will call you with the results.         2. Schedule at the [facility] pain clinic [phone]    3.  Return in 6 months with a xray prior.     4.  You MUST see your primary care doctor about depression.

## 2018-08-30 NOTE — LETTER
8/30/2018         RE: Antoinette Romero  [address]  [facility] [address]        Dear Colleague,    Thank you for referring your patient, Antoinette Romero, to the [facility]. Please see a copy of my visit note below.    [facility]  Neurosurgery followup:    HPI: Antoinette Romero is a 57 year old female who was admitted on 3/10/2018 with multiple falls and weakness with incomplete spinal cord injury. She had weakness in left arm and leg and extreme hypersensitivity in BUE. Subsequently underwent C3-T1 ACDF with Dr. Maynor Daley on 3/11/2018.  Post op she had continued weakness which was to be expected.  The pt continues to be sensitive on the left.  She continues to be depressed. She is doing at home PT at least 3X per week. She can't afford to go to formal PT.            Exam:  Constitutional:  Alert, well nourished, NAD.  HEENT: Normocephalic, atraumatic.   Pulm:  Without shortness of breath   CV:  No pitting edema of BLE.     Neurological:  Awake  Alert  Oriented x 3  Motor exam:     Shoulder Abduction:  Right:  5/5    Left:  5/5  Biceps:                      Right:  5/5    Left:  5/5  Triceps:                     Right:  5/5    Left:  5/5  Wrist Extensors:       Right:  5/5    Left:  5/5  Wrist Flexors:           Right:  5/5    Left:  5/5  Intrinsics:                  Right:  5/5    Left:  5/5     Able to spontaneously move U/E bilaterally  Sensation intact throughout all U/E dermatomes  Incisions:  Cervical incision healed     Imaging:  Cervical fusion intact per xray     A/P:  Antoinette Romero is a 57 year old female who was admitted on 3/10/2018 with multiple falls and weakness with incomplete spinal cord injury. She had weakness in left arm and leg and extreme hypersensitivity in BUE. Subsequently underwent C3-T1 ACDF with Dr. Maynor Daley on 3/11/2018.  Post op she had continued weakness which was to be expected.  The pt continues to be  sensitive on the left.  She continues to be depressed. She is doing at home PT at least 3X per week. She can't afford to go to formal PT.  The pt has full strength to bilateral UE.  She was educated again that she had a spinal cord injury and a large fusion. She is much better than prior to surgery and is now able to move her extremities and walk.  She states that she doesn't remember having pain and weakness prior to surgery. We reviewed her notes prior to surgery and post.  She states that she is just wanting to be better. She is depressed but denies any suicidal ideation or plan. She is continuing to need Oxycodone for pain.  We discussed a referral to pain and an EMG of her arms.  She is open to this.  Her fusion hardware is intact.  We will have her return in 6 months with a repeat xray.          Patient Instructions   Patient to follow up with Primary Care provider regarding elevated blood pressure.      1.   Please schedule your EMG. This is a nerve conduction study.  Someone will contact you to schedule. I will call you with the results.         2. Schedule at the [facility] pain clinic [phone]    3.  Return in 6 months with a xray prior.     4.  You MUST see your primary care doctor about depression.         Mojgan Callejas CNP  [facility]  [address] [address]    Tel [phone]  Pager [phone]      Again, thank you for allowing me to participate in the care of your patient.        Sincerely,        MARILIN Altamirano CNP

## 2018-08-30 NOTE — MR AVS SNAPSHOT
After Visit Summary   8/30/2018    Antoinette Romero    MRN: [id number]           Patient Information     Date Of Birth          1960        Visit Information        Provider Department      8/30/2018 9:40 AM Mojgan Callejas APRN CNP [facility]        Today's Diagnoses     Status post cervical spinal fusion    -  1      Care Instructions    Patient to follow up with Primary Care provider regarding elevated blood pressure.      1.   Please schedule your EMG. This is a nerve conduction study.  Someone will contact you to schedule. I will call you with the results.         2. Schedule at the [facility] pain clinic [phone]    3.  Return in 6 months with a xray prior.     4.  You MUST see your primary care doctor about depression.            Follow-ups after your visit        Additional Services     NEUROLOGY ADULT REFERRAL       Your provider has referred you for the following: bilateral UE  EMG at [facility]: [facility] [phone]   [email or web address]    Please be aware that coverage of these services is subject to the terms and limitations of your health insurance plan.  Call member services at your health plan with any benefit or coverage questions.      Please bring the following with you to your appointment:    (1) Any X-Rays, CTs or MRIs which have been performed.  Contact the facility where they were done to arrange for  prior to your scheduled appointment.    (2) List of current medications  (3) This referral request   (4) Any documents/labs given to you for this referral            PAIN MANAGEMENT REFERRAL       Your provider has referred you to: [facility] pain clinic [phone]    **ANY DIAGNOSTIC TESTS THAT ARE NOT IN [facility] SHOULD BE SENT TO THE PAIN CENTER**    REGARDING OPIOID MEDICATIONS:  The discussion of opioids management, appropriateness of therapy, and dosing will be discussed in patients being  seen for evaluation.  The pain management clinics are not long-term prescribing clinics, with transition of prescribing of medications ultimately going back to the referring provider/PCP.  If prescribing is taken over at the pain clinic, it is in actively involved patients whom are appropriate for opioids, urine drug screening is completed, and long-term prescribing plan has been determined.  Therefore, we will not be automatically taking over prescribing at the patient's first visit.  Is this agreeable to you? agrees.     Please be aware that coverage of these services is subject to the terms and limitations of your health insurance plan.  Call member services at your health plan with any benefit or coverage questions.      Please bring the following with you to your appointment:    (1) Any X-Rays, CTs or MRIs which have been performed.  Contact the facility where they were done to arrange for  prior to your scheduled appointment.    (2) List of current medications   (3) This referral request   (4) Any documents/labs given to you for this referral                  Future tests that were ordered for you today     Open Future Orders        Priority Expected Expires Ordered    XR Cervical Spine 2/3 Views Routine 2/28/2019 8/30/2019 8/30/2018            Who to contact     If you have questions or need follow up information about today's clinic visit or your schedule please contact [facility] directly at [phone].  Normal or non-critical lab and imaging results will be communicated to you by MyChart, letter or phone within 4 business days after the clinic has received the results. If you do not hear from us within 7 days, please contact the clinic through MyChart or phone. If you have a critical or abnormal lab result, we will notify you by phone as soon as possible.  Submit refill requests through [employer] or call your pharmacy and they will forward the refill request to us. Please allow  "3 business days for your refill to be completed.          Additional Information About Your Visit        Care EveryWhere ID     This is your Care EveryWhere ID. This could be used by other organizations to access your [place] medical records  [id number]        Your Vitals Were     Pulse Height Last Period Pulse Oximetry BMI (Body Mass Index)       80 5' 6.5\" (1.689 m) 03/01/2010 100% 20.16 kg/m2        Blood Pressure from Last 3 Encounters:   08/30/18 (!) 149/98   06/21/18 130/80   05/31/18 134/90    Weight from Last 3 Encounters:   08/30/18 126 lb 12.8 oz (57.5 kg)   06/21/18 129 lb (58.5 kg)   05/31/18 122 lb (55.3 kg)              We Performed the Following     NEUROLOGY ADULT REFERRAL     PAIN MANAGEMENT REFERRAL        Primary Care Provider Office Phone # Fax #    Umu Roberto PA-C [phone] [address] [facility] [address]        Equal Access to Services     CLEMENTE GALAN : Hadii aad ku hadasho Soomaali, waaxda luqadaha, qaybta kaalmada adeegyada, waxay idiin haybennien randi dueñas . So [facility] [phone].    ATENCIÓN: Si habla español, tiene a shah disposición servicios gratuitos de asistencia lingüística. [facility] [phone].    We comply with applicable federal civil rights laws and [place] laws. We do not discriminate on the basis of race, color, national origin, age, disability, sex, sexual orientation, or gender identity.            Thank you!     Thank you for choosing [facility]  for your care. Our goal is always to provide you with excellent care. Hearing back from our patients is one way we can continue to improve our services. Please take a few minutes to complete the written survey that you may receive in the mail after your visit with us. Thank you!             Your Updated Medication List - Protect others around you: Learn how to safely use, store and throw away your medicines at www.disposemymeds.org.          This list is accurate " as of 8/30/18 10:03 AM.  Always use your most recent med list.                   Brand Name Dispense Instructions for use Diagnosis    acetaminophen 325 MG tablet    TYLENOL    100 tablet    Take 2 tablets (650 mg) by mouth every 4 hours as needed for mild pain    Status post cervical spinal fusion       cyclobenzaprine 10 MG tablet    FLEXERIL    60 tablet    Take 1 tablet (10 mg) by mouth 3 times daily as needed for muscle spasms    Status post cervical spinal fusion       FLUoxetine 10 MG capsule    PROzac    30 capsule    Take 1 capsule (10 mg) by mouth daily    Anxiety       * gabapentin 600 MG tablet    NEURONTIN    150 tablet    Take 1.5 tablets (900 mg) by mouth 3 times daily    Status post cervical spinal fusion       * gabapentin 600 MG tablet    NEURONTIN    180 tablet    Take 2 tablets (1,200 mg) by mouth 3 times daily    Status post cervical spinal fusion       lisinopril 10 MG tablet    PRINIVIL/ZESTRIL    30 tablet    TAKE ONE-HALF TABLET (5MG) BY MOUTH DAILY    Benign essential hypertension       methylPREDNISolone 4 MG tablet    MEDROL DOSEPAK    21 tablet    Follow package instructions    S/P cervical spinal fusion       multivitamin, therapeutic with minerals Tabs tablet     30 each    Take 1 tablet by mouth daily    Alcohol dependence, daily use (H)       order for DME     1 Package    Equipment being ordered: right knee brace    Sprain of collateral ligament of right knee, initial encounter       oxyCODONE IR 5 MG tablet    ROXICODONE    30 tablet    Take 1 tablet (5 mg) by mouth nightly as needed for moderate to severe pain    Status post cervical spinal fusion       * pantoprazole 20 MG EC tablet    PROTONIX    30 tablet    Take 1 tablet (20 mg) by mouth daily Take by mouth 30-60 minutes before a meal.    Non-intractable vomiting with nausea, unspecified vomiting type, S/P gastric bypass, Hiatal hernia, Ulcer (H)       * pantoprazole 20 MG EC tablet    PROTONIX    30 tablet    TAKE ONE TABLET  BY MOUTH 30 TO 60 MINUTES BEFORE A MEAL EVERY DAY    Non-intractable vomiting with nausea, unspecified vomiting type       * Notice:  This list has 4 medication(s) that are the same as other medications prescribed for you. Read the directions carefully, and ask your doctor or other care provider to review them with you.

## 2018-09-05 NOTE — ADDENDUM NOTE
Encounter addended by: Kassidy Thompson OTR on: 9/5/2018  8:43 AM<BR>     Actions taken: Flowsheet data copied forward, Sign clinical note, Flowsheet accepted, Episode resolved

## 2018-09-05 NOTE — PROGRESS NOTES
OCCUPATIONAL THERAPY DISCHARGE--       09/05/18 0800   Signing Clinician's Name / Credentials   Signing clinician's name / credentials Kassidy Thompson OTR/l   Session Number   Session Number discharge   Adult OT Eval Goals   OT Eval Goals (Adult) 1;2;3;4;5;6;7;8   OT Goal 1   Goal Identifier Sx Mgmt   Goal Description Patient will identify and independently utilize 3 strategies (computer adaptations, self-awareness and self-management of symptoms, use of adaptive techniques, sensory calming etc.) to decrease her post-fall related symptoms of light, sound sensitivity and headaches, to support increased independence during ADL/IADLs.   Target Date 07/22/18   OT Goal 2   Goal Identifier IADL, CAM/MOCA testing   Goal Description Patient and family to verbalize understanding of  IADL screening, [place] Cognitive Assessment and/or Cognitive Assessment of [place] results and identify 3 strategies to increase patient's safety and independence in the home and community setting.   Target Date 07/22/18   OT Goal 3   Goal Identifier L FMC, , pinch   Goal Description Pt to demonstrate improved L  by 20# and pinch by 3 #  fine motor coordination by performing nine hole peg test in 25 seconds for L hand to support improved ability to cut/chop/prepare foods and write legibly.   Target Date 07/22/18   OT Goal 4   Goal Identifier R shoulder AROM   Goal Description Pt to demosntrate improved SH AROm to 170 of 180 dgrees without painful arc, as needed to put away 5# items  overhead ( hanging laundry and putting away groceries).    Target Date 07/22/18   OT Goal 5   Goal Identifier vision, reaction time   Goal Description OT to complete more in depth screening of virgilio visual skills as further deficits suspected; pt to demonstrate balanced visual awareness of all visual fields by scoring > 9/10 B on Scan Course Task and all modes of Dynavision to WNL.   Target Date 07/22/18   OT Goal 6   Goal Identifier problem solving/Errands   Goal  "Description Pt to independently complete executive functioning task sequence of 10-12 step Errands checklist ( clinic based series of daily functioning tasks) with >90% accuracy for preparation, temporal awareness, organization/consideration of location, duration, phone/web/mailing/copying and sequence of tasks.   Target Date 07/22/18   OT Goal 7   Goal Identifier money management   Goal Description Patient will demonstrate the ability to complete simple to moderately complex money management tasks with 90% accuracy for increased attention to detail and problem solving skills to resume finances at home and manage money in the community.   Target Date 07/22/18   OT Goal 8   Goal Identifier kitchen mobility   Goal Description Patient to demonstrate safe body mechanics and dynamic balance needed for gathering 2Q casseroles out of oven/draining 2Q pasta pots into the sink, gathering items form low D and high reaches with both hands safely in the kitchen without loss of balance.   Target Date 07/22/18   Subjective Report   Subjective Report As of 5/16/18, last visit, pt reports, \"I feel like all of these (concussion) symptoms are really what are getting at me now!  My neck is really pretty stable and I know I am doing way too much for my family right now.  Ramón's symptoms ever get better?  Will I ever be the person I was before or stronger than that?\"  OTR listens intently and encourages patient to be reaching out to counseling resources as she has in the past patient is hoping to make changes for the better in her life from her accident but is not quite sure how..  Pt has not returned for further OT, apparently having HH PT sessions, not able to afford OP.   Objective Measures   Objective Measures Objective Measure 1;Objective Measure 2;Objective Measure 3   Objective Measure 1   Objective Measure Concussion symptoms   Details As of 5/16/18-- Patient has 15 symptoms with score of 70/90 intensity.  Patient had 5 point " increase with upset stomach after having large zing of pain trying to do PT exercises an hour ago in session.  3 point increase with sensitivity to light two-point increases with focus and motion sickness.  One-point increases for 7 other symptoms.  (Was 54 points, 15 symptoms at eval on 4/23/18).   Therapeutic Interventions   Therapeutic Interventions Self Care/Home Management   Self Care/Home Management   Skilled Intervention Post fall symptom management   Patient Response Patient very appreciative and making plans for change   Treatment Detail As of 5/16/18-- Patient admits she has not done any of the activities I gave her from last session (symptom/task log, use yellow glasses).  She shares many stories about taking care of her family's needs over her own.  Her greatest respite his sleep and if she has nothing else to she can sleep for up to 6 hours at a time.  She finds herself very restless, looking for things to get done.  OTR educates patient in benefits of using yellow classes to cancel blue light, especially with reducing pupillary changes and softening visual input from bright lights.  Patient reports it takes her 30 minutes to recover after moving from bright outside to in indoor/darker setting.  OTR and patient discuss more ways to incorporate rest breaks into her day.  OTR educates in keeping symptoms at subthreshold levels (using edge and rubbing analogy, trying to operate at 2-3 of 6 symptom levels and avoid rubbing up into 4-5 level for greater than 5 minutes.  Reviewed that all events that create a recovery time greater than 1 hour following our exacerbating her symptoms and slowing recovery.  Patient verbalizes that she has a history of being obsessive about things and wonders how her bipolar history can be playing into this.  OT does share that depression symptom history can length and recovery time and reemphasizes importance of getting psychology help at this time to help her with skill  "building, especially with her new sobriety.  Also used analogy of burners on a stove top (cognitive, visual, social, physical) and cautions patient not to overfill these burners, focusing on 1-2 at a time.  Encouraged to \"rotate burners\" (like taking a walk after a stressful family visit, delaying emotional pickup of the mess until later), to help better pace the stress she is putting on her brain during her recovery. Patient plans to wear the yellow glasses more, walk 15 minutes a day (use as needed stress breaks) and be mindful of her needed recovery times following big tasks, keeping them below 1-2 hours for recovery.  Will further continue to consider reaching out for psychology help.   Plan   Updates to plan of care discharged, stopped coming   Total Session Time   Timed Code Treatment Minutes 0   Total Treatment Time (sum of timed and untimed services) 0   AMBULATORY CLINICS ONLY-MEDICAL AND TREATMENT DIAGNOSIS   Medical Diagnosis Status post cervical spinal fusion   OT Diagnosis Decreased ADL/IADL       Thank you for this thoughtful referral! If you have any questions, please call me [phone].  Nice to share in this patient's care with you.    Sincerely,   Kassidy Thompson, OTR/l  "

## 2018-09-07 ENCOUNTER — TELEPHONE (OUTPATIENT)
Dept: FAMILY MEDICINE | Facility: CLINIC | Age: 58
End: 2018-09-07

## 2018-09-07 NOTE — TELEPHONE ENCOUNTER
Patient called requesting a call from Umu Roberto.  Patient refused to speak to a nurse.  Said she needs to talk to Umu regarding her pain management.  Please call [phone].    Sherly Duncan

## 2018-09-07 NOTE — TELEPHONE ENCOUNTER
"Called patient back. Patient having significant GI symptoms, \"can't keep anything down\"  Watery diarrhea and vomiting multiple times today.    Also, pain is worsening in neck/arms. Advised by neurosurgery they could send in medrol dosepak, pt reports this causes \"terrible headaches\" and already has a terrible HA due to emesis.    Advised given concerning symptoms then go to ER. The patient indicates understanding of these issues and agrees with the plan.    Umu Roberto PA-C    "

## 2018-09-11 ENCOUNTER — TRANSFERRED RECORDS (OUTPATIENT)
Dept: HEALTH INFORMATION MANAGEMENT | Facility: CLINIC | Age: 58
End: 2018-09-11

## 2018-10-01 ENCOUNTER — PATIENT OUTREACH (OUTPATIENT)
Dept: CARE COORDINATION | Facility: CLINIC | Age: 58
End: 2018-10-01

## 2018-10-01 ASSESSMENT — ACTIVITIES OF DAILY LIVING (ADL): DEPENDENT_IADLS:: INDEPENDENT
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
## 2018-10-01 NOTE — LETTER
[place] CARE COORDINATION  [address]  [place] [facility] [address]  October 1, 2018    Antoinette Romero  [address]  [place] [address]      Dear Antoinette,    I am a social work clinic care coordinator who works with Umu Roberto PA-C. I wanted to thank you for spending the time to talk with me the other day.  I wanted to further introduce myself and provide you with my contact information so that you can call me with questions or concerns about your health care. Below is a description of clinic care coordination and how I can further assist you.     The clinic care coordinator is a registered nurse and/or  who understand the health care system. The goal of clinic care coordination is to help you manage your health and improve access to the [facility] system in the most efficient manner. The registered nurse can assist you in meeting your health care goals by providing education, coordinating services, and strengthening the communication among your providers. The  can assist you with financial, behavioral, psychosocial, chemical dependency, counseling, and/or psychiatric resources.    Please feel free to contact me at [phone], with any questions or concerns.     Sincerely,     SHORTY Brady Clinic Care Coordinator  [phone]  [email or web address]  Enclosed: I have enclosed a copy of the Complex Care Plan. This has helpful information and goals that we have talked about. Please keep this in an easy to access place to use as needed. I have also included helpful educational material that will better assist you with meeting your goal of obtaining Social Security Disability Income.

## 2018-10-01 NOTE — LETTER
[facility]  Complex Care Plan  About Me  Patient Name:  Antoinette Francis    YOB: 1960  Age:     58 year old   [place] MRN:   [id number] Telephone Information:    Home Phone [phone]   Mobile [phone]       Address:    [address]  [place] [address] Email address:  No e-mail address on record      Emergency Contact(s)  Name Relationship Lgl Grd Work Phone Home Phone Mobile Phone   1. NIDA FRANCIS Spouse  [phone]   2. ANANDA LORD Daughter  [phone]        Health Maintenance  Health Maintenance Reviewed: Due/Overdue   Health Maintenance Topics with due status: Overdue       Topic Date Due    COLON CANCER SCREEN (SYSTEM ASSIGNED) 09/14/2010    ADVANCE DIRECTIVE PLANNING Q5 YRS 09/14/2015    MAMMO SCREEN Q2 YR (SYSTEM ASSIGNED) 10/17/2015    PAP SCREENING Q3 YR (SYSTEM ASSIGNED) 10/17/2016    INFLUENZA VACCINE 09/01/2018     My Access Plan  Medical Emergency 911   Primary Clinic Line [facility] - [phone]   24 Hour Appointment Line [phone] or  [phone]) (toll-free)   24 Hour Nurse Line [phone] (toll-free)   Preferred Urgent Care [facility] [phone]   Preferred Hospital [facility]  [phone]   Preferred Pharmacy [facility] [place], [place] - [address]     Behavioral Health Crisis Line The National Suicide Prevention Lifeline at 1-122.198.4003 or 911     My Care Team Members    Patient Care Team       Relationship Specialty Notifications Start End    Umu Roberto PA-C PCP - General Family Practice  11/29/12     Phone: [phone] Fax: [phone] [address] [facility] [address]    Parviz Martinez MD MD General Surgery  5/26/15     Phone: [phone] Fax: [phone]         [address] [place] [address]    Maria D Stiles,  SHORTY Lead Care Coordinator Primary Care - CC  10/1/18     Phone: [phone]                 My Care Plans  Goals  Goals        General    1. Financial Wellbeing (pt-stated)     Notes - Note created  10/1/2018  1:44 PM by Maria D Stiles LICSW    Goal Statement: I will obtain Social Security Disability Income.  Measure of Success: I will contact the Social Security office to make an appointment to complete the application.  Strengths: Determined to receive benefits.   Date to Achieve By: 03/2019           My Medical and Care Information  Problem List   Patient Active Problem List   Diagnosis     HTN (hypertension)     Anxiety     HTN, goal below 140/90     CARDIOVASCULAR SCREENING; LDL GOAL LESS THAN 160     Tobacco abuse     Obesity     Morbid obesity (H)     S/P gastric bypass     Vitamin D deficiency disease     Nausea     Symptomatic cholelithiasis     Cholecystitis     Headache     Scotoma     Hiatal hernia     Abdominal pain     Ulcer (H)     Nausea & vomiting     Bilateral otitis media     Tobacco use disorder     Contusion     Spinal cord injury, C1-C7 (H)      Current Medications and Allergies:  See printed Medication Report.    Care Coordination Start Date: No linked episodes   Frequency of Care Coordination: 2 weeks   Form Last Updated: 10/01/2018

## 2018-10-04 NOTE — PROGRESS NOTES
Clinic Care Coordination Contact    Called received from patient inquiring about mailed information. Patient stated she has still not received envelop and wanted to follow-up to make sure something was coming. [facility] explained that envelop was mailed out Tuesday, 10/2/18, afternoon and may get to her on Friday or Saturday. Patient stated that she will not contact the Social Security office until she receives mailed envelop as she would like to be prepared prior to calling to schedule an appointment. [facility] discussed following up again with patient in 3-5 business days. Patient expressed appreciation and agreed to follow-up plan.     Goals Addressed             Most Recent       Patient Stated      1. Financial Wellbeing (pt-stated)   0% (10/1/2018)             Goal Statement: I will obtain Social Security Disability Income.  Measure of Success: I will contact the Social Security office to make an appointment to complete the application.  Strengths: Determined to receive benefits.   Date to Achieve By: 03/2019        SHORTY Brady   Clinic Care Coordinator  [phone]  [email or web address]

## 2018-10-10 ENCOUNTER — PATIENT OUTREACH (OUTPATIENT)
Dept: CARE COORDINATION | Facility: CLINIC | Age: 58
End: 2018-10-10

## 2018-10-10 NOTE — PROGRESS NOTES
Clinic Care Coordination Contact    Situation: Scheduled follow-up with patient to discuss previously mailed Social Security information and resources.    Background: Please refer to [facility] Patient Outreach note 10/1/18 for more information.     Assessment: Outreach to patient. Patient stated that just prior to [facility] call, her  had just gone out to get the mail and received the envelop this writer had mailed. Patient stated that she hasn't had an opportunity to look through the information as of yet. Patient requested [facility] follow-up at a later time to further discuss information.     Plan/Recommendations: [facility] will be out of the office 10/12/18. Patient stated that she is scheduled for a doctor's appointment tomorrow, 10/11/18, from 1200 to 1500. Patient and writer agreed for follow-up on Monday, 10/15/18. No further questions or concerns reported by patient at this time.     SHORTY Brady  [facility] Care Coordinator  [phone]  [email or web address]

## 2018-10-11 ENCOUNTER — TRANSFERRED RECORDS (OUTPATIENT)
Dept: HEALTH INFORMATION MANAGEMENT | Facility: CLINIC | Age: 58
End: 2018-10-11

## 2018-10-15 ENCOUNTER — PATIENT OUTREACH (OUTPATIENT)
Dept: CARE COORDINATION | Facility: CLINIC | Age: 58
End: 2018-10-15

## 2018-10-15 NOTE — PROGRESS NOTES
Clinic Care Coordination Contact    Situation: Return call received from patient.    Background: Please review [facility] Patient Outreach note 10/01/18 for more information.    Assessment: Patient called stating that she had looked through the information this writer sent to her. Patient reported concerns that she may not qualify due to length of time she had worked. [facility] encouraged patient to call Social Security to inquire further. Patient stated she will plan on calling tomorrow morning and will follow-up with [facility].     Plan/Recommendations: Patient to contact the Social Security Office to determine eligibility.    SHORTY Brady  [facility] Care Coordinator  [phone]  [email or web address]

## 2018-10-15 NOTE — PROGRESS NOTES
Clinic Care Coordination Contact  [facility]/Voicemail     Clinical Data: Scheduled follow-up with patient to discuss previously mailed Social Security information and resources.  Outreach attempted x 1.  Left message on voicemail with call back information and requested return call.  Plan: Care Coordinator will try to reach patient again in 1-2 business days.    SHORTY Brady   Clinic Care Coordinator  [phone]  [email or web address]

## 2018-11-01 ENCOUNTER — PATIENT OUTREACH (OUTPATIENT)
Dept: CARE COORDINATION | Facility: CLINIC | Age: 58
End: 2018-11-01

## 2018-11-01 NOTE — PROGRESS NOTES
"Clinic Care Coordination Contact    Situation: Care Coordination outreach with patient to determine eligibility of Social Security disability.     Background: Please review [facility] Patient Outreach notes 10/1/18 and 10/15/18 for more information.     Assessment: Outreach completed with patient. Patient reported feeling very \"down\" and disappointed. Patient explained that she has a lot going on and can't seem to \"catch a break\". Patient reported to [facility] that she has felt very depressed and if it weren't for her grandson, she wouldn't be here. Patient discussed her chronic pain and her experience at [facility]. Patient stated she will not be going back and felt she was viewed as a drug seeker. Patient further explained that they had recommended a treatment and when she stated that she wanted to further discuss with her PCP prior to completing, they dismissed her. Discussed patient's upcoming appointment on 11/7/18 with PCP and encouraged her to discuss her experience at the [facility] and request to be referred somewhere else. Patient stated that earlier this week she went to the [facility] in [place] and filled out intake forms in order to be scheduled for a future appointment. Patient expressed feeling hopeful of the services provided in the clinic and looks forward to being scheduled.     Patient again discussed her depression and became tearful. Patient explained that she had attempted counseling in the past but felt no relief. [facility] discussed antidepressants. Patient stated that she had tried medication in the past but had severe side effects and stopped taking them. [facility] also encouraged patient to report mood to PCP during upcoming appointment. [facility] offered to have [facility] Care Coordinator, ALKA Suarez, meet with patient during visit if available; patient gladly accepted and thanked me for the offer.     Patient's enrollment status changed from ENROLLED to " POTENTIAL as goal was to obtain Social Security Disability and patient was determined to be ineligible due to work history.     Plan/Recommendations: This writer is routing note to [facility], Juju Pinto, to further assist patient with needs. Care team updated 11/1/18. Patient encouraged to outreach to this writer if any further concerns arise prior to PCP appointment on 11/7/18. Patient again expressed gratitude and said she would contact me.     SHORTY Brady  [facility] Care Coordinator  [phone]  [email or web address]

## 2018-11-05 ENCOUNTER — PATIENT OUTREACH (OUTPATIENT)
Dept: CARE COORDINATION | Facility: CLINIC | Age: 58
End: 2018-11-05

## 2018-11-05 NOTE — PROGRESS NOTES
"Clinic Care Coordination Contact    Situation: Call received from patient.    Background: Please review [facility] Patient Outreach note from 11/1/18 for more information.    Assessment: Patient called [facility] in tears. Patient stated that she doesn't know where to turn and she feels like she continues to be brushed aside by the healthcare field. Patient reported feeling very depressed and expressed understanding that she should see a counselor but feels as though she needs to get \"other things in order prior to that\".     Patient stated that the [facility] in [place] has yet to schedule her for an appointment as they are awaiting her medical records. Patient reported that she could wait as long as 3 weeks to be seen and expressed much disappointment and frustration stating, \"I am someone in need. No one should have to wait more than 3 weeks to be seen when they need help.\" Patient again reported that she is not \"a drug seeker\" and \"open to anything at this point\".    Patient is scheduled for follow-up with PCP on 11/7/18. [facility] encouraged patient to request a copy of medical records so she can hand deliver records to [place] [facility]. [facility] also encouraged patient to discuss mental health concerns with PCP to determine available resources and services she may benefit from. [facility] discussed routing conversation to patient's PCP primary Care Coordinator to better assist patient with needs during appointment on 11/7/18; patient agreed to plan. Patient thanked this writer for the assistance.    Plan/Recommendations: Juju Pinto [facility], to meet with patient during upcoming PCP appointment on 11/7/18.     Next 5 appointments (look out 90 days)     Nov 07, 2018  9:00 AM CST   Office Visit with Umu Roberto PA-C   [facility]    [address] [place] [address] [phone]        SHORTY Brady  [facility] Care " Coordinator  [phone]  [email or web address]

## 2018-11-06 ENCOUNTER — TELEPHONE (OUTPATIENT)
Dept: FAMILY MEDICINE | Facility: CLINIC | Age: 58
End: 2018-11-06

## 2018-11-06 NOTE — TELEPHONE ENCOUNTER
Panel Management Review      Patient has the following on her problem list:     Hypertension   Last three blood pressure readings:  BP Readings from Last 3 Encounters:   08/30/18 (!) 149/98   06/21/18 130/80   05/31/18 134/90     Blood pressure: FAILED    HTN Guidelines:  Age 18-59 BP range:  Less than 140/90  Age 60-85 with Diabetes:  Less than 140/90  Age 60-85 without Diabetes:  less than 150/90      Composite cancer screening  Chart review shows that this patient is due/due soon for the following Pap Smear, Mammogram, Colonoscopy and Fecal Colorectal (FIT)  Summary:    Patient is due/failing the following:   BP CHECK, COLONOSCOPY, FIT, MAMMOGRAM and PAP    Action needed:   Patient needs office visit for Physical.    Type of outreach:    Panel Pool, please contact patient    Questions for provider review:    None                                                                                                                                    Jagdish Mejia MA       Chart routed to Care Team .

## 2018-11-07 ENCOUNTER — OFFICE VISIT (OUTPATIENT)
Dept: FAMILY MEDICINE | Facility: CLINIC | Age: 58
End: 2018-11-07
Payer: COMMERCIAL

## 2018-11-07 ENCOUNTER — PATIENT OUTREACH (OUTPATIENT)
Dept: CARE COORDINATION | Facility: CLINIC | Age: 58
End: 2018-11-07

## 2018-11-07 VITALS
WEIGHT: 125 LBS | TEMPERATURE: 98.7 F | RESPIRATION RATE: 16 BRPM | SYSTOLIC BLOOD PRESSURE: 130 MMHG | DIASTOLIC BLOOD PRESSURE: 78 MMHG | BODY MASS INDEX: 19.87 KG/M2 | HEART RATE: 90 BPM

## 2018-11-07 DIAGNOSIS — F41.9 ANXIETY: ICD-10-CM

## 2018-11-07 DIAGNOSIS — G89.29 CHRONIC NECK PAIN: ICD-10-CM

## 2018-11-07 DIAGNOSIS — M54.2 CHRONIC NECK PAIN: ICD-10-CM

## 2018-11-07 DIAGNOSIS — Z98.1 STATUS POST CERVICAL SPINAL FUSION: ICD-10-CM

## 2018-11-07 DIAGNOSIS — I10 BENIGN ESSENTIAL HYPERTENSION: Primary | ICD-10-CM

## 2018-11-07 PROCEDURE — 99214 OFFICE O/P EST MOD 30 MIN: CPT | Performed by: PHYSICIAN ASSISTANT

## 2018-11-07 RX ORDER — LISINOPRIL 10 MG/1
TABLET ORAL
Qty: 90 TABLET | Refills: 3 | Status: SHIPPED | OUTPATIENT
Start: 2018-11-07 | End: 2019-12-09

## 2018-11-07 RX ORDER — OXYCODONE HYDROCHLORIDE 5 MG/1
5 TABLET ORAL 2 TIMES DAILY PRN
Qty: 60 TABLET | Refills: 0 | Status: SHIPPED | OUTPATIENT
Start: 2018-11-07 | End: 2020-01-16

## 2018-11-07 RX ORDER — DESVENLAFAXINE 25 MG/1
25 TABLET, EXTENDED RELEASE ORAL DAILY
Qty: 30 TABLET | Refills: 1 | Status: SHIPPED | OUTPATIENT
Start: 2018-11-07 | End: 2020-01-16

## 2018-11-07 ASSESSMENT — PATIENT HEALTH QUESTIONNAIRE - PHQ9: SUM OF ALL RESPONSES TO PHQ QUESTIONS 1-9: 24

## 2018-11-07 ASSESSMENT — ACTIVITIES OF DAILY LIVING (ADL): DEPENDENT_IADLS:: INDEPENDENT

## 2018-11-07 NOTE — LETTER
Health Care Home - Access Care Plan    About Me  Patient Name:  Antoinette Francis    YOB: 1960  Age:                             58 year old   Efrain MRN:            [id number] Telephone Information:     Home Phone [phone]   Mobile [phone]       Address:    [address]  [place] [facility] [address] Email address:  No e-mail address on record      Emergency Contact(s)  Name Relationship Lgl Grd Work Phone Home Phone Mobile Phone   1. NIDA FRANCIS Spouse  [phone]   2. ANANDA LORD Daughter  [phone]             Health Maintenance:      My Access Plan  Medical Emergency 911   Questions or concerns during clinic hours Primary Clinic Line, I will call the clinic directly: [facility] - [phone]   24 Hour Appointment Line [phone] or  [phone]) (toll free)   24 Hour Nurse Line [phone] (toll free)   Questions or concerns outside clinic hours 24 Hour Appointment Line, I will call the after-hours on-call line:   [facility] [phone] or [phone]) (toll-free)   Preferred Urgent Care [facility] [phone]   Preferred Hospital [facility]  [phone]   Preferred Pharmacy [facility] [place], [place] - [address]     Behavioral Health Crisis Line The National Suicide Prevention Lifeline at 1-743.401.3965 or 910     My Care Team Members  Patient Care Team       Relationship Specialty Notifications Start End    Umu Roberto PA-C PCP - General Family Practice  11/29/12     Phone: [phone] Fax: [phone] [address] [facility] [address]    Parviz Martinez MD MD General Surgery  5/26/15     Phone: [phone] Fax: [phone]         [address] [place] [address]    Maria D Stiles LICSW Clinic Care Coordinator Primary Care - CC   10/1/18     Phone: [phone]         Juju Pinto, [facility] Clinic Care Coordinator Primary Care - CC  11/1/18            My Medical and Care Information  Problem List   Patient Active Problem List   Diagnosis     HTN (hypertension)     Anxiety     HTN, goal below 140/90     CARDIOVASCULAR SCREENING; LDL GOAL LESS THAN 160     Tobacco abuse     Obesity     Morbid obesity (H)     S/P gastric bypass     Vitamin D deficiency disease     Nausea     Symptomatic cholelithiasis     Cholecystitis     Headache     Scotoma     Hiatal hernia     Abdominal pain     Ulcer     Nausea & vomiting     Bilateral otitis media     Tobacco use disorder     Contusion     Spinal cord injury, C1-C7 (H)      Current Medications and Allergies:  See printed Medication Report

## 2018-11-07 NOTE — PROGRESS NOTES
SUBJECTIVE:   Antoinette Romero is a 58 year old female who presents to clinic today for the following health issues:      Pain    Onset: x 2 months    Description:   Location: all over-neck, shoulder, back, arms, right knee, left hip  Character: ache, sharp pain at times    Intensity: severe; 8/10-currently; 10/10-worst    Progression of Symptoms: worse    Accompanying Signs & Symptoms:  Other symptoms: numbness and tingling    History:   Previous similar pain: YES-from when patient broke neck       Precipitating factors:   Trauma or overuse: no     Alleviating factors:  Improved by: oxycodone temporary relief    Therapies Tried and outcome: oxycodone, tylenol, ibuprofen-no relief; patient states trying to get into pain clinic  Patient has upcoming appointment on 11/21/19.     -patient states not able to walk when taking gabapentin and flexeril    Hypertension Follow-up      Outpatient blood pressures are not being checked.    Low Salt Diet: low salt    Depression and Anxiety Follow-Up    Status since last visit: Worsened due to pain    Other associated symptoms:pain, fatigue    Complicating factors:     Significant life event: Yes-  Fractured neck     Current substance abuse: None    PHQ 1/3/2017 3/8/2018 11/7/2018   PHQ-9 Total Score 18 21 24   Q9: Suicide Ideation Not at all Not at all Not at all     RAFAL-7 SCORE 10/30/2014   Total Score 16     In the past two weeks have you had thoughts of suicide or self-harm?  Yes  In the past two weeks have you thought of a plan or intent to harm yourself? No.  Do you have concerns about your personal safety or the safety of others?   No  PHQ-9  English  PHQ-9   Any Language  RAFAL-7  Suicide Assessment Five-step Evaluation and Treatment (SAFE-T)    Problem list and histories reviewed & adjusted, as indicated.  Additional history: as documented    Patient Active Problem List   Diagnosis     HTN (hypertension)     Anxiety     HTN, goal below 140/90     CARDIOVASCULAR SCREENING;  LDL GOAL LESS THAN 160     Tobacco abuse     Obesity     Morbid obesity (H)     S/P gastric bypass     Vitamin D deficiency disease     Nausea     Symptomatic cholelithiasis     Cholecystitis     Headache     Scotoma     Hiatal hernia     Abdominal pain     Ulcer     Nausea & vomiting     Bilateral otitis media     Tobacco use disorder     Contusion     Spinal cord injury, C1-C7 (H)     Past Surgical History:   Procedure Laterality Date     DECOMPRESSION, FUSION CERVICAL ANTERIOR THREE+ LEVELS, COMBINED N/A 3/11/2018    Procedure: COMBINED DECOMPRESSION, FUSION CERVICAL ANTERIOR THREE+ LEVELS;   INTRA-OPERATIVE SPINAL CORD MONITIORING, ANTERIOR CERVICAL DECOMPRESSION AND FUSION C3-T1 ;  Surgeon: Maynor Daley MD;  Location:  OR     ENDOSCOPIC UPPER GASTROINTESTINAL, REMOVE SUTURE N/A 1/17/2015    Procedure: ENDOSCOPIC UPPER GASTROINTESTINAL, REMOVE SUTURE;  Surgeon: Parviz Martinez MD;  Location: [facility] OR     ESOPHAGOSCOPY, GASTROSCOPY, DUODENOSCOPY (EGD), COMBINED  2/18/2014    Procedure: COMBINED ESOPHAGOSCOPY, GASTROSCOPY, DUODENOSCOPY (EGD);  Esophagoscopy,Gastroscopy,Duodenoscopy;  Surgeon: Neo Sher MD;  Location: [facility]     ESOPHAGOSCOPY, GASTROSCOPY, DUODENOSCOPY (EGD), COMBINED  5/7/2014    Procedure: COMBINED ESOPHAGOSCOPY, GASTROSCOPY, DUODENOSCOPY (EGD), BIOPSY SINGLE OR MULTIPLE;  Surgeon: Jose Antonio Valencia MD;  Location:  GI     GYN SURGERY       LAPAROSCOPIC BYPASS GASTRIC  3/25/2013    Procedure: LAPAROSCOPIC BYPASS GASTRIC;  Laparoscopic Nawaf En Y Gastric Bypass. Hiatel hernia repair;  Surgeon: Parviz Martinez MD;  Location: [facility] OR     LAPAROSCOPIC CHOLECYSTECTOMY WITH CHOLANGIOGRAMS  3/28/2014    Procedure: LAPAROSCOPIC CHOLECYSTECTOMY WITH CHOLANGIOGRAMS;;  Surgeon: Jose Antonio Valencia MD;  [facility]     LAPAROSCOPY DIAGNOSTIC (GENERAL)  3/28/2014    Procedure: LAPAROSCOPY DIAGNOSTIC (GENERAL);  Diagnostic Laparoscopy, laparoscopic cholecystectomy,  EGD, repair of johnston defect;  Surgeon: Jose Antonio Valencia MD;  [facility]     LAPAROSCOPY OPERATIVE ADULT N/A 1/17/2015    Procedure: LAPAROSCOPY OPERATIVE ADULT;  Surgeon: Parviz Martinez MD;  [facility]       Social History   Substance Use Topics     Smoking status: Current Every Day Smoker     Packs/day: 1.00     Types: Cigarettes     Smokeless tobacco: Never Used      Comment: quit 3/2018     Alcohol use No     Family History   Problem Relation Age of Onset     Respiratory Mother      Diabetes Mother      Arthritis Mother      OA     Neurologic Disorder Father      Cancer Maternal Grandfather      Arthritis Maternal Grandmother      RA     Breast Cancer No family hx of      Ovarian Cancer No family hx of            Reviewed and updated as needed this visit by clinical staff  Tobacco  Allergies  Meds  Med Hx  Surg Hx  Fam Hx  Soc Hx      Reviewed and updated as needed this visit by Provider         ROS:  Constitutional, HEENT, cardiovascular, pulmonary, GI, , musculoskeletal, neuro, skin, endocrine and psych systems are negative, except as otherwise noted.    OBJECTIVE:     /78 (BP Location: Right arm, Patient Position: Chair, Cuff Size: Adult Large)  Pulse 90  Temp 98.7  F (37.1  C) (Oral)  Resp 16  Wt 125 lb (56.7 kg)  LMP 03/01/2010  BMI 19.87 kg/m2  Body mass index is 19.87 kg/(m^2).  GENERAL APPEARANCE: healthy, alert and no distress  RESP: lungs clear to auscultation - no rales, rhonchi or wheezes  CV: regular rates and rhythm, normal S1 S2, no S3 or S4 and no murmur, click or rub  NEURO: Normal strength and tone, mentation intact and speech normal  PSYCH: mentation appears normal, affect flat and fatigued        ASSESSMENT/PLAN:             1. Benign essential hypertension  Stable.   - lisinopril (PRINIVIL/ZESTRIL) 10 MG tablet; TAKE ONE-HALF TABLET (5MG) BY MOUTH DAILY  Dispense: 90 tablet; Refill: 3    2. Anxiety  Risks/benefits of medication use discussed with  patient. Patient reports understanding and accepts trial of medication.  F/u with me in 3 weeks, sooner if she can get into Psychiatry    - desvenlafaxine succinate (PRISTIQ) 25 MG 24 hr tablet; Take 1 tablet (25 mg) by mouth daily  Dispense: 30 tablet; Refill: 1  - MENTAL HEALTH REFERRAL  - Adult; Psychiatry and Medication Management; Psychiatry; [facility]: [facility] [phone].  Medication management & future refills will be returned to [facility] PCP upon completion of evaluation; We ammon...    3. Status post cervical spinal fusion  Rx handed to pt. Will fill for 1 month until can get in with pain clinic 11/21/19  Admits to smoking marijuana for mood and pain.   Agrees to stop today.   - oxyCODONE IR (ROXICODONE) 5 MG tablet; Take 1 tablet (5 mg) by mouth 2 times daily as needed for moderate to severe pain  Dispense: 60 tablet; Refill: 0    4. Chronic neck pain  See #3  - oxyCODONE IR (ROXICODONE) 5 MG tablet; Take 1 tablet (5 mg) by mouth 2 times daily as needed for moderate to severe pain  Dispense: 60 tablet; Refill: 0        Umu Roberto PA-C  [facility]

## 2018-11-07 NOTE — PROGRESS NOTES
Clinic Care Coordination Contact    Clinic Care Coordination Contact  OUTREACH    Referral Information:  Referral Source: Self-patient/Caregiver    Primary Diagnosis: Psychosocial    Chief Complaint   Patient presents with     Clinic Care Coordination - Initial        Clinical Concerns:  Current Medical Concerns:  Chronic pain, depression.    Current Behavioral Concerns: Current marijuana use potentially interfering with access to pain clinic. Very tearful and anxious during outreach. Vacillating between being willing and being willful about coping skills.  Education Provided to patient: CC provided psychoeducation on depression symptoms and coping skills. Also informed of importance around disclosure with providers about marijuana use, and reasons for use (nausea, pain), so can get treatment as appropriate and hopefully then eliminate need for use.   Pain  Pain (GOAL):: Yes  Type: Chronic (>3mo)  Location of chronic pain:: Left side of body  Radiating: No  Progression: Constant  Description of pain: Numb, Sharp  Chronic pain severity:: 8  Limitation of routine activities due to chronic pain:: Yes  Description: Able to do light housework  Alleviating Factors: Pain Medication  Aggravating Factors: Other  Health Maintenance Reviewed:      Medication Management:  PCP wrote new prescription today for a new medication and also referral for Psychiatrist in AV clinic. PCP also encouraged return PCP appt for three weeks out.    Functional Status:  Dependent ADLs:: Independent  Dependent IADLs:: Independent  Bed or wheelchair confined:: No  Mobility Status: Independent    Living Situation:  Current living arrangement:: I live in a private home with spouse  Type of residence:: Private home - no stairs    Diet/Exercise/Sleep:  Diet:: Regular  Inadequate nutrition (GOAL):: No  Food Insecurity: No  Tube Feeding: No  Exercise:: Currently not exercising  Inadequate activity/exercise (GOAL):: No  Significant changes in sleep  pattern (GOAL): No    Transportation:  Transportation concerns (GOAL):: No  Transportation means:: Regular car, only has access to it one day a week and this is a barrier to effective clinic access. Patient will be provided with a [employer] application.     Psychosocial:  Confucianism or spiritual beliefs that impact treatment:: No  Mental health DX:: Yes  Mental health DX how managed:: Medication  Mental health management concern (GOAL):: No  Informal Support system:: Spouse     Financial/Insurance:   Financial/Insurance concerns (GOAL):: Yes     Resources and Interventions:  Current Resources:    ;   Community Resources: None  Supplies used at home:: None  Equipment Currently Used at Home: none    Advance Care Plan/Directive  Advanced Care Plans/Directives on file:: No  Advanced Care Plan/Directive Status: Declined Further Information    Patient/Caregiver understanding: Pt reports understanding and denies any additional questions or concerns at this times.  CC engaged in AIDET communication during encounter.    Outreach Frequency: 2 weeks  Future Appointments              In 4 months Mojgan Callejas APRN CNP [facility], [facility]          Plan: [facility] will send CC intro letter, Access Plan, and information on [employer] including the application. [facility] will meet with patient in three weeks when she returns for follow up with PCP.    Juju Pinto [facility]  Clinic Care Coordinator - [facility], and [facility]  Ph: [phone]  [email or web address]  (Today's date: 11/7/18)

## 2018-11-07 NOTE — LETTER
[facility] CARE COORDINATION  [address]  [facility] [address]  November 7, 2018    Antoinette THEODORE Nick  [address]  [place] [facility] [address]      Dear Antoinette,    I am a clinic care coordinator who works with Umu Roberto PA-C at [facility]. I wanted to thank you for spending the time to talk with me.  I also wanted to provide you with my contact information so that you can call me with questions or concerns about your health care. Below is a description of clinic care coordination and how I can further assist you.     The clinic care coordinator is a registered nurse and/or  who understand the health care system. The goal of clinic care coordination is to help you manage your health and improve access to the [facility] system in the most efficient manner. The registered nurse can assist you in meeting your health care goals by providing education, coordinating services, and strengthening the communication among your providers. The  can assist you with financial, behavioral, psychosocial, chemical dependency, counseling, and/or psychiatric resources.    Please feel free to contact me at [phone], with any questions or concerns. We at [facility] are focused on providing you with the highest-quality healthcare experience possible and that all starts with you.     Sincerely,     Juju Pinto    Enclosed: I have enclosed a copy of a 24 Hour Access Plan. This has helpful phone numbers for you to call when needed. Please keep this in an easy to access place to use as needed. Also enclosed are the [employer] application.

## 2018-11-07 NOTE — MR AVS SNAPSHOT
"              After Visit Summary   11/7/2018    Antoinette Romero    MRN: [id number]           Patient Information     Date Of Birth          1960        Visit Information        Provider Department      11/7/2018 9:00 AM Umu Roberto PA-C [facility]         Follow-ups after your visit        Your next 10 appointments already scheduled     Mar 14, 2019 10:40 AM CDT   Return Visit with MARIILN Altamirano CNP   [facility] ([facility])    [address] [address] [address] [phone]              Who to contact     If you have questions or need follow up information about today's clinic visit or your schedule please contact [facility] directly at [phone].  Normal or non-critical lab and imaging results will be communicated to you by MyChart, letter or phone within 4 business days after the clinic has received the results. If you do not hear from us within 7 days, please contact the clinic through MyChart or phone. If you have a critical or abnormal lab result, we will notify you by phone as soon as possible.  Submit refill requests through [employer] or call your pharmacy and they will forward the refill request to us. Please allow 3 business days for your refill to be completed.          Additional Information About Your Visit        MyChart Information     [employer] lets you send messages to your doctor, view your test results, renew your prescriptions, schedule appointments and more. To sign up, go to [email or web address] . Click on \"Log in\" on the left side of the screen, which will take you to the Welcome page. Then click on \"Sign up Now\" on the right side of the page.     You will be asked to enter the access code listed below, as well as some personal information. Please follow the directions to create your username and password.     Your access code is: [email or web address]  Expires: " 2019  9:11 AM     Your access code will  in 90 days. If you need help or a new code, please call your [place] clinic or [phone].        Care EveryWhere ID     This is your Care EveryWhere ID. This could be used by other organizations to access your [place] medical records  [id number]        Your Vitals Were     Pulse Temperature Respirations Last Period BMI (Body Mass Index)       90 98.7  F (37.1  C) (Oral) 16 2010 19.87 kg/m2        Blood Pressure from Last 3 Encounters:   18 130/78   18 (!) 149/98   18 130/80    Weight from Last 3 Encounters:   18 125 lb (56.7 kg)   18 126 lb 12.8 oz (57.5 kg)   18 129 lb (58.5 kg)              Today, you had the following     No orders found for display         Today's Medication Changes          These changes are accurate as of 18  9:11 AM.  If you have any questions, ask your nurse or doctor.               These medicines have changed or have updated prescriptions.        Dose/Directions    GABAPENTIN PO   This may have changed:  Another medication with the same name was removed. Continue taking this medication, and follow the directions you see here.   Changed by:  Umu Roberto PA-C        Dose:  100 mg   Take 100 mg by mouth daily   Refills:  0       pantoprazole 20 MG EC tablet   Commonly known as:  PROTONIX   This may have changed:  Another medication with the same name was removed. Continue taking this medication, and follow the directions you see here.   Used for:  Non-intractable vomiting with nausea, unspecified vomiting type, S/P gastric bypass, Hiatal hernia, Ulcer   Changed by:  Umu Roberto PA-C        Dose:  20 mg   Take 1 tablet (20 mg) by mouth daily Take by mouth 30-60 minutes before a meal.   Quantity:  30 tablet   Refills:  1                Primary Care Provider Office Phone # Fax #    Umu Roberto PA-C [phone] [address] [facility] [address]         Equal Access to Services     [facility] : Hadii aad ku hadleathabarbara Jenniferali, wacorinada luqadaha, qaybta peñamariluenrike castro. So [facility] [phone].    ATENCIÓN: Si habla español, tiene a shah disposición servicios gratuitos de asistencia lingüística. Fahad al [phone].    We comply with applicable federal civil rights laws and [place] laws. We do not discriminate on the basis of race, color, national origin, age, disability, sex, sexual orientation, or gender identity.            Thank you!     Thank you for choosing [facility]  for your care. Our goal is always to provide you with excellent care. Hearing back from our patients is one way we can continue to improve our services. Please take a few minutes to complete the written survey that you may receive in the mail after your visit with us. Thank you!             Your Updated Medication List - Protect others around you: Learn how to safely use, store and throw away your medicines at www.disposemymeds.org.          This list is accurate as of 11/7/18  9:11 AM.  Always use your most recent med list.                   Brand Name Dispense Instructions for use Diagnosis    acetaminophen 325 MG tablet    TYLENOL    100 tablet    Take 2 tablets (650 mg) by mouth every 4 hours as needed for mild pain    Status post cervical spinal fusion       GABAPENTIN PO      Take 100 mg by mouth daily        lisinopril 10 MG tablet    PRINIVIL/ZESTRIL    30 tablet    TAKE ONE-HALF TABLET (5MG) BY MOUTH DAILY    Benign essential hypertension       multivitamin, therapeutic with minerals Tabs tablet     30 each    Take 1 tablet by mouth daily    Alcohol dependence, daily use (H)       pantoprazole 20 MG EC tablet    PROTONIX    30 tablet    Take 1 tablet (20 mg) by mouth daily Take by mouth 30-60 minutes before a meal.    Non-intractable vomiting with nausea, unspecified vomiting type, S/P gastric bypass, Hiatal hernia, Ulcer

## 2018-11-08 NOTE — TELEPHONE ENCOUNTER
Type of outreach:  Phone, spoke to patient.  Patient declined for now but will call at later time to schedule

## 2018-11-14 ENCOUNTER — PATIENT OUTREACH (OUTPATIENT)
Dept: CARE COORDINATION | Facility: CLINIC | Age: 58
End: 2018-11-14

## 2018-11-14 ASSESSMENT — ACTIVITIES OF DAILY LIVING (ADL): DEPENDENT_IADLS:: INDEPENDENT

## 2018-11-14 NOTE — PROGRESS NOTES
Clinic Care Coordination Contact    Situation: Patient chart reviewed by care coordinator.    Background: This writer has been assisting patient since initial outreach on 10/1/18. Please review [facility] Patient Outreach note 10/1/18 and 11/5/18 for more information.     Assessment: Patient seen by PCP and [facility] on 11/7/18. PCP recommended patient follow-up with mental health services and prescribed PRISTIQ. Patient is tentatively scheduled on 11/21/18 for initial assessment with the [facility] in [place].     Plan/Recommendations: This writer is removing self from care team as patient is now being followed by ALKA Suarez, who is the  Care Coordinator for the [facility]. No further outreaches will be made by this writer.     SHORTY Brady  [facility] Care Coordinator  [phone]  [email or web address]

## 2018-11-14 NOTE — PROGRESS NOTES
Clinic Care Coordination Contact  [facility]/Voicemail    Referral Source: Self-patient/Caregiver  Clinical Data: Care Coordinator Outreach  Outreach attempted x 1.  Left message on voicemail with call back information and requested return call.  Plan: Care Coordinator mailed out care coordination introduction letter on 11/7/18. Care Coordinator will try to reach patient again in 3-5 business days.  Juju Pinto [facility]    Clinic Care Coordinator - [facility], and [facility]  Ph: [phone]  [email or web address]  (Today's date: 11/14/18)

## 2018-11-21 ENCOUNTER — PATIENT OUTREACH (OUTPATIENT)
Dept: CARE COORDINATION | Facility: CLINIC | Age: 58
End: 2018-11-21

## 2018-11-21 ASSESSMENT — ACTIVITIES OF DAILY LIVING (ADL): DEPENDENT_IADLS:: INDEPENDENT

## 2018-11-21 NOTE — PROGRESS NOTES
Clinic Care Coordination Contact  [facility]/Voicemail    Referral Source: Self-patient/Caregiver  Clinical Data: Care Coordinator Outreach  Outreach attempted x 2.  Left message on voicemail with call back information, including how to reach CC after 11/30/18 when [facility] is transitioning out of role.  Plan: Care Coordinator mailed out care coordination introduction letter on 11/7/18. Care Coordinator will do no further outreaches at this time.    Juju Pinto [facility]  Clinic Care Coordinator - [facility], and [facility]  Ph: [phone]  [email or web address]  (Today's date: 11/21/18)

## 2018-11-23 ENCOUNTER — TELEPHONE (OUTPATIENT)
Dept: FAMILY MEDICINE | Facility: CLINIC | Age: 58
End: 2018-11-23

## 2018-11-23 DIAGNOSIS — M54.2 CHRONIC NECK PAIN: ICD-10-CM

## 2018-11-23 DIAGNOSIS — Z98.1 STATUS POST CERVICAL SPINAL FUSION: ICD-10-CM

## 2018-11-23 DIAGNOSIS — G89.29 CHRONIC NECK PAIN: ICD-10-CM

## 2018-11-23 RX ORDER — TRAMADOL HYDROCHLORIDE 50 MG/1
50 TABLET ORAL EVERY 6 HOURS PRN
Qty: 10 TABLET | Refills: 0 | Status: SHIPPED | OUTPATIENT
Start: 2018-11-23 | End: 2020-01-16

## 2018-11-23 NOTE — TELEPHONE ENCOUNTER
Patient calling and [facility] started seeing [facility] in [place] 11/21/18.  States gave her new 11/21/18.  States they gave her new pain med Belbuca that is a film you put in your cheek.  States Bulbuca needs PA and has not been done.  States called pain clinic and they offered something for withdrawal.  [facility] used all the Oxycodone as she took more than the 2/d.  [facility] took 4/d.  She is wanting something to get her by this week-end.  Is aware Umu may be gone.  Did discuss ER if needed for pain or withdrawal or both.  Ivelisse Epperson RN

## 2018-11-23 NOTE — TELEPHONE ENCOUNTER
Called patient back.  Advised of below.  Discussed following up with her pain clinic for plan going forward.  Patient agrees with plan.  Ivelisse Epperson RN

## 2018-11-23 NOTE — TELEPHONE ENCOUNTER
She did not feel giving med for withdrawal was addressing her pain.   She wants something for her pain.  Ivelisse Epperson RN

## 2018-12-27 DIAGNOSIS — R11.2 NON-INTRACTABLE VOMITING WITH NAUSEA, UNSPECIFIED VOMITING TYPE: ICD-10-CM

## 2018-12-28 RX ORDER — PANTOPRAZOLE SODIUM 20 MG/1
TABLET, DELAYED RELEASE ORAL
Qty: 30 TABLET | Refills: 3 | Status: SHIPPED | OUTPATIENT
Start: 2018-12-28 | End: 2019-05-07

## 2019-01-12 ENCOUNTER — TELEPHONE (OUTPATIENT)
Dept: FAMILY MEDICINE | Facility: CLINIC | Age: 59
End: 2019-01-12

## 2019-01-12 NOTE — TELEPHONE ENCOUNTER
Antoinette Romero is a 58 year old female who calls with constipation problem since starting methadone one month ago.  Last BM 2 days ago  Between times that I go like right this minute I feel like I have severe morning sickness.  I just don't know what to do.   Using a suppository and taking Senna  No blood in stool.    H/O gastric bypass.   Informed per Dr. King, severe nausea can be sign of obstruction, UC eval. Pt undecided whether UC. Will go if things get worse.   Frantz Proctor RN

## 2019-01-22 ENCOUNTER — TELEPHONE (OUTPATIENT)
Dept: SURGERY | Facility: CLINIC | Age: 59
End: 2019-01-22

## 2019-01-22 NOTE — TELEPHONE ENCOUNTER
Reason for call:  Other   Patient called regarding (reason for call): appointment  Additional comments: Patient is having complications. Had LRNY 6 years ago, 2013. She stated she has been losing weight rapidly and cannot keep anything down and feels sick. She wants to see someone and would like a call back asa and she is very sick. Can only do Thursday appointments but not this coming Thursday.    Phone number to reach patient:  Home number on file [phone] (home)    Best Time:  Any    Can we leave a detailed message on this number?  YES

## 2019-01-22 NOTE — TELEPHONE ENCOUNTER
Called patient, inform her I spoke to Mery Atkinson NP, that works with Dr. Martinez, since patient has not been seen in our clinic for so long, she will need to re-establish care. If patient is symptomatic she will need to go into ER since its been 4 years since she has been seen and her bariatric surgery was done back in 3/2013. An appt was made for patient to see Mery Atkinson NP on 2/21/19 @ 10am at the clinic and surgery center. Patient advise to go into ER if her symptoms gets worst. She states she has lost 7lb in 3 weeks and is having nausea and vomiting. Not able to keep anything down. Patient said she understands. Confirmed date/time/location of appointment with patient.    Alex RIDLEY LPN

## 2019-02-15 ENCOUNTER — TELEPHONE (OUTPATIENT)
Dept: SURGERY | Facility: CLINIC | Age: 59
End: 2019-02-15

## 2019-02-15 NOTE — TELEPHONE ENCOUNTER
Patient Nawaf-En-Y surgery 3/25/13.    Name: Antoinette EWELINA Nick    Scheduled to see CNP or LEAH on Thursday 2/21/19 at 10 am.    Spoke to patient: No  Left message: Yes, regarding appointment for Thursday 2/21/19 at 10 am.    Instructed to complete pre-visit questionnaires prior to visit .    [phone] Contact Center phone number      Raymondia Francisco Javier

## 2019-03-04 ENCOUNTER — TELEPHONE (OUTPATIENT)
Dept: FAMILY MEDICINE | Facility: CLINIC | Age: 59
End: 2019-03-04

## 2019-03-04 NOTE — TELEPHONE ENCOUNTER
Pt calls, now on Methadone for pain as oxycodone was not working, pt discussed frustration with pain clinic bills, now at another pain clinic in , not [place], wonders if [facility] will rx methadone, informed no, due for medication f/u appointment with MICHELLE Zuniga RN, BSN  Message handled by Nurse Triage.

## 2019-03-30 ENCOUNTER — NURSE TRIAGE (OUTPATIENT)
Dept: NURSING | Facility: CLINIC | Age: 59
End: 2019-03-30

## 2019-03-30 NOTE — TELEPHONE ENCOUNTER
Chronic pain neck injury. Stopped Methadone a week ago. Now worse and is very fatigued with shortness of breath and palpitations.  Will go to the ER per protocol.  Misa Jennings RN  [facility]  .     Reason for Disposition    Difficulty breathing    Additional Information    Negative: Severe difficulty breathing (e.g., struggling for each breath, speaks in single words)    Negative: Shock suspected (e.g., cold/pale/clammy skin, too weak to stand, low BP, rapid pulse)    Negative: Difficult to awaken or acting confused  (e.g., disoriented, slurred speech)    Negative: [1] Fainted > 15 minutes ago AND [2] still feels too weak or dizzy to stand    Negative: [1] SEVERE weakness (i.e., unable to walk or barely able to walk, requires support) AND     [2] new onset or worsening    Negative: Sounds like a life-threatening emergency to the triager    Protocols used: WEAKNESS (GENERALIZED) AND FATIGUE-ADULT-AH

## 2019-05-07 DIAGNOSIS — R11.2 NON-INTRACTABLE VOMITING WITH NAUSEA, UNSPECIFIED VOMITING TYPE: ICD-10-CM

## 2019-05-07 RX ORDER — PANTOPRAZOLE SODIUM 20 MG/1
TABLET, DELAYED RELEASE ORAL
Qty: 30 TABLET | Refills: 3 | Status: SHIPPED | OUTPATIENT
Start: 2019-05-07 | End: 2019-09-05

## 2019-05-07 NOTE — TELEPHONE ENCOUNTER
"Last Written Prescription Date:  12/28/18  Last Fill Quantity: 30 tablet,  # refills: 3   Last office visit: 11/7/2018 with prescribing provider:  MELODIE Roberto   Future Office Visit:      Requested Prescriptions   Pending Prescriptions Disp Refills     pantoprazole (PROTONIX) 20 MG EC tablet [Pharmacy Med Name: PANTOPRAZOLE SODIUM 20MG TBEC] 30 tablet 3     Sig: TAKE ONE TABLET BY MOUTH 30 TO 60 MINUTES BEFORE A MEAL EVERY DAY       PPI Protocol Passed - 5/7/2019  8:57 AM        Passed - Not on Clopidogrel (unless Pantoprazole ordered)        Passed - No diagnosis of osteoporosis on record        Passed - Recent (12 mo) or future (30 days) visit within the authorizing provider's specialty     Patient had office visit in the last 12 months or has a visit in the next 30 days with authorizing provider or within the authorizing provider's specialty.  See \"Patient Info\" tab in inbasket, or \"Choose Columns\" in Meds & Orders section of the refill encounter.              Passed - Medication is active on med list        Passed - Patient is age 18 or older        Passed - No active pregnacy on record        Passed - No positive pregnancy test in past 12 months          "

## 2019-05-07 NOTE — TELEPHONE ENCOUNTER
Prescription approved per [facility] [place] Refill Protocol.  Frantz Proctor RN

## 2019-05-09 ENCOUNTER — ALLIED HEALTH/NURSE VISIT (OUTPATIENT)
Dept: NURSING | Facility: CLINIC | Age: 59
End: 2019-05-09
Payer: COMMERCIAL

## 2019-05-09 DIAGNOSIS — H53.9 VISUAL DISTURBANCE: Primary | ICD-10-CM

## 2019-05-09 PROCEDURE — 99207 ZZC NO CHARGE NURSE ONLY: CPT

## 2019-05-09 NOTE — PROGRESS NOTES
Pt walks in, was at pharmacy, c/o flashes in left eye, onset this am, denies h/a, eye pain, no other complaints, discussed with MICHELLE, recommend ophthalmology appointment today, see referral, pt wished to call and schedule appointment herself, pt informed needs evaluation asap TODAY and agrees, provided phone number and printed referral, MICHELLE Zuniga, RN, BSN  Message handled by Nurse Triage with [facility] - provider name: MICHELLE.

## 2019-06-25 ENCOUNTER — TELEPHONE (OUTPATIENT)
Dept: FAMILY MEDICINE | Facility: CLINIC | Age: 59
End: 2019-06-25

## 2019-06-25 DIAGNOSIS — F17.200 TOBACCO USE DISORDER: Primary | ICD-10-CM

## 2019-06-25 RX ORDER — VARENICLINE TARTRATE 1 MG/1
1 TABLET, FILM COATED ORAL 2 TIMES DAILY
Qty: 60 TABLET | Refills: 1 | Status: SHIPPED | OUTPATIENT
Start: 2019-06-25 | End: 2019-06-27 | Stop reason: SINTOL

## 2019-06-25 NOTE — TELEPHONE ENCOUNTER
Pt reports she is smoking 1 PPD.  She wants patches.  Absolutely does not want Chantix.   Frantz Proctor RN

## 2019-06-25 NOTE — TELEPHONE ENCOUNTER
Patient is calling stating that she would like an RX to quit smoking.  Please call patient to advise.  Thank you

## 2019-06-25 NOTE — TELEPHONE ENCOUNTER
Left message for patient to return call to triage     Amy Schmitt, Registered Nurse   [facility]

## 2019-06-25 NOTE — TELEPHONE ENCOUNTER
Does pt want chantix? I know she has used in the past.     Or we can do the patch, but I would need to know how much she is smoking to send the correct dose.

## 2019-06-27 RX ORDER — NICOTINE 21 MG/24HR
1 PATCH, TRANSDERMAL 24 HOURS TRANSDERMAL EVERY 24 HOURS
Qty: 30 PATCH | Refills: 1 | Status: SHIPPED | OUTPATIENT
Start: 2019-06-27 | End: 2020-01-16

## 2019-06-27 NOTE — TELEPHONE ENCOUNTER
Call pt back.   rx's sent for nicoderm patches to [facility]'s for her.    Umu Roberto PA-C

## 2019-06-27 NOTE — TELEPHONE ENCOUNTER
Patient calling to verify rx was sent to pharmacy.  However, encounter was closed      Patient will also call back with correct pharmacy      Please advise    Eulalia Rivas

## 2019-06-27 NOTE — TELEPHONE ENCOUNTER
Pt informed.  She prefers [facility].  Pt will call [facility] to find Rx in [facility] database.  Frantz Proctor RN

## 2019-08-30 ENCOUNTER — TELEPHONE (OUTPATIENT)
Dept: FAMILY MEDICINE | Facility: CLINIC | Age: 59
End: 2019-08-30

## 2019-08-30 NOTE — TELEPHONE ENCOUNTER
Patient would like to know if Umu has any documentation on her Left sided Neuropathy.    Also needs to know if documentation she is seeing a therapist.    Antoinette has not been able to get in touch with her pain clinic.    She is needing records to apply for social security disability.    Please call patient [phone]

## 2019-09-05 DIAGNOSIS — R11.2 NON-INTRACTABLE VOMITING WITH NAUSEA, UNSPECIFIED VOMITING TYPE: ICD-10-CM

## 2019-09-05 NOTE — TELEPHONE ENCOUNTER
"Requested Prescriptions   Pending Prescriptions Disp Refills     pantoprazole (PROTONIX) 20 MG EC tablet [Pharmacy Med Name: PANTOPRAZOLE SODIUM 20MG TBEC] 30 tablet 3     Sig: TAKE ONE TABLET BY MOUTH 30 TO 60 MINUTES BEFORE A MEAL EVERY DAY       Last Written Prescription Date: 5/7/19   Last Fill Quantity: 30 tablets,  # refills: 3   Last office visit: 11/7/2018 with prescribing provider: Pardeep    Future Office Visit:        PPI Protocol Passed - 9/5/2019 10:18 AM        Passed - Not on Clopidogrel (unless Pantoprazole ordered)        Passed - No diagnosis of osteoporosis on record        Passed - Recent (12 mo) or future (30 days) visit within the authorizing provider's specialty     Patient had office visit in the last 12 months or has a visit in the next 30 days with authorizing provider or within the authorizing provider's specialty.  See \"Patient Info\" tab in inbasket, or \"Choose Columns\" in Meds & Orders section of the refill encounter.              Passed - Medication is active on med list        Passed - Patient is age 18 or older        Passed - No active pregnacy on record        Passed - No positive pregnancy test in past 12 months          "

## 2019-09-06 RX ORDER — PANTOPRAZOLE SODIUM 20 MG/1
TABLET, DELAYED RELEASE ORAL
Qty: 30 TABLET | Refills: 2 | Status: SHIPPED | OUTPATIENT
Start: 2019-09-06 | End: 2020-01-14

## 2019-09-26 DIAGNOSIS — G47.33 OBSTRUCTIVE SLEEP APNEA SYNDROME: Primary | ICD-10-CM

## 2019-09-26 DIAGNOSIS — I10 ESSENTIAL HYPERTENSION, MALIGNANT: ICD-10-CM

## 2019-09-26 DIAGNOSIS — E53.1 PYRIDOXINE DEPENDENCY SYNDROME: ICD-10-CM

## 2019-09-26 LAB
FOLATE SERPL-MCNC: >100 NG/ML
HBA1C MFR BLD: 5.2 % (ref 0–5.6)
VIT B12 SERPL-MCNC: 5975 PG/ML (ref 193–986)

## 2019-09-26 PROCEDURE — 84630 ASSAY OF ZINC: CPT | Mod: 90 | Performed by: PSYCHIATRY & NEUROLOGY

## 2019-09-26 PROCEDURE — 83036 HEMOGLOBIN GLYCOSYLATED A1C: CPT | Performed by: PSYCHIATRY & NEUROLOGY

## 2019-09-26 PROCEDURE — 82180 ASSAY OF ASCORBIC ACID: CPT | Mod: 90 | Performed by: PSYCHIATRY & NEUROLOGY

## 2019-09-26 PROCEDURE — 84590 ASSAY OF VITAMIN A: CPT | Mod: 90 | Performed by: PSYCHIATRY & NEUROLOGY

## 2019-09-26 PROCEDURE — 82607 VITAMIN B-12: CPT | Performed by: PSYCHIATRY & NEUROLOGY

## 2019-09-26 PROCEDURE — 84425 ASSAY OF VITAMIN B-1: CPT | Mod: 90 | Performed by: PSYCHIATRY & NEUROLOGY

## 2019-09-26 PROCEDURE — 83921 ORGANIC ACID SINGLE QUANT: CPT | Performed by: PSYCHIATRY & NEUROLOGY

## 2019-09-26 PROCEDURE — 36415 COLL VENOUS BLD VENIPUNCTURE: CPT | Performed by: PSYCHIATRY & NEUROLOGY

## 2019-09-26 PROCEDURE — 82525 ASSAY OF COPPER: CPT | Mod: 90 | Performed by: PSYCHIATRY & NEUROLOGY

## 2019-09-26 PROCEDURE — 83735 ASSAY OF MAGNESIUM: CPT | Mod: 90 | Performed by: PSYCHIATRY & NEUROLOGY

## 2019-09-26 PROCEDURE — 84207 ASSAY OF VITAMIN B-6: CPT | Mod: 90 | Performed by: PSYCHIATRY & NEUROLOGY

## 2019-09-26 PROCEDURE — 82306 VITAMIN D 25 HYDROXY: CPT | Performed by: PSYCHIATRY & NEUROLOGY

## 2019-09-26 PROCEDURE — 82746 ASSAY OF FOLIC ACID SERUM: CPT | Performed by: PSYCHIATRY & NEUROLOGY

## 2019-09-26 PROCEDURE — 84252 ASSAY OF VITAMIN B-2: CPT | Mod: 90 | Performed by: PSYCHIATRY & NEUROLOGY

## 2019-09-26 PROCEDURE — 99000 SPECIMEN HANDLING OFFICE-LAB: CPT | Performed by: PSYCHIATRY & NEUROLOGY

## 2019-09-27 LAB — DEPRECATED CALCIDIOL+CALCIFEROL SERPL-MC: 56 UG/L (ref 20–75)

## 2019-09-29 LAB
ANNOTATION COMMENT IMP: NORMAL
COPPER SERPL-MCNC: 108.1 UG/DL (ref 80–155)
MAGNESIUM RBC-SCNC: 1.9 MMOL/L (ref 1.5–3.1)
RETINYL PALMITATE SERPL-MCNC: 0.03 MG/L (ref 0–0.1)
VIT A SERPL-MCNC: 0.57 MG/L (ref 0.3–1.2)
VIT B6 SERPL-MCNC: 41.4 NMOL/L (ref 20–125)
VIT C SERPL-MCNC: 101 UMOL/L (ref 23–114)
ZINC SERPL-MCNC: 67.7 UG/DL (ref 60–120)

## 2019-09-30 LAB — VIT B1 BLD-MCNC: 210 NMOL/L (ref 70–180)

## 2019-10-02 ENCOUNTER — TRANSFERRED RECORDS (OUTPATIENT)
Dept: HEALTH INFORMATION MANAGEMENT | Facility: CLINIC | Age: 59
End: 2019-10-02

## 2019-10-03 LAB
METHYLMALONATE SERPL-SCNC: 0.14 UMOL/L (ref 0–0.4)
VIT B2 SERPL-MCNC: 4 MCG/L (ref 1–19)

## 2019-10-16 ENCOUNTER — TRANSFERRED RECORDS (OUTPATIENT)
Dept: HEALTH INFORMATION MANAGEMENT | Facility: CLINIC | Age: 59
End: 2019-10-16

## 2019-11-27 ENCOUNTER — TELEPHONE (OUTPATIENT)
Dept: NURSING | Facility: CLINIC | Age: 59
End: 2019-11-27

## 2019-11-28 NOTE — TELEPHONE ENCOUNTER
Patient calling for appt only. Transferred to scheduling.  Lashae Guzman RN [facility]

## 2019-12-09 DIAGNOSIS — I10 BENIGN ESSENTIAL HYPERTENSION: ICD-10-CM

## 2019-12-11 RX ORDER — LISINOPRIL 10 MG/1
TABLET ORAL
Qty: 15 TABLET | Refills: 0 | Status: SHIPPED | OUTPATIENT
Start: 2019-12-11 | End: 2020-01-07

## 2019-12-11 NOTE — TELEPHONE ENCOUNTER
Prachi calling from pharmacy.  Patient calling pharmacy frequently due to out of med.  Due for visit.  Upcoming appt.  Refill sent.  Ivelisse Epperson RN

## 2019-12-11 NOTE — TELEPHONE ENCOUNTER
Pt is out of medication and needs this morning if possible; can we this approved right away? Pt has scheduled appointment.    Thank you!  elizabeth

## 2020-01-07 DIAGNOSIS — I10 BENIGN ESSENTIAL HYPERTENSION: ICD-10-CM

## 2020-01-07 RX ORDER — LISINOPRIL 10 MG/1
TABLET ORAL
Qty: 15 TABLET | Refills: 0 | Status: SHIPPED | OUTPATIENT
Start: 2020-01-07 | End: 2020-01-14

## 2020-01-07 NOTE — TELEPHONE ENCOUNTER
"Routing refill request to provider for review/approval because:  Ronna given x1 and patient did not follow up, please advise  Labs not current:  K+, Cr  Patient needs to be seen because it has been more than 1 year since last office visit.    Lisinopril  Last Written Prescription Date:  12/11/2019  Last Fill Quantity: 15,  # refills: 0   Last office visit: 11/7/2018 with prescribing provider:  Pardeep   Future Office Visit:   Next 5 appointments (look out 90 days)    Jan 16, 2020  2:55 PM CST  (Arrive by 2:30 PM)  Annual Wellness Visit with Umu Roberto PA-C  [facility]) [address] [place] [id number] [phone]           Requested Prescriptions   Pending Prescriptions Disp Refills     lisinopril (PRINIVIL/ZESTRIL) 10 MG tablet [Pharmacy Med Name: LISINOPRIL 10MG TABS] 15 tablet 0     Sig: TAKE ONE-HALF TABLET BY MOUTH ONCE DAILY       ACE Inhibitors (Including Combos) Protocol Failed - 1/7/2020 11:58 AM        Failed - Blood pressure under 140/90 in past 12 months     BP Readings from Last 3 Encounters:   11/07/18 130/78   08/30/18 (!) 149/98   06/21/18 130/80                 Failed - Normal serum creatinine on file in past 12 months     Recent Labs   Lab Test 03/23/18  0630  03/27/14  1334   CR 0.60   < >  --    CREAT  --   --  0.7    < > = values in this interval not displayed.             Failed - Normal serum potassium on file in past 12 months     Recent Labs   Lab Test 03/23/18  0630   POTASSIUM 4.3             Passed - Recent (12 mo) or future (30 days) visit within the authorizing provider's specialty     Patient has had an office visit with the authorizing provider or a provider within the authorizing providers department within the previous 12 mos or has a future within next 30 days. See \"Patient Info\" tab in inbasket, or \"Choose Columns\" in Meds & Orders section of the refill encounter.              Passed - Medication is " active on med list        Passed - Patient is age 18 or older        Passed - No active pregnancy on record        Passed - No positive pregnancy test within past 12 months        Dang Padgett RN on 1/7/2020 at 12:31 PM

## 2020-01-09 NOTE — PROGRESS NOTES
Pre-Visit Planning     Future Appointments   Date Time Provider Department Center   1/16/2020  2:55 PM Umu Roberto PA-C [facility]     Arrival Time for this Appointment:  2:30 PM   Appointment Notes for this encounter:   phy    Questionnaires Reviewed/Assigned  Additional questionnaires assigned    Patient preferred phone number: [phone]    Patient contact not needed.     Ivelisse Epperson RN

## 2020-01-14 DIAGNOSIS — Z00.00 HEALTHCARE MAINTENANCE: Primary | ICD-10-CM

## 2020-01-14 DIAGNOSIS — I10 BENIGN ESSENTIAL HYPERTENSION: ICD-10-CM

## 2020-01-14 DIAGNOSIS — Z98.1 STATUS POST CERVICAL SPINAL FUSION: ICD-10-CM

## 2020-01-14 DIAGNOSIS — G89.29 CHRONIC NECK PAIN: ICD-10-CM

## 2020-01-14 DIAGNOSIS — R11.2 NON-INTRACTABLE VOMITING WITH NAUSEA, UNSPECIFIED VOMITING TYPE: ICD-10-CM

## 2020-01-14 DIAGNOSIS — M54.2 CHRONIC NECK PAIN: ICD-10-CM

## 2020-01-14 RX ORDER — PANTOPRAZOLE SODIUM 20 MG/1
TABLET, DELAYED RELEASE ORAL
Qty: 30 TABLET | Refills: 2 | Status: SHIPPED | OUTPATIENT
Start: 2020-01-14 | End: 2020-01-16

## 2020-01-14 RX ORDER — LISINOPRIL 10 MG/1
TABLET ORAL
Qty: 15 TABLET | Refills: 0 | Status: SHIPPED | OUTPATIENT
Start: 2020-01-14 | End: 2020-01-16

## 2020-01-14 RX ORDER — TRAMADOL HYDROCHLORIDE 50 MG/1
50 TABLET ORAL EVERY 6 HOURS PRN
Qty: 10 TABLET | Refills: 0 | OUTPATIENT
Start: 2020-01-14

## 2020-01-14 RX ORDER — GABAPENTIN 100 MG/1
100 CAPSULE ORAL DAILY
Qty: 30 CAPSULE | Refills: 1 | Status: SHIPPED | OUTPATIENT
Start: 2020-01-14 | End: 2020-01-16

## 2020-01-14 NOTE — TELEPHONE ENCOUNTER
"Requested Prescriptions   Pending Prescriptions Disp Refills     pantoprazole (PROTONIX) 20 MG EC tablet 30 tablet 2     Sig: TAKE ONE TABLET BY MOUTH 30 TO 60 MINUTES BEFORE A MEAL EVERY DAY   Last Written Prescription Date:  9/6/19  Last Fill Quantity: 30,  # refills: 2   Last office visit: 11/7/2018 with prescribing provider   Future Office Visit:   Next 5 appointments (look out 90 days)    Jan 16, 2020  2:55 PM CST  (Arrive by 2:30 PM)  Annual Wellness Visit with Umu Roberto PA-C  [facility]) [id number] [place] [id number]  [phone]             PPI Protocol Passed - 1/14/2020 11:16 AM        Passed - Not on Clopidogrel (unless Pantoprazole ordered)        Passed - No diagnosis of osteoporosis on record        Passed - Recent (12 mo) or future (30 days) visit within the authorizing provider's specialty     Patient has had an office visit with the authorizing provider or a provider within the authorizing providers department within the previous 12 mos or has a future within next 30 days. See \"Patient Info\" tab in inbasket, or \"Choose Columns\" in Meds & Orders section of the refill encounter.              Passed - Medication is active on med list        Passed - Patient is age 18 or older        Passed - No active pregnacy on record        Passed - No positive pregnancy test in past 12 months        traMADol (ULTRAM) 50 MG tablet 10 tablet 0     Sig: Take 1 tablet (50 mg) by mouth every 6 hours as needed for severe pain       There is no refill protocol information for this order     Last Written Prescription Date:  11/23/18  Last Fill Quantity: 10,   # refills: 0  Future Office visit:    Next 5 appointments (look out 90 days)    Jan 16, 2020  2:55 PM CST  (Arrive by 2:30 PM)  Annual Wellness Visit with Umu Roberto PA-C  [facility]) [id number] [place] [address] " "[phone]           Routing refill request to provider for review/approval because:  Drug not on the G, P or  Health refill protocol or controlled substance       lisinopril (PRINIVIL/ZESTRIL) 10 MG tablet 15 tablet 0   Last Written Prescription Date:  1/7/2020  Last Fill Quantity: 15,  # refills: 0   Last office visit: 11/7/2018 with prescribing provider:   Future Office Visit:   Next 5 appointments (look out 90 days)    Jan 16, 2020  2:55 PM CST  (Arrive by 2:30 PM)  Annual Wellness Visit with Umu Roberto PA-C  [facility] ([facility]) [address] [place] [phone]             ACE Inhibitors (Including Combos) Protocol Failed - 1/14/2020 11:16 AM        Failed - Blood pressure under 140/90 in past 12 months     BP Readings from Last 3 Encounters:   11/07/18 130/78   08/30/18 (!) 149/98   06/21/18 130/80                 Failed - Normal serum creatinine on file in past 12 months     Recent Labs   Lab Test 03/23/18  0630  03/27/14  1334   CR 0.60   < >  --    CREAT  --   --  0.7    < > = values in this interval not displayed.             Failed - Normal serum potassium on file in past 12 months     Recent Labs   Lab Test 03/23/18  0630   POTASSIUM 4.3             Passed - Recent (12 mo) or future (30 days) visit within the authorizing provider's specialty     Patient has had an office visit with the authorizing provider or a provider within the authorizing providers department within the previous 12 mos or has a future within next 30 days. See \"Patient Info\" tab in inbasket, or \"Choose Columns\" in Meds & Orders section of the refill encounter.              Passed - Medication is active on med list        Passed - Patient is age 18 or older        Passed - No active pregnancy on record        Passed - No positive pregnancy test within past 12 months        gabapentin (NEURONTIN) 100 MG capsule       Sig: Take 1 capsule " (100 mg) by mouth daily       There is no refill protocol information for this order      Future Office visit:    Next 5 appointments (look out 90 days)    Jan 16, 2020  2:55 PM CST  (Arrive by 2:30 PM)  Annual Wellness Visit with Umu Roberto PA-C  [facility] [id number] [place] [phone]  [phone]           Routing refill request to provider for review/approval because:  Drug not on the [facility], [facility] or  Health refill protocol or controlled substance

## 2020-01-14 NOTE — TELEPHONE ENCOUNTER
ACE Inhibitors (Including Combos) Protocol Failed - 1/14/2020 11:16 AM            Failed - Blood pressure under 140/90 in past 12 months           BP Readings from Last 3 Encounters:   11/07/18 130/78   08/30/18 (!) 149/98   06/21/18 130/80                        Failed - Normal serum creatinine on file in past 12 months             Recent Labs   Lab Test 03/23/18  0630   03/27/14  1334   CR 0.60   < >  --    CREAT  --   --  0.7    < > = values in this interval not displayed.                  Failed - Normal serum potassium on file in past 12 months           Recent Labs   Lab Test 03/23/18  0630   POTASSIUM 4.3         Patient fails refill protocol. Labs needed. Orders queued. Dottie Sotomayor RN on 1/14/2020 at 11:52 AM

## 2020-01-15 DIAGNOSIS — Z98.1 STATUS POST CERVICAL SPINAL FUSION: ICD-10-CM

## 2020-01-15 DIAGNOSIS — M54.2 CHRONIC NECK PAIN: ICD-10-CM

## 2020-01-15 DIAGNOSIS — G89.29 CHRONIC NECK PAIN: ICD-10-CM

## 2020-01-15 RX ORDER — TRAMADOL HYDROCHLORIDE 50 MG/1
50 TABLET ORAL EVERY 6 HOURS PRN
Qty: 10 TABLET | Refills: 0 | OUTPATIENT
Start: 2020-01-15

## 2020-01-15 NOTE — TELEPHONE ENCOUNTER
Last Written Prescription Date:  11.23.18  Last Fill Quantity: 10,  # refills: 0   Last office visit: 11/7/2018 with prescribing provider:  Isaura   Future Office Visit:   Next 5 appointments (look out 90 days)    Jan 16, 2020  2:55 PM CST  (Arrive by 2:30 PM)  Annual Wellness Visit with Umu Roberto PA-C  [facility] ([facility]) [address] [place] [phone]           Routing refill request to provider for review/approval because:  Drug not on the [facility] refill protocol   A break in medication  Patient needs to be seen because it has been more than 1 year since last office visit.

## 2020-01-16 ENCOUNTER — OFFICE VISIT (OUTPATIENT)
Dept: FAMILY MEDICINE | Facility: CLINIC | Age: 60
End: 2020-01-16
Payer: COMMERCIAL

## 2020-01-16 VITALS
BODY MASS INDEX: 19.58 KG/M2 | HEIGHT: 68 IN | OXYGEN SATURATION: 98 % | HEART RATE: 76 BPM | TEMPERATURE: 97.8 F | WEIGHT: 129.2 LBS | RESPIRATION RATE: 16 BRPM | DIASTOLIC BLOOD PRESSURE: 79 MMHG | SYSTOLIC BLOOD PRESSURE: 137 MMHG

## 2020-01-16 DIAGNOSIS — I10 BENIGN ESSENTIAL HYPERTENSION: ICD-10-CM

## 2020-01-16 DIAGNOSIS — F41.9 ANXIETY: Primary | ICD-10-CM

## 2020-01-16 DIAGNOSIS — K21.00 GASTROESOPHAGEAL REFLUX DISEASE WITH ESOPHAGITIS: ICD-10-CM

## 2020-01-16 DIAGNOSIS — F17.200 TOBACCO USE DISORDER: ICD-10-CM

## 2020-01-16 DIAGNOSIS — Z00.00 HEALTHCARE MAINTENANCE: ICD-10-CM

## 2020-01-16 PROCEDURE — 99214 OFFICE O/P EST MOD 30 MIN: CPT | Performed by: PHYSICIAN ASSISTANT

## 2020-01-16 RX ORDER — LISINOPRIL 10 MG/1
TABLET ORAL
Qty: 45 TABLET | Refills: 3 | Status: ON HOLD | OUTPATIENT
Start: 2020-01-16 | End: 2020-10-03

## 2020-01-16 RX ORDER — NICOTINE 21 MG/24HR
1 PATCH, TRANSDERMAL 24 HOURS TRANSDERMAL EVERY 24 HOURS
Qty: 30 PATCH | Refills: 1 | Status: ON HOLD | OUTPATIENT
Start: 2020-01-16 | End: 2020-10-03

## 2020-01-16 RX ORDER — DESVENLAFAXINE 25 MG/1
25 TABLET, EXTENDED RELEASE ORAL DAILY
Qty: 30 TABLET | Refills: 1 | Status: CANCELLED | OUTPATIENT
Start: 2020-01-16

## 2020-01-16 RX ORDER — PANTOPRAZOLE SODIUM 20 MG/1
TABLET, DELAYED RELEASE ORAL
Qty: 90 TABLET | Refills: 3 | Status: SHIPPED | OUTPATIENT
Start: 2020-01-16 | End: 2021-02-25

## 2020-01-16 RX ORDER — PANTOPRAZOLE SODIUM 20 MG/1
TABLET, DELAYED RELEASE ORAL
Qty: 30 TABLET | Refills: 2 | Status: CANCELLED | OUTPATIENT
Start: 2020-01-16

## 2020-01-16 RX ORDER — LISINOPRIL 10 MG/1
TABLET ORAL
Qty: 15 TABLET | Refills: 0 | Status: CANCELLED | OUTPATIENT
Start: 2020-01-16

## 2020-01-16 RX ORDER — NICOTINE 21 MG/24HR
1 PATCH, TRANSDERMAL 24 HOURS TRANSDERMAL EVERY 24 HOURS
Qty: 30 PATCH | Refills: 0 | Status: SHIPPED | OUTPATIENT
Start: 2020-01-16 | End: 2020-03-23

## 2020-01-16 ASSESSMENT — ENCOUNTER SYMPTOMS
CHILLS: 0
DYSURIA: 0
WEAKNESS: 0
FREQUENCY: 0
ABDOMINAL PAIN: 0
CONSTIPATION: 0
PARESTHESIAS: 0
SORE THROAT: 0
NERVOUS/ANXIOUS: 1
SHORTNESS OF BREATH: 0
COUGH: 0
HEMATOCHEZIA: 0
HEARTBURN: 0
DIZZINESS: 0
BREAST MASS: 0
JOINT SWELLING: 0
EYE PAIN: 0
DIARRHEA: 0
FEVER: 0
ARTHRALGIAS: 0
PALPITATIONS: 0
MYALGIAS: 0
HEADACHES: 0
HEMATURIA: 0
NAUSEA: 1

## 2020-01-16 ASSESSMENT — ANXIETY QUESTIONNAIRES
7. FEELING AFRAID AS IF SOMETHING AWFUL MIGHT HAPPEN: NEARLY EVERY DAY
3. WORRYING TOO MUCH ABOUT DIFFERENT THINGS: NEARLY EVERY DAY
1. FEELING NERVOUS, ANXIOUS, OR ON EDGE: NEARLY EVERY DAY
5. BEING SO RESTLESS THAT IT IS HARD TO SIT STILL: MORE THAN HALF THE DAYS
GAD7 TOTAL SCORE: 19
6. BECOMING EASILY ANNOYED OR IRRITABLE: NEARLY EVERY DAY
2. NOT BEING ABLE TO STOP OR CONTROL WORRYING: NEARLY EVERY DAY
IF YOU CHECKED OFF ANY PROBLEMS ON THIS QUESTIONNAIRE, HOW DIFFICULT HAVE THESE PROBLEMS MADE IT FOR YOU TO DO YOUR WORK, TAKE CARE OF THINGS AT HOME, OR GET ALONG WITH OTHER PEOPLE: NOT DIFFICULT AT ALL

## 2020-01-16 ASSESSMENT — PATIENT HEALTH QUESTIONNAIRE - PHQ9
10. IF YOU CHECKED OFF ANY PROBLEMS, HOW DIFFICULT HAVE THESE PROBLEMS MADE IT FOR YOU TO DO YOUR WORK, TAKE CARE OF THINGS AT HOME, OR GET ALONG WITH OTHER PEOPLE: NOT DIFFICULT AT ALL
SUM OF ALL RESPONSES TO PHQ QUESTIONS 1-9: 19
SUM OF ALL RESPONSES TO PHQ QUESTIONS 1-9: 19
5. POOR APPETITE OR OVEREATING: MORE THAN HALF THE DAYS

## 2020-01-16 ASSESSMENT — MIFFLIN-ST. JEOR: SCORE: 1203.2

## 2020-01-16 NOTE — PROGRESS NOTES
Subjective     Antoinette Romero is a 59 year old female who presents to clinic today for the following health issues:    HPI   Hypertension Follow-up      Do you check your blood pressure regularly outside of the clinic? No     Are you following a low salt diet? Yes    Are your blood pressures ever more than 140 on the top number (systolic) OR more   than 90 on the bottom number (diastolic), for example 140/90? No    Anxiety Follow-Up    How are you doing with your anxiety since your last visit? No change    Are you having other symptoms that might be associated with anxiety? No    Have you had a significant life event? No     Are you feeling depressed? No    Do you have any concerns with your use of alcohol or other drugs? No    Social History     Tobacco Use     Smoking status: Current Every Day Smoker     Packs/day: 1.00     Types: Cigarettes     Smokeless tobacco: Never Used     Tobacco comment: quit 3/2018   Substance Use Topics     Alcohol use: No     Drug use: No     RAFAL-7 SCORE 10/30/2014   Total Score 16     PHQ 3/8/2018 11/7/2018 1/16/2020   PHQ-9 Total Score 21 24 19   Q9: Thoughts of better off dead/self-harm past 2 weeks Not at all Not at all Not at all     Last PHQ-9 1/16/2020   1.  Little interest or pleasure in doing things 3   2.  Feeling down, depressed, or hopeless 3   3.  Trouble falling or staying asleep, or sleeping too much 2   4.  Feeling tired or having little energy 3   5.  Poor appetite or overeating 2   6.  Feeling bad about yourself 3   7.  Trouble concentrating 3   8.  Moving slowly or restless 0   Q9: Thoughts of better off dead/self-harm past 2 weeks 0   PHQ-9 Total Score 19   Difficulty at work, home, or with people -     No flowsheet data found.          Medication Followup of Protonix    Taking Medication as prescribed: yes    Side Effects:  None    Medication Helping Symptoms:  yes       Patient Active Problem List   Diagnosis     HTN (hypertension)     Anxiety     HTN, goal below  140/90     CARDIOVASCULAR SCREENING; LDL GOAL LESS THAN 160     Tobacco abuse     Obesity     Morbid obesity (H)     S/P gastric bypass     Vitamin D deficiency disease     Nausea     Symptomatic cholelithiasis     Cholecystitis     Headache     Scotoma     Hiatal hernia     Abdominal pain     Ulcer     Nausea & vomiting     Bilateral otitis media     Tobacco use disorder     Contusion     Spinal cord injury, C1-C7 (H)     Past Surgical History:   Procedure Laterality Date     DECOMPRESSION, FUSION CERVICAL ANTERIOR THREE+ LEVELS, COMBINED N/A 3/11/2018    Procedure: COMBINED DECOMPRESSION, FUSION CERVICAL ANTERIOR THREE+ LEVELS;   INTRA-OPERATIVE SPINAL CORD MONITIORING, ANTERIOR CERVICAL DECOMPRESSION AND FUSION C3-T1 ;  Surgeon: Maynor Daley MD;  Location:  OR     ENDOSCOPIC UPPER GASTROINTESTINAL, REMOVE SUTURE N/A 1/17/2015    Procedure: ENDOSCOPIC UPPER GASTROINTESTINAL, REMOVE SUTURE;  Surgeon: Parviz Martinez MD;  [facility]     ESOPHAGOSCOPY, GASTROSCOPY, DUODENOSCOPY (EGD), COMBINED  2/18/2014    Procedure: COMBINED ESOPHAGOSCOPY, GASTROSCOPY, DUODENOSCOPY (EGD);  Esophagoscopy,Gastroscopy,Duodenoscopy;  Surgeon: Neo Sher MD;  Location:  GI     ESOPHAGOSCOPY, GASTROSCOPY, DUODENOSCOPY (EGD), COMBINED  5/7/2014    Procedure: COMBINED ESOPHAGOSCOPY, GASTROSCOPY, DUODENOSCOPY (EGD), BIOPSY SINGLE OR MULTIPLE;  Surgeon: Jose Antonio Valencia MD;  Location:  GI     GYN SURGERY       LAPAROSCOPIC BYPASS GASTRIC  3/25/2013    Procedure: LAPAROSCOPIC BYPASS GASTRIC;  Laparoscopic Nawaf En Y Gastric Bypass. Hiatel hernia repair;  Surgeon: Parviz Martinez MD;  Location:  OR     LAPAROSCOPIC CHOLECYSTECTOMY WITH CHOLANGIOGRAMS  3/28/2014    Procedure: LAPAROSCOPIC CHOLECYSTECTOMY WITH CHOLANGIOGRAMS;;  Surgeon: Jose Antonio Valencia MD;  [facility]     LAPAROSCOPY DIAGNOSTIC (GENERAL)  3/28/2014    Procedure: LAPAROSCOPY DIAGNOSTIC (GENERAL);  Diagnostic  "Laparoscopy, laparoscopic cholecystectomy, EGD, repair of johnston defect;  Surgeon: Jose Antonio Valencia MD;  [facility]     LAPAROSCOPY OPERATIVE ADULT N/A 1/17/2015    Procedure: LAPAROSCOPY OPERATIVE ADULT;  Surgeon: Parviz Martinez MD;  [facility]       Social History     Tobacco Use     Smoking status: Current Every Day Smoker     Packs/day: 1.00     Types: Cigarettes     Smokeless tobacco: Never Used     Tobacco comment: quit 3/2018   Substance Use Topics     Alcohol use: No     Family History   Problem Relation Age of Onset     Respiratory Mother      Diabetes Mother      Arthritis Mother         OA     Neurologic Disorder Father      Cancer Maternal Grandfather      Arthritis Maternal Grandmother         RA     Breast Cancer No family hx of      Ovarian Cancer No family hx of              Reviewed and updated as needed this visit by Provider         Review of Systems   ROS COMP: Constitutional, HEENT, cardiovascular, pulmonary, gi and gu systems are negative, except as otherwise noted.      Objective    /79 (BP Location: Right arm, Patient Position: Sitting, Cuff Size: Adult Regular)   Pulse 76   Temp 97.8  F (36.6  C) (Oral)   Resp 16   Ht 1.717 m (5' 7.6\")   Wt 58.6 kg (129 lb 3.2 oz)   LMP 03/01/2010   SpO2 98%   Breastfeeding No   BMI 19.88 kg/m    Body mass index is 19.88 kg/m .  Physical Exam   GENERAL APPEARANCE: healthy, alert and no distress  RESP: lungs clear to auscultation - no rales, rhonchi or wheezes  CV: regular rates and rhythm, normal S1 S2, no S3 or S4 and no murmur, click or rub  PSYCH: mentation appears normal and anxious            Assessment & Plan     1. Anxiety  Patient does not want serotonin specific reuptake inhibitor or therapy.     2. Benign essential hypertension  Stable.   - lisinopril (PRINIVIL/ZESTRIL) 10 MG tablet; TAKE ONE-HALF TABLET BY MOUTH ONCE DAILY  Dispense: 45 tablet; Refill: 3    3. Gastroesophageal reflux disease with " esophagitis  Stable.   - pantoprazole (PROTONIX) 20 MG EC tablet; TAKE ONE TABLET BY MOUTH 30 TO 60 MINUTES BEFORE A MEAL EVERY DAY  Dispense: 90 tablet; Refill: 3    4. Tobacco use disorder  Will start patch and taper down.   - nicotine (NICODERM CQ) 21 MG/24HR 24 hr patch; Place 1 patch onto the skin every 24 hours  Dispense: 30 patch; Refill: 0  - nicotine (NICODERM CQ) 14 MG/24HR 24 hr patch; Place 1 patch onto the skin every 24 hours  Dispense: 30 patch; Refill: 1    5. Healthcare maintenance  Declines pap, mammogram, all immunizations, colon CA screen  - REVIEW OF HEALTH MAINTENANCE PROTOCOL ORDERS     Tobacco Cessation:   reports that she has been smoking cigarettes. She has been smoking about 1.00 pack per day. She has never used smokeless tobacco.  Tobacco Cessation Action Plan: Pharmacotherapies : Nicotine patch        Work on weight loss  Regular exercise    No follow-ups on file.    Umu Roberto PA-C  [facility]

## 2020-01-17 ASSESSMENT — ANXIETY QUESTIONNAIRES: GAD7 TOTAL SCORE: 19

## 2020-01-17 ASSESSMENT — PATIENT HEALTH QUESTIONNAIRE - PHQ9: SUM OF ALL RESPONSES TO PHQ QUESTIONS 1-9: 19

## 2020-03-31 ENCOUNTER — TELEPHONE (OUTPATIENT)
Dept: FAMILY MEDICINE | Facility: CLINIC | Age: 60
End: 2020-03-31

## 2020-03-31 NOTE — TELEPHONE ENCOUNTER
Medication Question or Refill  Who is calling: patient   What medication are you calling about (include dose and sig)?: Anxiety Medication   Controlled Substance Agreement on file: No  Who prescribed the medication?: none   Do you need a refill? No  When did you use the medication last? Hasnt been given yet   Patient offered an appointment? No  Do you have any questions or concerns?  Yes: Patient would like to discuss getting started on a medication for her depression/ and anxiety.   Requested Pharmacy: [facility]  Okay to leave a detailed message?: Yes at Cell number on file:    Telephone Information:   Mobile [phone]         Aleah Last/

## 2020-03-31 NOTE — TELEPHONE ENCOUNTER
Pt calls, wants CJ to rx anxiety med, has talked about lorazepam, has not slept in weeks, worried about COVID 19 etc, pt does not have computer access, agrees to phone visit, scheduled tomorrow,  Routed FYI to CJ, inform pt if NOT okay, FYI only  Next 5 appointments (look out 90 days)    Apr 01, 2020 10:20 AM CDT  Phone Visit with Umu Roberto PA-C  [facility] ([facility]) [address] [place] [id number] [phone]        Sheridan Zuniga RN, BSN  Message handled by Nurse Triage.

## 2020-04-28 DIAGNOSIS — K21.00 GASTROESOPHAGEAL REFLUX DISEASE WITH ESOPHAGITIS: ICD-10-CM

## 2020-04-28 DIAGNOSIS — I10 BENIGN ESSENTIAL HYPERTENSION: ICD-10-CM

## 2020-04-28 NOTE — TELEPHONE ENCOUNTER
General Call:   Who is calling:  Antoinette  Reason for Call:  90 days prescriptions  What are your questions or concerns:  Patient is requesting for ANTONELLA Roberto to send 90 days prescriptions over to [facility] in [facility] in [place].   Date of last appointment with provider: 01/16/20 ANTONELLA Roberto  Okay to leave a detailed message:Yes at Cell number on file:    Telephone Information:   Mobile [phone]        Anat Angulo

## 2020-04-29 RX ORDER — LISINOPRIL 10 MG/1
TABLET ORAL
Qty: 45 TABLET | Refills: 3 | OUTPATIENT
Start: 2020-04-29

## 2020-04-29 RX ORDER — PANTOPRAZOLE SODIUM 20 MG/1
TABLET, DELAYED RELEASE ORAL
Qty: 90 TABLET | Refills: 3 | OUTPATIENT
Start: 2020-04-29

## 2020-04-29 NOTE — TELEPHONE ENCOUNTER
Duplicate. Pt has refills remaining at pharmacy. Denial sent with note to pharmacy requesting they review and fill medication.     Sissy Reagan RN   [facility] -- Triage Nurse

## 2020-07-17 NOTE — PROGRESS NOTES
"Antoinette Romero is a 59 year old female who is being evaluated via a billable telephone visit.      The patient has been notified of following:     \"This telephone visit will be conducted via a call between you and your physician/provider. We have found that certain health care needs can be provided without the need for a physical exam.  This service lets us provide the care you need with a short phone conversation.  If a prescription is necessary we can send it directly to your pharmacy.  If lab work is needed we can place an order for that and you can then stop by our lab to have the test done at a later time.    Telephone visits are billed at different rates depending on your insurance coverage. During this emergency period, for some insurers they may be billed the same as an in-person visit.  Please reach out to your insurance provider with any questions.    If during the course of the call the physician/provider feels a telephone visit is not appropriate, you will not be charged for this service.\"    Patient has given verbal consent for Telephone visit?  Yes    What phone number would you like to be contacted at? [phone]    How would you like to obtain your AVS? [place]    Phone call duration: 22 minutes    Nutrition Assessment  Reason For Visit:  Antoinette Romero is a 59 year old female presents today for new re-establish nutrition visit. Pt with history of RNYGB in 2013.  Patient referred by AMRIK Elmore on July 20, 2020.    Anthropometrics:  Current Weight:   Estimated body mass index is 19.23 kg/m  as calculated from the following:    Height as of an earlier encounter on 7/20/20: 1.717 m (5' 7.6\").    Weight as of an earlier encounter on 7/20/20: 56.7 kg (125 lb).    Wt Readings from Last 10 Encounters:   07/20/20 56.7 kg (125 lb)   01/16/20 58.6 kg (129 lb 3.2 oz)   11/07/18 56.7 kg (125 lb)   08/30/18 57.5 kg (126 lb 12.8 oz)   06/21/18 58.5 kg (129 lb)   05/31/18 55.3 kg (122 lb)   04/19/18 55.3 " kg (122 lb)   04/10/18 55.3 kg (122 lb)   03/26/18 56.6 kg (124 lb 12.8 oz)   03/08/18 59.4 kg (131 lb)       Current Vitamins/Minerals: MVI/minerals one dose per day (gummy), iron (13 mg Fe), D3, potassium, B12 (2000 SL), collagen and vitamin C.     Labs:   Ref. Range 9/26/2019 10:15   Copper Latest Ref Range: 80.0 - 155.0 ug/dL 108.1   Folate Latest Ref Range: >5.4 ng/mL >100.0   Magnesium RBC Latest Ref Range: 1.5 - 3.1 mmol/L 1.9   Methylmalonic Acid Latest Ref Range: 0.00 - 0.40 umol/L 0.14   Retinol Palmitate Latest Ref Range: 0.00 - 0.10 mg/L 0.03   Vitamin A Latest Ref Range: 0.30 - 1.20 mg/L 0.57   Vitamin A Interp Unknown Normal   Vitamin B1 Whole Blood Level Latest Ref Range: 70 - 180 nmol/L 210 (H)   Vitamin B12 Latest Ref Range: 193 - 986 pg/mL 5,975 (H)   Vitamin B2 Latest Ref Range: 1 - 19 mcg/L 4   Vitamin B6 Latest Ref Range: 20.0 - 125.0 nmol/L 41.4   Vitamin C Latest Ref Range: 23 - 114 umol/L 101   Vitamin D Deficiency screening Latest Ref Range: 20 - 75 ug/L 56   Zinc Latest Ref Range: 60.0 - 120.0 ug/dL 67.7       Nutrition History:  - Patient states her main concern is to get off protonix, of which she states may be contributing to cognitive issues. Patient also notes desire to gain/maintain weight.      - Some reactive hypoglycemia sxs (shaky, sweaty, needing to lay down) after ingestion of Boost protein shake, and high sugar foods.   - Eating protein first at meals. Chewing food thoroughly.   - Wakes at 3 am starving - eats chocolate, candy  - Feels nauseas frequently - any fluid, taking room temp.   - Grazing (10 times per day) - snacking on fruit, pretzels, cheese, hard boiled egg.    - Portion sizes varies. Sometimes can eat a whole grilled cheese, sometimes can only eat one bite.     Recall Diet Questions Reviewed With Patient 7/20/2020   Describe what you typically consume for breakfast (typical or most recent): Yogurt (NF greek yogurt) or pc of fruit   Describe what you typically  consume for lunch (typical or most recent): Boost protein shake (20 gm protein, 240 honorio), fruit, veggie   Describe what you typically consume for supper (typical or most recent): 1 oz steak or chicken, couple bites rice/sweet potato    How many ounces of water, or other low calorie drinks, do you drink daily (8 oz=1 glass)? 8 oz    How often do you drink alcohol? Never       Eating Habits 7/20/2020   Do you have any dietary restrictions? No   Do you currently binge eat (eat a large amount of food in a short time)? No   Are you an emotional eater? No   Do you get up to eat after falling asleep? Yes   What foods do you crave? craving sugar       Nutrition Prescription:  Grams Protein: 50-60 (minimum)  Amount of Fluid: 48-64 oz    MALNUTRITION  % Intake: No decreased intake noted  % Weight Loss: None noted  Subcutaneous Fat Loss: Unable to assess  Muscle Loss: Unable to assess  Fluid Accumulation/Edema: None noted  Malnutrition Diagnosis: Unable to determine due to unable to preform NFPE at this time via phone visit.    Nutrition Diagnosis  Food and nutrition knowledge deficit r/t maintenance bariatric diet AEB pt with questions regarding maintenance diet.     Intervention  Materials/Education provided on maintenance dietary guidelines after bariatric surgery, portion sizes, eating pace and chewing well, snacking, separation of beverages from meals, vitamin and mineral supplements after weight loss surgery, and protein needs. Provided education on avoiding Dumping Syndrome and Reactive Hypoglycemia. Gave protein shake recommendations with less sugar. Provided pt with list of goals and RD contact information.    Goals:  Relating To Eating:  - Eat 4-6 small meals per day  - Meal should contain a serving of lean protein, small portion of healthy fat and small portion of  (always have protein with carb foods)   - Ex.Tuna salad (1 can tuna with 1 tb olive oil shankar, celery and onions) and 5 whole wheat crackers   - Ex. 10  almonds and half an apple   - Ex. Hard boiled egg and 1/2 cup grapes  - Consume 60 grams of protein per day  - Eat slowly (20-30 minutes per meal), chewing foods well (25 chews per bite/applesauce consistency)    Relating to beverages:  - Separate fluids from meals by 30 minutes before, during, and after eating  - Drink 48-64 ounces of fluid per day (sugar-free beverages only)    Relating to dietary supplements:  -Take the following after a Nawaf-en-y Gastric Bypass:    Multivitamin/minerals: adult dose 2 times daily    Iron: 45-60 mg elemental daily (18-36 mg daily if low risk) - may partly or fully be covered in multivitamin     Calcium Citrate containing vitamin D: 500 mg 3 times daily or 600 mg 2 times daily    Vitamin B12: sublingual form of at least 500 mcg daily or injection of 1000 mcg monthly     B-50 Complex daily    Nutrition Handouts:  Keeping Up Your Diet after Weight Loss Surgery  [email or web address]    Preventing Low Blood Sugar after Weight Loss Surgery  [email or web address]     Preventing Dumping Syndrome after Weight Loss Surgery  [email or web address]       *Protein Shake Criteria: no more than 210 Calories, at least 20 grams of protein, and less than 10 grams of sugar     Meal Replacement Shake Options:   [employer] C smoothie (160 Calories, 20 g protein)   Premier Protein (160 Calories, 30 g protein)  Slim Fast Advanced Nutrition (180 Calories, 20 g protein)  Muscle Milk, lactose-free, 17 oz bottle (210 Calories, 30 g protein)  Integrated Supplements, no artificial sugars (110 Calories, 20 g protein)  Genepro, unflavored protein powder (60 Calories, 30 g protein)  Ensure Max    Clear Liquid options:  BiPro  Premier Protein clear  [id number]  M [employer] Protein 15 Concentrate    Follow-Up:  MARBELLA Jeong, RD, LD

## 2020-07-20 ENCOUNTER — VIRTUAL VISIT (OUTPATIENT)
Dept: SURGERY | Facility: CLINIC | Age: 60
End: 2020-07-20
Payer: COMMERCIAL

## 2020-07-20 VITALS — BODY MASS INDEX: 18.94 KG/M2 | WEIGHT: 125 LBS | HEIGHT: 68 IN

## 2020-07-20 DIAGNOSIS — R11.0 NAUSEA: ICD-10-CM

## 2020-07-20 DIAGNOSIS — Z71.3 NUTRITIONAL COUNSELING: Primary | ICD-10-CM

## 2020-07-20 DIAGNOSIS — Z98.84 S/P GASTRIC BYPASS: ICD-10-CM

## 2020-07-20 DIAGNOSIS — Z98.84 S/P GASTRIC BYPASS: Primary | ICD-10-CM

## 2020-07-20 DIAGNOSIS — K21.9 GASTROESOPHAGEAL REFLUX DISEASE, ESOPHAGITIS PRESENCE NOT SPECIFIED: ICD-10-CM

## 2020-07-20 ASSESSMENT — PAIN SCALES - GENERAL: PAINLEVEL: MODERATE PAIN (4)

## 2020-07-20 ASSESSMENT — MIFFLIN-ST. JEOR: SCORE: 1184.15

## 2020-07-20 NOTE — NURSING NOTE
"Chief Complaint   Patient presents with     Consult     New bariatric appointment.       Vitals:    07/20/20 0903   Weight: 56.7 kg (125 lb)   Height: 1.717 m (5' 7.6\")       Body mass index is 19.23 kg/m .                            RAFFI SWANN, EMT    "

## 2020-07-20 NOTE — LETTER
"7/20/2020       RE: Antoinette Romero  [address]  [facility] [address]     Dear Colleague,    Thank you for referring your patient, Antoinette Romero, to the [facility] SURGICAL WEIGHT MANAGEMENT at [facility]. Please see a copy of my visit note below.    Antoinette Romero is a 59 year old female who is being evaluated via a billable telephone visit.      The patient has been notified of following:     \"This telephone visit will be conducted via a call between you and your physician/provider. We have found that certain health care needs can be provided without the need for a physical exam.  This service lets us provide the care you need with a short phone conversation.  If a prescription is necessary we can send it directly to your pharmacy.  If lab work is needed we can place an order for that and you can then stop by our lab to have the test done at a later time.    Telephone visits are billed at different rates depending on your insurance coverage. During this emergency period, for some insurers they may be billed the same as an in-person visit.  Please reach out to your insurance provider with any questions.    If during the course of the call the physician/provider feels a telephone visit is not appropriate, you will not be charged for this service.\"    Patient has given verbal consent for Telephone visit?  Yes    What phone number would you like to be contacted at? [phone]    How would you like to obtain your AVS? [place]    Phone call duration: 22 minutes    Nutrition Assessment  Reason For Visit:  Antoinette Romero is a 59 year old female presents today for new re-establish nutrition visit. Pt with history of RNYGB in 2013.  Patient referred by AMRIK Elmore on July 20, 2020.    Anthropometrics:  Current Weight:   Estimated body mass index is 19.23 kg/m  as calculated from the following:    Height as of an earlier encounter on 7/20/20: 1.717 m (5' " "7.6\").    Weight as of an earlier encounter on 7/20/20: 56.7 kg (125 lb).    Wt Readings from Last 10 Encounters:   07/20/20 56.7 kg (125 lb)   01/16/20 58.6 kg (129 lb 3.2 oz)   11/07/18 56.7 kg (125 lb)   08/30/18 57.5 kg (126 lb 12.8 oz)   06/21/18 58.5 kg (129 lb)   05/31/18 55.3 kg (122 lb)   04/19/18 55.3 kg (122 lb)   04/10/18 55.3 kg (122 lb)   03/26/18 56.6 kg (124 lb 12.8 oz)   03/08/18 59.4 kg (131 lb)       Current Vitamins/Minerals: MVI/minerals one dose per day (gummy), iron (13 mg Fe), D3, potassium, B12 (2000 SL), collagen and vitamin C.     Labs:   Ref. Range 9/26/2019 10:15   Copper Latest Ref Range: 80.0 - 155.0 ug/dL 108.1   Folate Latest Ref Range: >5.4 ng/mL >100.0   Magnesium RBC Latest Ref Range: 1.5 - 3.1 mmol/L 1.9   Methylmalonic Acid Latest Ref Range: 0.00 - 0.40 umol/L 0.14   Retinol Palmitate Latest Ref Range: 0.00 - 0.10 mg/L 0.03   Vitamin A Latest Ref Range: 0.30 - 1.20 mg/L 0.57   Vitamin A Interp Unknown Normal   Vitamin B1 Whole Blood Level Latest Ref Range: 70 - 180 nmol/L 210 (H)   Vitamin B12 Latest Ref Range: 193 - 986 pg/mL 5,975 (H)   Vitamin B2 Latest Ref Range: 1 - 19 mcg/L 4   Vitamin B6 Latest Ref Range: 20.0 - 125.0 nmol/L 41.4   Vitamin C Latest Ref Range: 23 - 114 umol/L 101   Vitamin D Deficiency screening Latest Ref Range: 20 - 75 ug/L 56   Zinc Latest Ref Range: 60.0 - 120.0 ug/dL 67.7       Nutrition History:  - Patient states her main concern is to get off protonix, of which she states may be contributing to cognitive issues. Patient also notes desire to gain/maintain weight.      - Some reactive hypoglycemia sxs (shaky, sweaty, needing to lay down) after ingestion of Boost protein shake, and high sugar foods.   - Eating protein first at meals. Chewing food thoroughly.   - Wakes at 3 am starving - eats chocolate, candy  - Feels nauseas frequently - any fluid, taking room temp.   - Grazing (10 times per day) - snacking on fruit, pretzels, cheese, hard boiled egg.  "   - Portion sizes varies. Sometimes can eat a whole grilled cheese, sometimes can only eat one bite.     Recall Diet Questions Reviewed With Patient 7/20/2020   Describe what you typically consume for breakfast (typical or most recent): Yogurt (NF greek yogurt) or pc of fruit   Describe what you typically consume for lunch (typical or most recent): Boost protein shake (20 gm protein, 240 honorio), fruit, veggie   Describe what you typically consume for supper (typical or most recent): 1 oz steak or chicken, couple bites rice/sweet potato    How many ounces of water, or other low calorie drinks, do you drink daily (8 oz=1 glass)? 8 oz    How often do you drink alcohol? Never       Eating Habits 7/20/2020   Do you have any dietary restrictions? No   Do you currently binge eat (eat a large amount of food in a short time)? No   Are you an emotional eater? No   Do you get up to eat after falling asleep? Yes   What foods do you crave? craving sugar       Nutrition Prescription:  Grams Protein: 50-60 (minimum)  Amount of Fluid: 48-64 oz    MALNUTRITION  % Intake: No decreased intake noted  % Weight Loss: None noted  Subcutaneous Fat Loss: Unable to assess  Muscle Loss: Unable to assess  Fluid Accumulation/Edema: None noted  Malnutrition Diagnosis: Unable to determine due to unable to preform NFPE at this time via phone visit.    Nutrition Diagnosis  Food and nutrition knowledge deficit r/t maintenance bariatric diet AEB pt with questions regarding maintenance diet.     Intervention  Materials/Education provided on maintenance dietary guidelines after bariatric surgery, portion sizes, eating pace and chewing well, snacking, separation of beverages from meals, vitamin and mineral supplements after weight loss surgery, and protein needs. Provided education on avoiding Dumping Syndrome and Reactive Hypoglycemia. Gave protein shake recommendations with less sugar. Provided pt with list of goals and RD contact  information.    Goals:  Relating To Eating:  - Eat 4-6 small meals per day  - Meal should contain a serving of lean protein, small portion of healthy fat and small portion of  (always have protein with carb foods)   - Ex.Tuna salad (1 can tuna with 1 tb olive oil shankar, celery and onions) and 5 whole wheat crackers   - Ex. 10 almonds and half an apple   - Ex. Hard boiled egg and 1/2 cup grapes  - Consume 60 grams of protein per day  - Eat slowly (20-30 minutes per meal), chewing foods well (25 chews per bite/applesauce consistency)    Relating to beverages:  - Separate fluids from meals by 30 minutes before, during, and after eating  - Drink 48-64 ounces of fluid per day (sugar-free beverages only)    Relating to dietary supplements:  -Take the following after a Nawaf-en-y Gastric Bypass:    Multivitamin/minerals: adult dose 2 times daily    Iron: 45-60 mg elemental daily (18-36 mg daily if low risk) - may partly or fully be covered in multivitamin     Calcium Citrate containing vitamin D: 500 mg 3 times daily or 600 mg 2 times daily    Vitamin B12: sublingual form of at least 500 mcg daily or injection of 1000 mcg monthly     B-50 Complex daily    Nutrition Handouts:  Keeping Up Your Diet after Weight Loss Surgery  [email or web address]    Preventing Low Blood Sugar after Weight Loss Surgery  [email or web address]     Preventing Dumping Syndrome after Weight Loss Surgery  [email or web address]       *Protein Shake Criteria: no more than 210 Calories, at least 20 grams of protein, and less than 10 grams of sugar     Meal Replacement Shake Options:   [facility] smoothie (160 Calories, 20 g protein)   Premier Protein (160 Calories, 30 g protein)  Slim Fast Advanced Nutrition (180 Calories, 20 g protein)  Muscle Milk, lactose-free, 17 oz bottle (210 Calories, 30 g protein)  Integrated Supplements, no artificial sugars (110 Calories, 20 g protein)  Genepro, unflavored protein powder (60 Calories,  30 g protein)  Ensure Max    Clear Liquid options:  BiPro  Premier Protein clear  [id number]  [employer] Protein 15 Concentrate    Follow-Up:  MARBELLA Jeong RD, LD

## 2020-07-20 NOTE — PROGRESS NOTES
"Antoinette Romero is a 59 year old female who is being evaluated via a billable telephone visit.      The patient has been notified of following:     \"This telephone visit will be conducted via a call between you and your physician/provider. We have found that certain health care needs can be provided without the need for a physical exam.  This service lets us provide the care you need with a short phone conversation.  If a prescription is necessary we can send it directly to your pharmacy.  If lab work is needed we can place an order for that and you can then stop by our lab to have the test done at a later time.    Telephone visits are billed at different rates depending on your insurance coverage. During this emergency period, for some insurers they may be billed the same as an in-person visit.  Please reach out to your insurance provider with any questions.    If during the course of the call the physician/provider feels a telephone visit is not appropriate, you will not be charged for this service.\"    Patient has given verbal consent for Telephone visit?  Yes    What phone number would you like to be contacted at? [phone]    How would you like to obtain your AVS? Mail a copy    Phone call duration: 21 minutes  During this virtual visit the patient is located in [place], patient verifies this as the location during the entirety of this visit.       Lissette Sánchez PA-C     New Re-establish Bariatric Surgery Consultation Note    2020    RE: Antoinette Romero  MR#: [id number]  : 1960      Referring provider: Self referred    Chief Complaint/Reason for visit: evaluation to re-establish bariatric care.    Dear Umu Roberto PA-C (General),    I had the pleasure of seeing your patient, Antoinette Romero, to evaluate her obesity. As you know, Antoinette Romero is 59 year old.  She has a height of 5' 7.6\", a weight of 125 lbs 0 oz, and calculated Body mass index is 19.23 kg/m .    Dr Martinez " LRYGB in 2013, last visit was 2015 with Dr martinez. 2015 marginal ulcer seen on EGD during dx laparoscopy.  Lost from 324 to 124 lbs  She is taking protonix but wants to get off of it. Feels it is causing cognitive issues  Seeing RD today   Quit smoking last week, she has been on/off for the last year   No NSAIDs  No recent labs  She reports taking her vitamins  Eating lots of sugar and processed carbs, milkshakes liquid calories etc.      HISTORY OF PRESENT ILLNESS:  Weight Loss History Reviewed with Patient 7/20/2020   How long have you been overweight? Since late 20's to early 40's   What is the most that you have ever weighed? 324   What is the most weight you have lost? 200   I have tried the following methods to lose weight Weight Loss Surgery   Have you ever had weight loss surgery? Yes   Please select the type of weight loss surgery you had (select all that apply): gastric bypass / Nawaf-en-Y       CO-MORBIDITIES OF OBESITY INCLUDE:     7/20/2020   I have the following health issues associated with obesity: High Blood Pressure, Sleep Apnea, Asthma, Osteoarthritis (joint disease)       PAST MEDICAL HISTORY:  Past Medical History:   Diagnosis Date     Anastomotic ulcer 3/28/2014    smoking history; 1PPD     Anxiety      Arthritis      Migraine      Morbid obesity (H)     s/p gastric bypass       PAST SURGICAL HISTORY:  Past Surgical History:   Procedure Laterality Date     DECOMPRESSION, FUSION CERVICAL ANTERIOR THREE+ LEVELS, COMBINED N/A 3/11/2018    Procedure: COMBINED DECOMPRESSION, FUSION CERVICAL ANTERIOR THREE+ LEVELS;   INTRA-OPERATIVE SPINAL CORD MONITIORING, ANTERIOR CERVICAL DECOMPRESSION AND FUSION C3-T1 ;  Surgeon: Maynor Daley MD;  Location:  OR     ENDOSCOPIC UPPER GASTROINTESTINAL, REMOVE SUTURE N/A 1/17/2015    Procedure: ENDOSCOPIC UPPER GASTROINTESTINAL, REMOVE SUTURE;  Surgeon: Parviz Martinez MD;  Location:  OR     ESOPHAGOSCOPY, GASTROSCOPY, DUODENOSCOPY (EGD),  COMBINED  2/18/2014    Procedure: COMBINED ESOPHAGOSCOPY, GASTROSCOPY, DUODENOSCOPY (EGD);  Esophagoscopy,Gastroscopy,Duodenoscopy;  Surgeon: Neo Sher MD;  Location:  GI     ESOPHAGOSCOPY, GASTROSCOPY, DUODENOSCOPY (EGD), COMBINED  5/7/2014    Procedure: COMBINED ESOPHAGOSCOPY, GASTROSCOPY, DUODENOSCOPY (EGD), BIOPSY SINGLE OR MULTIPLE;  Surgeon: Jose Antonio Valencia MD;  Location:  GI     GYN SURGERY       LAPAROSCOPIC BYPASS GASTRIC  3/25/2013    Procedure: LAPAROSCOPIC BYPASS GASTRIC;  Laparoscopic Nawaf En Y Gastric Bypass. Hiatel hernia repair;  Surgeon: Parviz Martinez MD;  Location: [facility] OR     LAPAROSCOPIC CHOLECYSTECTOMY WITH CHOLANGIOGRAMS  3/28/2014    Procedure: LAPAROSCOPIC CHOLECYSTECTOMY WITH CHOLANGIOGRAMS;;  Surgeon: Jose Antonio Valencia MD;  Location: [facility] OR     LAPAROSCOPY DIAGNOSTIC (GENERAL)  3/28/2014    Procedure: LAPAROSCOPY DIAGNOSTIC (GENERAL);  Diagnostic Laparoscopy, laparoscopic cholecystectomy, EGD, repair of johnston defect;  Surgeon: Jose Antonio Valencia MD;  Location: [facility] OR     LAPAROSCOPY OPERATIVE ADULT N/A 1/17/2015    Procedure: LAPAROSCOPY OPERATIVE ADULT;  Surgeon: Parviz Martinez MD;  Location: [facility] OR       FAMILY HISTORY:   Family History   Problem Relation Age of Onset     Respiratory Mother      Diabetes Mother      Arthritis Mother         OA     Neurologic Disorder Father      Cancer Maternal Grandfather      Arthritis Maternal Grandmother         RA     Breast Cancer No family hx of      Ovarian Cancer No family hx of        SOCIAL HISTORY:   Social History Questions Reviewed With Patient 7/20/2020   Which best describes your employment status (select all that apply) I am unemployed   Which best describes your marital status:    Do you have children? Yes   Who do you have in your support network that can be available to help you for the first 2 weeks after surgery? none   Who can you count on for support throughout your weight loss surgery  journey? none   Can you afford 3 meals a day?  Yes   Can you afford 50-60 dollars a month for vitamins? No       HABITS:     7/20/2020   How often do you drink alcohol? Never   Have you ever used any of the following nicotine products? No   Have you or are you currently using street drugs or prescription strength medication for which you do not have a prescription for? No   Do you have a history of chemical dependency (alcohol or drug abuse)? No       PSYCHOLOGICAL HISTORY:   Psychological History Reviewed With Patient 7/20/2020   Do you have a history of chronic pain? Yes   Have you ever been diagnosed with fibromyalgia? No   Are you currently being treated for any of the following? (select all that apply) Depression, Anxiety, Post traumatic stress disorder   Are you currently seeing a therapist or counselor?  Yes   Are you currently seeing a psychiatrist? Yes       ROS:     7/20/2020   Skin:  Leg swelling, Varicose veins   HEENT: Headaches, Dizziness/lightheadedness   Musculoskeletal: Joint Pain, Back pain, Arthritis   Cardiovascular: Shortness of breath with activity   Pulmonary: Snoring, Awaken from sleep to catch your breath, People have told me I stop breathing while asleep, Excessively sleep during the day   Gastrointestinal: Constipation, Ulcers   Genitourinary: None of the above   Hematological: None of the above   Neurological: None of the above   Female only: Post-menopausal       EATING BEHAVIORS:     7/20/2020   Do you currently binge eat (eat a large amount of food in a short time)? No   Are you an emotional eater? No   Do you get up to eat after falling asleep? Yes       EXERCISE:     7/20/2020   How often do you exercise? Daily   What is the duration of your exercise (in minutes)? 45 Minutes   What types of exercise do you do? walking   What keeps you from being more active?  I am as active as I can possbily be, Pain       MEDICATIONS:  Current Outpatient Medications   Medication Sig Dispense Refill  "    acetaminophen (TYLENOL) 325 MG tablet Take 2 tablets (650 mg) by mouth every 4 hours as needed for mild pain 100 tablet      lisinopril (PRINIVIL/ZESTRIL) 10 MG tablet TAKE ONE-HALF TABLET BY MOUTH ONCE DAILY 45 tablet 3     multivitamin, therapeutic with minerals (THERA-VIT-M) TABS tablet Take 1 tablet by mouth daily 30 each 0     pantoprazole (PROTONIX) 20 MG EC tablet TAKE ONE TABLET BY MOUTH 30 TO 60 MINUTES BEFORE A MEAL EVERY DAY 90 tablet 3     nicotine (NICODERM CQ) 14 MG/24HR 24 hr patch Place 1 patch onto the skin every 24 hours (Patient not taking: Reported on 7/20/2020) 30 patch 1     nicotine (NICODERM CQ) 21 MG/24HR 24 hr patch PLACE 1 PATCH ONTO THE SKIN EVERY 24 HOURS (Patient not taking: Reported on 7/20/2020) 28 patch 1       ALLERGIES:  Allergies   Allergen Reactions     Keflex [Cephalexin-Fd&C Yellow #6] Anaphylaxis     Unasyn Anaphylaxis and Swelling     Azithromycin      Heart palpitations     Compazine [Prochlorperazine] Other (See Comments)     headaches     Zofran [Ondansetron] Other (See Comments)     headaches       LABS/IMAGING/MEDICAL RECORDS REVIEW:     PHYSICAL EXAM:  Ht 1.717 m (5' 7.6\")   Wt 56.7 kg (125 lb)   LMP 03/01/2010   BMI 19.23 kg/m        In summary, Antoinette Romero has hx of obesity and hx of gastric bypass in 2013 with Dr Martinez. GERD and nausea with eating solid foods. GERD not controlled on protonix daily. Hx of tobacco use quit 1 week ago. Hx of marginal ulcer in 2015.     Bariatric labs at local  lab  EGD with Dr Martinez due to GERD and nausea with eating and bloating. Call [facility] to schedule [phone].   Follow up after EGD with Dr Martinez by virtual visit  See RD today      Sincerely,     Lissette Sánchez PA-C          "

## 2020-07-20 NOTE — LETTER
"2020       RE: Antoinette Romero  [address]  [facility] [address]     Dear Colleague,    Thank you for referring your patient, Antoinette Romero, to the [facility] SURGICAL WEIGHT MANAGEMENT at [facility]. Please see a copy of my visit note below.    Antoinette Romero is a 59 year old female who is being evaluated via a billable telephone visit.        Phone call duration: 21 minutes  During this virtual visit the patient is located in [place], patient verifies this as the location during the entirety of this visit.       Lissette Sánchez PA-C     New Re-establish Bariatric Surgery Consultation Note    2020    RE: Antoinette Romero  MR#: [id number]  : 1960      Referring provider: Self referred    Chief Complaint/Reason for visit: evaluation to re-establish bariatric care.    Dear Umu Roberto PA-C (General),    I had the pleasure of seeing your patient, Antoinette Romero, to evaluate her obesity. As you know, Antoinette Romero is 59 year old.  She has a height of 5' 7.6\", a weight of 125 lbs 0 oz, and calculated Body mass index is 19.23 kg/m .    Dr Morrison LRYGB in , last visit was  with Dr morrison.  marginal ulcer seen on EGD during dx laparoscopy.  Lost from 324 to 124 lbs  She is taking protonix but wants to get off of it. Feels it is causing cognitive issues  Seeing RD today   Quit smoking last week, she has been on/off for the last year   No NSAIDs  No recent labs  She reports taking her vitamins  Eating lots of sugar and processed carbs, milkshakes liquid calories etc.      HISTORY OF PRESENT ILLNESS:  Weight Loss History Reviewed with Patient 2020   How long have you been overweight? Since late 20's to early 40's   What is the most that you have ever weighed? 324   What is the most weight you have lost? 200   I have tried the following methods to lose weight Weight Loss Surgery   Have you ever had weight " loss surgery? Yes   Please select the type of weight loss surgery you had (select all that apply): gastric bypass / Nawaf-en-Y       CO-MORBIDITIES OF OBESITY INCLUDE:     7/20/2020   I have the following health issues associated with obesity: High Blood Pressure, Sleep Apnea, Asthma, Osteoarthritis (joint disease)       PAST MEDICAL HISTORY:  Past Medical History:   Diagnosis Date     Anastomotic ulcer 3/28/2014    smoking history; 1PPD     Anxiety      Arthritis      Migraine      Morbid obesity (H)     s/p gastric bypass       PAST SURGICAL HISTORY:  Past Surgical History:   Procedure Laterality Date     DECOMPRESSION, FUSION CERVICAL ANTERIOR THREE+ LEVELS, COMBINED N/A 3/11/2018    Procedure: COMBINED DECOMPRESSION, FUSION CERVICAL ANTERIOR THREE+ LEVELS;   INTRA-OPERATIVE SPINAL CORD MONITIORING, ANTERIOR CERVICAL DECOMPRESSION AND FUSION C3-T1 ;  Surgeon: Maynor Daley MD;  Location:  OR     ENDOSCOPIC UPPER GASTROINTESTINAL, REMOVE SUTURE N/A 1/17/2015    Procedure: ENDOSCOPIC UPPER GASTROINTESTINAL, REMOVE SUTURE;  Surgeon: Parviz Martinez MD;  Location: [facility]     ESOPHAGOSCOPY, GASTROSCOPY, DUODENOSCOPY (EGD), COMBINED  2/18/2014    Procedure: COMBINED ESOPHAGOSCOPY, GASTROSCOPY, DUODENOSCOPY (EGD);  Esophagoscopy,Gastroscopy,Duodenoscopy;  Surgeon: Neo Sher MD;  Location:  GI     ESOPHAGOSCOPY, GASTROSCOPY, DUODENOSCOPY (EGD), COMBINED  5/7/2014    Procedure: COMBINED ESOPHAGOSCOPY, GASTROSCOPY, DUODENOSCOPY (EGD), BIOPSY SINGLE OR MULTIPLE;  Surgeon: Jose Antonio Valencia MD;  [facility]     GYN SURGERY       LAPAROSCOPIC BYPASS GASTRIC  3/25/2013    Procedure: LAPAROSCOPIC BYPASS GASTRIC;  Laparoscopic Nawaf En Y Gastric Bypass. Hiatel hernia repair;  Surgeon: Parviz Martinez MD;  Location: [facility] OR     LAPAROSCOPIC CHOLECYSTECTOMY WITH CHOLANGIOGRAMS  3/28/2014    Procedure: LAPAROSCOPIC CHOLECYSTECTOMY WITH CHOLANGIOGRAMS;;  Surgeon: Jose Antonio Valencia MD;   [facility] OR     LAPAROSCOPY DIAGNOSTIC (GENERAL)  3/28/2014    Procedure: LAPAROSCOPY DIAGNOSTIC (GENERAL);  Diagnostic Laparoscopy, laparoscopic cholecystectomy, EGD, repair of johnston defect;  Surgeon: Jose Antonio Valencia MD;  [facility] OR     LAPAROSCOPY OPERATIVE ADULT N/A 1/17/2015    Procedure: LAPAROSCOPY OPERATIVE ADULT;  Surgeon: Parviz Martinez MD;  [facility]       FAMILY HISTORY:   Family History   Problem Relation Age of Onset     Respiratory Mother      Diabetes Mother      Arthritis Mother         OA     Neurologic Disorder Father      Cancer Maternal Grandfather      Arthritis Maternal Grandmother         RA     Breast Cancer No family hx of      Ovarian Cancer No family hx of        SOCIAL HISTORY:   Social History Questions Reviewed With Patient 7/20/2020   Which best describes your employment status (select all that apply) I am unemployed   Which best describes your marital status:    Do you have children? Yes   Who do you have in your support network that can be available to help you for the first 2 weeks after surgery? none   Who can you count on for support throughout your weight loss surgery journey? none   Can you afford 3 meals a day?  Yes   Can you afford 50-60 dollars a month for vitamins? No       HABITS:     7/20/2020   How often do you drink alcohol? Never   Have you ever used any of the following nicotine products? No   Have you or are you currently using street drugs or prescription strength medication for which you do not have a prescription for? No   Do you have a history of chemical dependency (alcohol or drug abuse)? No       PSYCHOLOGICAL HISTORY:   Psychological History Reviewed With Patient 7/20/2020   Do you have a history of chronic pain? Yes   Have you ever been diagnosed with fibromyalgia? No   Are you currently being treated for any of the following? (select all that apply) Depression, Anxiety, Post traumatic stress disorder   Are you currently  seeing a therapist or counselor?  Yes   Are you currently seeing a psychiatrist? Yes       ROS:     7/20/2020   Skin:  Leg swelling, Varicose veins   HEENT: Headaches, Dizziness/lightheadedness   Musculoskeletal: Joint Pain, Back pain, Arthritis   Cardiovascular: Shortness of breath with activity   Pulmonary: Snoring, Awaken from sleep to catch your breath, People have told me I stop breathing while asleep, Excessively sleep during the day   Gastrointestinal: Constipation, Ulcers   Genitourinary: None of the above   Hematological: None of the above   Neurological: None of the above   Female only: Post-menopausal       EATING BEHAVIORS:     7/20/2020   Do you currently binge eat (eat a large amount of food in a short time)? No   Are you an emotional eater? No   Do you get up to eat after falling asleep? Yes       EXERCISE:     7/20/2020   How often do you exercise? Daily   What is the duration of your exercise (in minutes)? 45 Minutes   What types of exercise do you do? walking   What keeps you from being more active?  I am as active as I can possbily be, Pain       MEDICATIONS:  Current Outpatient Medications   Medication Sig Dispense Refill     acetaminophen (TYLENOL) 325 MG tablet Take 2 tablets (650 mg) by mouth every 4 hours as needed for mild pain 100 tablet      lisinopril (PRINIVIL/ZESTRIL) 10 MG tablet TAKE ONE-HALF TABLET BY MOUTH ONCE DAILY 45 tablet 3     multivitamin, therapeutic with minerals (THERA-VIT-M) TABS tablet Take 1 tablet by mouth daily 30 each 0     pantoprazole (PROTONIX) 20 MG EC tablet TAKE ONE TABLET BY MOUTH 30 TO 60 MINUTES BEFORE A MEAL EVERY DAY 90 tablet 3     nicotine (NICODERM CQ) 14 MG/24HR 24 hr patch Place 1 patch onto the skin every 24 hours (Patient not taking: Reported on 7/20/2020) 30 patch 1     nicotine (NICODERM CQ) 21 MG/24HR 24 hr patch PLACE 1 PATCH ONTO THE SKIN EVERY 24 HOURS (Patient not taking: Reported on 7/20/2020) 28 patch 1       ALLERGIES:  Allergies  "  Allergen Reactions     Keflex [Cephalexin-Fd&C Yellow #6] Anaphylaxis     Unasyn Anaphylaxis and Swelling     Azithromycin      Heart palpitations     Compazine [Prochlorperazine] Other (See Comments)     headaches     Zofran [Ondansetron] Other (See Comments)     headaches       LABS/IMAGING/MEDICAL RECORDS REVIEW:     PHYSICAL EXAM:  Ht 1.717 m (5' 7.6\")   Wt 56.7 kg (125 lb)   LMP 03/01/2010   BMI 19.23 kg/m        In summary, Antoinette Romero has hx of obesity and hx of gastric bypass in 2013 with Dr Martinez. GERD and nausea with eating solid foods. GERD not controlled on protonix daily. Hx of tobacco use quit 1 week ago. Hx of marginal ulcer in 2015.     Bariatric labs at local [facility]  EGD with Dr Martinez due to GERD and nausea with eating and bloating. Call [facility] to schedule [phone].   Follow up after EGD with Dr Martinez by virtual visit  See RD today      Sincerely,     Lissette Sánchez PA-C            Again, thank you for allowing me to participate in the care of your patient.      Sincerely,    Lissette Sánchez PA-C      "

## 2020-08-13 DIAGNOSIS — Z98.84 S/P GASTRIC BYPASS: ICD-10-CM

## 2020-08-13 LAB
ERYTHROCYTE [DISTWIDTH] IN BLOOD BY AUTOMATED COUNT: 13.2 % (ref 10–15)
HBA1C MFR BLD: 5.5 % (ref 0–5.6)
HCT VFR BLD AUTO: 42.5 % (ref 35–47)
HGB BLD-MCNC: 13.8 G/DL (ref 11.7–15.7)
MCH RBC QN AUTO: 30.7 PG (ref 26.5–33)
MCHC RBC AUTO-ENTMCNC: 32.5 G/DL (ref 31.5–36.5)
MCV RBC AUTO: 94 FL (ref 78–100)
PLATELET # BLD AUTO: 272 10E9/L (ref 150–450)
PTH-INTACT SERPL-MCNC: 59 PG/ML (ref 18–80)
RBC # BLD AUTO: 4.5 10E12/L (ref 3.8–5.2)
VIT B12 SERPL-MCNC: 2668 PG/ML (ref 193–986)
WBC # BLD AUTO: 9.8 10E9/L (ref 4–11)

## 2020-08-13 PROCEDURE — 82306 VITAMIN D 25 HYDROXY: CPT | Performed by: PHYSICIAN ASSISTANT

## 2020-08-13 PROCEDURE — 80061 LIPID PANEL: CPT | Performed by: PHYSICIAN ASSISTANT

## 2020-08-13 PROCEDURE — 84425 ASSAY OF VITAMIN B-1: CPT | Mod: 90 | Performed by: PHYSICIAN ASSISTANT

## 2020-08-13 PROCEDURE — 83036 HEMOGLOBIN GLYCOSYLATED A1C: CPT | Performed by: PHYSICIAN ASSISTANT

## 2020-08-13 PROCEDURE — 99000 SPECIMEN HANDLING OFFICE-LAB: CPT | Performed by: PHYSICIAN ASSISTANT

## 2020-08-13 PROCEDURE — 85027 COMPLETE CBC AUTOMATED: CPT | Performed by: PHYSICIAN ASSISTANT

## 2020-08-13 PROCEDURE — 84590 ASSAY OF VITAMIN A: CPT | Mod: 90 | Performed by: PHYSICIAN ASSISTANT

## 2020-08-13 PROCEDURE — 82728 ASSAY OF FERRITIN: CPT | Performed by: PHYSICIAN ASSISTANT

## 2020-08-13 PROCEDURE — 80053 COMPREHEN METABOLIC PANEL: CPT | Performed by: PHYSICIAN ASSISTANT

## 2020-08-13 PROCEDURE — 82607 VITAMIN B-12: CPT | Performed by: PHYSICIAN ASSISTANT

## 2020-08-13 PROCEDURE — 82525 ASSAY OF COPPER: CPT | Mod: 90 | Performed by: PHYSICIAN ASSISTANT

## 2020-08-13 PROCEDURE — 84630 ASSAY OF ZINC: CPT | Mod: 90 | Performed by: PHYSICIAN ASSISTANT

## 2020-08-13 PROCEDURE — 36415 COLL VENOUS BLD VENIPUNCTURE: CPT | Performed by: PHYSICIAN ASSISTANT

## 2020-08-13 PROCEDURE — 83970 ASSAY OF PARATHORMONE: CPT | Performed by: PHYSICIAN ASSISTANT

## 2020-08-14 LAB
ALBUMIN SERPL-MCNC: 3.4 G/DL (ref 3.4–5)
ALP SERPL-CCNC: 106 U/L (ref 40–150)
ALT SERPL W P-5'-P-CCNC: 18 U/L (ref 0–50)
ANION GAP SERPL CALCULATED.3IONS-SCNC: 6 MMOL/L (ref 3–14)
AST SERPL W P-5'-P-CCNC: 15 U/L (ref 0–45)
BILIRUB SERPL-MCNC: 0.4 MG/DL (ref 0.2–1.3)
BUN SERPL-MCNC: 12 MG/DL (ref 7–30)
CALCIUM SERPL-MCNC: 8.8 MG/DL (ref 8.5–10.1)
CHLORIDE SERPL-SCNC: 106 MMOL/L (ref 94–109)
CHOLEST SERPL-MCNC: 165 MG/DL
CO2 SERPL-SCNC: 27 MMOL/L (ref 20–32)
CREAT SERPL-MCNC: 0.63 MG/DL (ref 0.52–1.04)
DEPRECATED CALCIDIOL+CALCIFEROL SERPL-MC: 56 UG/L (ref 20–75)
FERRITIN SERPL-MCNC: 10 NG/ML (ref 8–252)
GFR SERPL CREATININE-BSD FRML MDRD: >90 ML/MIN/{1.73_M2}
GLUCOSE SERPL-MCNC: 120 MG/DL (ref 70–99)
HDLC SERPL-MCNC: 77 MG/DL
LDLC SERPL CALC-MCNC: 76 MG/DL
NONHDLC SERPL-MCNC: 88 MG/DL
POTASSIUM SERPL-SCNC: 4.1 MMOL/L (ref 3.4–5.3)
PROT SERPL-MCNC: 6.7 G/DL (ref 6.8–8.8)
SODIUM SERPL-SCNC: 139 MMOL/L (ref 133–144)
TRIGL SERPL-MCNC: 62 MG/DL

## 2020-08-15 LAB
ANNOTATION COMMENT IMP: NORMAL
RETINYL PALMITATE SERPL-MCNC: 0.02 MG/L (ref 0–0.1)
VIT A SERPL-MCNC: 0.62 MG/L (ref 0.3–1.2)

## 2020-08-16 LAB — VIT B1 BLD-MCNC: 224 NMOL/L (ref 70–180)

## 2020-08-18 LAB
COPPER SERPL-MCNC: 102.6 UG/DL (ref 80–155)
ZINC SERPL-MCNC: 98.4 UG/DL (ref 60–120)

## 2020-09-03 ENCOUNTER — TELEPHONE (OUTPATIENT)
Dept: SURGERY | Facility: CLINIC | Age: 60
End: 2020-09-03

## 2020-09-20 ENCOUNTER — NURSE TRIAGE (OUTPATIENT)
Dept: NURSING | Facility: CLINIC | Age: 60
End: 2020-09-20

## 2020-09-20 NOTE — TELEPHONE ENCOUNTER
"Antoinette states that yesterday morning woke up with abdominal pain and then got worse yesterday afternoon.  Today is having lower abdominal pain and urine is dark orange.   Pain is currently a \"4\" and last night was a \"10\".  Lower back in the middle is also sore.  Denies fever cough and shortness of breath.    COVID 19 Nurse Triage Plan/Patient Instructions    Please be aware that novel coronavirus (COVID-19) may be circulating in the community. If you develop symptoms such as fever, cough, or SOB or if you have concerns about the presence of another infection including coronavirus (COVID-19), please contact your health care provider or visit [email or web address].     Disposition/Instructions    In-Person Visit with provider recommended. Reference Visit Selection Guide.    Thank you for taking steps to prevent the spread of this virus.  o Limit your contact with others.  o Wear a simple mask to cover your cough.  o Wash your hands well and often.    Resources    [facility]: About COVID-19: [email or web address]    CDC: What to Do If You're Sick: www.cdc.gov/coronavirus/2019-ncov/about/steps-when-sick.html    CDC: Ending Home Isolation: www.cdc.gov/coronavirus/2019-ncov/hcp/disposition-in-home-patients.html     CDC: Caring for Someone: www.cdc.gov/coronavirus/2019-ncov/if-you-are-sick/care-for-someone.html     [facility]: Interim Guidance for Hospital Discharge to Home: www.health.[facility].[email or web address]/diseases/coronavirus/hcp/hospdischarge.pdf    [facility] clinical trials (COVID-19 research studies): [email or web address].[email or web address]/umn-clinical-trials     Below are the COVID-19 hotlines at the [facility] of [facility] ([facility]). Interpreters are available.   o For health questions: Call 899-921-7047 or 1-975.341.3790 (7 a.m. to 7 p.m.)  o For questions about schools and childcare: Call 458-871-7964 or 1-415.374.8061 (7 a.m. to 7 p.m.)                       Additional Information    Negative: Shock suspected (e.g., " cold/pale/clammy skin, too weak to stand, low BP, rapid pulse)    Negative: Sounds like a life-threatening emergency to the triager    Negative: [1] Unable to urinate (or only a few drops) > 4 hours AND     [2] bladder feels very full (e.g., palpable bladder or strong urge to urinate)    Negative: [1] Decreased urination and [2] drinking very little AND [2] dehydration suspected (e.g., dark urine, no urine > 12 hours, very dry mouth, very lightheaded)    Negative: Patient sounds very sick or weak to the triager    Negative: Fever > 100.5 F (38.1 C)    Side (flank) or lower back pain present    Protocols used: URINARY SYMPTOMS-A-AH

## 2020-09-23 ENCOUNTER — NURSE TRIAGE (OUTPATIENT)
Dept: NURSING | Facility: CLINIC | Age: 60
End: 2020-09-23

## 2020-09-23 NOTE — TELEPHONE ENCOUNTER
A lot of pain on Saturday - [facility] on Sunday noted 3 mm stone. Thought the stone had passed - no pain for a couple of days.  2 hours ago - entire gut area from breast down to pubic area - really bad pain. Rates pain 7/10 - no relief from Norco - patient states medication makes her very sick. No fever. Wasn't able to take the flomax.    Per protocol advised ED evaluation. Is scared to death to go to the emergency room because of Covid. Advised patient ED is taking every precaution possible. Patient refuses ED dispo.     Jillian Saldaña RN on 9/23/2020 at 6:15 PM    Reason for Disposition    [1] SEVERE pain (e.g., excruciating, scale 8-10) AND [2] present > 1 hour    Additional Information    Negative: Passed out (i.e., lost consciousness, collapsed and was not responding)    Negative: Shock suspected (e.g., cold/pale/clammy skin, too weak to stand, low BP, rapid pulse)    Negative: Difficult to awaken or acting confused (e.g., disoriented, slurred speech)    Negative: Sounds like a life-threatening emergency to the triager    Negative: Followed a major injury to the back (e.g., MVA, fall > 10 feet or 3 meters, penetrating injury, etc.)    Negative: Back pain or flank pain during pregnancy    Negative: Upper, mid or lower back pain that occurs mainly in the midline    Protocols used: FLANK PAIN-A-

## 2020-09-29 ENCOUNTER — TELEPHONE (OUTPATIENT)
Dept: FAMILY MEDICINE | Facility: CLINIC | Age: 60
End: 2020-09-29

## 2020-09-29 NOTE — TELEPHONE ENCOUNTER
Patient calling and states she was seen for kidney stone 9/20/20- [facility].  Pain again Wednesday and last night.  States was so sore last night could barely move, urinary urgency and then can not go.  Pain med she was given did not touch pain last night.  States was told to go to ER if pain did not resolve.  Does not want to go to ER.  Wondering if can go back to UC.  I advised ER would be better option.  Patient still wants to call UC and see if they will see her again.  Did again advise ER is better option.  Ivelisse Epperson RN

## 2020-10-02 ENCOUNTER — HOSPITAL ENCOUNTER (OUTPATIENT)
Facility: CLINIC | Age: 60
Setting detail: OBSERVATION
Discharge: HOME | End: 2020-10-03
Attending: INTERNAL MEDICINE | Admitting: INTERNAL MEDICINE
Payer: COMMERCIAL

## 2020-10-02 ENCOUNTER — TRANSFERRED RECORDS (OUTPATIENT)
Dept: HEALTH INFORMATION MANAGEMENT | Facility: CLINIC | Age: 60
End: 2020-10-02

## 2020-10-02 ENCOUNTER — TELEPHONE (OUTPATIENT)
Facility: CLINIC | Age: 60
End: 2020-10-02

## 2020-10-02 DIAGNOSIS — M54.50 BILATERAL LOW BACK PAIN WITHOUT SCIATICA, UNSPECIFIED CHRONICITY: Primary | ICD-10-CM

## 2020-10-02 PROBLEM — N23 RENAL COLIC: Status: ACTIVE | Noted: 2020-10-02

## 2020-10-02 LAB
ANION GAP SERPL CALCULATED.3IONS-SCNC: 3 MMOL/L (ref 3–14)
BUN SERPL-MCNC: 16 MG/DL (ref 7–30)
CALCIUM SERPL-MCNC: 9 MG/DL (ref 8.5–10.1)
CHLORIDE SERPL-SCNC: 109 MMOL/L (ref 94–109)
CO2 SERPL-SCNC: 28 MMOL/L (ref 20–32)
CREAT SERPL-MCNC: 0.91 MG/DL (ref 0.52–1.04)
ERYTHROCYTE [DISTWIDTH] IN BLOOD BY AUTOMATED COUNT: 12.7 % (ref 10–15)
GFR SERPL CREATININE-BSD FRML MDRD: 69 ML/MIN/{1.73_M2}
GLUCOSE SERPL-MCNC: 113 MG/DL (ref 70–99)
HCT VFR BLD AUTO: 44.8 % (ref 35–47)
HGB BLD-MCNC: 14 G/DL (ref 11.7–15.7)
LABORATORY COMMENT REPORT: NORMAL
MCH RBC QN AUTO: 30.3 PG (ref 26.5–33)
MCHC RBC AUTO-ENTMCNC: 31.3 G/DL (ref 31.5–36.5)
MCV RBC AUTO: 97 FL (ref 78–100)
PLATELET # BLD AUTO: 313 10E9/L (ref 150–450)
POTASSIUM SERPL-SCNC: 4.7 MMOL/L (ref 3.4–5.3)
RBC # BLD AUTO: 4.62 10E12/L (ref 3.8–5.2)
SARS-COV-2 RNA SPEC QL NAA+PROBE: NEGATIVE
SARS-COV-2 RNA SPEC QL NAA+PROBE: NORMAL
SODIUM SERPL-SCNC: 140 MMOL/L (ref 133–144)
SPECIMEN SOURCE: NORMAL
SPECIMEN SOURCE: NORMAL
WBC # BLD AUTO: 13.1 10E9/L (ref 4–11)

## 2020-10-02 PROCEDURE — G0378 HOSPITAL OBSERVATION PER HR: HCPCS

## 2020-10-02 PROCEDURE — 88300 SURGICAL PATH GROSS: CPT | Mod: 26 | Performed by: PATHOLOGY

## 2020-10-02 PROCEDURE — 80048 BASIC METABOLIC PNL TOTAL CA: CPT | Performed by: PHYSICIAN ASSISTANT

## 2020-10-02 PROCEDURE — 99220 PR INITIAL OBSERVATION CARE,LEVEL III: CPT | Performed by: PHYSICIAN ASSISTANT

## 2020-10-02 PROCEDURE — 36415 COLL VENOUS BLD VENIPUNCTURE: CPT | Performed by: PHYSICIAN ASSISTANT

## 2020-10-02 PROCEDURE — 82365 CALCULUS SPECTROSCOPY: CPT | Performed by: PHYSICIAN ASSISTANT

## 2020-10-02 PROCEDURE — 85027 COMPLETE CBC AUTOMATED: CPT | Performed by: PHYSICIAN ASSISTANT

## 2020-10-02 PROCEDURE — 88300 SURGICAL PATH GROSS: CPT | Mod: TC | Performed by: INTERNAL MEDICINE

## 2020-10-02 PROCEDURE — U0003 INFECTIOUS AGENT DETECTION BY NUCLEIC ACID (DNA OR RNA); SEVERE ACUTE RESPIRATORY SYNDROME CORONAVIRUS 2 (SARS-COV-2) (CORONAVIRUS DISEASE [COVID-19]), AMPLIFIED PROBE TECHNIQUE, MAKING USE OF HIGH THROUGHPUT TECHNOLOGIES AS DESCRIBED BY CMS-2020-01-R: HCPCS | Performed by: PHYSICIAN ASSISTANT

## 2020-10-02 PROCEDURE — 250N000013 HC RX MED GY IP 250 OP 250 PS 637: Performed by: PHYSICIAN ASSISTANT

## 2020-10-02 PROCEDURE — 258N000003 HC RX IP 258 OP 636: Performed by: PHYSICIAN ASSISTANT

## 2020-10-02 RX ORDER — ACETAMINOPHEN 325 MG/1
650 TABLET ORAL EVERY 4 HOURS PRN
Status: DISCONTINUED | OUTPATIENT
Start: 2020-10-02 | End: 2020-10-03

## 2020-10-02 RX ORDER — AMOXICILLIN 250 MG
1 CAPSULE ORAL 2 TIMES DAILY PRN
Status: DISCONTINUED | OUTPATIENT
Start: 2020-10-02 | End: 2020-10-03 | Stop reason: HOSPADM

## 2020-10-02 RX ORDER — HYDROMORPHONE HYDROCHLORIDE 1 MG/ML
0.3 INJECTION, SOLUTION INTRAMUSCULAR; INTRAVENOUS; SUBCUTANEOUS
Status: DISCONTINUED | OUTPATIENT
Start: 2020-10-02 | End: 2020-10-03 | Stop reason: HOSPADM

## 2020-10-02 RX ORDER — PANTOPRAZOLE SODIUM 40 MG/1
40 TABLET, DELAYED RELEASE ORAL
Status: DISCONTINUED | OUTPATIENT
Start: 2020-10-03 | End: 2020-10-03 | Stop reason: HOSPADM

## 2020-10-02 RX ORDER — LISINOPRIL 5 MG/1
5 TABLET ORAL DAILY
Status: DISCONTINUED | OUTPATIENT
Start: 2020-10-03 | End: 2020-10-03 | Stop reason: HOSPADM

## 2020-10-02 RX ORDER — NALOXONE HYDROCHLORIDE 0.4 MG/ML
.1-.4 INJECTION, SOLUTION INTRAMUSCULAR; INTRAVENOUS; SUBCUTANEOUS
Status: DISCONTINUED | OUTPATIENT
Start: 2020-10-02 | End: 2020-10-03 | Stop reason: HOSPADM

## 2020-10-02 RX ORDER — AMOXICILLIN 250 MG
1 CAPSULE ORAL 2 TIMES DAILY
Status: DISCONTINUED | OUTPATIENT
Start: 2020-10-02 | End: 2020-10-03 | Stop reason: HOSPADM

## 2020-10-02 RX ORDER — OXYCODONE HYDROCHLORIDE 5 MG/1
5-10 TABLET ORAL
Status: DISCONTINUED | OUTPATIENT
Start: 2020-10-02 | End: 2020-10-03 | Stop reason: HOSPADM

## 2020-10-02 RX ORDER — AMOXICILLIN 250 MG
2 CAPSULE ORAL 2 TIMES DAILY
Status: DISCONTINUED | OUTPATIENT
Start: 2020-10-02 | End: 2020-10-03 | Stop reason: HOSPADM

## 2020-10-02 RX ORDER — AMOXICILLIN 250 MG
2 CAPSULE ORAL 2 TIMES DAILY PRN
Status: DISCONTINUED | OUTPATIENT
Start: 2020-10-02 | End: 2020-10-03 | Stop reason: HOSPADM

## 2020-10-02 RX ORDER — METOCLOPRAMIDE HYDROCHLORIDE 5 MG/ML
10 INJECTION INTRAMUSCULAR; INTRAVENOUS EVERY 6 HOURS PRN
Status: DISCONTINUED | OUTPATIENT
Start: 2020-10-02 | End: 2020-10-03 | Stop reason: HOSPADM

## 2020-10-02 RX ORDER — TAMSULOSIN HYDROCHLORIDE 0.4 MG/1
0.4 CAPSULE ORAL DAILY
Status: DISCONTINUED | OUTPATIENT
Start: 2020-10-02 | End: 2020-10-03 | Stop reason: HOSPADM

## 2020-10-02 RX ORDER — SODIUM CHLORIDE 9 MG/ML
INJECTION, SOLUTION INTRAVENOUS CONTINUOUS
Status: DISCONTINUED | OUTPATIENT
Start: 2020-10-02 | End: 2020-10-03 | Stop reason: HOSPADM

## 2020-10-02 RX ADMIN — SODIUM CHLORIDE: 9 INJECTION, SOLUTION INTRAVENOUS at 20:24

## 2020-10-02 RX ADMIN — TAMSULOSIN HYDROCHLORIDE 0.4 MG: 0.4 CAPSULE ORAL at 18:13

## 2020-10-02 RX ADMIN — OXYCODONE HYDROCHLORIDE 10 MG: 5 TABLET ORAL at 22:49

## 2020-10-02 RX ADMIN — OXYCODONE HYDROCHLORIDE 10 MG: 5 TABLET ORAL at 18:09

## 2020-10-02 RX ADMIN — SODIUM CHLORIDE 1000 ML: 9 INJECTION, SOLUTION INTRAVENOUS at 18:58

## 2020-10-02 ASSESSMENT — MIFFLIN-ST. JEOR: SCORE: 1194.54

## 2020-10-02 NOTE — TELEPHONE ENCOUNTER
3-Hospitalist Huddle Documentation    Acuity/Preferred Bed Type: OBS  Infection Concerns: none  Additional Specialist Needed Or Specialist Already Contacted:   None  Timely Treatments Needed: No  Important things to know/address during hospitalization: sitter not needed      61 yo with persistent flank pain. Has 3-4 mm left UVJ stone refractory oral pain meds and flomax. Admit for pain control aggressive fluids.

## 2020-10-02 NOTE — H&P
History and Physical     Antoinette Romero MRN# [id number]   YOB: 1960 Age: 60 year old      Date of Admission:  10/2/2020    Primary care provider: Umu Roberto          Assessment and Plan:   Antoinette Romero is a 60 year old female with a PMH significant for HTN, GERD, and s/p gastric bypass 2015 complicated by anastomotic ulcers who presents as a direct admit for on-going left flank and lower groin pain due to left renal stone.   Dx 3-4mm left UVJ stone with mild hydroureter and hydronephrosis  (See report in Epic document). She is being admitted for pain control.     1. 3-4 mm Left UVJ stone with hydronephrosis/hydroureter   --first episode of stone   --high likelihood of passing  --UA did not convincing for acute infection  --aggressive fluids, pain control   --Start flomax  --KUB in am  --Hold off on Urology consult for now, if still having significant pain, then consult in am    2. HTN--Bp stable.   --Resume Lisinopril    3. GERD: resume Protonix     4. Asymptomatic COVID testing pending (sent for possible surgery next day)    [place] to OBS  DVT prophylaxis: ambulate  CODE: FULL  Dispo: likely home tomorrow         Chief Complaint:   Left flank and abd pain       History of Present Illness:   Antoinette Romero is a 60 year old female with a PMH significant for HTN, GERD, and s/p gastric bypass complicated by anastomotic ulcers who presents as a direct admit for on-going left flank and lower groin pain due to left renal stone.  She was initially diagnosed with 3 mm left ureteral stone on September 20.  She describes left flank pain and then movement down to her left groin.  She was discharged with few oxycodone and 3 tabs of Flomax.  She took a couple of Flomax and could not tolerate it due to aphthous ulcers and overall felt a little bit better.  However her pain started to escalate again and now has been intractable.  Went to [facility] urgent care which repeat CT scan showed 3-4mm left  UVJ stone with mild hydroureter and hydronephrosis  (See report in Epic document).  BMP is still pending, urinalysis did not show acute infection. she is being admitted for pain control.          Past Medical History:     Past Medical History:   Diagnosis Date     Anastomotic ulcer 3/28/2014    smoking history; 1PPD     Anxiety      Arthritis      Migraine      Morbid obesity (H)     s/p gastric bypass             Past Surgical History:     Past Surgical History:   Procedure Laterality Date     DECOMPRESSION, FUSION CERVICAL ANTERIOR THREE+ LEVELS, COMBINED N/A 3/11/2018    Procedure: COMBINED DECOMPRESSION, FUSION CERVICAL ANTERIOR THREE+ LEVELS;   INTRA-OPERATIVE SPINAL CORD MONITIORING, ANTERIOR CERVICAL DECOMPRESSION AND FUSION C3-T1 ;  Surgeon: Maynor Daley MD;  Location:  OR     ENDOSCOPIC UPPER GASTROINTESTINAL, REMOVE SUTURE N/A 1/17/2015    Procedure: ENDOSCOPIC UPPER GASTROINTESTINAL, REMOVE SUTURE;  Surgeon: Parviz Martinez MD;  [facility]     ESOPHAGOSCOPY, GASTROSCOPY, DUODENOSCOPY (EGD), COMBINED  2/18/2014    Procedure: COMBINED ESOPHAGOSCOPY, GASTROSCOPY, DUODENOSCOPY (EGD);  Esophagoscopy,Gastroscopy,Duodenoscopy;  Surgeon: Neo Sher MD;  Location:  GI     ESOPHAGOSCOPY, GASTROSCOPY, DUODENOSCOPY (EGD), COMBINED  5/7/2014    Procedure: COMBINED ESOPHAGOSCOPY, GASTROSCOPY, DUODENOSCOPY (EGD), BIOPSY SINGLE OR MULTIPLE;  Surgeon: Jose Antonio Valencia MD;  Location:  GI     GYN SURGERY       LAPAROSCOPIC BYPASS GASTRIC  3/25/2013    Procedure: LAPAROSCOPIC BYPASS GASTRIC;  Laparoscopic Nawaf En Y Gastric Bypass. Hiatel hernia repair;  Surgeon: Parviz Martinez MD;  [facility]     LAPAROSCOPIC CHOLECYSTECTOMY WITH CHOLANGIOGRAMS  3/28/2014    Procedure: LAPAROSCOPIC CHOLECYSTECTOMY WITH CHOLANGIOGRAMS;;  Surgeon: Jose Antonio Valencia MD;  [facility]     LAPAROSCOPY DIAGNOSTIC (GENERAL)  3/28/2014    Procedure: LAPAROSCOPY DIAGNOSTIC (GENERAL);   Diagnostic Laparoscopy, laparoscopic cholecystectomy, EGD, repair of johnston defect;  Surgeon: Jose Antonio Valencia MD;  [facility] OR     LAPAROSCOPY OPERATIVE ADULT N/A 1/17/2015    Procedure: LAPAROSCOPY OPERATIVE ADULT;  Surgeon: Parviz Martinez MD;  [facility]               Social History:     Social History     Socioeconomic History     Marital status:      Spouse name: Preston     Number of children: 2     Years of education: Not on file     Highest education level: Not on file   Occupational History     Occupation:      Employer: NONE    Social Needs     Financial resource strain: Not on file     Food insecurity     Worry: Not on file     Inability: Not on file     Transportation needs     Medical: Not on file     Non-medical: Not on file   Tobacco Use     Smoking status: Former Smoker     Packs/day: 1.00     Types: Cigarettes     Smokeless tobacco: Never Used     Tobacco comment: quit 3/2018   Substance and Sexual Activity     Alcohol use: No     Drug use: No     Sexual activity: Not Currently     Partners: Male   Lifestyle     Physical activity     Days per week: Not on file     Minutes per session: Not on file     Stress: Not on file   Relationships     Social connections     Talks on phone: Not on file     Gets together: Not on file     Attends Yarsanism service: Not on file     Active member of club or organization: Not on file     Attends meetings of clubs or organizations: Not on file     Relationship status: Not on file     Intimate partner violence     Fear of current or ex partner: Not on file     Emotionally abused: Not on file     Physically abused: Not on file     Forced sexual activity: Not on file   Other Topics Concern     Parent/sibling w/ CABG, MI or angioplasty before 65F 55M? Not Asked   Social History Narrative     Not on file               Family History:     Family History   Problem Relation Age of Onset     Respiratory Mother      Diabetes Mother       Arthritis Mother         OA     Neurologic Disorder Father      Cancer Maternal Grandfather      Arthritis Maternal Grandmother         RA     Breast Cancer No family hx of      Ovarian Cancer No family hx of               Allergies:      Allergies   Allergen Reactions     Keflex [Cephalexin-Fd&C Yellow #6] Anaphylaxis     Unasyn Anaphylaxis and Swelling     Azithromycin      Heart palpitations     Compazine [Prochlorperazine] Other (See Comments)     headaches     Zofran [Ondansetron] Other (See Comments)     headaches               Medications:     Prior to Admission medications    Medication Sig Last Dose Taking? Auth Provider   acetaminophen (TYLENOL) 325 MG tablet Take 2 tablets (650 mg) by mouth every 4 hours as needed for mild pain   Ray Simon MD   lisinopril (PRINIVIL/ZESTRIL) 10 MG tablet TAKE ONE-HALF TABLET BY MOUTH ONCE DAILY   Umu Roberto PA-C   multivitamin, therapeutic with minerals (THERA-VIT-M) TABS tablet Take 1 tablet by mouth daily   Ray Simon MD   nicotine (NICODERM CQ) 14 MG/24HR 24 hr patch Place 1 patch onto the skin every 24 hours  Patient not taking: Reported on 7/20/2020   Umu Roberto PA-C   nicotine (NICODERM CQ) 21 MG/24HR 24 hr patch PLACE 1 PATCH ONTO THE SKIN EVERY 24 HOURS  Patient not taking: Reported on 7/20/2020   Umu Roberto PA-C   pantoprazole (PROTONIX) 20 MG EC tablet TAKE ONE TABLET BY MOUTH 30 TO 60 MINUTES BEFORE A MEAL EVERY DAY   Umu Roberto PA-C              Review of Systems:     A Comprehensive greater than 10 system review of systems was carried out.  Pertinent positives and negatives are noted above.  Otherwise negative for contributory information.            Physical Exam:   Blood pressure (!) 154/74, pulse 95, temperature 97.3  F (36.3  C), temperature source Oral, resp. rate 20, weight 58.2 kg (128 lb 6.4 oz), last menstrual period 03/01/2010, SpO2 94 %, not currently breastfeeding.  Exam:  GENERAL:   Comfortable.  PSYCH: pleasant, oriented, No acute distress.  HEENT:  PERRLA. Normal conjunctiva, normal hearing, nasal mucosa and Oropharynx are normal.  NECK:  Supple, no neck vein distention, adenopathy or bruits, normal thyroid.  HEART:  Normal S1, S2 with no murmur, no pericardial rub, gallops or S3 or S4.  LUNGS:  Clear to auscultation, normal Respiratory effort. No wheezing, rales or ronchi.  ABDOMEN:  Soft, no hepatosplenomegaly, normal bowel sounds. Non-tender, non distended.   EXTREMITIES:  No pedal edema, +2 pulses bilateral and equal.  SKIN:  Dry to touch, No rash, wound or ulcerations.  NEUROLOGIC:  CN 2-12 intact, BL 5/5 symmetric upper and lower extremity strength, sensation is intact with no focal deficits.           Data:     Recent Labs   Lab 10/02/20  1755   WBC 13.1*   HGB 14.0   HCT 44.8   MCV 97        Recent Labs   Lab 10/02/20  1755      POTASSIUM 4.7   CHLORIDE 109   CO2 28   ANIONGAP 3   *   BUN 16   CR 0.91   GFRESTIMATED 69   GFRESTBLACK 79   BESSY 9.0     Component Name  10/2/2020 9/20/2020     Yellow Orange (A)   Clear Turbid (A)   >=1.030 (A) 1.020   5.5 7.5   100 (A) 30 (A)   Negative Negative   Trace (A) Trace (A)   0.2 2.0 (A)   Negative Small (A)   Small (A) Large (A)   Negative Negative   Negative Trace (A)   8 - 10 (A) Packed Field (A)   0 - 5 0 - 5   Few (A)     Moderate (A) Few (A)   Present (A)     Clean Catch Clean Catch   Urine Color   Urine Clarity   Specific Gravity, Urine   PH Urine   Protein, Urine Qual (mg/dL)   Glucose Urine Qual (mg/dL)   Ketones, Urine (mg/dL)   Urobilinogen, Urine (EU/dL)   Bilirubin Urine   Blood, Urine   Nitrite Urine   Leukocyte Est.   Red Blood Cells   White Blood Cells   Bacteria   Squamous Epithelial Cells   Crystals, Calcium Oxalate   Urine Source     CT abd/pelvis:  IMPRESSION  COMPARISON: 09/20/2020    TECHNIQUE: Images were obtained through the abdomen and pelvis without contrast using a renal stone  protocol.    FINDINGS:    LUNG BASES: Emphysematous changes in the lung bases. Mild peripheral atelectasis and/or fibrosis.    LIVER: Unremarkable.    GALLBLADDER AND BILIARY TREE: Postoperative evidence of cholecystectomy. Respiratory dilatation of the intrahepatic and extrahepatic biliary system.     PANCREAS: Unremarkable.    SPLEEN: Mild calcification about the periphery of the spleen, similar compared to the prior exam and likely related to prior infarct.    ADRENALS: Unremarkable.    KIDNEYS AND URETERS: Mild to moderate left hydronephrosis and hydroureter.Previously visualized stone now projects near the ureterovesical junction (series 2 image 114). No right hydronephrosis, hydroureter, or nephroureterolithiasis.    VESSELS: Atherosclerotic calcifications. No aneurysm.    BOWEL: Postoperative evidence of Nawaf-en-Y gastric bypass. No dilated loop of small or large bowel. Extensive colonic diverticulosis without evidence for diverticulitis. Normal appendix    BLADDER: Unremarkable.    REPRODUCTIVE ORGANS: Previously visualized distal right ureteral stone projects over the bladder near the left ureterovesical junction, either at the tip of the ureterovesical junction or within the bladder. This stone measures approximately 3 to 4 mm.    MESENTERY/PERITONEUM: No enlarged mesenteric lymph nodes. No ascites or free air. No focal fluid collection.     RETROPERITONEUM: No adenopathy.     ABDOMINAL WALL/SOFT TISSUES: Unremarkable.    BONES: Unremarkable.    IMPRESSION: Previously visualized distal left ureteral stone now projects over the left ureterovesical junction, either at the tip of the junction or recently passed into the bladder. Persistent hydronephrosis and hydroureter of the left renal collecting system.          Aleja Martinez PA-C

## 2020-10-03 ENCOUNTER — APPOINTMENT (OUTPATIENT)
Dept: GENERAL RADIOLOGY | Facility: CLINIC | Age: 60
End: 2020-10-03
Attending: PHYSICIAN ASSISTANT
Payer: COMMERCIAL

## 2020-10-03 VITALS
WEIGHT: 128.4 LBS | BODY MASS INDEX: 19.46 KG/M2 | HEIGHT: 68 IN | RESPIRATION RATE: 16 BRPM | HEART RATE: 61 BPM | TEMPERATURE: 97.2 F | OXYGEN SATURATION: 95 % | SYSTOLIC BLOOD PRESSURE: 150 MMHG | DIASTOLIC BLOOD PRESSURE: 85 MMHG

## 2020-10-03 LAB
ANION GAP SERPL CALCULATED.3IONS-SCNC: 4 MMOL/L (ref 3–14)
BUN SERPL-MCNC: 15 MG/DL (ref 7–30)
CALCIUM SERPL-MCNC: 7.8 MG/DL (ref 8.5–10.1)
CHLORIDE SERPL-SCNC: 112 MMOL/L (ref 94–109)
CO2 SERPL-SCNC: 26 MMOL/L (ref 20–32)
CREAT SERPL-MCNC: 0.82 MG/DL (ref 0.52–1.04)
ERYTHROCYTE [DISTWIDTH] IN BLOOD BY AUTOMATED COUNT: 12.6 % (ref 10–15)
GFR SERPL CREATININE-BSD FRML MDRD: 78 ML/MIN/{1.73_M2}
GLUCOSE SERPL-MCNC: 88 MG/DL (ref 70–99)
HCT VFR BLD AUTO: 35.4 % (ref 35–47)
HGB BLD-MCNC: 11 G/DL (ref 11.7–15.7)
MCH RBC QN AUTO: 29.8 PG (ref 26.5–33)
MCHC RBC AUTO-ENTMCNC: 31.1 G/DL (ref 31.5–36.5)
MCV RBC AUTO: 96 FL (ref 78–100)
PLATELET # BLD AUTO: 222 10E9/L (ref 150–450)
POTASSIUM SERPL-SCNC: 4.1 MMOL/L (ref 3.4–5.3)
RBC # BLD AUTO: 3.69 10E12/L (ref 3.8–5.2)
SODIUM SERPL-SCNC: 142 MMOL/L (ref 133–144)
WBC # BLD AUTO: 8 10E9/L (ref 4–11)

## 2020-10-03 PROCEDURE — 250N000011 HC RX IP 250 OP 636: Performed by: PHYSICIAN ASSISTANT

## 2020-10-03 PROCEDURE — 85027 COMPLETE CBC AUTOMATED: CPT | Performed by: PHYSICIAN ASSISTANT

## 2020-10-03 PROCEDURE — 250N000013 HC RX MED GY IP 250 OP 250 PS 637: Performed by: PHYSICIAN ASSISTANT

## 2020-10-03 PROCEDURE — 96376 TX/PRO/DX INJ SAME DRUG ADON: CPT

## 2020-10-03 PROCEDURE — 96374 THER/PROPH/DIAG INJ IV PUSH: CPT

## 2020-10-03 PROCEDURE — 36415 COLL VENOUS BLD VENIPUNCTURE: CPT | Performed by: PHYSICIAN ASSISTANT

## 2020-10-03 PROCEDURE — 80048 BASIC METABOLIC PNL TOTAL CA: CPT | Performed by: PHYSICIAN ASSISTANT

## 2020-10-03 PROCEDURE — G0378 HOSPITAL OBSERVATION PER HR: HCPCS

## 2020-10-03 PROCEDURE — 99217 PR OBSERVATION CARE DISCHARGE: CPT | Performed by: PHYSICIAN ASSISTANT

## 2020-10-03 PROCEDURE — 74019 RADEX ABDOMEN 2 VIEWS: CPT

## 2020-10-03 PROCEDURE — 258N000003 HC RX IP 258 OP 636: Performed by: PHYSICIAN ASSISTANT

## 2020-10-03 RX ORDER — LISINOPRIL 10 MG/1
5 TABLET ORAL EVERY EVENING
COMMUNITY
End: 2021-01-07

## 2020-10-03 RX ORDER — LISINOPRIL 5 MG/1
5 TABLET ORAL EVERY EVENING
Status: DISCONTINUED | OUTPATIENT
Start: 2020-10-03 | End: 2020-10-03

## 2020-10-03 RX ORDER — ACETAMINOPHEN 325 MG/1
975 TABLET ORAL EVERY 8 HOURS
Status: DISCONTINUED | OUTPATIENT
Start: 2020-10-03 | End: 2020-10-03 | Stop reason: HOSPADM

## 2020-10-03 RX ORDER — MULTIVITAMIN WITH IRON
1 TABLET ORAL DAILY
COMMUNITY

## 2020-10-03 RX ORDER — KETOROLAC TROMETHAMINE 30 MG/ML
30 INJECTION, SOLUTION INTRAMUSCULAR; INTRAVENOUS EVERY 6 HOURS PRN
Status: DISCONTINUED | OUTPATIENT
Start: 2020-10-03 | End: 2020-10-03 | Stop reason: HOSPADM

## 2020-10-03 RX ORDER — UBIDECARENONE 75 MG
100 CAPSULE ORAL DAILY
COMMUNITY

## 2020-10-03 RX ORDER — LIDOCAINE 4 G/G
1-2 PATCH TOPICAL
Status: DISCONTINUED | OUTPATIENT
Start: 2020-10-03 | End: 2020-10-03 | Stop reason: HOSPADM

## 2020-10-03 RX ORDER — LIDOCAINE 4 G/G
1-2 PATCH TOPICAL EVERY 24 HOURS
Qty: 15 PATCH | Refills: 0 | Status: SHIPPED | OUTPATIENT
Start: 2020-10-03 | End: 2020-10-14

## 2020-10-03 RX ORDER — OXYCODONE HYDROCHLORIDE 10 MG/1
10 TABLET ORAL 4 TIMES DAILY PRN
Qty: 30 TABLET | Refills: 0 | Status: SHIPPED | OUTPATIENT
Start: 2020-10-03 | End: 2020-10-14

## 2020-10-03 RX ADMIN — PANTOPRAZOLE SODIUM 40 MG: 40 TABLET, DELAYED RELEASE ORAL at 07:45

## 2020-10-03 RX ADMIN — OXYCODONE HYDROCHLORIDE 10 MG: 5 TABLET ORAL at 14:06

## 2020-10-03 RX ADMIN — OXYCODONE HYDROCHLORIDE 10 MG: 5 TABLET ORAL at 07:45

## 2020-10-03 RX ADMIN — SODIUM CHLORIDE: 9 INJECTION, SOLUTION INTRAVENOUS at 01:55

## 2020-10-03 RX ADMIN — LISINOPRIL 5 MG: 5 TABLET ORAL at 07:45

## 2020-10-03 RX ADMIN — TAMSULOSIN HYDROCHLORIDE 0.4 MG: 0.4 CAPSULE ORAL at 07:45

## 2020-10-03 RX ADMIN — ACETAMINOPHEN 975 MG: 325 TABLET, FILM COATED ORAL at 14:05

## 2020-10-03 RX ADMIN — OXYCODONE HYDROCHLORIDE 10 MG: 5 TABLET ORAL at 10:36

## 2020-10-03 RX ADMIN — HYDROMORPHONE HYDROCHLORIDE 0.3 MG: 1 INJECTION, SOLUTION INTRAMUSCULAR; INTRAVENOUS; SUBCUTANEOUS at 04:23

## 2020-10-03 RX ADMIN — LIDOCAINE 2 PATCH: 560 PATCH PERCUTANEOUS; TOPICAL; TRANSDERMAL at 14:07

## 2020-10-03 RX ADMIN — HYDROMORPHONE HYDROCHLORIDE 0.3 MG: 1 INJECTION, SOLUTION INTRAMUSCULAR; INTRAVENOUS; SUBCUTANEOUS at 01:07

## 2020-10-03 RX ADMIN — OXYCODONE HYDROCHLORIDE 10 MG: 5 TABLET ORAL at 01:59

## 2020-10-03 NOTE — PLAN OF CARE
PRIMARY DIAGNOSIS: ACUTE RENAL COLIC     OUTPATIENT/OBSERVATION GOALS TO BE MET BEFORE DISCHARGE  1. Pain Status: No improvement noted. Consider adjustment in pain regimen.     2. Tolerating adequate PO diet: Yes     3. Surgical Intervention planned: No     4. Cleared by consultants (if involved): No     5. Return to near baseline physical activity: Yes     Discharge Planner Nurse   Safe discharge environment identified: Yes  Barriers to discharge: Yes         Please review provider order for any additional goals.   Nurse to notify provider when observation goals have been met and patient is ready for discharge.     VSS. Afebrile. Pt continues to complain of right flank pain. Pt crying and rocking in bed. Oxycodone and heat pack given.  Denies nausea. KUB ordered this am, Holding off on breakfast until results come back. Up independently in room. Straining urine.

## 2020-10-03 NOTE — DISCHARGE SUMMARY
[facility] Outpatient / Observation Unit  Discharge Summary        Antoinette Romero MRN# [id number]   YOB: 1960 Age: 60 year old     Date of Admission:  10/2/2020  Date of Discharge:  10/3/2020  Admitting Physician:  Jose Antonio Stewart MD  Discharge Physician: Amy Colunga PA-C, LEAH  Discharging Service: Hospitalist      Primary Provider: Umu Roberto  Primary Care Physician Phone Number: [phone]         Primary Discharge Diagnoses:    Summary of Stay:    Antoinette Romero is a 60 year old female with a PMH significant for HTN, GERD, s/p gastric bypass 2013 complicated by anastomotic ulcers in 2015, diverticulosis, anxiety/depression, PTSD, history of chronic pain (was on methadone in 2019) due to cervical spine injury s/p fusion who presented 10/2/2020 as a direct admit for on-going left flank and lower groin pain due to left renal stone. Patient had been dealing with left flank pain for the past 2 weeks. She was found to have a 3-4mm left UVJ stone on 9/20/20. She was treated symptomatically at first, but her symptoms worsened so she presented again to the  on 10/2/20 and repeat CT showed persisting stone with left hydronephrosis and hydroureter so she was admitted for pain control and monitoring. The patient was started on symptomatic cares and urine was strained. She did end up passing the stone overnight. She states that the left flank pain improved but shortly after this she developed right lower back, hip and buttock pain, this was intractable and 10/10 per patient. A KUB was obtained that was negative, no residual stone noted.      1. 3-4 mm Left UVJ stone with hydronephrosis/hydroureter   --UA did not convincing for acute infection  -- passed, stone analysis pending     2. Right Lower Back Pain   -- seems most c/w MSK cause   -- no focal deficits noted on exam   -- discussed treatment options, patient is very resistant to trying anything for pain. Discussed tylenol, muscle  relaxer, lidocaine patches, steroids and topical capsaicin or voltaren gel and patient declines all of these.   -- she agrees to try an abdominal binder   -- will have her continue tylenol, as needed oxycodone, ice/heat, lidocaine patches or over the counter voltaren gel.  -- patient to follow up with PCP within 1 week for recheck for continued management      3. HTN--Bp stable.   --Resume Lisinopril     4. GERD: resume Protonix      5. Asymptomatic COVID testing negative         Secondary Discharge Diagnoses:     Past Medical History:   Diagnosis Date     Anastomotic ulcer 3/28/2014    smoking history; 1PPD     Anxiety      Arthritis      Migraine      Morbid obesity (H)     s/p gastric bypass            Code Status:      Full Code        Brief Hospital Summary:        Reason for your hospital stay      You were registered to the observation unit for left flank pain due to   left kidney stone. You were able to pass this overnight but then you   developed right lower back pain. Repeat imaging did not reveal any   retained stones and your symptoms were thought to be musculoskeletal in   nature. You were hesitant to try oral medications for pain, so will be   sent home with an abdominal binder and a small supply of as needed   oxycodone.         Please refer to initial admission history and physical for further details.   Briefly, Antoinette Romero was admitted on 10/2/2020 for acute back pain.   Initial work up in the ED did not reveal evidence of significant neurologic deficits concerning for cauda equina syndrome or infected stone. No infectious or malignant process was identified. Pt was registered to the Observation Unit for further evaluation and pain control. She did end up passing the stone overnight. She states that the left flank pain improved but shortly after this she developed right lower back, hip and buttock pain, this was intractable and 10/10 per patient. A KUB was obtained that was negative, no  residual stone noted.   Pt was placed on oral multi-modal pain regiment and was ambulated in the rowe. On the day of discharge, pt's pain improved and is able to perform basic ADLs. Safe discharge plan was identified and pt was discharged with po pain meds, education regarding home therapies and set up with follow up care.         Significant Lab During Hospitalization:      No results for input(s): PH, PHV, PO2, PO2V, SAT, PCO2, PCO2V, HCO3, HCO3V in the last 168 hours.  Recent Labs   Lab 10/03/20  0625 10/02/20  1755   WBC 8.0 13.1*   HGB 11.0* 14.0   HCT 35.4 44.8   MCV 96 97    313          Lab Results   Component Value Date     10/03/2020     10/02/2020     08/13/2020    Lab Results   Component Value Date    CHLORIDE 112 10/03/2020    CHLORIDE 109 10/02/2020    CHLORIDE 106 08/13/2020    Lab Results   Component Value Date    BUN 15 10/03/2020    BUN 16 10/02/2020    BUN 12 08/13/2020      Lab Results   Component Value Date    POTASSIUM 4.1 10/03/2020    POTASSIUM 4.7 10/02/2020    POTASSIUM 4.1 08/13/2020    Lab Results   Component Value Date    CO2 26 10/03/2020    CO2 28 10/02/2020    CO2 27 08/13/2020    Lab Results   Component Value Date    CR 0.82 10/03/2020    CR 0.91 10/02/2020    CR 0.63 08/13/2020        No results for input(s): CULT in the last 168 hours.  Recent Labs   Lab 10/03/20  0625 10/02/20  1755    140   POTASSIUM 4.1 4.7   CHLORIDE 112* 109   CO2 26 28   ANIONGAP 4 3   GLC 88 113*   BUN 15 16   CR 0.82 0.91   GFRESTIMATED 78 69   GFRESTBLACK >90 79   BESSY 7.8* 9.0     No results for input(s): DD in the last 168 hours.  Recent Labs   Lab 10/03/20  0625 10/02/20  1755    140   POTASSIUM 4.1 4.7   CHLORIDE 112* 109   CO2 26 28   GLC 88 113*     No results for input(s): SED, CRP in the last 168 hours.  No results for input(s): INR in the last 168 hours.  No results for input(s): LACT in the last 168 hours.  No results for input(s): LIPASE in the last 168  hours.  No results for input(s): TSH in the last 168 hours.  No results for input(s): TROPONIN, TROPI, TROPR in the last 168 hours.    Invalid input(s): TROP, TROPONINIES  No results for input(s): COLOR, APPEARANCE, URINEGLC, URINEBILI, URINEKETONE, SG, UBLD, URINEPH, PROTEIN, UROBILINOGEN, NITRITE, LEUKEST, RBCU, WBCU in the last 168 hours.             Significant Imaging During Hospitalization:      Recent Results (from the past 48 hour(s))   XR KUB    Narrative    ABDOMEN ONE VIEW  10/3/2020 8:09 AM     HISTORY: Evaluate stone position.    COMPARISON: CT abdomen and pelvis 3/10/2018.      Impression    IMPRESSION: No visible urolithiasis. Nonobstructive bowel gas pattern.  Cholecystectomy. No free air. Spine degenerative changes diffusely.    JEAN CARLOS COLEMAN MD              Pending Results:        Unresulted Labs Ordered in the Past 30 Days of this Admission     Date and Time Order Name Status Description    10/2/2020 2257 Stone analysis In process               Consultations This Hospital Stay:      No consultations were requested during this admission         Discharge Instructions and Follow-Up:      Follow-up Appointments     Follow-up and recommended labs and tests       Follow up with your family practice clinic in the next 1 week for   recheck.  Alternate ice and heat to the lower back as needed for comfort.  You can use over the counter Voltaren gel to the lower back as needed for   pain if the lidocaine patches are not helpful.         Pt instructed to follow up with PCP in 7 days.  Follow up with outpatient therapy if indicated.       Discharge Disposition:      Discharged to home         Discharge Medications:        Current Discharge Medication List      START taking these medications    Details   Lidocaine (LIDOCARE) 4 % Patch Place 1-2 patches onto the skin every 24 hours To prevent lidocaine toxicity, patient should be patch free for 12 hrs daily.  Qty: 15 patch, Refills: 0    Associated Diagnoses:  "Bilateral low back pain without sciatica, unspecified chronicity      oxyCODONE (ROXICODONE) 10 MG tablet Take 1 tablet (10 mg) by mouth 4 times daily as needed for moderate to severe pain  Qty: 30 tablet, Refills: 0    Associated Diagnoses: Bilateral low back pain without sciatica, unspecified chronicity         CONTINUE these medications which have NOT CHANGED    Details   acetaminophen (TYLENOL) 325 MG tablet Take 2 tablets (650 mg) by mouth every 4 hours as needed for mild pain  Qty: 100 tablet    Associated Diagnoses: Status post cervical spinal fusion      calcium gluconate 500 MG tablet Take 500 mg by mouth daily      cyanocobalamin (VITAMIN B-12) 100 MCG tablet Take 100 mcg by mouth daily      lisinopril (ZESTRIL) 10 MG tablet Take 5 mg by mouth every evening      multivitamin CF FORMULA (CHOICEFUL) chewable tablet Take 1 tablet by mouth 2 times daily      pantoprazole (PROTONIX) 20 MG EC tablet TAKE ONE TABLET BY MOUTH 30 TO 60 MINUTES BEFORE A MEAL EVERY DAY  Qty: 90 tablet, Refills: 3    Associated Diagnoses: Gastroesophageal reflux disease with esophagitis      vitamin (B COMPLEX-C) tablet Take 1 tablet by mouth daily                 Allergies:         Allergies   Allergen Reactions     Keflex [Cephalexin-Fd&C Yellow #6] Anaphylaxis     Unasyn Anaphylaxis and Swelling     Azithromycin      Heart palpitations     Compazine [Prochlorperazine] Other (See Comments)     headaches     Zofran [Ondansetron] Other (See Comments)     headaches           Condition and Physical on Discharge:      Discharge condition: Stable   Vitals: Blood pressure (!) 141/77, pulse 72, temperature 97.8  F (36.6  C), temperature source Oral, resp. rate 16, height 1.717 m (5' 7.6\"), weight 58.2 kg (128 lb 6.4 oz), last menstrual period 03/01/2010, SpO2 95 %, not currently breastfeeding.  128 lbs 6.4 oz      GENERAL:  Comfortable.  PSYCH: pleasant, oriented, No acute distress.  HEENT:  PERRLA. Normal conjunctiva, normal hearing, nasal " mucosa and Oropharynx are normal.  NECK:  Supple, no neck vein distention, adenopathy or bruits, normal thyroid.  HEART:  Normal S1, S2 with no murmur, no pericardial rub, gallops or S3 or S4.  LUNGS:  Clear to auscultation, normal Respiratory effort. No wheezing, rales or ronchi.  ABDOMEN:  Soft, no hepatosplenomegaly, normal bowel sounds. Non-tender, non distended.   EXTREMITIES:  No pedal edema, +2 pulses bilateral and equal. Back symmetric, no curvature. ROM normal. No CVA tenderness. negative findings: no skin lesions, erythema, or scars, no tenderness to percussion or palpitation, no tenderness to palpation   SKIN:  Dry to touch, No rash, wound or ulcerations.  NEUROLOGIC:  CN 2-12 intact, BL 5/5 symmetric upper and lower extremity strength, sensation is intact with no focal deficits.

## 2020-10-03 NOTE — PROGRESS NOTES
Patient's After Visit Summary was reviewed with patient.   Patient verbalized understanding of After Visit Summary, recommended follow up and was given an opportunity to ask questions.   Discharge medications sent home with patient/family: YES   Discharged with other:friend

## 2020-10-03 NOTE — PLAN OF CARE
ROOM # 206-2    Living Situation (if not independent, order SW consult):   Facility name: lives with    : [facility]     Activity level at baseline: Ind  Activity level on admit: Ind       Patient registered to observation; given Patient Bill of Rights; given the opportunity to ask questions about observation status and their plan of care.  Patient has been oriented to the observation room, bathroom and call light is in place.    Discussed discharge goals and expectations with patient/family.

## 2020-10-03 NOTE — PLAN OF CARE
PRIMARY DIAGNOSIS: ACUTE RENAL COLIC     OUTPATIENT/OBSERVATION GOALS TO BE MET BEFORE DISCHARGE  1. Pain Status: No improvement noted. Consider adjustment in pain regimen.     2. Tolerating adequate PO diet: Yes     3. Surgical Intervention planned: No     4. Cleared by consultants (if involved): No     5. Return to near baseline physical activity: Yes     Discharge Planner Nurse   Safe discharge environment identified: Yes  Barriers to discharge: Yes         Please review provider order for any additional goals.   Nurse to notify provider when observation goals have been met and patient is ready for discharge.     VSS. Afebrile. Pt continues to complain of right flank pain. Oxycodone and heat pack given.  Denies nausea. KUB is normal. Up independently in room.

## 2020-10-03 NOTE — PROGRESS NOTES
[facility]  Hospitalist Progress Note  Amy Colunga PA-C, LEAH 10/03/2020    Reason for Stay (Diagnosis): back pain, left kidney stone         Assessment and Plan:      Summary of Stay:    Antoinette Romero is a 60 year old female with a PMH significant for HTN, GERD, s/p gastric bypass 2013 complicated by anastomotic ulcers in 2015, diverticulosis, anxiety/depression, PTSD, history of chronic pain (was on methadone in 2019) due to cervical spine injury s/p fusion who presented 10/2/2020 as a direct admit for on-going left flank and lower groin pain due to left renal stone. Patient had been dealing with left flank pain for the past 2 weeks. She was found to have a 3-4mm left UVJ stone on 9/20/20. She was treated symptomatically at first, but her symptoms worsened so she presented again to the  on 10/2/20 and repeat CT showed persisting stone with left hydronephrosis and hydroureter so she was admitted for pain control and monitoring. The patient was started on symptomatic cares and urine was strained. She did end up passing the stone overnight. She states that the left flank pain improved but shortly after this she developed right lower back, hip and buttock pain, this was intractable and 10/10 per patient. A KUB was obtained that was negative, no residual stone noted.      1. 3-4 mm Left UVJ stone with hydronephrosis/hydroureter   --UA did not convincing for acute infection  -- passed, stone analysis pending    2. Right Lower Back Pain   -- seems most c/w MSK cause   -- no focal deficits noted on exam   -- discussed treatment options, patient is very resistant to trying anything for pain. Discussed tylenol, muscle relaxer, lidocaine patches, steroid or topical capsaicin or voltaren gel and patient declines all of these.   -- she agrees to try an abdominal binder for now  -- if this provided enough relief then will plan to discharge with a binder and a small supply of oxycodone with close  "OP follow up     3. HTN--Bp stable.   --Resume Lisinopril     4. GERD: resume Protonix      5. Asymptomatic COVID testing negative     DVT Prophylaxis: Low Risk/Ambulatory with no VTE prophylaxis indicated  Code Status: Full Code  Discharge Dispo: to home   Estimated Disch Date / # of Days until Disch: today or tomorrow pending pain control         Interval History (Subjective):      Patient states that her left flank pain is improved. She now has right lower back and buttock pain. This pain is a achy, squeezing kind of pain. She has no radiation to the LEs. Pain seems worse with movement and palpation. Denies any abdominal pain. Has not had a BM today, but this is normal for her. She has a history of chronic neck pain but denies any history of lumbar issues. No recent falls or injuries. She is resistant to taking any meds, as she states she has a history of opioid dependence from her neck issues and she does not want to go down that road again.                   Physical Exam:      Last Vital Signs:  BP (!) 141/77 (BP Location: Right arm)   Pulse 72   Temp 97.8  F (36.6  C) (Oral)   Resp 16   Ht 1.717 m (5' 7.6\")   Wt 58.2 kg (128 lb 6.4 oz)   LMP 03/01/2010   SpO2 95%   BMI 19.76 kg/m      I/O last 3 completed shifts:  In: 1905 [I.V.:1905]  Out: 200 [Urine:200]  Vitals:    10/02/20 1706   Weight: 58.2 kg (128 lb 6.4 oz)       Constitutional: Awake, alert, cooperative, no apparent distress   Respiratory: Clear to auscultation bilaterally, no crackles or wheezing   Cardiovascular: Regular rate and rhythm, normal S1 and S2, and no murmur noted   Abdomen: Normal bowel sounds, soft, non-distended, non-tender   Skin: No rashes, no cyanosis, dry to touch   Neuro: Alert and oriented x3, no weakness, numbness, memory loss   Extremities: No edema, normal range of motion   Other(s): No midline lumbar tender to palpation, she is tender to palpation to the right lumbar paraspinal region and right sciatic tract      "   All other systems: Negative          Medications:      All current medications were reviewed with changes reflected in problem list.         Data:      All new lab and imaging data was reviewed.   Labs:  No results for input(s): CULT in the last 168 hours.  No results for input(s): PH, PHARTERIAL, PO2, [id number], SAT, PCO2, HCO3, BASEEXCESS, TABITHA, BEB in the last 168 hours.    Invalid input(s): [id number]       Lab Results   Component Value Date     10/03/2020     10/02/2020     08/13/2020    Lab Results   Component Value Date    CHLORIDE 112 10/03/2020    CHLORIDE 109 10/02/2020    CHLORIDE 106 08/13/2020    Lab Results   Component Value Date    BUN 15 10/03/2020    BUN 16 10/02/2020    BUN 12 08/13/2020      Lab Results   Component Value Date    POTASSIUM 4.1 10/03/2020    POTASSIUM 4.7 10/02/2020    POTASSIUM 4.1 08/13/2020    Lab Results   Component Value Date    CO2 26 10/03/2020    CO2 28 10/02/2020    CO2 27 08/13/2020    Lab Results   Component Value Date    CR 0.82 10/03/2020    CR 0.91 10/02/2020    CR 0.63 08/13/2020        Recent Labs   Lab 10/03/20  0625 10/02/20  1755   WBC 8.0 13.1*   HGB 11.0* 14.0   HCT 35.4 44.8   MCV 96 97    313     Recent Labs   Lab 10/03/20  0625 10/02/20  1755    140   POTASSIUM 4.1 4.7   CHLORIDE 112* 109   CO2 26 28   ANIONGAP 4 3   GLC 88 113*   BUN 15 16   CR 0.82 0.91   GFRESTIMATED 78 69   GFRESTBLACK >90 79   BESSY 7.8* 9.0     No results for input(s): DD in the last 168 hours.  No results for input(s): SED, CRP in the last 168 hours.  No results for input(s): INR in the last 168 hours.  No results for input(s): CHOL, HDL, LDL, TRIG, CHOLHDLRATIO in the last 168 hours.  No results for input(s): TROPONIN, TROPI, TROPR in the last 168 hours.    Invalid input(s): TROP, TROPONINIES  No results for input(s): TSH in the last 168 hours.  No results for input(s): COLOR, APPEARANCE, URINEGLC, URINEBILI, URINEKETONE, SG, UBLD, URINEPH, PROTEIN,  UROBILINOGEN, NITRITE, LEUKEST, RBCU, WBCU in the last 168 hours.   Imaging:   Recent Results (from the past 48 hour(s))   XR KUB    Narrative    ABDOMEN ONE VIEW  10/3/2020 8:09 AM     HISTORY: Evaluate stone position.    COMPARISON: CT abdomen and pelvis 3/10/2018.      Impression    IMPRESSION: No visible urolithiasis. Nonobstructive bowel gas pattern.  Cholecystectomy. No free air. Spine degenerative changes diffusely.

## 2020-10-03 NOTE — PLAN OF CARE
PRIMARY DIAGNOSIS: ACUTE RENAL COLIC    OUTPATIENT/OBSERVATION GOALS TO BE MET BEFORE DISCHARGE  1. Pain Status: Improved-controlled with oral pain medications.    2. Tolerating adequate PO diet: Yes    3. Surgical Intervention planned: No    4. Cleared by consultants (if involved): No    5. Return to near baseline physical activity: Yes    Discharge Planner Nurse   Safe discharge environment identified: Yes  Barriers to discharge: Yes       Entered by: Jaymie Bashir 10/02/2020 9:06 PM     Please review provider order for any additional goals.   Nurse to notify provider when observation goals have been met and patient is ready for discharge.    VSS, on RA, afebrile. Up Independently. Pt complains of left flank pain that radiates around her entire abdomen and to her back, prn oxy given. Denies nausea at this time. Regular diet- has an appetite, ordering dinner. Tearful on admission due to amount of pain. Plan- IV bolus, IV fluids, strain urine, flomax, pain management, antiemetics, xr kub in am, if pain unmanageable urology will be consulted.

## 2020-10-03 NOTE — PROGRESS NOTES
Informed by RN that pt has passed stone!  Stone sent for stone analysis  KUB cancelled for tomorrow  Still somewhat sore per pt. Keep overnight for sxs control. Cont IVF for now, pain meds.   Will be able to discharge in am.

## 2020-10-03 NOTE — PLAN OF CARE
PRIMARY DIAGNOSIS: ACUTE RENAL COLIC     OUTPATIENT/OBSERVATION GOALS TO BE MET BEFORE DISCHARGE  1. Pain Status: Improved-controlled with oral pain medications.     2. Tolerating adequate PO diet: Yes     3. Surgical Intervention planned: No     4. Cleared by consultants (if involved): No     5. Return to near baseline physical activity: Yes     Discharge Planner Nurse   Safe discharge environment identified: Yes  Barriers to discharge: Yes       Entered by: Jaymie Bashir 10/02/2020 9:06 PM  Please review provider order for any additional goals.   Nurse to notify provider when observation goals have been met and patient is ready for discharge.     VSS, on RA, afebrile. Up Independently. Rating left flank pain 5/10, prn oxy given. Denies nausea at this time. Regular diet- good appetite. IV bolus completed, IV maintenance fluid running at 175mL/hr. Plan- strain urine, flomax, pain management, antiemetics, xr kub in am, if pain unmanageable urology will be consulted.

## 2020-10-03 NOTE — PHARMACY-ADMISSION MEDICATION HISTORY
Admission medication history interview status for this patient is complete. See [facility] admission navigator for allergy information, prior to admission medications and immunization status.     Medication history interview done via telephone during Covid-19 pandemic, indicate source(s): Patient  Medication history resources (including written lists, pill bottles, clinic record): SureScripts and care everywhere  Pharmacy: [facility] in [facility]    Changes made to PTA medication list:  Added: calcium, vitamin B12, B complex vitamin  Deleted: nicotine patches  Changed: multivitamin once daily --> twice daily    Actions taken by pharmacist (provider contacted, etc): Called pt to verify home med list     Additional medication history information:None    Medication reconciliation/reorder completed by provider prior to medication history?  Y   (Y/N)       Prior to Admission medications    Medication Sig Last Dose Taking? Auth Provider   acetaminophen (TYLENOL) 325 MG tablet Take 2 tablets (650 mg) by mouth every 4 hours as needed for mild pain 10/2/2020 at Unknown time Yes Ray Simon MD   calcium gluconate 500 MG tablet Take 500 mg by mouth daily 10/2/2020 at Unknown time Yes Unknown, Entered By History   cyanocobalamin (VITAMIN B-12) 100 MCG tablet Take 100 mcg by mouth daily 10/2/2020 at Unknown time Yes Unknown, Entered By History   lisinopril (ZESTRIL) 10 MG tablet Take 5 mg by mouth every evening 10/1/2020 at pm Yes Unknown, Entered By History   multivitamin CF FORMULA (CHOICEFUL) chewable tablet Take 1 tablet by mouth 2 times daily 10/2/2020 at am Yes Unknown, Entered By History   pantoprazole (PROTONIX) 20 MG EC tablet TAKE ONE TABLET BY MOUTH 30 TO 60 MINUTES BEFORE A MEAL EVERY DAY 10/2/2020 at am Yes Umu Roberto PA-C   vitamin (B COMPLEX-C) tablet Take 1 tablet by mouth daily 10/2/2020 at Unknown time Yes Unknown, Entered By History

## 2020-10-03 NOTE — PLAN OF CARE
"PRIMARY DIAGNOSIS: ACUTE RENAL COLIC    OUTPATIENT/OBSERVATION GOALS TO BE MET BEFORE DISCHARGE  1. Pain Status: No improvement noted. Consider adjustment in pain regimen.    2. Tolerating adequate PO diet: Yes    3. Surgical Intervention planned: No    4. Cleared by consultants (if involved): No    5. Return to near baseline physical activity: Yes    Discharge Planner Nurse   Safe discharge environment identified: Yes  Barriers to discharge: Yes       Entered by: Janny Teran 10/03/2020 5:09 AM     Please review provider order for any additional goals.   Nurse to notify provider when observation goals have been met and patient is ready for discharge.    VSS. Afebrile. Pt continues to complain of 8-9/10 right flank pain described as a spasm pain. Oxycodone x1. Dilaudid x2. Heating pad to back. Pt reluctant to try other pain medication options. Discussed with patient about paging provider for further pain medication options. Pt declines at this time as \"I just don't want to take anything else\". Denies nausea. Tolerating regular diet. Up independently in room. Straining urine overnight. No stones or sediment appreciated at this time. Will continue to monitor.   "

## 2020-10-03 NOTE — PLAN OF CARE
PRIMARY DIAGNOSIS: ACUTE RENAL COLIC    OUTPATIENT/OBSERVATION GOALS TO BE MET BEFORE DISCHARGE  1. Pain Status: No improvement noted. Consider adjustment in pain regimen.    2. Tolerating adequate PO diet: Yes    3. Surgical Intervention planned: No    4. Cleared by consultants (if involved): No    5. Return to near baseline physical activity: Yes    Discharge Planner Nurse   Safe discharge environment identified: Yes  Barriers to discharge: Yes       Entered by: Janny Teran 10/03/2020 2:20 AM     Please review provider order for any additional goals.   Nurse to notify provider when observation goals have been met and patient is ready for discharge.    VSS. Afebrile. 5/10 right flank pain at start of shift. Declining PRN interventions. Pain later 9/10 bilateral flank pain, right greater then left. IV dilaudid x1 without relief. Oxycodone 10 mg x1. Heating pad ordered for pain.Denies nausea. Tolerating regular diet. Up independently in room. Straining urine overnight. No stones or sediment appreciated at this time. Will continue to monitor.

## 2020-10-05 ENCOUNTER — TELEPHONE (OUTPATIENT)
Dept: FAMILY MEDICINE | Facility: CLINIC | Age: 60
End: 2020-10-05

## 2020-10-05 LAB — COPATH REPORT: NORMAL

## 2020-10-06 NOTE — TELEPHONE ENCOUNTER
Pt was in [facility]  10/02/2020 3-4mm L UVJ stone w hydronephrosis and pain management.  Pt did pass stone  Discharge Instructions and Follow-Up:      Follow-up Appointments     Follow-up and recommended labs and tests       Follow up with your family practice clinic in the next 1 week for   recheck.  Alternate ice and heat to the lower back as needed for comfort.  You can use over the counter Voltaren gel to the lower back as needed for   pain if the lidocaine patches are not helpful.      Pt instructed to follow up with PCP in 7 days.  Follow up with outpatient therapy if indicated.     New medications  Lidocaine patches  Oxycodone given 30 tabs    LM on VM to call this RN back if he should call the main clinic please forward to me [phone]  Ivanna Degroot RN

## 2020-10-07 LAB
APPEARANCE STONE: NORMAL
COMPN STONE: NORMAL
NUMBER STONE: 1
SIZE STONE: NORMAL MM
WT STONE: 20 MG

## 2020-10-07 NOTE — TELEPHONE ENCOUNTER
LM on  to call this RN back if he should call the main clinic please forward to me [phone]  Ivanna Degroot RN

## 2020-10-08 NOTE — TELEPHONE ENCOUNTER
LM on  to call this RN back if he should call the main clinic please forward to me [phone]  Ivanna Degroot RN

## 2020-10-14 ENCOUNTER — VIRTUAL VISIT (OUTPATIENT)
Dept: FAMILY MEDICINE | Facility: CLINIC | Age: 60
End: 2020-10-14
Payer: COMMERCIAL

## 2020-10-14 VITALS — WEIGHT: 125 LBS | HEIGHT: 68 IN | TEMPERATURE: 98 F | BODY MASS INDEX: 18.94 KG/M2

## 2020-10-14 DIAGNOSIS — M54.50 ACUTE RIGHT-SIDED LOW BACK PAIN WITHOUT SCIATICA: ICD-10-CM

## 2020-10-14 DIAGNOSIS — F32.2 SEVERE MAJOR DEPRESSION (H): Primary | ICD-10-CM

## 2020-10-14 PROCEDURE — 99214 OFFICE O/P EST MOD 30 MIN: CPT | Mod: 95 | Performed by: PHYSICIAN ASSISTANT

## 2020-10-14 ASSESSMENT — ANXIETY QUESTIONNAIRES
IF YOU CHECKED OFF ANY PROBLEMS ON THIS QUESTIONNAIRE, HOW DIFFICULT HAVE THESE PROBLEMS MADE IT FOR YOU TO DO YOUR WORK, TAKE CARE OF THINGS AT HOME, OR GET ALONG WITH OTHER PEOPLE: EXTREMELY DIFFICULT
5. BEING SO RESTLESS THAT IT IS HARD TO SIT STILL: NEARLY EVERY DAY
7. FEELING AFRAID AS IF SOMETHING AWFUL MIGHT HAPPEN: NEARLY EVERY DAY
1. FEELING NERVOUS, ANXIOUS, OR ON EDGE: NEARLY EVERY DAY
GAD7 TOTAL SCORE: 21
2. NOT BEING ABLE TO STOP OR CONTROL WORRYING: NEARLY EVERY DAY
3. WORRYING TOO MUCH ABOUT DIFFERENT THINGS: NEARLY EVERY DAY
6. BECOMING EASILY ANNOYED OR IRRITABLE: NEARLY EVERY DAY

## 2020-10-14 ASSESSMENT — COLUMBIA-SUICIDE SEVERITY RATING SCALE - C-SSRS
3. IN THE PAST MONTH, HAVE YOU BEEN THINKING ABOUT HOW YOU MIGHT KILL YOURSELF?: YES
6. HAVE YOU EVER DONE ANYTHING, STARTED TO DO ANYTHING, OR PREPARED TO DO ANYTHING TO END YOUR LIFE?: NO
5. IN THE PAST MONTH, HAVE YOU STARTED TO WORK OUT OR WORKED OUT THE DETAILS OF HOW TO KILL YOURSELF? DO YOU INTEND TO CARRY OUT THIS PLAN?: YES
1. WITHIN THE PAST MONTH, HAVE YOU WISHED YOU WERE DEAD OR WISHED YOU COULD GO TO SLEEP AND NOT WAKE UP?: YES
5. IN THE PAST MONTH, HAVE YOU STARTED TO WORK OUT OR WORKED OUT THE DETAILS OF HOW TO KILL YOURSELF? DO YOU INTEND TO CARRY OUT THIS PLAN?: NO
2. IN THE PAST MONTH, HAVE YOU ACTUALLY HAD ANY THOUGHTS OF KILLING YOURSELF?: YES

## 2020-10-14 ASSESSMENT — PATIENT HEALTH QUESTIONNAIRE - PHQ9
5. POOR APPETITE OR OVEREATING: NEARLY EVERY DAY
SUM OF ALL RESPONSES TO PHQ QUESTIONS 1-9: 26

## 2020-10-14 ASSESSMENT — MIFFLIN-ST. JEOR: SCORE: 1185.5

## 2020-10-14 NOTE — PROGRESS NOTES
"Antoinette Romero is a 60 year old female who is being evaluated via a billable telephone visit.      The patient has been notified of following:     \"This telephone visit will be conducted via a call between you and your physician/provider. We have found that certain health care needs can be provided without the need for a physical exam.  This service lets us provide the care you need with a short phone conversation.  If a prescription is necessary we can send it directly to your pharmacy.  If lab work is needed we can place an order for that and you can then stop by our lab to have the test done at a later time.    Telephone visits are billed at different rates depending on your insurance coverage. During this emergency period, for some insurers they may be billed the same as an in-person visit.  Please reach out to your insurance provider with any questions.    If during the course of the call the physician/provider feels a telephone visit is not appropriate, you will not be charged for this service.\"    Patient has given verbal consent for Telephone visit?  Yes    What phone number would you like to be contacted at? [phone]    How would you like to obtain your AVS? Mail a copy    Subjective     Antoinette Romero is a 60 year old female who presents via phone visit today for the following health issues:    No future appointments.  Appointment Notes for this encounter:   LG- reviewed- Ph. [phone]- talk about depression (Hospital F/U back pain)    Health Maintenance Due   Topic Date Due     ADVANCE CARE PLANNING  1960     Pneumococcal Vaccine: Pediatrics (0 to 5 Years) and At-Risk Patients (6 to 64 Years) (1 of 1 - PPSV23) 09/14/1966     COLORECTAL CANCER SCREENING  09/14/1970     ZOSTER IMMUNIZATION (1 of 2) 09/14/2010     MAMMO SCREENING  10/17/2015     URINE DRUG SCREEN  05/01/2016     PAP  10/17/2016     PREVENTIVE CARE VISIT  01/03/2018     PHQ-9  07/16/2020     INFLUENZA VACCINE (1) 09/01/2020 " "    Health Maintenance addressed:  Mammogram, Pap Smear and Colon Cancer Screen/FIT    Pap Smear Pt agrees to schedule appointment today or future appointment scheduled, Mammogram Pt agrees to schedule appt today or future appt scheduled and Colonoscopy Pt agrees to colonoscopy - please pend referral and give pt info    MyChart Status:  Not Active Patient declined        HPI    Depression and Anxiety Follow-Up    How are you doing with your depression since your last visit? Worsened much worse    How are you doing with your anxiety since your last visit?  Worsened much worse    Are you having other symptoms that might be associated with depression or anxiety? Yes:  difficulty sleeping - all of it!    Have you had a significant life event? Relationship Concerns, Housing Concerns and Health Concerns     Do you have any concerns with your use of alcohol or other drugs? No     She is working through a divorce (trying to leave the home- she lives with her  who is mentally and emotionally and son)    She notes she does feel safe at home.    She currently seeing a therapist. Talking to therapist once weekly, Mohini Montalvo. Seeing her tomorrow.    She has spoken with psychiatrist about ECT therapy. She did not want to do it.    She has had trouble with anti-depressants in the past due to \"absorption problems\" following her gastric bypass. She has tried:  Lexapro, Prozac, Cymbalta, Pristiq, Buspar and Zoloft      She indicates the reasons she would like to live include her 4 year old grandson and her children (daughter lives near  and [place], one son also lives at home with her and ).     Social History     Tobacco Use     Smoking status: Current Every Day Smoker     Packs/day: 1.00     Types: Cigarettes     Smokeless tobacco: Never Used     Tobacco comment: quit 3/2018   Substance Use Topics     Alcohol use: No     Drug use: No     PHQ 11/7/2018 1/16/2020 10/14/2020   PHQ-9 Total Score 24 19 - "   Q9: Thoughts of better off dead/self-harm past 2 weeks Not at all Not at all More than half the days     RAFAL-7 SCORE 10/30/2014 1/16/2020 10/14/2020   Total Score 16 - -   Total Score - 19 21     Last PHQ-9 10/14/2020   1.  Little interest or pleasure in doing things 3   2.  Feeling down, depressed, or hopeless 3   3.  Trouble falling or staying asleep, or sleeping too much 3   4.  Feeling tired or having little energy 3   5.  Poor appetite or overeating 3   6.  Feeling bad about yourself 3   7.  Trouble concentrating 3   8.  Moving slowly or restless 3   Q9: Thoughts of better off dead/self-harm past 2 weeks 2   PHQ-9 Total Score 26   Difficulty at work, home, or with people Extremely dIfficult         RAFAL-7  10/14/2020   1. Feeling nervous, anxious, or on edge 3   2. Not being able to stop or control worrying 3   3. Worrying too much about different things 3   4. Trouble relaxing 3   5. Being so restless that it is hard to sit still 3   6. Becoming easily annoyed or irritable 3   7. Feeling afraid, as if something awful might happen 3   RAFAL-7 Total Score 21   If you checked any problems, how difficult have they made it for you to do your work, take care of things at home, or get along with other people? Extremely difficult       Suicide Assessment Five-step Evaluation and Treatment (SAFE-T)      How many servings of fruits and vegetables do you eat daily?  0-1    On average, how many sweetened beverages do you drink each day (Examples: soda, juice, sweet tea, etc.  Do NOT count diet or artificially sweetened beverages)?   0    How many days per week do you exercise enough to make your heart beat faster? 7    How many minutes a day do you exercise enough to make your heart beat faster? 30 - 60    How many days per week do you miss taking your medication? 0      Hospital Follow-up Visit:    Hospital/Nursing Home/IP Rehab Facility: [facility]  Date of Admission: 10/2/2020  Date of Discharge:  "10/3/2020  Reason(s) for Admission: right lower back pain - kidney stone      Was your hospitalization related to COVID-19? No   Problems taking medications regularly:  None  Medication changes since discharge: None  Problems adhering to non-medication therapy:  None    Summary of hospitalization:  [facility] discharge summary reviewed  Diagnostic Tests/Treatments reviewed.  Follow up needed: none  Other Healthcare Providers Involved in Patient s Care:         None  Update since discharge: stable.       Post Discharge Medication Reconciliation: discharge medications reconciled and changed, per note/orders.  Plan of care communicated with patient                Review of Systems   GENERAL:  No fevers  MUSCULOSKELETAL: As noted in HPI         Objective          Vitals:  No vitals were obtained today due to virtual visit.    healthy, alert and tearful at times  PSYCH: Alert and oriented times 3; coherent speech, normal   rate and volume, able to articulate logical thoughts, able   to abstract reason, no tangential thoughts, no hallucinations   or delusions  Her affect is anxious and tearful  RESP: No cough, no audible wheezing, able to talk in full sentences  Remainder of exam unable to be completed due to telephone visits          Assessment/Plan:    Assessment & Plan     Severe major depression (H)  I am concerned as patient continues to say how hopeless she is. She is having suicidal thoughts and has thought about where she would have it happen (not how). She continues to say that she would never commit suicide because she would \"not do that\" to her children.  She notes she is in the midst of trying to get a divorce from her . This is stressful. She would like to leave but does not know where to go.  Deena Hughes also joined call to talk with patient. She continued to talk to patient after my visit was over.  Safety plan created and will be mailed to patient with AVS.  Start methylfolate in the " mean time. Follow up with [facility] next week for GeneSight testing.  Acute right-sided low back pain without sciatica  Diclofenac prescribed. It may not be covered. She can buy OTC if needed.  - diclofenac (VOLTAREN) 1 % topical gel; Place 2 g onto the skin 4 times daily as needed for moderate pain      Depression Screening Follow Up    PHQ 10/14/2020   PHQ-9 Total Score 26   Q9: Thoughts of better off dead/self-harm past 2 weeks More than half the days          C-SSRS (Brief [place]) 10/14/2020   Within the last month, have you wished you were dead or wished you could go to sleep and not wake up? Yes   Within the last month, have you had any actual thoughts of killing yourself? Yes   Within the last month, have you been thinking about how you might do this? Yes   Within the last month, have you had these thoughts and had some intention of acting on them? No   Within the last month, have you started to work out or worked out the details of how to kill yourself with the intent to carry out this plan? Yes   Within the last month, have you ever done anything, started to do anything, or prepared to do anything to end your life? No       Follow Up    Follow Up Actions Taken  Referred patient back to mental health provider She has therapy with therapist tomorrow.    Discussed the following ways the patient can remain in a safe environment:  remove alcohol, remove drugs and dispose of old medications - she has disposed of narcotics recently.       Return in about 1 week (around 10/21/2020) for Depression follow up, GeneSight testing, In Person, With PCP (can use same day spot per [facility]).    Ciera Nichole PA-C  [facility]    Phone call duration:  45 minutes

## 2020-10-14 NOTE — PATIENT INSTRUCTIONS
"Try Methylfolate 7.5 mg daily.    Try diclofenac topically for the back pain. If unable to get as prescription can get over the counter.    SAFETY PLAN:  Step 1: Warning signs / cues (Thoughts, images, mood, situation, behavior) that a crisis may be developing:  Thoughts: \"People would be better off without me\", \"I'm a burden\", \"I can't do this anymore\" and \"I just want this to end\"  Images: obsessive thoughts of death or dying: giving away things, writting a will  Thinking Processes: highly critical and negative thoughts: I have no purpose  Mood: worsening depression, hopelessness and helplessness  Behaviors: isolating/withdrawing , can't stop crying, giving things away, saying good-bye, not taking care of myself, not taking care of my responsibilities, not sleeping enough and increasing frequency and duration of dissociation  Situations: loss: of people   Step 2: Coping strategies - Things I can do to take my mind off of my problems without contacting another person (relaxation technique, physical activity):  Physical Activities: deep breathing, engaging in hobbies, exercise  Focus on helpful thoughts:  \"This is temporary\" and \"I will get through this\"  Step 3: People and social settings that provide distraction:                 grandson            Step 4: Remind myself of people and things that are important to me and worth living for:                  Grandson, children     Step 5: When I am in crisis, I can ask these people to help me use my safety plan:                 Therapist      Step 6: Making the environment safe:                                   be around others  Step 7: Professionals or agencies I can contact during a crisis:  Suicide Prevention Lifeline: 1-500-941-TALK (4543)  Crisis Text Line Service: Text  \"[place]\" to 026290.  Local Crisis Services: If you are in immediate danger, call 911.  If you or someone you know is experiencing a mental health crisis and you need help, the following " "crisis  hotlines are available to help.     Suicide Prevention Lifeline: 3-062-343-TALK (1949)     Crisis Text Line Service: Text \"[place]\" to 634330.     [facility]    [place] [facility]  [facility]  [address] [place] [address]  [phone]  [facility] Crisis Response Services  (Adults & Children)  [phone]  [facility] -  Acute Psychiatric Services (APS)  Assessment & Referral: [phone]  Suicide Hotline: 849.353.7547    [facility]  [phone]    [facility] Adult [facility] Services   [phone]  Referrals: [phone]  Crisis: [phone]    [facility]    Emergency Department  [address] [place] [address]  [phone]  [place] [facility]    7-020-ZhemVxc (1-627.697.7304)  [facility]    [phone]  [facility]  Community Outreach for Psychiatric Emergencies  [phone]  [facility] Crisis Services  [facility] Adult [facility] - Crisis Services (24/7)  Information & Referrals: [phone]  Crisis Line: [phone]    [facility] Emergency Center (24/7)  [phone] TDD     Call 911 or go to my nearest emergency department.          Adapted from Safety Plan Template 2008 Amanda Rod and Noel Santana is reprinted with the express permission of the authors.  No portion of the Safety Plan Template may be reproduced without the express, written permission.  You can contact the authors at [email or web address] or [email or web address].    "

## 2020-10-15 ENCOUNTER — THERAPY VISIT (OUTPATIENT)
Dept: BEHAVIORAL HEALTH | Facility: CLINIC | Age: 60
End: 2020-10-15
Payer: COMMERCIAL

## 2020-10-15 ASSESSMENT — ANXIETY QUESTIONNAIRES: GAD7 TOTAL SCORE: 21
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
## 2020-10-21 NOTE — PROGRESS NOTES
Pre-Visit Planning     Future Appointments   Date Time Provider Department Center   10/22/2020  2:20 PM [facility] Roberto PA-C [facility]     Arrival Time for this Appointment:  2:00 PM   Appointment Notes for this encounter:   genesight ok per cj     Questionnaires Reviewed/Assigned  No additional questionnaires are needed    Last OV with provider  10/14/2020 LG Severe major depression    Hospital ER Visits  10/02 kidney stone.    Specialty Visits  [facility] 09/20/2020 Hematuria kidney stone  10/15/2020 Behavioral health    Imaging and Lab Review  See epic for review during kidney stone ER visit    Recent Procedures  none  Health Maintenance Due   Topic Date Due     ADVANCE CARE PLANNING  1960     DEPRESSION ACTION PLAN  1960     Pneumococcal Vaccine: Pediatrics (0 to 5 Years) and At-Risk Patients (6 to 64 Years) (1 of 1 - PPSV23) 09/14/1966     COLORECTAL CANCER SCREENING  09/14/1970     ZOSTER IMMUNIZATION (1 of 2) 09/14/2010     MAMMO SCREENING  10/17/2015     URINE DRUG SCREEN  05/01/2016     PAP  10/17/2016     PREVENTIVE CARE VISIT  01/03/2018     INFLUENZA VACCINE (1) 09/01/2020     Current Outpatient Medications   Medication     acetaminophen (TYLENOL) 325 MG tablet     calcium gluconate 500 MG tablet     cyanocobalamin (VITAMIN B-12) 100 MCG tablet     diclofenac (VOLTAREN) 1 % topical gel     lisinopril (ZESTRIL) 10 MG tablet     multivitamin CF FORMULA (CHOICEFUL) chewable tablet     pantoprazole (PROTONIX) 20 MG EC tablet     vitamin (B COMPLEX-C) tablet     No current facility-administered medications for this visit.        Refills due? none    Patient is not active on MyChart. Encouraged MyChart activation.    Patient preferred phone number: [phone]  Spoke to pt no new notes   Ivanna Degroot RN

## 2020-10-22 ENCOUNTER — OFFICE VISIT (OUTPATIENT)
Dept: FAMILY MEDICINE | Facility: CLINIC | Age: 60
End: 2020-10-22
Payer: COMMERCIAL

## 2020-10-22 ENCOUNTER — TRANSFERRED RECORDS (OUTPATIENT)
Dept: HEALTH INFORMATION MANAGEMENT | Facility: CLINIC | Age: 60
End: 2020-10-22

## 2020-10-22 VITALS
WEIGHT: 125 LBS | HEART RATE: 80 BPM | OXYGEN SATURATION: 98 % | TEMPERATURE: 98.2 F | SYSTOLIC BLOOD PRESSURE: 119 MMHG | DIASTOLIC BLOOD PRESSURE: 78 MMHG | RESPIRATION RATE: 18 BRPM | BODY MASS INDEX: 19.01 KG/M2

## 2020-10-22 DIAGNOSIS — Z00.00 HEALTH CARE MAINTENANCE: ICD-10-CM

## 2020-10-22 DIAGNOSIS — F32.2 SEVERE MAJOR DEPRESSION (H): Primary | ICD-10-CM

## 2020-10-22 DIAGNOSIS — F41.9 ANXIETY: ICD-10-CM

## 2020-10-22 PROBLEM — S14.109A: Status: RESOLVED | Noted: 2018-03-18 | Resolved: 2020-10-22

## 2020-10-22 PROCEDURE — 99213 OFFICE O/P EST LOW 20 MIN: CPT | Performed by: PHYSICIAN ASSISTANT

## 2020-10-22 RX ORDER — HYDROXYZINE HYDROCHLORIDE 25 MG/1
25 TABLET, FILM COATED ORAL EVERY 6 HOURS PRN
Qty: 15 TABLET | Refills: 0 | Status: SHIPPED | OUTPATIENT
Start: 2020-10-22 | End: 2023-04-20

## 2020-10-22 ASSESSMENT — PAIN SCALES - GENERAL: PAINLEVEL: SEVERE PAIN (6)
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
## 2020-10-22 NOTE — LETTER
Antoinette,    Thank you for choosing [facility] today for your health care needs.     [facility] is transforming primary care  At [facility], we re dedicated to constantly improve how we serve the health care needs of our patients and communities. We re currently making changes to the way we deliver care.     Changes you ll notice include:    An emphasis on building a relationship with a primary care provider    Access to a PAL (personal advocate and liaison) to help guide you with your care needs    Appointment lengths tailored to your specific needs and greater access to a care team to help you and your provider improve and maintain your health and well-being    Improved online access to your care team    Benefits of a primary care provider  If you don t have a designated primary care provider, we encourage you to get to know our care team online and find a provider you d like to see. Most of our providers have a short video on their online provider page. Visit [email or web address] to explore our providers and locations.    Benefits of having a primary care provider include:      They get to know you - your health history, family history and goals, making it easier to make a health plan together.     You get to know them - making health-related conversations and decisions easier      Primary care doctors help you when you re sick or hurt - but also focus on keeping you healthy with preventive care and screenings.      A doctor who sees you regularly is more likely to notice changes in your health.     You ll be connected to a broad care team who partners with your provider to support you.    Patient Advocate Liaison (PAL) Ivanna KAY RN, PAL  To help make sure you get the right care, at the right time, we include PALs, or Patient Advocate Liaisons, as part of your care team. Your PAL will be your first line of contact. They ll advocate for your needs and help you navigate our services, connecting you with care team members and  community resources to ensure your care is well coordinated. You ll be introduced to a PAL in an upcoming visit.   [phone]                      Expanded care team access with tailored appointment lengths  Depending on your health care needs, you may have longer or shorter appointments and see additional care team providers - including Medication Therapy Management (MTM) pharmacists, diabetes educators, behavioral health clinicians, or social workers. At times, they may be included in your visit with your provider, or you may see them individually.     Online access to your health care records and care team  [employer] is our online tool that makes it easy to see your health care information and communicate with your care team.     [employer] allows you to:     View your health maintenance plan so you know when you re due for a preventive screening    Send secure messages to your care team    View your health history and visit summaries     Schedule appointments     Complete questionnaires and eCheck-in before appointments      Get care from your provider with an e-visit      View and pay your bill     Sign up at [email or web address]. Once you have an account, you also can download the mobile lucio.     Ivanna KAY RN, PAL  [phone]

## 2020-10-29 NOTE — PROGRESS NOTES
Pre-Visit Planning     Future Appointments   Date Time Provider Department Center   10/30/2020 11:00 AM [facility] Roberto PA-C [facility]     Arrival Time for this Appointment: 10:45 AM   Appointment Notes for this encounter:   video visit(doximity)-1 week follow up     Questionnaires Reviewed/Assigned  No additional questionnaires are needed     Last OV with provider  10/22/2020 ? Genesight.    10/14/2020 LG Severe major depression     Hospital ER Visits  10/02 kidney stone.     Specialty Visits  [facility] 09/20/2020 Hematuria kidney stone  10/15/2020 Behavioral health     Imaging and Lab Review  See epic for review during kidney stone ER visit     Recent Procedures  none       Health Maintenance Due   Topic Date Due     ADVANCE CARE PLANNING  1960     DEPRESSION ACTION PLAN  1960     Pneumococcal Vaccine: Pediatrics (0 to 5 Years) and At-Risk Patients (6 to 64 Years) (1 of 1 - PPSV23) 09/14/1966     COLORECTAL CANCER SCREENING  09/14/1970     ZOSTER IMMUNIZATION (1 of 2) 09/14/2010     MAMMO SCREENING  10/17/2015     URINE DRUG SCREEN  05/01/2016     PAP  10/17/2016     PREVENTIVE CARE VISIT  01/03/2018     INFLUENZA VACCINE (1) 09/01/2020          Current Outpatient Medications   Medication     acetaminophen (TYLENOL) 325 MG tablet     calcium gluconate 500 MG tablet     cyanocobalamin (VITAMIN B-12) 100 MCG tablet     diclofenac (VOLTAREN) 1 % topical gel     lisinopril (ZESTRIL) 10 MG tablet     multivitamin CF FORMULA (CHOICEFUL) chewable tablet     pantoprazole (PROTONIX) 20 MG EC tablet     vitamin (B COMPLEX-C) tablet      No current facility-administered medications for this visit.          Refills due? none     Patient is not active on MyChart. Encouraged MyChart activation.     Patient preferred phone number: [phone]  LM on  to call back if changes are needed to appointment.   Ivanna KAY RN, BSN, PAL

## 2020-10-30 ENCOUNTER — VIRTUAL VISIT (OUTPATIENT)
Dept: FAMILY MEDICINE | Facility: CLINIC | Age: 60
End: 2020-10-30
Payer: COMMERCIAL

## 2020-10-30 ENCOUNTER — TELEPHONE (OUTPATIENT)
Dept: FAMILY MEDICINE | Facility: CLINIC | Age: 60
End: 2020-10-30

## 2020-10-30 DIAGNOSIS — F32.2 SEVERE MAJOR DEPRESSION (H): Primary | ICD-10-CM

## 2020-10-30 PROCEDURE — 99213 OFFICE O/P EST LOW 20 MIN: CPT | Mod: 95 | Performed by: PHYSICIAN ASSISTANT

## 2020-10-30 RX ORDER — DESVENLAFAXINE 25 MG/1
25 TABLET, EXTENDED RELEASE ORAL DAILY
Qty: 30 TABLET | Refills: 1 | Status: SHIPPED | OUTPATIENT
Start: 2020-10-30 | End: 2020-12-17

## 2020-10-30 NOTE — TELEPHONE ENCOUNTER
Pt calls, see recent visit, went online and read all the side effects for generic pristiq, now worried about them happening to her, has not talked to pharmacy and not sure if covered, worried about weakness,   Nervousness, Vision problems, Tingling as has a lot of these already, pt wants more information about med prior to starting, discussed at length, pt did say she would try but still wants CJ to respond, routed, call pt back again with update  Sheridan Zuniga RN, BSN  Message handled by CLINIC NURSE.

## 2020-10-30 NOTE — TELEPHONE ENCOUNTER
Call pt back.     Drug  must list all POTENTIAL side effects.   Please remind pt this is not likely to happen and to start with 1/2 tablet daily.

## 2020-10-30 NOTE — PROGRESS NOTES
"Antoinette Romero is a 60 year old female who is being evaluated via a billable telephone visit.      The patient has been notified of following:     \"This telephone visit will be conducted via a call between you and your physician/provider. We have found that certain health care needs can be provided without the need for a physical exam.  This service lets us provide the care you need with a short phone conversation.  If a prescription is necessary we can send it directly to your pharmacy.  If lab work is needed we can place an order for that and you can then stop by our lab to have the test done at a later time.    Telephone visits are billed at different rates depending on your insurance coverage. During this emergency period, for some insurers they may be billed the same as an in-person visit.  Please reach out to your insurance provider with any questions.    If during the course of the call the physician/provider feels a telephone visit is not appropriate, you will not be charged for this service.\"    Patient has given verbal consent for Telephone visit?  Yes    What phone number would you like to be contacted at? [phone]    How would you like to obtain your AVS? Mail a copy    Subjective     Antoinette Romero is a 60 year old female who presents via phone visit today for the following health issues:    HPI     Concern - Genesight results   Patient would like result mailed to her        Depression Followup    How are you doing with your depression since your last visit? worsened    Are you having other symptoms that might be associated with depression? anxiety    Have you had a significant life event?  Relationship Concerns, Financial Concerns and Housing Concerns     Are you feeling anxious or having panic attacks?   yes    Do you have any concerns with your use of alcohol or other drugs? No    Social History     Tobacco Use     Smoking status: Current Every Day Smoker     Packs/day: 1.00     Types: " Cigarettes     Smokeless tobacco: Never Used     Tobacco comment: quit 3/2018   Substance Use Topics     Alcohol use: No     Drug use: No     PHQ 11/7/2018 1/16/2020 10/14/2020   PHQ-9 Total Score 24 19 26   Q9: Thoughts of better off dead/self-harm past 2 weeks Not at all Not at all More than half the days     RAFAL-7 SCORE 10/30/2014 1/16/2020 10/14/2020   Total Score 16 - -   Total Score - 19 21     Last PHQ-9 10/14/2020   1.  Little interest or pleasure in doing things 3   2.  Feeling down, depressed, or hopeless 3   3.  Trouble falling or staying asleep, or sleeping too much 3   4.  Feeling tired or having little energy 3   5.  Poor appetite or overeating 3   6.  Feeling bad about yourself 3   7.  Trouble concentrating 3   8.  Moving slowly or restless 3   Q9: Thoughts of better off dead/self-harm past 2 weeks 2   PHQ-9 Total Score 26   Difficulty at work, home, or with people Extremely dIfficult     RAFAL-7  10/14/2020   1. Feeling nervous, anxious, or on edge 3   2. Not being able to stop or control worrying 3   3. Worrying too much about different things 3   4. Trouble relaxing 3   5. Being so restless that it is hard to sit still 3   6. Becoming easily annoyed or irritable 3   7. Feeling afraid, as if something awful might happen 3   RAFAL-7 Total Score 21   If you checked any problems, how difficult have they made it for you to do your work, take care of things at home, or get along with other people? Extremely difficult     In the past two weeks have you had thoughts of suicide or self-harm?  Yes  In the past two weeks have you thought of a plan or intent to harm yourself? No.  Do you have concerns about your personal safety or the safety of others?   No    Suicide Assessment Five-step Evaluation and Treatment (SAFE-T)            Review of Systems   Constitutional, HEENT, cardiovascular, pulmonary, gi and gu systems are negative, except as otherwise noted.       Objective          Vitals:  No vitals were  obtained today due to virtual visit.    healthy, alert and no distress  PSYCH: Alert and oriented times 3; coherent speech, normal   rate and volume, able to articulate logical thoughts, able   to abstract reason, no tangential thoughts, no hallucinations   or delusions  Her affect is flat and tearful  RESP: No cough, no audible wheezing, able to talk in full sentences  Remainder of exam unable to be completed due to telephone visits            Assessment/Plan:    Assessment & Plan     Severe major depression (H)  genesight reviewed at length.  Take with Prenatal vitamin or Folic Acid  Patient very depressed. States she will get an appt with her therapist.  Declines Psychiatry, Declines day program, declines ECT  SI, no active plan-contracts for safety verbally  No HI  - desvenlafaxine succinate (PRISTIQ) 25 MG 24 hr tablet; Take 1 tablet (25 mg) by mouth daily            Return in about 3 weeks (around 11/20/2020) for Mood/Mental Health Recheck.    LEAH [facility]    Phone call duration:  22 minutes

## 2020-10-30 NOTE — Clinical Note
Can we call pt Monday and Wednesday of next week to check in? See if she's made an appt with her therapist?

## 2020-11-03 ENCOUNTER — TELEPHONE (OUTPATIENT)
Dept: FAMILY MEDICINE | Facility: CLINIC | Age: 60
End: 2020-11-03

## 2020-11-03 NOTE — TELEPHONE ENCOUNTER
Pt states she feels it is the medication.  She states her nausea is really bad and she is still dizzy and a headache that just will not go away.  She did take her Protonix with no relief of nausea .  She states she cannot take Zofran and she doesn't know if Phenergan is an option either.  Advised to drink ginger ale/crackers/brat diet.    Found an appt with more time on Thursday the 5th.  She is ok with this and will wait until then.  Ivanna Degroot RN

## 2020-11-03 NOTE — TELEPHONE ENCOUNTER
I think many of her symptoms are related to her anxiety and depression. Her body is physically manifesting this. I would still encourage her to take the medication. I do not think her symptoms are related.    Umu Roberto PA-C

## 2020-11-03 NOTE — TELEPHONE ENCOUNTER
Patient calls and reports side effects of Pristiq that she started yesterday, patient reports taking half a tablet (12.5 mg)    Patient reports she is still experiencingDizziness, light-headedness, could not fall asleep last night.. Patients two biggest complaints are feeling extremely nauseous and like she is unable to food without gagging and also reports a splitting headache, patient seeking further guidance regarding this, see telephone encounter from 10/30, patient stated she was worried about taking this medication, please advise regarding next steps    Patrick Locke RN

## 2020-11-05 ENCOUNTER — VIRTUAL VISIT (OUTPATIENT)
Dept: FAMILY MEDICINE | Facility: CLINIC | Age: 60
End: 2020-11-05
Payer: COMMERCIAL

## 2020-11-05 DIAGNOSIS — F32.2 SEVERE MAJOR DEPRESSION (H): Primary | ICD-10-CM

## 2020-11-05 PROCEDURE — 99213 OFFICE O/P EST LOW 20 MIN: CPT | Mod: 95 | Performed by: PHYSICIAN ASSISTANT

## 2020-11-05 ASSESSMENT — ANXIETY QUESTIONNAIRES
1. FEELING NERVOUS, ANXIOUS, OR ON EDGE: NEARLY EVERY DAY
2. NOT BEING ABLE TO STOP OR CONTROL WORRYING: NEARLY EVERY DAY
IF YOU CHECKED OFF ANY PROBLEMS ON THIS QUESTIONNAIRE, HOW DIFFICULT HAVE THESE PROBLEMS MADE IT FOR YOU TO DO YOUR WORK, TAKE CARE OF THINGS AT HOME, OR GET ALONG WITH OTHER PEOPLE: NOT DIFFICULT AT ALL
3. WORRYING TOO MUCH ABOUT DIFFERENT THINGS: NEARLY EVERY DAY
GAD7 TOTAL SCORE: 19
7. FEELING AFRAID AS IF SOMETHING AWFUL MIGHT HAPPEN: NEARLY EVERY DAY
5. BEING SO RESTLESS THAT IT IS HARD TO SIT STILL: SEVERAL DAYS
6. BECOMING EASILY ANNOYED OR IRRITABLE: NEARLY EVERY DAY

## 2020-11-05 ASSESSMENT — PATIENT HEALTH QUESTIONNAIRE - PHQ9
5. POOR APPETITE OR OVEREATING: NEARLY EVERY DAY
SUM OF ALL RESPONSES TO PHQ QUESTIONS 1-9: 21

## 2020-11-05 NOTE — PROGRESS NOTES
"Antoinette Romero is a 60 year old female who is being evaluated via a billable telephone visit.      The patient has been notified of following:     \"This telephone visit will be conducted via a call between you and your physician/provider. We have found that certain health care needs can be provided without the need for a physical exam.  This service lets us provide the care you need with a short phone conversation.  If a prescription is necessary we can send it directly to your pharmacy.  If lab work is needed we can place an order for that and you can then stop by our lab to have the test done at a later time.    Telephone visits are billed at different rates depending on your insurance coverage. During this emergency period, for some insurers they may be billed the same as an in-person visit.  Please reach out to your insurance provider with any questions.    If during the course of the call the physician/provider feels a telephone visit is not appropriate, you will not be charged for this service.\"    Patient has given verbal consent for Telephone visit?  Yes    What phone number would you like to be contacted at? [phone]    How would you like to obtain your AVS? Mail a copy    Subjective     Antoinette Romero is a 60 year old female who presents via phone visit today for the following health issues:    HPI     Depression and Anxiety Follow-Up    How are you doing with your depression since your last visit? No change    How are you doing with your anxiety since your last visit?  No change    Are you having other symptoms that might be associated with depression or anxiety? No    Have you had a significant life event? Housing Concerns     Do you have any concerns with your use of alcohol or other drugs? No    Social History     Tobacco Use     Smoking status: Current Every Day Smoker     Packs/day: 1.00     Types: Cigarettes     Smokeless tobacco: Never Used     Tobacco comment: quit 3/2018   Substance Use " Topics     Alcohol use: No     Drug use: No     PHQ 1/16/2020 10/14/2020 11/5/2020   PHQ-9 Total Score 19 26 21   Q9: Thoughts of better off dead/self-harm past 2 weeks Not at all More than half the days Not at all     RAFAL-7 SCORE 1/16/2020 10/14/2020 11/5/2020   Total Score - - -   Total Score 19 21 19     Last PHQ-9 11/5/2020   1.  Little interest or pleasure in doing things 3   2.  Feeling down, depressed, or hopeless 3   3.  Trouble falling or staying asleep, or sleeping too much 3   4.  Feeling tired or having little energy 3   5.  Poor appetite or overeating 3   6.  Feeling bad about yourself 3   7.  Trouble concentrating 3   8.  Moving slowly or restless 0   Q9: Thoughts of better off dead/self-harm past 2 weeks 0   PHQ-9 Total Score 21   Difficulty at work, home, or with people Not difficult at all     RAFAL-7  11/5/2020   1. Feeling nervous, anxious, or on edge 3   2. Not being able to stop or control worrying 3   3. Worrying too much about different things 3   4. Trouble relaxing 3   5. Being so restless that it is hard to sit still 1   6. Becoming easily annoyed or irritable 3   7. Feeling afraid, as if something awful might happen 3   RAFAL-7 Total Score 19   If you checked any problems, how difficult have they made it for you to do your work, take care of things at home, or get along with other people? Not difficult at all       Suicide Assessment Five-step Evaluation and Treatment (SAFE-T)                 Review of Systems   Constitutional, HEENT, cardiovascular, pulmonary, gi and gu systems are negative, except as otherwise noted.       Objective          Vitals:  No vitals were obtained today due to virtual visit.    healthy, alert and no distress  PSYCH: Alert and oriented times 3; coherent speech, normal   rate and volume, able to articulate logical thoughts, able   to abstract reason, no tangential thoughts, no hallucinations   or delusions  Her affect is normal and pleasant  RESP: No cough, no  "audible wheezing, able to talk in full sentences  Remainder of exam unable to be completed due to telephone visits            Assessment/Plan:    Assessment & Plan     Severe major depression (H)  Patient stopped Pristiq. Had double vision with it and was not able to tolerate side effects.  Voice is brighter today and pt is calm.  No SI or HI.  Discussed Cymbalta and Psychiatry.  Patient declinced Psychiatry.  Will \"research cymbalta\" and get back to us  Declines medications today.              No follow-ups on file.    Umu Roberto PA-C  [facility]    Phone call duration:  20 minutes              "

## 2020-11-06 ASSESSMENT — ANXIETY QUESTIONNAIRES: GAD7 TOTAL SCORE: 19

## 2020-11-19 ENCOUNTER — APPOINTMENT (OUTPATIENT)
Dept: CT IMAGING | Facility: CLINIC | Age: 60
End: 2020-11-19
Attending: EMERGENCY MEDICINE
Payer: COMMERCIAL

## 2020-11-19 ENCOUNTER — TELEPHONE (OUTPATIENT)
Dept: FAMILY MEDICINE | Facility: CLINIC | Age: 60
End: 2020-11-19

## 2020-11-19 ENCOUNTER — HOSPITAL ENCOUNTER (EMERGENCY)
Facility: CLINIC | Age: 60
Discharge: HOME | End: 2020-11-19
Attending: EMERGENCY MEDICINE | Admitting: EMERGENCY MEDICINE
Payer: COMMERCIAL

## 2020-11-19 ENCOUNTER — OFFICE VISIT (OUTPATIENT)
Dept: URGENT CARE | Facility: URGENT CARE | Age: 60
End: 2020-11-19
Payer: COMMERCIAL

## 2020-11-19 VITALS
HEART RATE: 71 BPM | RESPIRATION RATE: 18 BRPM | WEIGHT: 124 LBS | HEIGHT: 68 IN | TEMPERATURE: 98.3 F | SYSTOLIC BLOOD PRESSURE: 132 MMHG | OXYGEN SATURATION: 99 % | DIASTOLIC BLOOD PRESSURE: 83 MMHG | BODY MASS INDEX: 18.79 KG/M2

## 2020-11-19 DIAGNOSIS — R51.9 INTRACTABLE HEADACHE, UNSPECIFIED CHRONICITY PATTERN, UNSPECIFIED HEADACHE TYPE: Primary | ICD-10-CM

## 2020-11-19 DIAGNOSIS — R51.9 NONINTRACTABLE EPISODIC HEADACHE, UNSPECIFIED HEADACHE TYPE: ICD-10-CM

## 2020-11-19 DIAGNOSIS — Z20.822 SUSPECTED COVID-19 VIRUS INFECTION: ICD-10-CM

## 2020-11-19 DIAGNOSIS — Z11.59 SCREENING FOR VIRAL DISEASE: Primary | ICD-10-CM

## 2020-11-19 DIAGNOSIS — I67.1 INTERNAL CAROTID ANEURYSM: ICD-10-CM

## 2020-11-19 LAB
ALBUMIN UR-MCNC: 20 MG/DL
AMORPH CRY #/AREA URNS HPF: ABNORMAL /HPF
ANION GAP SERPL CALCULATED.3IONS-SCNC: 2 MMOL/L (ref 3–14)
APPEARANCE UR: ABNORMAL
BASOPHILS # BLD AUTO: 0.1 10E9/L (ref 0–0.2)
BASOPHILS NFR BLD AUTO: 0.8 %
BILIRUB UR QL STRIP: NEGATIVE
BUN SERPL-MCNC: 16 MG/DL (ref 7–30)
CALCIUM SERPL-MCNC: 8.6 MG/DL (ref 8.5–10.1)
CHLORIDE SERPL-SCNC: 109 MMOL/L (ref 94–109)
CO2 SERPL-SCNC: 30 MMOL/L (ref 20–32)
COLOR UR AUTO: ABNORMAL
CREAT SERPL-MCNC: 0.67 MG/DL (ref 0.52–1.04)
DIFFERENTIAL METHOD BLD: NORMAL
EOSINOPHIL # BLD AUTO: 0.1 10E9/L (ref 0–0.7)
EOSINOPHIL NFR BLD AUTO: 0.9 %
ERYTHROCYTE [DISTWIDTH] IN BLOOD BY AUTOMATED COUNT: 13 % (ref 10–15)
GFR SERPL CREATININE-BSD FRML MDRD: >90 ML/MIN/{1.73_M2}
GLUCOSE SERPL-MCNC: 89 MG/DL (ref 70–99)
GLUCOSE UR STRIP-MCNC: NEGATIVE MG/DL
HCT VFR BLD AUTO: 42.4 % (ref 35–47)
HGB BLD-MCNC: 13.8 G/DL (ref 11.7–15.7)
HGB UR QL STRIP: NEGATIVE
IMM GRANULOCYTES # BLD: 0 10E9/L (ref 0–0.4)
IMM GRANULOCYTES NFR BLD: 0.2 %
KETONES UR STRIP-MCNC: NEGATIVE MG/DL
LEUKOCYTE ESTERASE UR QL STRIP: NEGATIVE
LYMPHOCYTES # BLD AUTO: 2.9 10E9/L (ref 0.8–5.3)
LYMPHOCYTES NFR BLD AUTO: 31.8 %
MCH RBC QN AUTO: 30.5 PG (ref 26.5–33)
MCHC RBC AUTO-ENTMCNC: 32.5 G/DL (ref 31.5–36.5)
MCV RBC AUTO: 94 FL (ref 78–100)
MONOCYTES # BLD AUTO: 0.6 10E9/L (ref 0–1.3)
MONOCYTES NFR BLD AUTO: 7 %
MUCOUS THREADS #/AREA URNS LPF: PRESENT /LPF
NEUTROPHILS # BLD AUTO: 5.4 10E9/L (ref 1.6–8.3)
NEUTROPHILS NFR BLD AUTO: 59.3 %
NITRATE UR QL: NEGATIVE
NRBC # BLD AUTO: 0 10*3/UL
NRBC BLD AUTO-RTO: 0 /100
PH UR STRIP: 8 PH (ref 5–7)
PLATELET # BLD AUTO: 313 10E9/L (ref 150–450)
POTASSIUM SERPL-SCNC: 4.1 MMOL/L (ref 3.4–5.3)
RBC # BLD AUTO: 4.52 10E12/L (ref 3.8–5.2)
RBC #/AREA URNS AUTO: 2 /HPF (ref 0–2)
SODIUM SERPL-SCNC: 141 MMOL/L (ref 133–144)
SOURCE: ABNORMAL
SP GR UR STRIP: 1.03 (ref 1–1.03)
SQUAMOUS #/AREA URNS AUTO: 1 /HPF (ref 0–1)
UROBILINOGEN UR STRIP-MCNC: 3 MG/DL (ref 0–2)
WBC # BLD AUTO: 9.1 10E9/L (ref 4–11)
WBC #/AREA URNS AUTO: 3 /HPF (ref 0–5)

## 2020-11-19 PROCEDURE — 250N000011 HC RX IP 250 OP 636: Performed by: EMERGENCY MEDICINE

## 2020-11-19 PROCEDURE — 99285 EMERGENCY DEPT VISIT HI MDM: CPT | Mod: 25

## 2020-11-19 PROCEDURE — 99207 PR NO CHARGE LOS: CPT | Performed by: PHYSICIAN ASSISTANT

## 2020-11-19 PROCEDURE — 74176 CT ABD & PELVIS W/O CONTRAST: CPT

## 2020-11-19 PROCEDURE — 258N000003 HC RX IP 258 OP 636: Performed by: EMERGENCY MEDICINE

## 2020-11-19 PROCEDURE — 70450 CT HEAD/BRAIN W/O DYE: CPT

## 2020-11-19 PROCEDURE — 96374 THER/PROPH/DIAG INJ IV PUSH: CPT | Mod: 59

## 2020-11-19 PROCEDURE — U0003 INFECTIOUS AGENT DETECTION BY NUCLEIC ACID (DNA OR RNA); SEVERE ACUTE RESPIRATORY SYNDROME CORONAVIRUS 2 (SARS-COV-2) (CORONAVIRUS DISEASE [COVID-19]), AMPLIFIED PROBE TECHNIQUE, MAKING USE OF HIGH THROUGHPUT TECHNOLOGIES AS DESCRIBED BY CMS-2020-01-R: HCPCS | Performed by: EMERGENCY MEDICINE

## 2020-11-19 PROCEDURE — 85025 COMPLETE CBC W/AUTO DIFF WBC: CPT | Performed by: EMERGENCY MEDICINE

## 2020-11-19 PROCEDURE — 96361 HYDRATE IV INFUSION ADD-ON: CPT

## 2020-11-19 PROCEDURE — 96375 TX/PRO/DX INJ NEW DRUG ADDON: CPT

## 2020-11-19 PROCEDURE — 81001 URINALYSIS AUTO W/SCOPE: CPT | Performed by: EMERGENCY MEDICINE

## 2020-11-19 PROCEDURE — 250N000009 HC RX 250: Performed by: EMERGENCY MEDICINE

## 2020-11-19 PROCEDURE — 70496 CT ANGIOGRAPHY HEAD: CPT

## 2020-11-19 PROCEDURE — 80048 BASIC METABOLIC PNL TOTAL CA: CPT | Performed by: EMERGENCY MEDICINE

## 2020-11-19 RX ORDER — IOPAMIDOL 755 MG/ML
70 INJECTION, SOLUTION INTRAVASCULAR ONCE
Status: COMPLETED | OUTPATIENT
Start: 2020-11-19 | End: 2020-11-19

## 2020-11-19 RX ORDER — SUMATRIPTAN 25 MG/1
TABLET, FILM COATED ORAL
Qty: 8 TABLET | Refills: 0 | Status: SHIPPED | OUTPATIENT
Start: 2020-11-19 | End: 2023-04-20

## 2020-11-19 RX ORDER — METOCLOPRAMIDE 10 MG/1
10 TABLET ORAL 3 TIMES DAILY PRN
Qty: 15 TABLET | Refills: 0 | Status: SHIPPED | OUTPATIENT
Start: 2020-11-19 | End: 2023-04-20

## 2020-11-19 RX ORDER — KETOROLAC TROMETHAMINE 30 MG/ML
30 INJECTION, SOLUTION INTRAMUSCULAR; INTRAVENOUS ONCE
Status: COMPLETED | OUTPATIENT
Start: 2020-11-19 | End: 2020-11-19

## 2020-11-19 RX ORDER — METOCLOPRAMIDE HYDROCHLORIDE 5 MG/ML
10 INJECTION INTRAMUSCULAR; INTRAVENOUS ONCE
Status: COMPLETED | OUTPATIENT
Start: 2020-11-19 | End: 2020-11-19

## 2020-11-19 RX ADMIN — IOPAMIDOL 70 ML: 755 INJECTION, SOLUTION INTRAVENOUS at 17:43

## 2020-11-19 RX ADMIN — KETOROLAC TROMETHAMINE 30 MG: 30 INJECTION, SOLUTION INTRAMUSCULAR at 16:03

## 2020-11-19 RX ADMIN — SODIUM CHLORIDE 1000 ML: 9 INJECTION, SOLUTION INTRAVENOUS at 16:03

## 2020-11-19 RX ADMIN — SODIUM CHLORIDE 80 ML: 9 INJECTION, SOLUTION INTRAVENOUS at 17:43

## 2020-11-19 RX ADMIN — METOCLOPRAMIDE HYDROCHLORIDE 10 MG: 5 INJECTION INTRAMUSCULAR; INTRAVENOUS at 16:03

## 2020-11-19 ASSESSMENT — ENCOUNTER SYMPTOMS
HEADACHES: 1
NAUSEA: 1
VOMITING: 0
ABDOMINAL PAIN: 1
FEVER: 0

## 2020-11-19 ASSESSMENT — MIFFLIN-ST. JEOR: SCORE: 1180.96

## 2020-11-19 NOTE — ED AVS SNAPSHOT
[facility]  [address]  [facility] [address]  Phone: [phone]  Fax: [phone]                                    Antoinette Romero   MRN: [id number]    Department: [facility]   Date of Visit: 11/19/2020           After Visit Summary Signature Page    I have received my discharge instructions, and my questions have been answered. I have discussed any challenges I see with this plan with the nurse or doctor.    ..........................................................................................................................................  Patient/Patient Representative Signature      ..........................................................................................................................................  Patient Representative Print Name and Relationship to Patient    ..................................................               ................................................  Date                                   Time    ..........................................................................................................................................  Reviewed by Signature/Title    ...................................................              ..............................................  Date                                               Time          22EPIC Rev 08/18

## 2020-11-19 NOTE — TELEPHONE ENCOUNTER
Order placed for pt.   The [facility] nurse line will call her to set this up. They will call from a blocked number when they call.    Umu Roberto PA-C

## 2020-11-19 NOTE — ED PROVIDER NOTES
History   Chief Complaint:  Headache    HPI  Antoinetet Romero is a 60 year old female with a history of hypertension, migraines, anxiety, and depression who presents for evaluation of a headache. She reports feeling ill for the last four days with general malaise, nasal congestion, myalgias, chills, and headaches. She reports this is the worst headache she has ever had and states it has been consistent for the last four days. She has been taking Tylenol at home which provides intermittent relief, and she feels she cannot handle the pain at home any more. She also notes nausea with the headache, but denies vomiting.     She also reports diffuse abdominal pain which feels somewhat similar to the symptoms leading up to a recent hospitalization when she was diagnosed with kidney stones. She reports feeling similar today compared to when she was found to have a kidney stone.     She also reports significant depression and anxiety which is exacerbating her headache today.     Allergies:  Keflex  Unasyn  Pristiq   Azithromycin  Compazine  Zofran    Medications:    Lisinopril   Atarax   Protonix     Past Medical History:    Anastomotic ulcer  Anxiety   Arthritis   Migraine   Morbid obesity   Hypertension   Scotoma   Renal colic   Depression     Past Surgical History:    Cervical fusion, decompression - 3 levels  Upper GI endoscopic suture removal   EGD - multiple   Gastric bypass   Cholecystectomy, johnston defect repair, etc  Operative laparoscopy     Family History:    Respiratory disease  Diabetes  Osteoarthritis  Neurologic disorder     Social History:  Marital Status: .  Positive for tobacco use. Comment: 1 PPD.   Negative for alcohol use.  Negative for drug use.     Review of Systems   Constitutional: Negative for fever.   HENT: Positive for congestion.    Gastrointestinal: Positive for abdominal pain and nausea. Negative for vomiting.   Neurological: Positive for headaches.   All other systems reviewed and  "are negative.    Physical Exam     Patient Vitals for the past 24 hrs:   BP Temp Pulse Resp SpO2 Height Weight   11/19/20 1518 (!) 144/80 98.1  F (36.7  C) 77 16 99 % 1.727 m (5' 8\") 56.2 kg (124 lb)        Physical Exam  Nursing note and vitals reviewed.  Constitutional: Cooperative.   HENT:   Mouth/Throat: Moist mucous membranes.   Eyes: EOMI, nonicteric sclera  Cardiovascular: Normal rate, regular rhythm, no murmurs, rubs, or gallops  Pulmonary/Chest: Effort normal and breath sounds normal. No respiratory distress. No wheezes. No rales.   Abdominal: Soft. Nontender, nondistended, no guarding or rigidity.   Musculoskeletal: Normal range of motion.   Neurological: Alert. Moves all extremities spontaneously.     CN's II-XII intact. 5/5 BUE and BLE strength. PERRL    EOMI without nystagmus.      Sensation intact to light touch. Negative pronator drift.    Finger to nose intact.   Skin: Skin is warm and dry. No rash noted.   Psychiatric: Depressed mood with congruent affect.  Occasionally tearful.    Emergency Department Course     Imaging:  Radiologic findings were communicated with the patient who voiced understanding of the findings.  CT Head without contrast:   1.  No acute intracranial process.   2.  Stable mild chronic small vessel ischemic disease and generalized brain parenchymal volume loss since 03/10/2018, as per radiology.    CTA Head Neck w/ IV contrast:   HEAD CTA:   1.  Two tiny 2 mm nonruptured saccular aneurysms of the periophthalmic and communicating segments of the left internal carotid artery.   2.  Widely patent major intracranial arteries. No large vessel occlusion or significant stenosis.     NECK CTA:   1.  Widely patent major cervical arteries. No significant stenosis or evidence of dissection.   2.  Mild atherosclerotic disease at the carotid bifurcations, as per radiology.    CT Abdomen/Pelvis w/o IV contrast-stone protocol:   1.  No urinary calculi or hydronephrosis. A previously seen " obstructing stone in the distal left ureter is no longer present and the previously seen left-sided hydronephrosis has resolved since the 10/02/2020 exam.   2.  No definite acute abnormalities or CT findings to explain the patient's symptoms allowing for limitations of a noncontrast study. If symptoms persist, repeat imaging with oral and IV contrast may be helpful.   3.  Postoperative changes of gastric bypass surgery. No evidence for bowel obstruction.   4.  Sigmoid diverticulosis without definite evidence for diverticulitis.   5.  Gallbladder is absent. There remains prominence of the common bile duct which is similar to the previous exam and most likely due to the postcholecystectomy state. Correlation with biliary enzymes if there is clinical concern, as per radiology.     Laboratory:  Laboratory findings were communicated with the patient who voiced understanding of the findings.    CBC: WBC: 9.1, HGB: 13.8, PLT: 313  BMP: Anion gap: 2 (L), o/w WNL (Creatinine: 0.67)    UA with Microscopic: pH: 8.0 (H), Albumin: 20 (A), Urobiliogen: 3.0 (H), Mucous: Present (A), Amorphous crystals: Few (A), o/w WNL     Symptomatic COVID-19 Virus PCR: Pending     Interventions:  1603 NS 1L IV    Toradol, 30 mg, IV    Reglan, 10 mg, IV     Emergency Department Course:  Nursing notes and vitals reviewed.   1525: I performed an exam of the patient as documented above.      IV was inserted and blood was drawn for laboratory testing, results above. Medicine administered as documented above.   The patient's nose was swabbed and this sample was sent for COVID testing.   The patient was sent for head and abdomen/pelvis CTs while in the emergency department, results above.    The patient provided a urine sample here in the emergency department. This was sent for laboratory testing, findings above.      1852: I rechecked the patient and discussed the results of her workup and recommendations for home.     Findings and plan explained to  "the Patient. Patient discharged home with instructions regarding supportive care, medications, and reasons to return. The importance of close follow-up was reviewed. The patient was prescribed Imitrex and Reglan.     I personally reviewed the laboratory and imaging results with the Patient and answered all related questions prior to discharge.    Impression & Plan      Covid-19  Antoinette Romero was evaluated during a global COVID-19 pandemic, which necessitated consideration that the patient might be at risk for infection with the SARS-CoV-2 virus that causes COVID-19.   Applicable protocols for evaluation were followed during the patient's care.   COVID-19 was considered as part of the patient's evaluation. The plan for testing is:  a test was obtained during this visit.    Medical Decision Making:   Antoinette Romero is a 60 year old female who presents with chief complaint headache.  Patient has associated symptoms of chills and myalgias, nausea, sinus congestion.  Symptoms respond to Tylenol, but incompletely and patient presented first to urgent care and then to emergency department for evaluation.  Given ongoing COVID-19 pandemic, this is considered the top of the differential, especially in light of concurrent URI symptoms.  Patient did describe this headache as \"worst ever.\"  Simultaneously, patient was reluctant to take medication for it, but we did give some Toradol, Reglan, and fluids which did improve headache for some time.  In that time, we obtained CT/CTA of the head and neck given worst ever character.  Patient was found to have 2 incidental small (2mm)saccular aneurysms in her anterior circulation.  There is no subarachnoid hemorrhage.  No evidence of rupture.  As it is greater than 6 to 12 hours after onset of pain, we are unable to fully rule out subarachnoid hemorrhage, and I discussed this with patient. Pt is wishing to be discharged home without further testing or treatment. This is reasonable as " her headache has not been sudden onset, neuro exam is normal, there is no neck pain or stiffness, there was no syncope, and CT/CTA shows no SAH or ruptured aneurysm. Furthermore, she has associated symptoms suggesting likely COVID-19 infection and a PCR is pending. I considered meningitis/encephalitis as well, though there is no altered mental status, documented fever, nor leukocytosis. Pt declines LP at this time. She requested non-narcotic medication for further medication therapy for headache at home. We discussed max dosing of tylenol/ibuprofen, and I also ordered reglan and imitrex for PRN use. Referral placed to Neurology for close follow-up for nonruptured aneurysms, though I discussed with pt these are smaller than the traditional 7mm cutoff and are unlikely to be intervened upon. She is in stable condition at the time of discharge, indications for return to the ED were discussed as well as follow up. All questions were answered and she is in agreement with the plan.      Diagnosis:     ICD-10-CM    1. Nonintractable episodic headache, unspecified headache type  R51.9    2. Internal carotid aneurysm  I67.1    3. Suspected COVID-19 virus infection  Z20.828        Disposition:   Discharged to home.    Discharge Medications:  New Prescriptions    METOCLOPRAMIDE (REGLAN) 10 MG TABLET    Take 1 tablet (10 mg) by mouth 3 times daily as needed (Nausea or Vomiting)    SUMATRIPTAN (IMITREX) 25 MG TABLET    Take 25-100mg one time. May repeat 25mg every two hours up to a maximum of 200mg in 24 hours.      Scribe Disclosure:  I, Gwendolyn Alvarez, am serving as a scribe on 11/19/2020 at 3:24 PM to personally document services performed by Sanchez Preciado MD based on my observations and the provider's statements to me.       EMERGENCY DEPARTMENT     Sanchez Preciado MD  11/20/20 0203

## 2020-11-19 NOTE — ED TRIAGE NOTES
Pt c/o headache since Sunday that has gotten severe the past 3 days.  Also c/o nasal congestion and body aches.

## 2020-11-19 NOTE — TELEPHONE ENCOUNTER
Order/Referral Request:    Who is requesting: Antoinette    Orders being requested: COVID 19 test    Reason service is needed/diagnosis: pt has covid sx, states she can't access [email or web address] and needs an order for a covid test    When are orders needed by: asap    Has this been discussed with Provider: No    Does patient have a preference on a Group/Provider/Facility? [facility]    Does patient have an appointment scheduled: No    Where to send Orders: N/A    Okay to leave detailed message?  Yes at Home number on file [phone] (home)    Routing

## 2020-11-19 NOTE — PROGRESS NOTES
Patient is presenting with the worse headache of her life and is extermely nauseated.  Patient's  is present to take her to the ED.  Quite frankly, patient looks ill.

## 2020-11-20 LAB
SARS-COV-2 RNA SPEC QL NAA+PROBE: NOT DETECTED
SPECIMEN SOURCE: NORMAL

## 2020-12-13 ENCOUNTER — HEALTH MAINTENANCE LETTER (OUTPATIENT)
Age: 60
End: 2020-12-13

## 2020-12-17 DIAGNOSIS — F32.2 SEVERE MAJOR DEPRESSION (H): ICD-10-CM

## 2020-12-17 RX ORDER — DESVENLAFAXINE 25 MG/1
TABLET, EXTENDED RELEASE ORAL
Qty: 30 TABLET | Refills: 1 | OUTPATIENT
Start: 2020-12-17

## 2020-12-17 NOTE — TELEPHONE ENCOUNTER
"At my last virtual visit with pt. She had stopped the medication due to \"double vision\".    Was this a pharmacy request?  To my knowledge, pt is not taking this.     Rx denied.  "

## 2020-12-17 NOTE — TELEPHONE ENCOUNTER
Routing refill request to provider for review/approval because:  Drug allergy warning  Failing PHQ9    Ivelisse Epperson RN

## 2021-01-04 DIAGNOSIS — I10 ESSENTIAL (PRIMARY) HYPERTENSION: ICD-10-CM

## 2021-01-05 RX ORDER — LISINOPRIL 10 MG/1
TABLET ORAL
Qty: 15 TABLET | Refills: 11 | OUTPATIENT
Start: 2021-01-05

## 2021-01-05 NOTE — TELEPHONE ENCOUNTER
Routing refill request to provider for review/approval because:  Medication is reported/historical    Patrick Locke RN

## 2021-01-07 ENCOUNTER — TRANSFERRED RECORDS (OUTPATIENT)
Dept: HEALTH INFORMATION MANAGEMENT | Facility: CLINIC | Age: 61
End: 2021-01-07

## 2021-01-07 DIAGNOSIS — I10 HTN, GOAL BELOW 140/90: Primary | ICD-10-CM

## 2021-01-07 RX ORDER — LISINOPRIL 10 MG/1
TABLET ORAL
Qty: 15 TABLET | Refills: 11 | Status: SHIPPED | OUTPATIENT
Start: 2021-01-07 | End: 2022-01-05

## 2021-01-27 ENCOUNTER — TRANSFERRED RECORDS (OUTPATIENT)
Dept: HEALTH INFORMATION MANAGEMENT | Facility: CLINIC | Age: 61
End: 2021-01-27

## 2021-04-07 ENCOUNTER — DOCUMENTATION ONLY (OUTPATIENT)
Dept: FAMILY MEDICINE | Facility: CLINIC | Age: 61
End: 2021-04-07

## 2021-04-07 ENCOUNTER — NURSE TRIAGE (OUTPATIENT)
Dept: NURSING | Facility: CLINIC | Age: 61
End: 2021-04-07

## 2021-04-07 DIAGNOSIS — F17.200 TOBACCO USE DISORDER: Primary | ICD-10-CM

## 2021-04-07 NOTE — TELEPHONE ENCOUNTER
Spoke to pt who states that she has been trying to quit smoking for a while now and she did not have good results from the nicotine patch.  She started smoking again, one and half packs daily.  She is wanting to try the nicotine gum.     Pt also wanted to speak to Umu Roberto PA-C about her anxiety.  Offered a visit with an extender pt declined, wanted a tele visit with only Umu Roberto PA-C.  Sched in on Thursday 04/15 pt was ok waiting    Ivanna KAY RN, BSN, PAL (Patient Advocate Liaison)  [facility]   [phone]

## 2021-04-07 NOTE — TELEPHONE ENCOUNTER
"Clinic Action Needed:Yes, Please call patient [phone] (home) Please advise if able to work in for telephone visit with Umu ROWLEY    Reason for Call:  1. Patient is requesting prescription for Nicorette gum \"strongest available.\" Stating the patch has not worked for her.    2. Patient is requesting a call back regarding depression symptoms. Stating she has been depressed for years. Symptoms increased in past couple of months. Reporting feeling \"just really down, sad, anxious.\" Stating she does not have any feelings.   Denies any suicidal thoughts or plans.  Stating she is seen by counseling x 1 time per week.  Patient is going through divorce and is faced with possible homelessness.     Spoke with [facility]. Patient was unable to come to in person appointment as offered. Requesting telephone visit with Umu ROWLEY. Per scheduling no available appointments. Patient declines other providers.       Gwendolyn Sidhu RN  [facility]       Additional Information    Negative: Patient attempted suicide    Negative: Patient is threatening suicide now    Negative: Violent behavior, or threatening to physically hurt or kill someone    Negative: [1] Patient is very confused (disoriented, slurred speech) AND [2] no other adult (e.g., friend or family member) available    Negative: [1] Difficult to awaken or acting very confused (disoriented, slurred speech) AND [2] new onset    Negative: Sounds like a life-threatening emergency to the triager    Negative: [1] Depression AND [2] unable to do any of normal activities (e.g., self care, school, work; in comparison to baseline).    Negative: Very strange or confused behavior    Negative: Patient sounds very sick or weak to the triager    [1] Depression AND [2] worsening (e.g.,sleeping poorly, less able to do activities of daily living)    Protocols used: DEPRESSION-A-AH      "

## 2021-04-15 ENCOUNTER — TELEPHONE (OUTPATIENT)
Dept: FAMILY MEDICINE | Facility: CLINIC | Age: 61
End: 2021-04-15

## 2021-04-15 ENCOUNTER — VIRTUAL VISIT (OUTPATIENT)
Dept: FAMILY MEDICINE | Facility: CLINIC | Age: 61
End: 2021-04-15
Payer: COMMERCIAL

## 2021-04-15 VITALS — HEIGHT: 68 IN | BODY MASS INDEX: 17.43 KG/M2 | WEIGHT: 115 LBS

## 2021-04-15 DIAGNOSIS — F41.0 PANIC ATTACK: ICD-10-CM

## 2021-04-15 DIAGNOSIS — R41.3 POOR MEMORY: ICD-10-CM

## 2021-04-15 DIAGNOSIS — F41.9 ANXIETY: Primary | ICD-10-CM

## 2021-04-15 PROCEDURE — 99214 OFFICE O/P EST MOD 30 MIN: CPT | Mod: 95 | Performed by: PHYSICIAN ASSISTANT

## 2021-04-15 RX ORDER — PROPRANOLOL HYDROCHLORIDE 10 MG/1
5-10 TABLET ORAL 3 TIMES DAILY PRN
Qty: 30 TABLET | Refills: 1 | Status: SHIPPED | OUTPATIENT
Start: 2021-04-15 | End: 2023-04-20

## 2021-04-15 RX ORDER — LORAZEPAM 0.5 MG/1
0.5 TABLET ORAL EVERY 6 HOURS PRN
Qty: 15 TABLET | Refills: 0 | Status: SHIPPED | OUTPATIENT
Start: 2021-04-15 | End: 2023-04-20

## 2021-04-15 ASSESSMENT — ANXIETY QUESTIONNAIRES
IF YOU CHECKED OFF ANY PROBLEMS ON THIS QUESTIONNAIRE, HOW DIFFICULT HAVE THESE PROBLEMS MADE IT FOR YOU TO DO YOUR WORK, TAKE CARE OF THINGS AT HOME, OR GET ALONG WITH OTHER PEOPLE: EXTREMELY DIFFICULT
7. FEELING AFRAID AS IF SOMETHING AWFUL MIGHT HAPPEN: NEARLY EVERY DAY
6. BECOMING EASILY ANNOYED OR IRRITABLE: NEARLY EVERY DAY
1. FEELING NERVOUS, ANXIOUS, OR ON EDGE: NEARLY EVERY DAY
2. NOT BEING ABLE TO STOP OR CONTROL WORRYING: NEARLY EVERY DAY
5. BEING SO RESTLESS THAT IT IS HARD TO SIT STILL: NEARLY EVERY DAY
GAD7 TOTAL SCORE: 21
3. WORRYING TOO MUCH ABOUT DIFFERENT THINGS: NEARLY EVERY DAY

## 2021-04-15 ASSESSMENT — PATIENT HEALTH QUESTIONNAIRE - PHQ9
5. POOR APPETITE OR OVEREATING: NEARLY EVERY DAY
SUM OF ALL RESPONSES TO PHQ QUESTIONS 1-9: 21

## 2021-04-15 ASSESSMENT — MIFFLIN-ST. JEOR: SCORE: 1140.14

## 2021-04-15 NOTE — TELEPHONE ENCOUNTER
Received fax from [facility].  No mention of a medication that she is not to take because of aneurysms.    Umu Roberto PA-C  noted EMG order did not see pt did this.      Sent fax to radhaingEmmy BULLOCK on  to call this RN back if he should call the main clinic please forward to me [phone]  Ivanna Degroot RN

## 2021-04-15 NOTE — TELEPHONE ENCOUNTER
"1. Call over to [facility] and have them send us pt notes. Patient saw Dr. Delarosa.   2.She wants us to find out from Neuro \"the one medication they told me never to take because of my aneurysm's\"  3. Referral placed to [facility] for neuropsych testing. Please go ahead and make appt for her on any Thursday for this.   4. Call and inform her of appt and med. Okay to leave detailed VM   "

## 2021-04-15 NOTE — TELEPHONE ENCOUNTER
Called records and faxed a request to them at [phone]  Needing provider notes from 01/07/21 and 09/19/2019.    Called to [facility]  [phone] ARA on her  to call to this RN back.      Faxed referral to [phone] ([facility] does Neuropsych testing)    Called to pt to let her know status.  ARA on her VM    Ivanna KAY RN, BSN, PAL (Patient Advocate Liaison)  [facility]   [phone]
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
## 2021-04-16 ASSESSMENT — ANXIETY QUESTIONNAIRES: GAD7 TOTAL SCORE: 21

## 2021-04-16 NOTE — TELEPHONE ENCOUNTER
Pt called back gave her the number for Dr Delarosa [phone] to see where they wanted to do the EMG since it was ordered by them.    Pt is still waiting for a call from Neurolpsychology scheduling Sandie KAY RN, BSN, PAL (Patient Advocate Liaison)  [facility]   [phone]

## 2021-06-09 ENCOUNTER — TRANSFERRED RECORDS (OUTPATIENT)
Dept: HEALTH INFORMATION MANAGEMENT | Facility: CLINIC | Age: 61
End: 2021-06-09

## 2021-06-16 ENCOUNTER — TRANSFERRED RECORDS (OUTPATIENT)
Dept: HEALTH INFORMATION MANAGEMENT | Facility: CLINIC | Age: 61
End: 2021-06-16
Payer: COMMERCIAL

## 2021-06-23 ENCOUNTER — TRANSFERRED RECORDS (OUTPATIENT)
Dept: HEALTH INFORMATION MANAGEMENT | Facility: CLINIC | Age: 61
End: 2021-06-23

## 2021-06-25 ENCOUNTER — OFFICE VISIT (OUTPATIENT)
Dept: PEDIATRICS | Facility: CLINIC | Age: 61
End: 2021-06-25
Payer: COMMERCIAL

## 2021-06-25 VITALS
WEIGHT: 121 LBS | RESPIRATION RATE: 18 BRPM | OXYGEN SATURATION: 98 % | DIASTOLIC BLOOD PRESSURE: 74 MMHG | SYSTOLIC BLOOD PRESSURE: 120 MMHG | TEMPERATURE: 98.7 F | BODY MASS INDEX: 18.4 KG/M2 | HEART RATE: 82 BPM

## 2021-06-25 DIAGNOSIS — H66.001 NON-RECURRENT ACUTE SUPPURATIVE OTITIS MEDIA OF RIGHT EAR WITHOUT SPONTANEOUS RUPTURE OF TYMPANIC MEMBRANE: Primary | ICD-10-CM

## 2021-06-25 PROCEDURE — 99213 OFFICE O/P EST LOW 20 MIN: CPT | Performed by: PHYSICIAN ASSISTANT

## 2021-06-25 RX ORDER — DOXYCYCLINE HYCLATE 100 MG
100 TABLET ORAL 2 TIMES DAILY
Qty: 14 TABLET | Refills: 0 | Status: SHIPPED | OUTPATIENT
Start: 2021-06-25 | End: 2021-07-02

## 2021-06-25 NOTE — PROGRESS NOTES
Assessment & Plan     Non-recurrent acute suppurative otitis media of right ear without spontaneous rupture of tympanic membrane  Begin antibiotics as directed.  - doxycycline hyclate (VIBRA-TABS) 100 MG tablet; Take 1 tablet (100 mg) by mouth 2 times daily for 14 doses    LEAH Pereira [facility] RANLUFO Curry is a 60 year old who presents for the following health issues:     HPI   Acute Illness  Acute illness concerns: right ear pain  Onset/Duration: 5 days  Symptoms:  Fever: no  Chills/Sweats: no  Headache (location?): YES  Sinus Pressure: no  Conjunctivitis:  no  Ear Pain: YES: right  Rhinorrhea: no  Congestion: no  Sore Throat: no  Cough: no  Wheeze: no  Decreased Appetite: YES but pt states for other reasons  Nausea: no  Vomiting: no  Diarrhea: no  Dysuria/Freq.: no  Dysuria or Hematuria: no  Fatigue/Achiness: YES but pt states for other reasons  Sick/Strep Exposure: no  Therapies tried and outcome: None    Review of Systems   Constitutional, HEENT, cardiovascular, pulmonary, gi and gu systems are negative, except as otherwise noted.      Objective    /74   Pulse 82   Temp 98.7  F (37.1  C) (Tympanic)   Resp 18   Wt 54.9 kg (121 lb)   LMP 03/01/2010   SpO2 98%   BMI 18.40 kg/m    Body mass index is 18.4 kg/m .  Physical Exam   GENERAL: alert and no distress  EYES: Eyes grossly normal to inspection, PERRL and conjunctivae and sclerae normal  HENT: ear canals and TM's erythemic on right; wnl on left; nose and mouth without ulcers or lesions  NECK: no adenopathy  RESP: lungs clear to auscultation - no rales, rhonchi or wheezes  CV: regular rate and rhythm, normal S1 S2, no S3 or S4    No results found for any visits on 06/25/21.

## 2021-06-29 ENCOUNTER — HOSPITAL ENCOUNTER (INPATIENT)
Facility: CLINIC | Age: 61
Setting detail: SURGERY ADMIT
End: 2021-06-29
Attending: NEUROLOGICAL SURGERY | Admitting: NEUROLOGICAL SURGERY
Payer: COMMERCIAL

## 2021-06-29 DIAGNOSIS — Z11.59 ENCOUNTER FOR SCREENING FOR OTHER VIRAL DISEASES: ICD-10-CM

## 2021-07-13 NOTE — TELEPHONE ENCOUNTER
Why won't pt take med the pain clinic offered for withdrawal?  I think this would be good option.    no fever and no chills.

## 2021-08-25 ENCOUNTER — NURSE TRIAGE (OUTPATIENT)
Dept: FAMILY MEDICINE | Facility: CLINIC | Age: 61
End: 2021-08-25

## 2021-08-25 ENCOUNTER — OFFICE VISIT (OUTPATIENT)
Dept: URGENT CARE | Facility: URGENT CARE | Age: 61
End: 2021-08-25
Payer: COMMERCIAL

## 2021-08-25 VITALS
DIASTOLIC BLOOD PRESSURE: 88 MMHG | RESPIRATION RATE: 20 BRPM | TEMPERATURE: 98 F | OXYGEN SATURATION: 98 % | HEART RATE: 78 BPM | SYSTOLIC BLOOD PRESSURE: 130 MMHG

## 2021-08-25 DIAGNOSIS — R55 VASOVAGAL SYNCOPE: ICD-10-CM

## 2021-08-25 DIAGNOSIS — R32 URINARY INCONTINENCE, UNSPECIFIED TYPE: Primary | ICD-10-CM

## 2021-08-25 LAB
ALBUMIN UR-MCNC: NEGATIVE MG/DL
APPEARANCE UR: CLEAR
BILIRUB UR QL STRIP: NEGATIVE
COLOR UR AUTO: YELLOW
GLUCOSE UR STRIP-MCNC: NEGATIVE MG/DL
HGB UR QL STRIP: NEGATIVE
KETONES UR STRIP-MCNC: NEGATIVE MG/DL
LEUKOCYTE ESTERASE UR QL STRIP: NEGATIVE
NITRATE UR QL: NEGATIVE
PH UR STRIP: 6 [PH] (ref 5–7)
SP GR UR STRIP: 1.02 (ref 1–1.03)
UROBILINOGEN UR STRIP-ACNC: 0.2 E.U./DL

## 2021-08-25 PROCEDURE — 99214 OFFICE O/P EST MOD 30 MIN: CPT | Performed by: FAMILY MEDICINE

## 2021-08-25 PROCEDURE — 81003 URINALYSIS AUTO W/O SCOPE: CPT | Performed by: FAMILY MEDICINE

## 2021-08-25 NOTE — TELEPHONE ENCOUNTER
Pt calls,    S-(situation) & B-(Backgroud): see triage note, pt informs was in her car parked at a park, stressful phone call with sister, pt had episode of nausea, laid head on car window, feels passed out very briefly, when woke up found she wet herself, has had episodes like this before but never wet herself, today feels fatigue, discussed urgent care, pt has no transportation today, wonders if can go to urgent care tomorrow    A-(assessment): syncopal episode    R-(recommendations): routed to , please advise plan, route to inform pt, may leave DETAILED message      Reason for Disposition    Age > 50 years    Additional Information    Negative: Still unconscious    Negative: Still feels dizzy or lightheaded    Negative: Difficult to awaken or acting confused (e.g., disoriented, slurred speech)    Negative: Difficulty breathing    Negative: Bluish (or gray) lips or face    Negative: Shock suspected (e.g., cold/pale/clammy skin, too weak to stand, low BP, rapid pulse)    Negative: Bleeding (e.g., vomiting blood, rectal bleeding or tarry stools, severe vaginal bleeding)    Negative: Chest pain    Negative: Extra heart beats or heart is beating fast (i.e., palpitations)    Negative: Heart beating < 50 beats per minute OR > 140 beats per minute    Negative: Fainted suddenly after medicine, allergic food or bee sting    Negative: Sounds like a life-threatening emergency to the triager    Negative: Has diabetes (diabetes mellitus) and fainting from low blood sugar (i.e., < 70 mg/dL or 3.9 mmol/L)    Negative: Seizure suspected (e.g., muscle jerking or shaking followed by confusion)    Negative: Heat exhaustion suspected (i.e., dehydration from heat exposure)    Negative: Fainted > 15 minutes ago and still looks pale (pale skin, pallor)    Negative: Fainted > 15 minutes ago and still feels weak or dizzy    Negative: History of heart problems or congestive heart failure    Negative: Occurred during exercise    Negative:  "Any head or face injury    Answer Assessment - Initial Assessment Questions  1. ONSET: \"How long were you unconscious?\" (minutes) \"When did it happen?\"      Few seconds to one minute  2. CONTENT: \"What happened during period of unconsciousness?\" (e.g., seizure activity)       Passed out briefly in car, felt sick, \"wet herself\"  3. MENTAL STATUS: \"Alert and oriented now?\" (oriented x 3 = name, month, location)       No   4. TRIGGER: \"What do you think caused the fainting?\" \"What were you doing just before you fainted?\"  (e.g., exercise, sudden standing up, prolonged standing)      Stress, \"lots of stress\"   5. RECURRENT SYMPTOM: \"Have you ever passed out before?\" If so, ask: \"When was the last time?\" and \"What happened that time?\"       One year ago, feels nauseous  6. INJURY: \"Did you sustain any injury during the fall?\"       No   7. CARDIAC SYMPTOMS: \"Have you had any of the following symptoms: chest pain, difficulty breathing, palpitations?\"      No  8. NEUROLOGIC SYMPTOMS: \"Have you had any of the following symptoms: headache, numbness, vertigo, weakness?\"      Hx of headaches and aneuysm  9. GI SYMPTOMS: \"Have you had any of the following symptoms: abdominal pain, vomiting, diarrhea, blood in stools?\"      NO  10. OTHER SYMPTOMS: \"Do you have any other symptoms?\"        Felt nauseous at time of incident  11. PREGNANCY: \"Is there any chance you are pregnant?\" \"When was your last menstrual period?\"        n/aa    Protocols used: [id number]-EL    Sheridan Zuniga RN, BSN  Message handled by CLINIC NURSE.    "

## 2021-08-25 NOTE — PATIENT INSTRUCTIONS
Patient Education     Fainting: Vagal Reaction  Fainting (syncope) is a temporary loss of consciousness that is associated with a loss of postural tone. It s also called passing out. It occurs when blood flow to the brain is less than normal. Your healthcare provider believes that your fainting was because of a vagal reaction. This condition is not a sign of serious disease.   A vagal reaction is a response in your body that causes your pulse to slow down or the blood vessels to expand. This causes your blood pressure to fall. And this sends less blood to your brain if you are standing or sitting. That results in dizziness, near-fainting, or fainting. Lying down usually stops the reaction within 60 seconds.   This response can occur during sudden fear, severe pain, emotional stress, overexertion, overheating, hunger, nausea or vomiting, prolonged standing, or standing up after sitting or lying for a long time.   Home care  Follow these guidelines when caring for yourself at home:     Rest today. Go back to your normal activities as soon as you are feeling back to normal.    Stay hydrated and avoid skipping meals.    If you feel lightheaded or dizzy, lie down right away. Or sit with your head lowered between your knees.  Follow-up care  Follow up with your healthcare provider, or as advised.   When to seek medical advice  Call your healthcare provider right away if any of these occur:     Another fainting spell that s not explained by the common causes listed above    Pain in your chest, arm, neck, jaw, back, or abdomen    Shortness of breath    Severe headache or seizure    Your heart beats very rapidly, very slowly, or irregularly (palpitations)  [employer] last reviewed this educational content on 6/1/2019 2000-2021 [employer]. All rights reserved. This information is not intended as a substitute for professional medical care. Always follow your healthcare professional's instructions.
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
## 2021-09-26 ENCOUNTER — HEALTH MAINTENANCE LETTER (OUTPATIENT)
Age: 61
End: 2021-09-26

## 2021-10-19 PROBLEM — F32.9 MAJOR DEPRESSION: Status: ACTIVE | Noted: 2020-10-14

## 2021-12-08 ENCOUNTER — TELEPHONE (OUTPATIENT)
Dept: FAMILY MEDICINE | Facility: CLINIC | Age: 61
End: 2021-12-08

## 2021-12-08 NOTE — TELEPHONE ENCOUNTER
Pt LM on  at the [facility] looking for PAL RN.  Returned call LM on  to call back.   Ivanna Degroot. RN, BSN, PAL (Patient Advocate Liaison)  [facility]   [phone]

## 2021-12-09 NOTE — TELEPHONE ENCOUNTER
Pt wanted to speak about [facility] paperwork. Video appt made next week Tuesday at 1200.

## 2022-01-03 NOTE — PROGRESS NOTES
Pre-Visit Planning   Next 5 appointments (look out 90 days)    Jan 05, 2022 11:30 AM  (Arrive by 11:10 AM)  Adult Preventative Visit with Umu Roberto PA-C  [facility] ([facility] [address] [address] [address] [phone]        Appointment Notes for this encounter:   Annual Physical. ALSO discuss other health issues: Meds, ulcers, stress, aneurisms and strokes since last seen.  Questionnaires Reviewed/Assigned  Additional questionnaires assigned: [id number]  Current Outpatient Medications   Medication     acetaminophen (TYLENOL) 325 MG tablet     calcium gluconate 500 MG tablet     cyanocobalamin (VITAMIN B-12) 100 MCG tablet     diclofenac (VOLTAREN) 1 % topical gel     hydrOXYzine (ATARAX) 25 MG tablet     lisinopril (ZESTRIL) 10 MG tablet     LORazepam (ATIVAN) 0.5 MG tablet     metoclopramide (REGLAN) 10 MG tablet     multivitamin CF FORMULA (CHOICEFUL) chewable tablet     nicotine (NICORETTE) 2 MG gum     pantoprazole (PROTONIX) 20 MG EC tablet     propranolol (INDERAL) 10 MG tablet     SUMAtriptan (IMITREX) 25 MG tablet     vitamin (B COMPLEX-C) tablet     No current facility-administered medications for this visit.     Health Maintenance Due   Topic Date Due     ADVANCE CARE PLANNING  Never done     COVID-19 Vaccine (1) Never done     Pneumococcal Vaccine: Pediatrics (0 to 5 Years) and At-Risk Patients (6 to 64 Years) (1 of 2 - PPSV23) Never done     COLORECTAL CANCER SCREENING  Never done     ZOSTER IMMUNIZATION (1 of 2) Never done     LUNG CANCER SCREENING  Never done     MAMMO SCREENING  10/17/2015     PAP  10/17/2016     PREVENTIVE CARE VISIT  01/03/2018     ANNUAL REVIEW OF HM ORDERS  01/16/2021     INFLUENZA VACCINE (1) Never done     PHQ-9  10/15/2021     Patient preferred phone number: [phone]    Unable to reach. Left voicemail. Advised patient to call clinic back at [phone]  Ivanna Degroot. RN, BSN, PAL  (Patient Advocate Liaison)  [facility]   [phone]  .

## 2022-01-05 ENCOUNTER — PATIENT OUTREACH (OUTPATIENT)
Dept: CARE COORDINATION | Facility: CLINIC | Age: 62
End: 2022-01-05

## 2022-01-05 ENCOUNTER — OFFICE VISIT (OUTPATIENT)
Dept: FAMILY MEDICINE | Facility: CLINIC | Age: 62
End: 2022-01-05
Payer: COMMERCIAL

## 2022-01-05 ENCOUNTER — TELEPHONE (OUTPATIENT)
Dept: FAMILY MEDICINE | Facility: CLINIC | Age: 62
End: 2022-01-05

## 2022-01-05 VITALS
DIASTOLIC BLOOD PRESSURE: 78 MMHG | HEART RATE: 84 BPM | SYSTOLIC BLOOD PRESSURE: 130 MMHG | TEMPERATURE: 97.9 F | BODY MASS INDEX: 19.55 KG/M2 | WEIGHT: 129 LBS | OXYGEN SATURATION: 98 % | HEIGHT: 68 IN

## 2022-01-05 DIAGNOSIS — K21.00 GASTROESOPHAGEAL REFLUX DISEASE WITH ESOPHAGITIS WITHOUT HEMORRHAGE: ICD-10-CM

## 2022-01-05 DIAGNOSIS — I10 HTN, GOAL BELOW 140/90: ICD-10-CM

## 2022-01-05 DIAGNOSIS — Z72.0 TOBACCO ABUSE DISORDER: ICD-10-CM

## 2022-01-05 DIAGNOSIS — T74.31XA ADULT SUBJECT TO EMOTIONAL ABUSE, INITIAL ENCOUNTER: ICD-10-CM

## 2022-01-05 DIAGNOSIS — F33.2 SEVERE EPISODE OF RECURRENT MAJOR DEPRESSIVE DISORDER, WITHOUT PSYCHOTIC FEATURES (H): Primary | ICD-10-CM

## 2022-01-05 PROBLEM — I67.1 CEREBRAL ANEURYSM, NONRUPTURED: Status: ACTIVE | Noted: 2022-01-05

## 2022-01-05 PROCEDURE — 99214 OFFICE O/P EST MOD 30 MIN: CPT | Performed by: PHYSICIAN ASSISTANT

## 2022-01-05 RX ORDER — BUPROPION HYDROCHLORIDE 150 MG/1
150 TABLET, FILM COATED, EXTENDED RELEASE ORAL 2 TIMES DAILY
Qty: 60 TABLET | Refills: 1 | Status: SHIPPED | OUTPATIENT
Start: 2022-01-05 | End: 2022-05-17

## 2022-01-05 RX ORDER — LISINOPRIL 10 MG/1
TABLET ORAL
Qty: 15 TABLET | Refills: 11 | Status: SHIPPED | OUTPATIENT
Start: 2022-01-05 | End: 2022-08-19

## 2022-01-05 RX ORDER — PANTOPRAZOLE SODIUM 20 MG/1
TABLET, DELAYED RELEASE ORAL
Qty: 90 TABLET | Refills: 2 | Status: SHIPPED | OUTPATIENT
Start: 2022-01-05 | End: 2023-04-20

## 2022-01-05 SDOH — ECONOMIC STABILITY: INCOME INSECURITY: IN THE LAST 12 MONTHS, WAS THERE A TIME WHEN YOU WERE NOT ABLE TO PAY THE MORTGAGE OR RENT ON TIME?: NO

## 2022-01-05 SDOH — HEALTH STABILITY: PHYSICAL HEALTH: ON AVERAGE, HOW MANY DAYS PER WEEK DO YOU ENGAGE IN MODERATE TO STRENUOUS EXERCISE (LIKE A BRISK WALK)?: 0 DAYS

## 2022-01-05 SDOH — ECONOMIC STABILITY: INCOME INSECURITY: HOW HARD IS IT FOR YOU TO PAY FOR THE VERY BASICS LIKE FOOD, HOUSING, MEDICAL CARE, AND HEATING?: SOMEWHAT HARD

## 2022-01-05 SDOH — ECONOMIC STABILITY: FOOD INSECURITY: WITHIN THE PAST 12 MONTHS, THE FOOD YOU BOUGHT JUST DIDN'T LAST AND YOU DIDN'T HAVE MONEY TO GET MORE.: NEVER TRUE

## 2022-01-05 SDOH — ECONOMIC STABILITY: TRANSPORTATION INSECURITY
IN THE PAST 12 MONTHS, HAS LACK OF TRANSPORTATION KEPT YOU FROM MEETINGS, WORK, OR FROM GETTING THINGS NEEDED FOR DAILY LIVING?: YES

## 2022-01-05 SDOH — ECONOMIC STABILITY: FOOD INSECURITY: WITHIN THE PAST 12 MONTHS, YOU WORRIED THAT YOUR FOOD WOULD RUN OUT BEFORE YOU GOT MONEY TO BUY MORE.: NEVER TRUE

## 2022-01-05 SDOH — HEALTH STABILITY: PHYSICAL HEALTH: ON AVERAGE, HOW MANY MINUTES DO YOU ENGAGE IN EXERCISE AT THIS LEVEL?: 0 MIN

## 2022-01-05 SDOH — ECONOMIC STABILITY: TRANSPORTATION INSECURITY
IN THE PAST 12 MONTHS, HAS THE LACK OF TRANSPORTATION KEPT YOU FROM MEDICAL APPOINTMENTS OR FROM GETTING MEDICATIONS?: YES

## 2022-01-05 ASSESSMENT — SOCIAL DETERMINANTS OF HEALTH (SDOH)
HOW OFTEN DO YOU GET TOGETHER WITH FRIENDS OR RELATIVES?: ONCE A WEEK
HOW OFTEN DO YOU ATTEND CHURCH OR RELIGIOUS SERVICES?: NEVER
IN A TYPICAL WEEK, HOW MANY TIMES DO YOU TALK ON THE PHONE WITH FAMILY, FRIENDS, OR NEIGHBORS?: MORE THAN THREE TIMES A WEEK
DO YOU BELONG TO ANY CLUBS OR ORGANIZATIONS SUCH AS CHURCH GROUPS UNIONS, FRATERNAL OR ATHLETIC GROUPS, OR SCHOOL GROUPS?: NO

## 2022-01-05 ASSESSMENT — LIFESTYLE VARIABLES
HOW OFTEN DO YOU HAVE A DRINK CONTAINING ALCOHOL: NEVER
HOW MANY STANDARD DRINKS CONTAINING ALCOHOL DO YOU HAVE ON A TYPICAL DAY: PATIENT DECLINED
HOW OFTEN DO YOU HAVE SIX OR MORE DRINKS ON ONE OCCASION: NEVER

## 2022-01-05 ASSESSMENT — ENCOUNTER SYMPTOMS
BREAST MASS: 0
ARTHRALGIAS: 1
HEARTBURN: 0
JOINT SWELLING: 1
CHILLS: 0
ABDOMINAL PAIN: 0
HEMATURIA: 0
COUGH: 0
SHORTNESS OF BREATH: 0
WEAKNESS: 1
PARESTHESIAS: 0
HEADACHES: 1
NERVOUS/ANXIOUS: 1
MYALGIAS: 0
PALPITATIONS: 1
FREQUENCY: 0
DYSURIA: 0
CONSTIPATION: 0
HEMATOCHEZIA: 0
FEVER: 0
NAUSEA: 1
EYE PAIN: 0
DIZZINESS: 0
SORE THROAT: 0
DIARRHEA: 0

## 2022-01-05 ASSESSMENT — ANXIETY QUESTIONNAIRES
2. NOT BEING ABLE TO STOP OR CONTROL WORRYING: NEARLY EVERY DAY
6. BECOMING EASILY ANNOYED OR IRRITABLE: NEARLY EVERY DAY
7. FEELING AFRAID AS IF SOMETHING AWFUL MIGHT HAPPEN: NEARLY EVERY DAY
1. FEELING NERVOUS, ANXIOUS, OR ON EDGE: NEARLY EVERY DAY
GAD7 TOTAL SCORE: 18
GAD7 TOTAL SCORE: 18
4. TROUBLE RELAXING: NEARLY EVERY DAY
GAD7 TOTAL SCORE: 18
7. FEELING AFRAID AS IF SOMETHING AWFUL MIGHT HAPPEN: NEARLY EVERY DAY
5. BEING SO RESTLESS THAT IT IS HARD TO SIT STILL: NOT AT ALL
3. WORRYING TOO MUCH ABOUT DIFFERENT THINGS: NEARLY EVERY DAY

## 2022-01-05 ASSESSMENT — PATIENT HEALTH QUESTIONNAIRE - PHQ9
10. IF YOU CHECKED OFF ANY PROBLEMS, HOW DIFFICULT HAVE THESE PROBLEMS MADE IT FOR YOU TO DO YOUR WORK, TAKE CARE OF THINGS AT HOME, OR GET ALONG WITH OTHER PEOPLE: NOT DIFFICULT AT ALL
SUM OF ALL RESPONSES TO PHQ QUESTIONS 1-9: 19
SUM OF ALL RESPONSES TO PHQ QUESTIONS 1-9: 19

## 2022-01-05 ASSESSMENT — MIFFLIN-ST. JEOR: SCORE: 1198.64

## 2022-01-05 NOTE — LETTER
[facility] CARE COORDINATION  [facility]   January 6, 2022    Antoinette Romero  [address]  [place] [address]      Dear Antoinette,    I am a clinic care coordinator who works with Umu Roberto PA-C at [facility]. I have been trying to reach you recently to introduce Clinic Care Coordination and to see if there was anything I could assist you with.  Below is a description of clinic care coordination and how I can further assist you.      The clinic care coordination team is made up of a registered nurse,  and community health worker who understand the health care system. The goal of clinic care coordination is to help you manage your health and improve access to the health care system in the most efficient manner. The team can assist you in meeting your health care goals by providing education, coordinating services, strengthening the communication among your providers and supporting you with any resource needs.    Please feel free to contact me at [phone] with any questions or concerns. We are focused on providing you with the highest-quality healthcare experience possible and that all starts with you.     Sincerely,     GATITO Villarreal   Care Coordination Team  [phone]

## 2022-01-05 NOTE — TELEPHONE ENCOUNTER
Called to [facility] Dr Delarosa [phone] opt 3 Anuradha Pt Coordinator.    LM on  to call this RN back if she should call the main clinic please forward to me [phone]  Ivanna Degroot RN
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
## 2022-01-05 NOTE — TELEPHONE ENCOUNTER
Patient seeing Dr. Delarosa @ [facility].   I'm recommending pt start bupropion  mg twice daily to help with smoking cessation and mood. Pt wanted us to check with Dr. Delarosa before she starts it.    Also, pt states she was advised to have MRA, but does not know how to get this scheduled. Does she call [facility] or is there already an order somewhere or will they call her?

## 2022-01-06 ASSESSMENT — ANXIETY QUESTIONNAIRES: GAD7 TOTAL SCORE: 18

## 2022-01-06 ASSESSMENT — PATIENT HEALTH QUESTIONNAIRE - PHQ9: SUM OF ALL RESPONSES TO PHQ QUESTIONS 1-9: 19

## 2022-01-06 NOTE — TELEPHONE ENCOUNTER
Call back from [facility].   is ok with pt taking bupropion.    Ivanna Degroot. RN, BSN, PAL (Patient Advocate Liaison)  [facility]   [phone]

## 2022-01-06 NOTE — PROGRESS NOTES
Clinic Care Coordination Contact  [facility]/Voicemail    Referral Source: PCP  Clinical Data: Care Coordinator Outreach  Outreach attempted x 2.  Left message on patient's voicemail with call back information and requested return call.  Plan: Care Coordinator sent care coordination introduction letter on 1/6/22 via [employer]. Care Coordinator will try to reach patient again in 3-5 business days.      GATITO Villarreal   Care Coordination Team  [phone]

## 2022-01-06 NOTE — PROGRESS NOTES
Clinic Care Coordination Contact  [facility]/Voicemail    Referral Source: PCP  Clinical Data: Care Coordinator Outreach  Outreach attempted x 1.  Left message on patient's voicemail with call back information and requested return call.  Plan: Care Coordinator will send care coordination introduction letter with care coordinator contact information and explanation of care coordination services via [employer]. Care Coordinator will try to reach patient again in 1-2 business days.      GATITO Villarreal   Care Coordination Team  [phone]

## 2022-01-07 ENCOUNTER — PATIENT OUTREACH (OUTPATIENT)
Dept: NURSING | Facility: CLINIC | Age: 62
End: 2022-01-07

## 2022-01-07 ASSESSMENT — ACTIVITIES OF DAILY LIVING (ADL): DEPENDENT_IADLS:: INDEPENDENT
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
## 2022-01-16 ENCOUNTER — HEALTH MAINTENANCE LETTER (OUTPATIENT)
Age: 62
End: 2022-01-16

## 2022-01-17 ENCOUNTER — PATIENT OUTREACH (OUTPATIENT)
Dept: NURSING | Facility: CLINIC | Age: 62
End: 2022-01-17

## 2022-01-17 ENCOUNTER — TELEPHONE (OUTPATIENT)
Dept: CARE COORDINATION | Facility: CLINIC | Age: 62
End: 2022-01-17

## 2022-01-17 ASSESSMENT — ACTIVITIES OF DAILY LIVING (ADL): DEPENDENT_IADLS:: INDEPENDENT

## 2022-01-17 NOTE — TELEPHONE ENCOUNTER
Antoinette Lang is wondering if she would qualify for a Handicap Parking Sticker? She prefers not to schedule a virtual visit with you to discuss further due to  financial concerns.  Please let me know if this is a possibility for her and I will let her know.  Thanks.    GATITO Villarreal   Care Coordination Team  [phone]

## 2022-01-18 ENCOUNTER — TELEPHONE (OUTPATIENT)
Dept: CARE COORDINATION | Facility: CLINIC | Age: 62
End: 2022-01-18

## 2022-01-18 NOTE — TELEPHONE ENCOUNTER
Yes, I have filled the form out for Antoinette.  Please notify patient.    Forms are in my outbox.     Please:  2) Copy at [facility] (accordian file)  3) Abstract  4) Call patient to see if she wants it mailed or pick it up Okay to leave detailed VM    Thanks,    Umu Roberto PA-C

## 2022-01-18 NOTE — TELEPHONE ENCOUNTER
Called patient and she stated that she would like this form mailed to her directly. This has been placed in our outgoing mail per patients request.    Dai Conti
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
## 2022-01-18 NOTE — TELEPHONE ENCOUNTER
MISTY WILLIS spoke with isabella and she is requesting that the Handicap Parking form be mailed to her home.  Thanks!    GATITO Villarreal Care Coordination Team  [phone]

## 2022-02-16 ENCOUNTER — PATIENT OUTREACH (OUTPATIENT)
Dept: CARE COORDINATION | Facility: CLINIC | Age: 62
End: 2022-02-16

## 2022-02-16 NOTE — PROGRESS NOTES
Clinic Care Coordination Contact  [facility]/Voicemail       Clinical Data: Care Coordinator Outreach  Outreach attempted x 1.  Left message on patient's voicemail with call back information and requested return call.  Plan: . Care Coordinator will try to reach patient again in 10 business days.      GATITO Villarreal   Care Coordination Team  [phone]

## 2022-03-07 ENCOUNTER — PATIENT OUTREACH (OUTPATIENT)
Dept: NURSING | Facility: CLINIC | Age: 62
End: 2022-03-07

## 2022-03-07 ASSESSMENT — ACTIVITIES OF DAILY LIVING (ADL): DEPENDENT_IADLS:: INDEPENDENT
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
## 2022-04-06 ENCOUNTER — PATIENT OUTREACH (OUTPATIENT)
Dept: CARE COORDINATION | Facility: CLINIC | Age: 62
End: 2022-04-06

## 2022-04-06 ASSESSMENT — ACTIVITIES OF DAILY LIVING (ADL): DEPENDENT_IADLS:: INDEPENDENT

## 2022-04-06 NOTE — PROGRESS NOTES
Clinic Care Coordination Contact  [facility]/Voicemail    Referral Source: PCP  Clinical Data: Care Coordinator Outreach  Outreach attempted x 1.  Left message on patient's voicemail with call back information and requested return call.  Plan:  Care Coordinator will try to reach patient again in 10 business days.      GATITO Villarreal   Care Coordination Team  [phone]

## 2022-04-25 ENCOUNTER — PATIENT OUTREACH (OUTPATIENT)
Dept: CARE COORDINATION | Facility: CLINIC | Age: 62
End: 2022-04-25

## 2022-04-25 ASSESSMENT — ACTIVITIES OF DAILY LIVING (ADL): DEPENDENT_IADLS:: INDEPENDENT

## 2022-04-25 NOTE — PROGRESS NOTES
Clinic Care Coordination Contact  [facility]/Voicemail       Clinical Data: Care Coordinator Outreach  Outreach attempted x 1.  Left message on patient's voicemail with call back information and requested return call.  Plan:. Care Coordinator will try to reach patient again in 3-5 business days.      GATITO Villarreal   Care Coordination Team  [phone]

## 2022-05-02 ENCOUNTER — TELEPHONE (OUTPATIENT)
Dept: FAMILY MEDICINE | Facility: CLINIC | Age: 62
End: 2022-05-02

## 2022-05-02 NOTE — TELEPHONE ENCOUNTER
Pt calls, working with care coordination, in beginning stages of social security disability and wanted to discuss previous gene sight testing from 2020, wants FYI sent to [facility] informing, divorce to be final soon, has struggled long term with depression and hard to medicate and gene sight confirmed this, pt aware may need phone visit to fill out social security disability forms when time comes, FYI to [facility]  Sheridan Zuniga RN, BSN  [facility]

## 2022-05-03 NOTE — TELEPHONE ENCOUNTER
I may be able to add a few things to disability form, but typically primary care does not certify for SSDI.  If it's for mental health, pt would be better to see Psychiatry/Psychology.   She was going to [facility]. Is she still and is she seeing therapist or Psychiatrist or both there?    Umu Roberto PA-C

## 2022-05-13 ENCOUNTER — PATIENT OUTREACH (OUTPATIENT)
Dept: CARE COORDINATION | Facility: CLINIC | Age: 62
End: 2022-05-13

## 2022-05-13 ASSESSMENT — ACTIVITIES OF DAILY LIVING (ADL): DEPENDENT_IADLS:: INDEPENDENT
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
## 2022-05-17 ENCOUNTER — OFFICE VISIT (OUTPATIENT)
Dept: FAMILY MEDICINE | Facility: CLINIC | Age: 62
End: 2022-05-17
Payer: COMMERCIAL

## 2022-05-17 VITALS
OXYGEN SATURATION: 98 % | TEMPERATURE: 98.3 F | RESPIRATION RATE: 16 BRPM | BODY MASS INDEX: 22.33 KG/M2 | HEIGHT: 65 IN | HEART RATE: 81 BPM | SYSTOLIC BLOOD PRESSURE: 134 MMHG | WEIGHT: 134 LBS | DIASTOLIC BLOOD PRESSURE: 78 MMHG

## 2022-05-17 DIAGNOSIS — R76.8 POSITIVE ANA (ANTINUCLEAR ANTIBODY): ICD-10-CM

## 2022-05-17 DIAGNOSIS — R52 TOTAL BODY PAIN: ICD-10-CM

## 2022-05-17 DIAGNOSIS — F33.2 SEVERE EPISODE OF RECURRENT MAJOR DEPRESSIVE DISORDER, WITHOUT PSYCHOTIC FEATURES (H): Primary | ICD-10-CM

## 2022-05-17 DIAGNOSIS — T74.31XA ADULT SUBJECT TO EMOTIONAL ABUSE, INITIAL ENCOUNTER: ICD-10-CM

## 2022-05-17 DIAGNOSIS — F41.9 ANXIETY: ICD-10-CM

## 2022-05-17 LAB
ERYTHROCYTE [DISTWIDTH] IN BLOOD BY AUTOMATED COUNT: 15.4 % (ref 10–15)
HCT VFR BLD AUTO: 41.2 % (ref 35–47)
HGB BLD-MCNC: 13.2 G/DL (ref 11.7–15.7)
MCH RBC QN AUTO: 27.3 PG (ref 26.5–33)
MCHC RBC AUTO-ENTMCNC: 32 G/DL (ref 31.5–36.5)
MCV RBC AUTO: 85 FL (ref 78–100)
PLATELET # BLD AUTO: 370 10E3/UL (ref 150–450)
RBC # BLD AUTO: 4.84 10E6/UL (ref 3.8–5.2)
WBC # BLD AUTO: 8.6 10E3/UL (ref 4–11)

## 2022-05-17 PROCEDURE — 86431 RHEUMATOID FACTOR QUANT: CPT | Performed by: NURSE PRACTITIONER

## 2022-05-17 PROCEDURE — 84443 ASSAY THYROID STIM HORMONE: CPT | Performed by: NURSE PRACTITIONER

## 2022-05-17 PROCEDURE — 99215 OFFICE O/P EST HI 40 MIN: CPT | Performed by: NURSE PRACTITIONER

## 2022-05-17 PROCEDURE — 36415 COLL VENOUS BLD VENIPUNCTURE: CPT | Performed by: NURSE PRACTITIONER

## 2022-05-17 PROCEDURE — 85027 COMPLETE CBC AUTOMATED: CPT | Performed by: NURSE PRACTITIONER

## 2022-05-17 PROCEDURE — 86039 ANTINUCLEAR ANTIBODIES (ANA): CPT | Performed by: NURSE PRACTITIONER

## 2022-05-17 PROCEDURE — 86038 ANTINUCLEAR ANTIBODIES: CPT | Performed by: NURSE PRACTITIONER

## 2022-05-17 PROCEDURE — 80053 COMPREHEN METABOLIC PANEL: CPT | Performed by: NURSE PRACTITIONER

## 2022-05-17 RX ORDER — DULOXETIN HYDROCHLORIDE 20 MG/1
20 CAPSULE, DELAYED RELEASE ORAL DAILY
Qty: 30 CAPSULE | Refills: 0 | Status: SHIPPED | OUTPATIENT
Start: 2022-05-17 | End: 2023-04-20

## 2022-05-17 ASSESSMENT — ANXIETY QUESTIONNAIRES
GAD7 TOTAL SCORE: 20
4. TROUBLE RELAXING: NEARLY EVERY DAY
7. FEELING AFRAID AS IF SOMETHING AWFUL MIGHT HAPPEN: NEARLY EVERY DAY
5. BEING SO RESTLESS THAT IT IS HARD TO SIT STILL: MORE THAN HALF THE DAYS
GAD7 TOTAL SCORE: 20
8. IF YOU CHECKED OFF ANY PROBLEMS, HOW DIFFICULT HAVE THESE MADE IT FOR YOU TO DO YOUR WORK, TAKE CARE OF THINGS AT HOME, OR GET ALONG WITH OTHER PEOPLE?: EXTREMELY DIFFICULT
2. NOT BEING ABLE TO STOP OR CONTROL WORRYING: NEARLY EVERY DAY
1. FEELING NERVOUS, ANXIOUS, OR ON EDGE: NEARLY EVERY DAY
3. WORRYING TOO MUCH ABOUT DIFFERENT THINGS: NEARLY EVERY DAY
GAD7 TOTAL SCORE: 20
6. BECOMING EASILY ANNOYED OR IRRITABLE: NEARLY EVERY DAY
7. FEELING AFRAID AS IF SOMETHING AWFUL MIGHT HAPPEN: NEARLY EVERY DAY

## 2022-05-17 ASSESSMENT — ENCOUNTER SYMPTOMS
WEAKNESS: 1
DIZZINESS: 1
PALPITATIONS: 1
BREAST MASS: 0
HEADACHES: 1
JOINT SWELLING: 1
NERVOUS/ANXIOUS: 1
HEARTBURN: 1
NAUSEA: 1
ARTHRALGIAS: 1
MYALGIAS: 1

## 2022-05-17 ASSESSMENT — PATIENT HEALTH QUESTIONNAIRE - PHQ9
10. IF YOU CHECKED OFF ANY PROBLEMS, HOW DIFFICULT HAVE THESE PROBLEMS MADE IT FOR YOU TO DO YOUR WORK, TAKE CARE OF THINGS AT HOME, OR GET ALONG WITH OTHER PEOPLE: VERY DIFFICULT
SUM OF ALL RESPONSES TO PHQ QUESTIONS 1-9: 23
SUM OF ALL RESPONSES TO PHQ QUESTIONS 1-9: 23
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
## 2022-05-17 NOTE — PATIENT INSTRUCTIONS
Duloxetine 20 mg once daily    Follow-up with me in 2 weeks; sooner as needed.     Consult with Behavioral Health Clinician.

## 2022-05-18 ENCOUNTER — TELEPHONE (OUTPATIENT)
Dept: FAMILY MEDICINE | Facility: CLINIC | Age: 62
End: 2022-05-18

## 2022-05-18 LAB
ALBUMIN SERPL-MCNC: 4.1 G/DL (ref 3.4–5)
ALP SERPL-CCNC: 114 U/L (ref 40–150)
ALT SERPL W P-5'-P-CCNC: 21 U/L (ref 0–50)
ANA PAT SER IF-IMP: ABNORMAL
ANA SER QL IF: ABNORMAL
ANA TITR SER IF: ABNORMAL {TITER}
ANION GAP SERPL CALCULATED.3IONS-SCNC: 2 MMOL/L (ref 3–14)
AST SERPL W P-5'-P-CCNC: 23 U/L (ref 0–45)
BILIRUB SERPL-MCNC: 0.3 MG/DL (ref 0.2–1.3)
BUN SERPL-MCNC: 11 MG/DL (ref 7–30)
CALCIUM SERPL-MCNC: 9.4 MG/DL (ref 8.5–10.1)
CHLORIDE BLD-SCNC: 109 MMOL/L (ref 94–109)
CO2 SERPL-SCNC: 29 MMOL/L (ref 20–32)
CREAT SERPL-MCNC: 0.65 MG/DL (ref 0.52–1.04)
GFR SERPL CREATININE-BSD FRML MDRD: >90 ML/MIN/1.73M2
GLUCOSE BLD-MCNC: 98 MG/DL (ref 70–99)
POTASSIUM BLD-SCNC: 4.5 MMOL/L (ref 3.4–5.3)
PROT SERPL-MCNC: 7.5 G/DL (ref 6.8–8.8)
RHEUMATOID FACT SER NEPH-ACNC: <6 IU/ML
SODIUM SERPL-SCNC: 140 MMOL/L (ref 133–144)
TSH SERPL DL<=0.005 MIU/L-ACNC: 0.73 MU/L (ref 0.4–4)

## 2022-05-18 ASSESSMENT — ANXIETY QUESTIONNAIRES: GAD7 TOTAL SCORE: 20

## 2022-05-18 NOTE — TELEPHONE ENCOUNTER
Patient calling regarding lab results.  Discussed 3 out of 5 tests are still in process.  Discussed once all labs are back Maria D will comment on them.  She is concerned about RDW.  Discussed normal is to 15 and she is at 15.4 so very minimal elevation.  She will watch for [facility] [employer] message regarding results and aware if Maria D has anything additional for us to call on we will call.  Patient agrees with plan.  Ivelisse Epperson RN

## 2022-05-19 ENCOUNTER — NURSE TRIAGE (OUTPATIENT)
Dept: FAMILY MEDICINE | Facility: CLINIC | Age: 62
End: 2022-05-19

## 2022-05-19 NOTE — TELEPHONE ENCOUNTER
"Patient calling and states she fainted for a few seconds around 10 pm last night.  Was alone and sitting in her car.  Had been talking on her phone a few minutes prior to passing out.  Feels she was stressed.  States was wet with sweat, lost control of bladder, feels she slumped over to the right and was fatigued.  States this happed a year ago as well- see 8/25/21  visit.  Do you want her to go to UC or ER?  Please advise.  Ivelisse Epperson RN         Reason for Disposition    Age > 50 years    Additional Information    Negative: Still unconscious    Negative: Still feels dizzy or lightheaded    Negative: Difficult to awaken or acting confused (e.g., disoriented, slurred speech)    Negative: Difficulty breathing    Negative: Bluish (or gray) lips or face    Negative: Shock suspected (e.g., cold/pale/clammy skin, too weak to stand, low BP, rapid pulse)    Negative: Bleeding (e.g., vomiting blood, rectal bleeding or tarry stools, severe vaginal bleeding)    Negative: Chest pain    Negative: Extra heart beats or heart is beating fast (i.e., palpitations)    Negative: Heart beating < 50 beats per minute OR > 140 beats per minute    Negative: Fainted suddenly after medicine, allergic food or bee sting    Negative: Sounds like a life-threatening emergency to the triager    Negative: Has diabetes (diabetes mellitus) and fainting from low blood sugar (i.e., < 70 mg/dL or 3.9 mmol/L)    Negative: Seizure suspected (e.g., muscle jerking or shaking followed by confusion)    Negative: Heat exhaustion suspected (i.e., dehydration from heat exposure)    Negative: Fainted > 15 minutes ago and still looks pale (pale skin, pallor)    Negative: Fainted > 15 minutes ago and still feels weak or dizzy    Negative: History of heart problems or congestive heart failure    Negative: Occurred during exercise    Negative: Any head or face injury    Answer Assessment - Initial Assessment Questions  1. ONSET: \"How long were you unconscious?\" " "(minutes) \"When did it happen?\"      Seconds per patient, yesterday around 10 pm  2. CONTENT: \"What happened during period of unconsciousness?\" (e.g., seizure activity)       Unwitnessed   3. MENTAL STATUS: \"Alert and oriented now?\" (oriented x 3 = name, month, location)       Normal now by tired out  4. TRIGGER: \"What do you think caused the fainting?\" \"What were you doing just before you fainted?\"  (e.g., exercise, sudden standing up, prolonged standing)      No, sitting in car and a few minutes before passing out was talking on her phone, was petty stressed  5. RECURRENT SYMPTOM: \"Have you ever passed out before?\" If so, ask: \"When was the last time?\" and \"What happened that time?\"       About a year ago, called the next day and was advised UC and she is unsure what she was told  6. INJURY: \"Did you sustain any injury during the fall?\"       No fall, slumped to right a little but no noted injury  7. CARDIAC SYMPTOMS: \"Have you had any of the following symptoms: chest pain, difficulty breathing, palpitations?\"      Sweating and nausea  8. NEUROLOGIC SYMPTOMS: \"Have you had any of the following symptoms: headache, numbness, vertigo, weakness?\"      Had a headache, weakness  9. GI SYMPTOMS: \"Have you had any of the following symptoms: abdominal pain, vomiting, diarrhea, blood in stools?\"      nausea  10. OTHER SYMPTOMS: \"Do you have any other symptoms?\"        Lost control of bladder  11. PREGNANCY: \"Is there any chance you are pregnant?\" \"When was your last menstrual period?\"        n/a    Protocols used: [id number]-LR      "

## 2022-05-23 ENCOUNTER — TELEPHONE (OUTPATIENT)
Dept: FAMILY MEDICINE | Facility: CLINIC | Age: 62
End: 2022-05-23

## 2022-05-23 ENCOUNTER — MYC MEDICAL ADVICE (OUTPATIENT)
Dept: FAMILY MEDICINE | Facility: CLINIC | Age: 62
End: 2022-05-23

## 2022-05-23 NOTE — TELEPHONE ENCOUNTER
Called and spoke with patient. Patient has appointment scheduled with [facility] in August. Printed and faxed referral.     Fax #[phone]    Adams DENIS RN

## 2022-05-23 NOTE — TELEPHONE ENCOUNTER
Maria D TUTTLE  -Pt states her insurance covers [facility].   -Can you please send a new Rheumatology referral to at [facility] [address], [place]. (I'm unsure how to do an out of network referral for Rheumatology)    Gwendolyn RICK, RN, BSN, PHN - Patient Advocate Liaison - PAL RN  [facility]  [phone]

## 2022-05-23 NOTE — TELEPHONE ENCOUNTER
Referrals  -Is it possible pt can see Dr. Mars, Rheumatologist at [facility]?      Gwendolyn Gil, RN, BSN, PHN  [facility]

## 2022-05-23 NOTE — TELEPHONE ENCOUNTER
Patient calling regarding wait time to see a rheumatologist.  States she can not get in til December.  Discussed calling to see if another location has a sooner appointment.  Patient agrees with plan.  Ivelisse Epperson RN

## 2022-05-23 NOTE — TELEPHONE ENCOUNTER
Pt wants release of information form.   -Sent link through [employer]    [email or web address]    Gwendolyn Gil RN, BSN, PHN  [facility]

## 2022-05-23 NOTE — TELEPHONE ENCOUNTER
Have patient contact [facility] to schedule.   Current referral is good for anywhere.   Thank you  MARILIN Geiger CNP on 5/23/2022 at 3:35 PM

## 2022-05-23 NOTE — TELEPHONE ENCOUNTER
Reason for Call:  Other call back    Detailed comments: patient called and needs an MARIE to sent med records from appt visits from Umu Roberto.  Please send via email or my chart.    Also, patient needs a referral sent for Rheumaology at [facility] [address], [place].  Patient will be going to this clinic instead as can get in sooner.    Please contact patient today.  Thank you.    Phone Number Patient can be reached at: Home number on file [phone] (home)    Best Time: any    Can we leave a detailed message on this number? YES    Call taken on 5/23/2022 at 11:21 AM by Mayela Mariano

## 2022-05-25 ENCOUNTER — TRANSFERRED RECORDS (OUTPATIENT)
Dept: HEALTH INFORMATION MANAGEMENT | Facility: CLINIC | Age: 62
End: 2022-05-25

## 2022-05-26 ENCOUNTER — OFFICE VISIT (OUTPATIENT)
Dept: BEHAVIORAL HEALTH | Facility: CLINIC | Age: 62
End: 2022-05-26

## 2022-05-26 DIAGNOSIS — F33.2 SEVERE EPISODE OF RECURRENT MAJOR DEPRESSIVE DISORDER, WITHOUT PSYCHOTIC FEATURES (H): ICD-10-CM

## 2022-05-26 DIAGNOSIS — F33.3 SEVERE RECURRENT MAJOR DEPRESSIVE DISORDER WITH PSYCHOTIC FEATURES (H): Primary | ICD-10-CM

## 2022-05-26 PROCEDURE — 90834 PSYTX W PT 45 MINUTES: CPT | Performed by: SOCIAL WORKER

## 2022-05-26 ASSESSMENT — COLUMBIA-SUICIDE SEVERITY RATING SCALE - C-SSRS
3. HAVE YOU BEEN THINKING ABOUT HOW YOU MIGHT KILL YOURSELF?: YES
5. HAVE YOU STARTED TO WORK OUT OR WORKED OUT THE DETAILS OF HOW TO KILL YOURSELF? DO YOU INTEND TO CARRY OUT THIS PLAN?: NO
1. HAVE YOU WISHED YOU WERE DEAD OR WISHED YOU COULD GO TO SLEEP AND NOT WAKE UP?: YES
2. HAVE YOU ACTUALLY HAD ANY THOUGHTS OF KILLING YOURSELF?: YES
TOTAL  NUMBER OF ABORTED OR SELF INTERRUPTED ATTEMPTS LIFETIME: NO
1. IN THE PAST MONTH, HAVE YOU WISHED YOU WERE DEAD OR WISHED YOU COULD GO TO SLEEP AND NOT WAKE UP?: NO
REASONS FOR IDEATION PAST MONTH: MOSTLY TO END OR STOP THE PAIN (YOU COULDN'T GO ON LIVING WITH THE PAIN OR HOW YOU WERE FEELING)
ATTEMPT LIFETIME: NO
4. HAVE YOU HAD THESE THOUGHTS AND HAD SOME INTENTION OF ACTING ON THEM?: NO
REASONS FOR IDEATION LIFETIME: MOSTLY TO END OR STOP THE PAIN (YOU COULDN'T GO ON LIVING WITH THE PAIN OR HOW YOU WERE FEELING)
TOTAL  NUMBER OF INTERRUPTED ATTEMPTS LIFETIME: NO
5. HAVE YOU STARTED TO WORK OUT OR WORKED OUT THE DETAILS OF HOW TO KILL YOURSELF? DO YOU INTEND TO CARRY OUT THIS PLAN?: NO
6. HAVE YOU EVER DONE ANYTHING, STARTED TO DO ANYTHING, OR PREPARED TO DO ANYTHING TO END YOUR LIFE?: NO
2. HAVE YOU ACTUALLY HAD ANY THOUGHTS OF KILLING YOURSELF?: YES
4. HAVE YOU HAD THESE THOUGHTS AND HAD SOME INTENTION OF ACTING ON THEM?: NO

## 2022-05-26 NOTE — Clinical Note
Maria D Eisenberg. Just met this pt today.  She thinks you referred her.  Pt already has a therapist she sees every other week, so I won't be working with her. She has all of the resources she needs. Is there something else you were wanting to address with her?  Thanks so much! Amy
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
## 2022-05-31 ENCOUNTER — TELEPHONE (OUTPATIENT)
Dept: FAMILY MEDICINE | Facility: CLINIC | Age: 62
End: 2022-05-31

## 2022-05-31 NOTE — TELEPHONE ENCOUNTER
Patient calling wanting to cancel appointment for today since she did not start her duloxetine prescription. She has concerns of side effects including, drowsiness, dizziness, and nausea. Patient has a lot of appointments that she cannot miss coming up and so she has bee unable to start on the duloxetine.     Patient states that she has past history of significant side effects from selective serotonin reuptake inhibitor that she has had to go to the ER for. Patient has also had genetic testing done and she reports that there are not medication options that would be effective at controlling her symptoms. Patient states that she is concerned that starting the duloxetine might trigger her PTSD.     Disused with patient keeping her scheduled appointment for today, rescheduling with Umu Roberto. Patient would like to hold off on taking the duloxetine and discuss this with Umu Roberto at a future scheduled appointment. Patient will also discuss the duloxetine with there therapist.     MA/TC: Please assist patient in scheduling an office visit with Umu Roberto first available.     Alannah Peoples RN on 5/31/2022 at 12:53 PM

## 2022-06-09 ENCOUNTER — VIRTUAL VISIT (OUTPATIENT)
Dept: FAMILY MEDICINE | Facility: CLINIC | Age: 62
End: 2022-06-09
Payer: COMMERCIAL

## 2022-06-09 VITALS — BODY MASS INDEX: 20.31 KG/M2 | WEIGHT: 134 LBS | HEIGHT: 68 IN

## 2022-06-09 DIAGNOSIS — F33.2 SEVERE EPISODE OF RECURRENT MAJOR DEPRESSIVE DISORDER, WITHOUT PSYCHOTIC FEATURES (H): Primary | ICD-10-CM

## 2022-06-09 PROCEDURE — 99213 OFFICE O/P EST LOW 20 MIN: CPT | Mod: 95 | Performed by: PHYSICIAN ASSISTANT

## 2022-06-09 NOTE — PROGRESS NOTES
Antoinette is a 61 year old who is being evaluated via a billable telephone visit.      What phone number would you like to be contacted at? [phone]  How would you like to obtain your AVS? MyChart    Assessment & Plan     Severe episode of recurrent major depressive disorder, without psychotic features (H)  Discussed meds and options. Pt does no want to try cymbalta.  Has therapist at [facility]  Divorce finalized yesterday  Passive SI, no plan, no HI  - Adult Mental Health  Referral; Future    No follow-ups on file.    Umu Roberto PA-C  [facility]    Subjective   Antoinette is a 61 year old who presents for the following health issues     HPI     Concern - medication  Description: pt has questions regarding Cymbalta for pain    Fearful of trying med due to sE      Review of Systems   Constitutional, HEENT, cardiovascular, pulmonary, gi and gu systems are negative, except as otherwise noted.      Objective           Vitals:  No vitals were obtained today due to virtual visit.    Physical Exam   healthy and alert  PSYCH: Alert and oriented times 3; coherent speech, normal   rate and volume, able to articulate logical thoughts, able   to abstract reason, no tangential thoughts, no hallucinations   or delusions  Her affect is anxious  RESP: No cough, no audible wheezing, able to talk in full sentences  Remainder of exam unable to be completed due to telephone visits                Phone call duration: 24 minutes

## 2022-06-10 DIAGNOSIS — F33.2 SEVERE EPISODE OF RECURRENT MAJOR DEPRESSIVE DISORDER, WITHOUT PSYCHOTIC FEATURES (H): ICD-10-CM

## 2022-06-10 DIAGNOSIS — R52 TOTAL BODY PAIN: ICD-10-CM

## 2022-06-10 RX ORDER — DULOXETIN HYDROCHLORIDE 20 MG/1
20 CAPSULE, DELAYED RELEASE ORAL DAILY
Qty: 30 CAPSULE | Refills: 0 | OUTPATIENT
Start: 2022-06-10

## 2022-06-15 ENCOUNTER — TRANSFERRED RECORDS (OUTPATIENT)
Dept: HEALTH INFORMATION MANAGEMENT | Facility: CLINIC | Age: 62
End: 2022-06-15

## 2022-06-17 ENCOUNTER — PATIENT OUTREACH (OUTPATIENT)
Dept: FAMILY MEDICINE | Facility: CLINIC | Age: 62
End: 2022-06-17

## 2022-06-17 ENCOUNTER — PATIENT OUTREACH (OUTPATIENT)
Dept: CARE COORDINATION | Facility: CLINIC | Age: 62
End: 2022-06-17

## 2022-06-17 ASSESSMENT — ACTIVITIES OF DAILY LIVING (ADL): DEPENDENT_IADLS:: INDEPENDENT

## 2022-06-17 NOTE — TELEPHONE ENCOUNTER
Roslyn SW / Care Coordination     Patient calling very anxious requesting assistance     [place] and patient is unable to fill out on her own, requesting assistance     Patient received the forms yesterday however they were mailed on 6/8 - very anxious that this get filled out today   RN explained that this likely will not get completed today and advised that typically there is a time frame to return 30 days?     Patient is currently covered under her 's insurance until the end of the month     Advised that if patient would need to be seen prior to all of the forms being completed that MA does back date for 3 months to decrease anxiety     Routing to care coordination team to follow up with patient     Amy Schmitt Registered Nurse, PAL (Patient Advocate Liason)   [facility]   [phone]

## 2022-06-17 NOTE — PROGRESS NOTES
Clinic Care Coordination Contact  [facility]/Voicemail    Referral Source: PCP  Clinical Data: Care Coordinator Outreach  Outreach attempted x 1.  Left message on patient's voicemail with call back information and requested return call.  Plan: Care Coordinator  via . Care Coordinator will try to reach patient again in 1-2 business days.      GATITO Villarreal   Care Coordination Team  [phone]

## 2022-06-20 ENCOUNTER — PATIENT OUTREACH (OUTPATIENT)
Dept: CARE COORDINATION | Facility: CLINIC | Age: 62
End: 2022-06-20

## 2022-06-20 ASSESSMENT — ACTIVITIES OF DAILY LIVING (ADL): DEPENDENT_IADLS:: INDEPENDENT
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
## 2022-06-22 ENCOUNTER — ALLIED HEALTH/NURSE VISIT (OUTPATIENT)
Dept: NURSING | Facility: CLINIC | Age: 62
End: 2022-06-22
Payer: COMMERCIAL

## 2022-06-22 DIAGNOSIS — Z71.89 COUNSELING AND COORDINATION OF CARE: Primary | ICD-10-CM

## 2022-06-22 PROCEDURE — 99207 PR NO CHARGE NURSE ONLY: CPT

## 2022-06-22 NOTE — PROGRESS NOTES
Clinic Care Coordination Contact  Community Health Worker Follow Up    Care Gaps:     Health Maintenance Due   Topic Date Due     COVID-19 Vaccine (1) Never done     Pneumococcal Vaccine: Pediatrics (0 to 5 Years) and At-Risk Patients (6 to 64 Years) (1 - PCV) Never done     COLORECTAL CANCER SCREENING  Never done     ZOSTER IMMUNIZATION (1 of 2) Never done     LUNG CANCER SCREENING  Never done     MAMMO SCREENING  10/17/2015     PAP  10/17/2016     PREVENTIVE CARE VISIT  01/03/2018     Not addressed    Goals:    Goals Addressed as of 6/22/2022 at 11:15 AM                    Today    4/25/22       1.  Financial Wellbeing (pt-stated)   40%  30%    Added 1/10/22 by Roslyn Patel LSW      Goal Statement: In the next five months I will have a greater sense of financial well being.    Date Goal set: 1/7/22  Barriers: I have no income of my own.  I don't qualify for assistance because of my spouses income. I don't think I am eligible for SSDI based on my work history.      Strengths: I have put a  on retainer to begin divorce process.  Date to Achieve By: Extended to 8/30/22  Patient expressed understanding of goal: Yes  Action steps to achieve this goal:  1. I will explore resources that may offer guidance and support on finances, etc as I begin the divorce process.  Care Coordinator will send me resources to consider  2. I will pursue county assistance once I have  my spouse (and will be eligible for assistance.)  3  I will apply for health insurance once my divorce is finalized.  4.. I will check back in to getting Social Security.  [facility] will email me information about this process and I can also call [phone] for more information.                   Intervention and Education during outreach: Assisted [place] with [place].     CHW Plan: Patient will continue to work with [facility] on other goals and reach out to CHW as needed.    JACQUIE Ramos, B.S. [facility]  Care Coordination  [facility] Clinics:  [place], [place] and [place]  [phone]  [email or web address]

## 2022-07-06 ENCOUNTER — PATIENT OUTREACH (OUTPATIENT)
Dept: CARE COORDINATION | Facility: CLINIC | Age: 62
End: 2022-07-06

## 2022-07-06 ASSESSMENT — ACTIVITIES OF DAILY LIVING (ADL): DEPENDENT_IADLS:: INDEPENDENT

## 2022-07-06 NOTE — PROGRESS NOTES
Clinic Care Coordination Contact  [facility]/Voicemail    Referral Source: PCP  Clinical Data: Care Coordinator Outreach  Outreach attempted x 1.  Left message on patient's voicemail with call back information and requested return call.  Plan: . Care Coordinator will try to reach patient again in 10 business days.    GATITO Villarreal   Care Coordination Team  [phone]
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
## 2022-07-20 ENCOUNTER — PATIENT OUTREACH (OUTPATIENT)
Dept: CARE COORDINATION | Facility: CLINIC | Age: 62
End: 2022-07-20

## 2022-07-20 ASSESSMENT — ACTIVITIES OF DAILY LIVING (ADL): DEPENDENT_IADLS:: INDEPENDENT

## 2022-07-20 NOTE — PROGRESS NOTES
Clinic Care Coordination Contact  [facility]/Voicemail    Referral Source: PCP  Clinical Data: Care Coordinator Outreach  Outreach attempted x 2.  Left message on patient's voicemail with call back information and requested return call.  Plan: . Care Coordinator will try to reach patient again in 10 business days.      GATITO Villarreal   Care Coordination Team  [phone]

## 2022-08-11 ENCOUNTER — VIRTUAL VISIT (OUTPATIENT)
Dept: FAMILY MEDICINE | Facility: CLINIC | Age: 62
End: 2022-08-11
Payer: COMMERCIAL

## 2022-08-11 VITALS — HEIGHT: 66 IN | BODY MASS INDEX: 20.41 KG/M2 | WEIGHT: 127 LBS

## 2022-08-11 DIAGNOSIS — F33.2 SEVERE EPISODE OF RECURRENT MAJOR DEPRESSIVE DISORDER, WITHOUT PSYCHOTIC FEATURES (H): Primary | ICD-10-CM

## 2022-08-11 DIAGNOSIS — F41.0 PANIC ATTACK: ICD-10-CM

## 2022-08-11 PROCEDURE — 99214 OFFICE O/P EST MOD 30 MIN: CPT | Mod: 95 | Performed by: PHYSICIAN ASSISTANT

## 2022-08-11 RX ORDER — LORAZEPAM 0.5 MG/1
0.5 TABLET ORAL DAILY PRN
Qty: 15 TABLET | Refills: 0 | Status: SHIPPED | OUTPATIENT
Start: 2022-08-11 | End: 2023-12-28

## 2022-08-11 NOTE — PROGRESS NOTES
Antoinette is a 61 year old who is being evaluated via a billable telephone visit.      What phone number would you like to be contacted at? [phone]  How would you like to obtain your AVS? Darcyhart    Assessment & Plan     Severe episode of recurrent major depressive disorder, without psychotic features (H)  Patient declines medications  Will continue care at [facility]  Looking for housing [facility]  Applying for disability, may need forms/letter. Informed we will support this.     Panic attack  Risks/benefits of medication use discussed with patient. Patient reports understanding and accepts trial of medication.  Use sparingly.   - LORazepam (ATIVAN) 0.5 MG tablet; Take 1 tablet (0.5 mg) by mouth daily as needed (panic)                 No follow-ups on file.    Umu Roberto PA-C  [facility]   Antoinette is a 61 year old, presenting for the following health issues:  Patient Request for Note/Letter (For disability/)      HPI     Concern - disability  Description: pt is requesting a letter regarding possible disability          Review of Systems   Constitutional, HEENT, cardiovascular, pulmonary, gi and gu systems are negative, except as otherwise noted.      Objective           Vitals:  No vitals were obtained today due to virtual visit.    Physical Exam   healthy, alert and no distress  PSYCH: Alert and oriented times 3; coherent speech, normal   rate and volume, able to articulate logical thoughts, able   to abstract reason, no tangential thoughts, no hallucinations   or delusions  Her affect is normal  RESP: No cough, no audible wheezing, able to talk in full sentences  Remainder of exam unable to be completed due to telephone visits                Phone call duration: 27 minutes    .  ..

## 2022-08-12 ENCOUNTER — PATIENT OUTREACH (OUTPATIENT)
Dept: CARE COORDINATION | Facility: CLINIC | Age: 62
End: 2022-08-12

## 2022-08-12 ASSESSMENT — ACTIVITIES OF DAILY LIVING (ADL): DEPENDENT_IADLS:: INDEPENDENT

## 2022-08-12 NOTE — PROGRESS NOTES
Clinic Care Coordination Contact    Follow Up Progress Note      Assessment: Antoinette now has MA for 6 months.  She has had no luck with housing  search. She wants to stay in same area and finding that rent is too high.  She thinks she can afford about $700.00 per month.     Expressed feeling very overwhelmed with everything and feels like she is spinning her wheels.  MISTY WILLIS will send her information about the Housing Stabilization program which is for individuals on MA and have a disability.   She has applied for social security disability.  She continues to see her therapist but frustrated that she has cancelled a number of appointments. .      Expressed interest in having a .  MISTY WILLIS gave her number for [facility] who will connect people with targeted case management if appropriate. Antoinette not interested in getting an [facility] worker.    She needs to go through [facility] Housing services to determine if she is eligible for Housing stabilization program and if she is then be connected to an organization to assist her with a housing search.  :    Health Maintenance Due   Topic Date Due     COVID-19 Vaccine (1) Never done     Pneumococcal Vaccine: Pediatrics (0 to 5 Years) and At-Risk Patients (6 to 64 Years) (1 - PCV) Never done     COLORECTAL CANCER SCREENING  Never done     ZOSTER IMMUNIZATION (1 of 2) Never done     LUNG CANCER SCREENING  Never done     MAMMO SCREENING  10/17/2015     PAP  10/17/2016     PREVENTIVE CARE VISIT  01/03/2018       Postponed to discuss at next outreach     Goals addressed this encounter:    Goals Addressed                    This Visit's Progress       Patient Stated       1.  Financial Wellbeing (pt-stated)   60%      Goal Statement: In the next five months I will have a greater sense of financial well being.    Date Goal set: 1/7/22  Barriers: I have no income of my own.  I don't qualify for assistance because of my spouses income. I don't  think I am eligible for SSDI based on my work history.      Strengths: I have put a  on retainer to begin divorce process.  Date to Achieve By: Extended to 8/30/22  Patient expressed understanding of goal: Yes  Action steps to achieve this goal:  1. I will explore resources that may offer guidance and support on finances, etc as I begin the divorce process.  Care Coordinator will send me resources to consider  2. I will pursue county assistance once I have  my spouse (and will be eligible for assistance.)  3  I will apply for health insurance once my divorce is finalized.  4.. I will check back in to getting Social Security.  MISTY WILLIS will email me information about this process and I can also call [phone] for more information.                   Intervention/Education provided during outreach: Emailed the following information to her:    Here is some information about [facility] Adult Mental Health (the video in first link is informative) ,  and housing stabilization information.      Adult Mental Health [facility]    AdultMentalHealthServicesBrochure.pdf ([facility].)    AdultServicesBrochure.pdf ([facility].)    Housing Stabilization Services  [email or web address]-[email or web address]-broch.pdf ([employer])    It looks like to be referred to a Housing Stabilization organization  you need to be referred by [facility] Coordinated Entry:  ?[facility] residents may call the Housing Resource Line at [phone] if they need:  For more information about Housing Stabilization services you can  contact Disability Hub at [phone]    Senior Linkage Line might have good housing resources - [phone]         Outreach Frequency: monthly        Plan:   MISTY WILLIS emailed information about Housing Stabilization program and how to access a adult mental health .     Care Coordinator will follow up in one week.    GATITO Villarreal Care Coordination Team  [phone]

## 2022-08-19 DIAGNOSIS — K21.00 GASTROESOPHAGEAL REFLUX DISEASE WITH ESOPHAGITIS WITHOUT HEMORRHAGE: ICD-10-CM

## 2022-08-19 DIAGNOSIS — I10 HTN, GOAL BELOW 140/90: ICD-10-CM

## 2022-08-19 RX ORDER — LISINOPRIL 10 MG/1
TABLET ORAL
Qty: 45 TABLET | Refills: 3 | Status: SHIPPED | OUTPATIENT
Start: 2022-08-19 | End: 2023-04-20

## 2022-08-19 RX ORDER — PANTOPRAZOLE SODIUM 20 MG/1
TABLET, DELAYED RELEASE ORAL
Qty: 90 TABLET | Refills: 2 | OUTPATIENT
Start: 2022-08-19

## 2022-08-23 ENCOUNTER — PATIENT OUTREACH (OUTPATIENT)
Dept: CARE COORDINATION | Facility: CLINIC | Age: 62
End: 2022-08-23

## 2022-08-23 ASSESSMENT — ACTIVITIES OF DAILY LIVING (ADL): DEPENDENT_IADLS:: INDEPENDENT

## 2022-08-23 NOTE — PROGRESS NOTES
Clinic Care Coordination Contact  Clinic Care Coordination - Chart Review Only    Situation/Background: Patient chart reviewed by care coordinator related to [facility] [facility] conversion.    Assessment: Patient continues to be followed by Clinic Care Coordination.    Plan: Patient's chart updated to align with [employer] [facility] program for ongoing patient management.    GATITO Villarreal   Care Coordination Team  [phone]

## 2022-08-23 NOTE — PROGRESS NOTES
Clinic Care Coordination Contact  [facility]/Voicemail    Referral Source: PCP  Clinical Data: Care Coordinator Outreach      Outreach attempted x 1.  Left message on patient's voicemail with call back information and requested return call.  Plan: . CHW will try to reach patient again in 10 business days.    GATITO Villarreal   Care Coordination Team  [phone]

## 2022-08-27 ENCOUNTER — HOSPITAL ENCOUNTER (EMERGENCY)
Facility: CLINIC | Age: 62
Discharge: HOME | End: 2022-08-27
Attending: EMERGENCY MEDICINE | Admitting: EMERGENCY MEDICINE
Payer: COMMERCIAL

## 2022-08-27 ENCOUNTER — NURSE TRIAGE (OUTPATIENT)
Dept: NURSING | Facility: CLINIC | Age: 62
End: 2022-08-27

## 2022-08-27 VITALS
TEMPERATURE: 97.8 F | HEART RATE: 102 BPM | DIASTOLIC BLOOD PRESSURE: 95 MMHG | SYSTOLIC BLOOD PRESSURE: 147 MMHG | OXYGEN SATURATION: 97 % | RESPIRATION RATE: 20 BRPM

## 2022-08-27 DIAGNOSIS — U07.1 INFECTION DUE TO 2019 NOVEL CORONAVIRUS: ICD-10-CM

## 2022-08-27 LAB
FLUAV RNA SPEC QL NAA+PROBE: NEGATIVE
FLUBV RNA RESP QL NAA+PROBE: NEGATIVE
HOLD SPECIMEN: NORMAL
HOLD SPECIMEN: NORMAL
RSV RNA SPEC NAA+PROBE: NEGATIVE
SARS-COV-2 RNA RESP QL NAA+PROBE: POSITIVE

## 2022-08-27 PROCEDURE — 96374 THER/PROPH/DIAG INJ IV PUSH: CPT

## 2022-08-27 PROCEDURE — 258N000003 HC RX IP 258 OP 636: Performed by: EMERGENCY MEDICINE

## 2022-08-27 PROCEDURE — 99284 EMERGENCY DEPT VISIT MOD MDM: CPT | Mod: CS,25

## 2022-08-27 PROCEDURE — 250N000011 HC RX IP 250 OP 636: Performed by: EMERGENCY MEDICINE

## 2022-08-27 PROCEDURE — 87637 SARSCOV2&INF A&B&RSV AMP PRB: CPT | Performed by: EMERGENCY MEDICINE

## 2022-08-27 PROCEDURE — 96375 TX/PRO/DX INJ NEW DRUG ADDON: CPT

## 2022-08-27 PROCEDURE — 93005 ELECTROCARDIOGRAM TRACING: CPT | Mod: RTG

## 2022-08-27 PROCEDURE — C9803 HOPD COVID-19 SPEC COLLECT: HCPCS

## 2022-08-27 PROCEDURE — 96361 HYDRATE IV INFUSION ADD-ON: CPT

## 2022-08-27 PROCEDURE — 250N000013 HC RX MED GY IP 250 OP 250 PS 637: Performed by: EMERGENCY MEDICINE

## 2022-08-27 RX ORDER — METOCLOPRAMIDE HYDROCHLORIDE 5 MG/ML
10 INJECTION INTRAMUSCULAR; INTRAVENOUS ONCE
Status: COMPLETED | OUTPATIENT
Start: 2022-08-27 | End: 2022-08-27

## 2022-08-27 RX ORDER — METOCLOPRAMIDE 10 MG/1
10 TABLET ORAL 3 TIMES DAILY PRN
Qty: 20 TABLET | Refills: 0 | Status: SHIPPED | OUTPATIENT
Start: 2022-08-27 | End: 2023-04-20

## 2022-08-27 RX ORDER — BENZOCAINE/MENTHOL 6 MG-10 MG
LOZENGE MUCOUS MEMBRANE 2 TIMES DAILY
Status: DISCONTINUED | OUTPATIENT
Start: 2022-08-27 | End: 2022-08-28 | Stop reason: HOSPADM

## 2022-08-27 RX ORDER — KETOROLAC TROMETHAMINE 15 MG/ML
15 INJECTION, SOLUTION INTRAMUSCULAR; INTRAVENOUS ONCE
Status: COMPLETED | OUTPATIENT
Start: 2022-08-27 | End: 2022-08-27

## 2022-08-27 RX ORDER — DIPHENHYDRAMINE HYDROCHLORIDE 50 MG/ML
25 INJECTION INTRAMUSCULAR; INTRAVENOUS ONCE
Status: COMPLETED | OUTPATIENT
Start: 2022-08-27 | End: 2022-08-27

## 2022-08-27 RX ADMIN — METOCLOPRAMIDE HYDROCHLORIDE 10 MG: 5 INJECTION INTRAMUSCULAR; INTRAVENOUS at 16:28

## 2022-08-27 RX ADMIN — KETOROLAC TROMETHAMINE 15 MG: 15 INJECTION, SOLUTION INTRAMUSCULAR; INTRAVENOUS at 16:24

## 2022-08-27 RX ADMIN — DIPHENHYDRAMINE HYDROCHLORIDE 25 MG: 50 INJECTION, SOLUTION INTRAMUSCULAR; INTRAVENOUS at 16:55

## 2022-08-27 RX ADMIN — SODIUM CHLORIDE 1000 ML: 9 INJECTION, SOLUTION INTRAVENOUS at 16:23

## 2022-08-27 RX ADMIN — HYDROCORTISONE: 1 CREAM TOPICAL at 17:02

## 2022-08-27 ASSESSMENT — ACTIVITIES OF DAILY LIVING (ADL)
ADLS_ACUITY_SCORE: 37
ADLS_ACUITY_SCORE: 35
ADLS_ACUITY_SCORE: 37
ADLS_ACUITY_SCORE: 37

## 2022-08-27 NOTE — ED TRIAGE NOTES
"Arrives with headache since Thursday, and states \"I think I had a stroke yesterday\", reports she doesn't remember anything that happened yesterday, however reports she went to bed about 3 pm due to the headache. States she is having some tingling in her left side arm and leg, but that it is not new. Tearful in triage, stating \"I'm afraid I am dying\". Alert and oriented, ABCs intact at this time.     Triage Assessment     Row Name 08/27/22 4252       Triage Assessment (Adult)    Airway WDL WDL       Respiratory WDL    Respiratory WDL WDL       Skin Circulation/Temperature WDL    Skin Circulation/Temperature WDL WDL       Cardiac WDL    Cardiac WDL WDL       Peripheral/Neurovascular WDL    Peripheral Neurovascular WDL WDL       Cognitive/Neuro/Behavioral WDL    Cognitive/Neuro/Behavioral WDL WDL       [place] Coma Scale    Best Eye Response 4-->(E4) spontaneous    Best Motor Response 6-->(M6) obeys commands    Best Verbal Response 5-->(V5) oriented    [place] Coma Scale Score 15              "

## 2022-08-27 NOTE — ED PROVIDER NOTES
"  History   Chief Complaint:  Headache       The history is provided by the patient.      Antoinette Romero is a 61 year old female with history of tobacco use, neuropathy, stroke, hypertension, and non-ruptured cerebral aneurysms who presents with a persistent headache that has worsened since the onset began three days ago. The headache started around Wednesday and has been progressively getting worse, reporting that she feels \"foggy\". She reports experiencing a headache Thursday night that felt similar in severity to when she was seen in the emergency department in the past, along with expressing concern after sleeping approximately 20 hours after.  However without experiencing much relief from Tylenol at home, the severity of her symptoms prompted the patients concern and their presentation to the ED today. The patient confirms experiencing headache, fatigue, diarrhea, confusion (\"fogginess\"), body aches, chills, diaphoresis, and cough. She mentions that she was recently exposed to COVID by her ex- who tested positive. She denies any vomiting, chest pain, or dyspnea. Notably, she denies any recent falls or head trauma prior to the onset of her symptoms, noting that she has a history of a broken neck 3 years ago.     Review of Systems   All other systems reviewed and are negative.    Allergies:  Keflex [Cephalexin-Fd&C Yellow #6]  Unasyn  Pristiq [Desvenlafaxine]  Azithromycin  Compazine [Prochlorperazine]  Zofran [Ondansetron]    Medications:  Tylenol  Calcium gluconate  Vitamin B-12  Voltaren  Cymbalta  Atarax  Zestril  Ativan  Reglan  Choiceful  Nicotrol  Protonix  Inderal  Imitrex    Past Medical History:     Urinary incontinence  Spondylolisthesis and radiculopathy of the lumbar region  Hypertension  Anxiety  Tobacco abuse  Obesity  Vitamin D Deficiency   Cholestasias  Scotoma  Hiatal hernia  Anastomotic ulcer  Osteoarthritis  Bilateral otitis media  Renal colic  Severe MDD  Cerebral aneurysm, non " ruptured  Panic attacks  Migraines    Past Surgical History:    Gastric bypass  Cervical fusion, decompression - 3 levels  Upper GI endoscopic suture removal  EGD x 3  Cholecystectomy  Sanchez defect repair  Operative laparoscopy    Family History:    Mother: diabetes, respiratory, osteoarthritis  Father: Neurologic disorder    Social History:  The patient arrived in a private vehicle.   The patient presents alone.   PCP: Umu Roberto     Physical Exam     Patient Vitals for the past 24 hrs:   BP Temp Temp src Pulse Resp SpO2   08/27/22 1655 (!) 147/95 -- -- -- -- 97 %   08/27/22 1545 -- -- -- -- -- 96 %   08/27/22 1530 -- -- -- -- -- 97 %   08/27/22 1426 (!) 142/115 97.8  F (36.6  C) Temporal 102 20 98 %       Physical Exam  General: Resting on the bed.  Head: No obvious trauma to head.  Ears, Nose, Throat:  External ears normal.  Nose normal.  No pharyngeal erythema, swelling or exudate.  Midline uvula.  clear TMs  Eyes:  Conjunctivae clear.  Pupils are equal, round, and reactive.  EOMI.   Neck: Normal range of motion.  Neck supple. no nuchal rigidity.    CV: Regular rate and rhythm.  No murmurs.      Respiratory: Effort normal and breath sounds normal.  No wheezing or crackles.   Gastrointestinal: Soft.  No distension. There is no tenderness.    Musculoskeletal: Non tender non edematous calves  Neuro: Alert. Moving all extremities appropriately.  Normal speech.  CN II-XII grossly intact, no pronator drift, normal finger-nose-finger, visual fields intact.  Gross muscle strength intact of the proximal and distal bilateral upper and lower extremities.  Sensation intact to light touch in all 4 extremities.   Skin: Skin is warm and dry.  No rash noted.     Emergency Department Course          EKG Interpretation:      Interpreted by Amy Arnold MD  Symptoms at time of EKG: none    Rhythm: normal sinus   Rate: normal  Axis: NORMAL  Ectopy: none  Conduction: normal  ST Segments/ T Waves: No ST-T wave  changes  Q Waves: none  Comparison to prior: Unchanged    Clinical Impression: normal EKG    Laboratory:  Labs Ordered and Resulted from Time of ED Arrival to Time of ED Departure   INFLUENZA A/B & SARS-COV2 PCR MULTIPLEX - Abnormal       Result Value    Influenza A PCR Negative      Influenza B PCR Negative      RSV PCR Negative      SARS CoV2 PCR Positive (*)         Emergency Department Course:           Reviewed:  I reviewed nursing notes, vitals, past medical history and Care Everywhere    Assessments:  1628 I obtained history and examined the patient as noted above.  1630 I explained the findings with the patient. I believe that the patient is safe for discharge at this time.     Interventions:  1623 NS 1 L IV  1624 Toradol 15 mg IV  1628 Reglan 10 mg IV  1655 Benadryl 25 mg IV  1702 Cortaid 1% cream Topical    Disposition:  The patient was discharged to home.     Impression & Plan   Medical Decision Makin-year-old female presented with headache muscle aches and fatigue.  Vital signs were reassuring.  Broad differential was pursued including not limited to COVID-19, influenza, electrolyte, metabolic, renal dysfunction, meningitis encephalitis, subarachnoid, stroke, tumor, mass, etc. EKG was done prior to my assessing the patient.  Reviewing EKG there is sinus rhythm.  No acute ischemic change.  No signs of arrhythmia.  No suggestion of myocarditis or pericarditis.  Patient has no chest pain or shortness of breath.  Unclear as to why EKG was performed but reviewed in the chart and appears to be unremarkable.  Patient reports waxing and waning headache, aches, generalized fatigue.  Patient has had recent COVID exposure.  Patient returned COVID-positive.  She is moving neck freely, non focal neurologic exam.  No signs of meningismus.  No indication for LP for meningitis or encephalitis.  No focal neurologic findings indicate stroke, tumor, mass.  No worst headache of life to indicate subarachnoid or suggest  need for emergent neuroimaging at this time.  I offered lab works and x-ray given that she has COVID she declined all of these.  She felt better after the above cocktail.  She wishes to go home and have supportive care.  She declines antivirals.  At this point patient feels well enough for discharge.  Outpatient management is indicated.      Diagnosis:    ICD-10-CM    1. Infection due to 2019 novel coronavirus  U07.1        Discharge Medications:  New Prescriptions    METOCLOPRAMIDE (REGLAN) 10 MG TABLET    Take 1 tablet (10 mg) by mouth 3 times daily as needed (Nausea or Vomiting)       Scribe Disclosure:  I, Chastity Ortega, am serving as a scribe at 4:12 PM on 8/27/2022 to document services personally performed by Amy Arnold MD based on my observations and the provider's statements to me.        Amy Arnold MD  08/27/22 6522

## 2022-08-27 NOTE — DISCHARGE INSTRUCTIONS
Continue good supportive cares including fluids, Tylenol ibuprofen as needed, rest and continue to monitor symptoms.    If shortness of breath, chest pain, difficulty breathing or worsening symptoms develop you may return to the ER at any point for reevaluation.        Discharge Instructions  COVID-19    COVID-19 is the disease caused by a new coronavirus. The virus spreads from person-to-person primarily by droplets when an infected person coughs or sneezes and the droplets are then breathed in by another person.    Symptoms of COVID-19  Many people have no symptoms or mild symptoms.  Symptoms usually appear within a few days, but up to 14-days, after contact with a person with COVID-19.    A mild COVID-19 illness is like a cold and can have fever, cough, sneezing, sore throat, tiredness, headache, and muscle pain.    A moderate COVID-19 illness might include shortness of breath or pneumonia on a chest x-ray.    A severe COVID-19 illness causes significant breathing problems such as low oxygen levels or more serious pneumonia.  Some patients experience loss of taste or smell which is somewhat unique to COVID-19.      Isolation and Quarantine  Testing is recommended for any person with symptoms that could be COVID-19 and often for those exposed to COVID-19. The best way to stop the spread of the virus is to avoid contact with others.    A close contact exposure is being within 6 feet of someone with COVID for 15 minutes.    Isolation refers to sick people staying away from people who are not sick.    A person in quarantine is limiting activity because they were exposed and are waiting to see if they might become sick.    If you test positive for COVID and have no symptoms, you should stay home (isolation) for 5 full days after the day of the test. You should then wear a mask when around others for another 5 days.    If you test positive for COVID and have mild symptoms, you should stay home (isolation) for at  least 5 days after your symptoms began. You can return to normal activities at that time, wearing a mask when around others, for another 5 days as long as your symptoms are improving/resolving and you have been without a fever for 24 hours (without using fever-reducing medicine).    If you test positive for COVID and have more than mild symptoms, you should stay home (isolation) for at least 10 days after your symptoms began. You can return to normal activities at that time as long as your symptoms are improving and you have been without a fever for 24 hours (without using fever-reducing medicine).  For example, if you have a fever and cough for 6 days, you need to stay home 4 more days with no fever for a total of 10 days. Or, if you have a fever and cough for 10 days, you need to stay home one more day with no fever for a total of 11 days.    If you were exposed to COVID and are not vaccinated (or it has been more than six months from your Pfizer or Moderna vaccine or two months from J&J vaccine), you should stay home (quarantine) for 5 days and then wear a mask around others for 5 additional days. A COVID test at day 5 is recommended.    If you were exposed to COVID and are vaccinated (had a booster, had two shots of Pfizer or Moderna vaccine in the last five months, or had J&J vaccine within two months), you do not need to quarantine but should wear a mask around others for 10 days and get a COVID test on day 5.    If you have symptoms but a negative test, you should stay at home until you have mild/improving symptoms and are without fever for 24 hours, using the same judgment you would for when it is safe to return to work/school from strep throat, influenza, or the common cold. If you worsen, you should consider being re-evaluated.    If you are being tested for COVID because of symptoms and your test is pending, you should stay home until you know your test result.  More details on isolation and quarantine  can be found on this website from the CDC:  https://www.cdc.gov/coronavirus/2019-ncov/your-health/quarantine-isolation.html    If I have COVID, how should I protect myself and others?    Do not go to work or school. Have a friend or relative do your shopping. Do not use public transportation (bus, train) or ridesharing (Lyft, Uber).    Separate yourself from other people in your home. As much as possible, you should stay in one room and away from other people in your home. Also, use a separate bathroom, if possible. Avoid handling pets or other animals while sick.     Wear a facemask if you need to be around other people and cover your mouth and nose with a tissue when you cough or sneeze.     Avoid sharing personal household items. You should not share dishes, drinking glasses, forks/knives/spoons, towels, or bedding with other people in your home. After using these items, they should be washed with soap and water. Clean parts of your home that are touched often (doorknobs, faucets, countertops, etc.) daily.     Wash your hands often with soap and water for at least 20 seconds or use an alcohol-based hand  containing at least 60% alcohol.     Avoid touching your face.    Treat your symptoms. You can take Acetaminophen (Tylenol) to treat body aches and fever as needed for comfort. Ibuprofen (Advil or Motrin) can be used as well if you still have symptoms after taking Tylenol. Drink fluids. Rest.    Watch for worsening symptoms such as shortness of breath/difficulty breathing or very severe weakness.    Employers/workplaces are being asked by the Centers for Disease Control (CDC) to not request notes/documentation for you to return to work or prove that you were ill. You may choose to show your employer this paperwork. Also, repeat testing should not be required to return to work.    Exercise/Sports in rare cases, COVID could affect your heart in a way that makes exercise or participation in sports  dangerous.  If you have a mild COVID illness (fever, cough, sore throat, and similar symptoms but no difficulty breathing or abnormalities of the lung): After your COVID symptoms have resolved, wait 14-days before returning to activity.  If you have more than a mild illness (meaning that you have problems with your breathing or lungs) or if you participate in competitive or strenuous activity or have a history of heart disease: Please see your primary doctor/provider prior to return to activity/competition.    COVID treatments such as antiviral and antibody medications are available. They are recommended for those patients who have a risk for developing more severe COVID illness. Importantly, the treatments must be started early in the illness (within 5-7 days, depending on which treatment). These treatments may have been considered today during your visit. If you have other questions, contact your primary doctor/clinic.     You can learn more about COVID treatments from the [facility]:  [email or web address]/meds.[email or web address]    Return to the Emergency Department if:    If you are developing worsening breathing, shortness of breath, or feel worse you should seek medical attention.  If you are uncertain, contact your health care provider/clinic. If you need emergency medical attention, call 911 and tell them you have been ill.

## 2022-08-27 NOTE — TELEPHONE ENCOUNTER
"Caller reports that she cannot remember much of yesterday; had a severe headache thatshe went to bed for in the afternoon  and during night woke in a cold sweat; today feels weak all over and unwell; headache present but to a lesser degree. Caller states \" It hink Ihad a stroke\"  Triage protocol reviewed   Advised to call  911 for acute  neuro symptoms   Caller will comply   Carolee Ibarra RN  [facility]NA       Reason for Disposition    Difficult to awaken or acting confused (e.g., disoriented, slurred speech)    Confusion, disorientation, or hallucinations is main symptom    [1] Difficult to awaken or acting confused (e.g., disoriented, slurred speech) AND [2] present now AND [3] new-onset    Additional Information    Negative: [1] SEVERE weakness (i.e., unable to walk or barely able to walk, requires support) AND [2] new-onset or worsening    Negative: [1] Weakness (i.e., paralysis, loss of muscle strength) of the face, arm / hand, or leg / foot on one side of the body AND [2] sudden onset AND [3] present now (Exception: Bell's palsy suspected [i.e., weakness only on one side of the face, developing over hours to days, no other symptoms])    Negative: [1] Numbness (i.e., loss of sensation) of the face, arm / hand, or leg / foot on one side of the body AND [2] sudden onset AND [3] present now    Negative: [1] Loss of speech or garbled speech AND [2] sudden onset AND [3] present now    Negative: [1] Difficult to awaken or acting confused (e.g., disoriented, slurred speech) AND [2] present now AND [3] diabetes mellitus    Protocols used: NEUROLOGIC DEFICIT-A-AH, CONFUSION - DELIRIUM-A-AH      "

## 2022-08-28 ENCOUNTER — NURSE TRIAGE (OUTPATIENT)
Dept: NURSING | Facility: CLINIC | Age: 62
End: 2022-08-28

## 2022-08-28 NOTE — TELEPHONE ENCOUNTER
Patient is calling and states that she went to [facility] and diagnosed with covid.  Today everything is fine.  Patient needs to be tested again and needs another covid test in 4 days for the place she is moving to.  Patient states that today she is on day 7 of quarantine.  Patient is calling to schedule a covid test with  [facility].      Reason for Disposition    Health Information question, no triage required and triager able to answer question    Additional Information    Negative: RN needs further essential information from caller in order to complete triage    Negative: Requesting regular office appointment    Negative: [1] Caller requesting NON-URGENT health information AND [2] PCP's office is the best resource    Protocols used: INFORMATION ONLY CALL - NO TRIAGE-A-

## 2022-09-01 ENCOUNTER — NURSE TRIAGE (OUTPATIENT)
Dept: NURSING | Facility: CLINIC | Age: 62
End: 2022-09-01

## 2022-09-01 DIAGNOSIS — U07.1 INFECTION DUE TO 2019 NOVEL CORONAVIRUS: Primary | ICD-10-CM

## 2022-09-02 ENCOUNTER — PATIENT OUTREACH (OUTPATIENT)
Dept: CARE COORDINATION | Facility: CLINIC | Age: 62
End: 2022-09-02

## 2022-09-02 ASSESSMENT — ACTIVITIES OF DAILY LIVING (ADL): DEPENDENT_IADLS:: INDEPENDENT

## 2022-09-02 NOTE — TELEPHONE ENCOUNTER
Called patient and left voicemail to call back and ask to speak to any triage nurse.    Tiesha Esparza RN

## 2022-09-02 NOTE — TELEPHONE ENCOUNTER
PCR test ordered in our system.  Please see triage note and advise.     Supportive cares.     Symptoms are very common with covid.

## 2022-09-02 NOTE — TELEPHONE ENCOUNTER
Pt returns call.     Pt informs she will take COVID test on Saturday and if positive, pt willl call on Tuesday to schedule lab only appt at [facility] to take PCR test (ordered in system already). If lab only appts are full, okay to double book pt (taking PCR test on Tuesday is necessary for pt to obtain housing).    Tiesha Esparza RN

## 2022-09-02 NOTE — TELEPHONE ENCOUNTER
Nurse Triage SBAR    Is this a 2nd Level Triage? NO    Situation: patient states she is still having symptoms from her covid.    Background: She was diagnosed with Covid on Saturday    Assessment: Patient feel like she is loosing her hearing x Wednesday  Patient states her sense of taste and smell are gone.  She also states she has brain fog- this has not gotten worse. She states she has eaten very little over the last 3 days due to lack of appetite.    Protocol Recommended Disposition:   Home Care    Recommendation: Patient is suppose to see if she is still positive on Saturday.    She is taking the test to get into a home as she is currently homeless.    Patient is wondering if her primary provider can order her a pcr test.    She is also wondering if her insurance will cover a second follow up test if neccessary.    Patient advised to call her Care Coordinator tomorrow.    Lluvia Mcgovern RN on 9/1/2022 at 8:13 PM      Reason for Disposition    [1] COVID-19 diagnosed by positive lab test (e.g., PCR, rapid self-test kit) AND [2] mild symptoms (e.g., cough, fever, others) AND [3] no complications or SOB    Additional Information    Negative: SEVERE difficulty breathing (e.g., struggling for each breath, speaks in single words)    Negative: Difficult to awaken or acting confused (e.g., disoriented, slurred speech)    Negative: Bluish (or gray) lips or face now    Negative: Shock suspected (e.g., cold/pale/clammy skin, too weak to stand, low BP, rapid pulse)    Negative: Sounds like a life-threatening emergency to the triager    Negative: [1] Diagnosed or suspected COVID-19 AND [2] symptoms lasting 3 or more weeks    Negative: [1] COVID-19 exposure AND [2] no symptoms    Negative: COVID-19 vaccine reaction suspected (e.g., fever, headache, muscle aches) occurring 1 to 3 days after getting vaccine    Negative: COVID-19 vaccine, questions about    Negative: [1] Lives with someone known to have influenza (flu test  positive) AND [2] flu-like symptoms (e.g., cough, runny nose, sore throat, SOB; with or without fever)    Negative: [1] Adult with possible COVID-19 symptoms AND [2] triager concerned about severity of symptoms or other causes    Negative: COVID-19 and breastfeeding, questions about    Negative: SEVERE or constant chest pain or pressure  (Exception: Mild central chest pain, present only when coughing.)    Negative: MODERATE difficulty breathing (e.g., speaks in phrases, SOB even at rest, pulse 100-120)    Negative: [1] Headache AND [2] stiff neck (can't touch chin to chest)    Negative: Oxygen level (e.g., pulse oximetry) 90 percent or lower    Negative: Chest pain or pressure    Negative: Patient sounds very sick or weak to the triager    Negative: MILD difficulty breathing (e.g., minimal/no SOB at rest, SOB with walking, pulse <100)    Negative: Fever > 103 F (39.4 C)    Negative: [1] Fever > 101 F (38.3 C) AND [2] age > 60 years    Negative: [1] Fever > 100.0 F (37.8 C) AND [2] bedridden (e.g., nursing home patient, CVA, chronic illness, recovering from surgery)    Negative: HIGH RISK for severe COVID complications (e.g., weak immune system, age > 64 years, obesity with BMI > 25, pregnant, chronic lung disease or other chronic medical condition)  (Exception: Already seen by PCP and no new or worsening symptoms.)    Negative: [1] HIGH RISK patient AND [2] influenza is widespread in the community AND [3] ONE OR MORE respiratory symptoms: cough, sore throat, runny or stuffy nose    Negative: [1] HIGH RISK patient AND [2] influenza exposure within the last 7 days AND [3] ONE OR MORE respiratory symptoms: cough, sore throat, runny or stuffy nose    Negative: Oxygen level (e.g., pulse oximetry) 91 to 94 percent    Negative: Fever present > 3 days (72 hours)    Negative: [1] Fever returns after gone for over 24 hours AND [2] symptoms worse or not improved    Negative: [1] Continuous (nonstop) coughing interferes with  work or school AND [2] no improvement using cough treatment per Care Advice    Negative: [1] COVID-19 infection suspected by caller or triager AND [2] mild symptoms (cough, fever, or others) AND [3] negative COVID-19 rapid test    Negative: Cough present > 3 weeks    Negative: [1] COVID-19 diagnosed by positive lab test (e.g., PCR, rapid self-test kit) AND [2] NO symptoms (e.g., cough, fever, others)    Protocols used: CORONAVIRUS (COVID-19) DIAGNOSED OR [id number] 1.18.2022

## 2022-09-06 ENCOUNTER — TELEPHONE (OUTPATIENT)
Dept: CARE COORDINATION | Facility: CLINIC | Age: 62
End: 2022-09-06

## 2022-09-06 ENCOUNTER — TELEPHONE (OUTPATIENT)
Dept: FAMILY MEDICINE | Facility: CLINIC | Age: 62
End: 2022-09-06

## 2022-09-06 NOTE — TELEPHONE ENCOUNTER
"Pt calls, told to call Alannah back to inform ok to talk to Heather, see note, pt informs Alannah \"knows what to do\" and only wants Alannah RN to handle, routed to Alannah, can you assist? Pt wants call back to inform when call has been placed on cell, reno Zuniga, RN, BSN  [facility]    "

## 2022-09-06 NOTE — TELEPHONE ENCOUNTER
Discussed with angie Jaffe to call Heather, called Heather-discussed covid guidelines/protocol, discussed, called pt, informed  Sheridan Zuniga RN, BSN  [facility]

## 2022-09-06 NOTE — LETTER
[facility]  [address] [address]  [phone]  Dept: [phone]        September 6, 2022        Dear Antoinette Romero,     You tested positive for COVID-19 on 8/27/22 (date of result) and before travel, you must complete a full 10 day isolation period. You are cleared to travel on 9/7/22 (day 11 after positive result).    Please visit the CDC travel web site for additional information: https://www.cdc.gov/coronavirus/2019-ncov/travelers/index.html  Please check your airline carrier/cruise company for their most up-to-date guidelines.    Sincerely,    Alannah Peoples RN on 9/6/2022 at 10:31 AM
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
## 2022-09-06 NOTE — TELEPHONE ENCOUNTER
ANETTE BARRERA huddled with Roslyn JAY via teams     Another RN has spoken to patient regarding this see other encounter     RN HOLLY advised Roslyn SW - that a PCR test completed in clinic may still show positive for up to 90 days     Patient should complete a rapid covid test that she can get in the pharmacy for free with her insurance card     Amy Schmitt, Registered Nurse, PAL (Patient Advocate Liason)   [facility]   [phone]

## 2022-09-06 NOTE — TELEPHONE ENCOUNTER
Antoinette left  a message for this  writer that she was unable to get a COVID test over the weekend because they did not accept her insurance.  She is currently staying in a hotel and is planning to move in to a new place and they need the COVID test results before they will let her move in.     She is requesting a call back to see if she can come to the clinic today  to get a COVID test     GATITO Villarreal   Care Coordination Team  [phone]

## 2022-09-06 NOTE — TELEPHONE ENCOUNTER
8/27/22 tested positive, symptoms started 4 days prior to that. Has been fever free this whole time. Overall symptoms are improving. Smell and taste are starting to come back.     Patient is currently homeless and had someone from her [employer] willing to take patient in but requested patient take a COVID PCR test before patient moves in with her.     Offered to call the women who is offering patient housing and educate her on why a PCR test is not appropriate but a Rapid home antigen test would be reasonable.     The women offering housing is Heather Graham [phone]. Patient will call back once Heather has given permission to be called.     ------------------------------------------------------------------------------    Patient would like a letter drafted for safe to fly. Letter drafted. Patient would like this letter available for her to  at the .     Letter drafted. MA/LPN/TC please print off letter and leave at the  for patient to .     Alannah Peoples RN on 9/6/2022 at 10:33 AM

## 2022-09-23 ENCOUNTER — PATIENT OUTREACH (OUTPATIENT)
Dept: CARE COORDINATION | Facility: CLINIC | Age: 62
End: 2022-09-23

## 2022-09-23 NOTE — PROGRESS NOTES
Clinic Care Coordination Contact  [facility]/Voicemail       Clinical Data: Care Coordinator Outreach  Outreach attempted x 1.  Left message on patient's voicemail with call back information and requested return call.    Plan: Care Coordinator will try to reach patient again in 10 business days.    JACQUIE Ramos, B.S. [facility] Care Coordination  [facility]:  [address] and [place]  [phone]  [email or web address]
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
## 2022-10-11 ENCOUNTER — PATIENT OUTREACH (OUTPATIENT)
Dept: CARE COORDINATION | Facility: CLINIC | Age: 62
End: 2022-10-11

## 2022-10-11 NOTE — PROGRESS NOTES
Clinic Care Coordination Contact  [facility]/Voicemail       Clinical Data: Care Coordinator Outreach  Outreach attempted x 1.  Left message on patient's voicemail with call back information and requested return call.    Plan: Care Coordinator will try to reach patient again in 10 business days.    JACQUIE Ramos, B.S. [facility] Care Coordination  [facility]:  [place] and [place]  [phone]  [email or web address]

## 2022-10-14 ENCOUNTER — PATIENT OUTREACH (OUTPATIENT)
Dept: CARE COORDINATION | Facility: CLINIC | Age: 62
End: 2022-10-14

## 2022-10-14 ASSESSMENT — ACTIVITIES OF DAILY LIVING (ADL): DEPENDENT_IADLS:: INDEPENDENT

## 2022-10-14 NOTE — LETTER
[facility] CARE COORDINATION  [facility]   October 17, 2022        Antoinette Romero  [address]  [place] [address]          Dear Antoinette,     Attached is an updated Patient Centered Plan of Care for your continued enrollment in Care Coordination. Please let us know if you have additional questions, concerns, or goals that we can assist with.    Sincerely,    GATITO Villarreal   Care Coordination Team  [phone]          [facility]  Patient Centered Plan of Care  About Me:        Patient Name:  Antoinette Romero    YOB: 1960  Age:         62 year old   [place] MRN:    [id number] Telephone Information:  Home Phone [phone]   Mobile [phone]       Address:  [address]  [place] [address] Email address:  [email or web address]      Emergency Contact(s)    Name Relationship Lgl Grd Work Phone Home Phone Mobile Phone   1. ANANDA LORD Daughter  [phone]           Primary language:  English     needed? No   [place] [employer]:  [phone] op. 1  Other communication barriers:None    Preferred Method of Communication:  Mail  Current living arrangement: I live in a private home with family    Mobility Status/ Medical Equipment: Independent        Health Maintenance  Health Maintenance Reviewed: Due/Overdue   Health Maintenance Due   Topic Date Due     HEPATITIS B IMMUNIZATION (1 of 3 - 3-dose series) Never done     COVID-19 Vaccine (1) Never done     Pneumococcal Vaccine: Pediatrics (0 to 5 Years) and At-Risk Patients (6 to 64 Years) (1 - PCV) Never done     COLORECTAL CANCER SCREENING  Never done     ZOSTER IMMUNIZATION (1 of 2) Never done     LUNG CANCER SCREENING  Never done     MAMMO SCREENING  10/17/2015     PAP  10/17/2016     YEARLY PREVENTIVE VISIT  01/03/2018     INFLUENZA VACCINE (1) Never done     PHQ-9  11/17/2022         My Access Plan  Medical Emergency 911   Primary Clinic Line [facility]  Clinic [place] - [phone]   24 Hour Appointment Line [phone] or  [phone]) (toll-free)   24 Hour Nurse Line [phone] (toll-free)   Preferred Urgent Care No data recorded   Preferred Hospital [facility]  [phone]     Preferred Pharmacy [facility] - [place], [place] - [address]     Behavioral Health Crisis Line The National Suicide Prevention Lifeline at 1-630.253.7020 or Text/Call 459             My Care Team Members  Patient Care Team       Relationship Specialty Notifications Start End    Umu Roberto PA-C PCP - General Family Practice  11/29/12     Phone: [phone] Fax: [phone] [address] [facility] [address]    Umu Roberto PA-C Assigned PCP   10/4/12     Phone: [phone] Fax: [phone] [address] [facility] [address]    Parviz Martinez MD MD General Surgery  5/26/15     Phone: [phone] Fax: [phone]         [address] [facility] [address] [facility] [address]    Roslyn Patel, CONNERW Lead Care Coordinator Primary Care - CC Admissions 1/5/22     Phone: [phone]         Essie Asher MA Community Health Worker  Admissions 8/23/22     Phone: [phone]                 My Care Plans  Self Management and Treatment Plan  Care Plan  Care Plan: Housing and Support     Problem: Insufficient In-home support     Goal: Safe Housing     Start Date: 1/7/2022 Expected End Date: 10/30/2022    This Visit's Progress: 50% Recent Progress: 20%    Priority: High    Note:     Barriers: Finances are tight and I can't afford my own place right now.        Strengths: I have transportation.I am in process of working out alimony, etc.      Patient expressed understanding of goal: Yes  Action steps to achieve this goal:   1. I will contact [facility] [place] Outreach  - call or text [phone].   2. Care Coordinator will send me a list of housing resources including [facility]  section 8 application, CDA programs and housing search resources.     3.I plan to move in with a neighbor the week of 9/6/22.  4. I have applied for CDA Senior Housing.                         Action Plans on File:                       Advance Care Plans/Directives Type:   No data recorded    My Medical and Care Information  Problem List   Patient Active Problem List   Diagnosis     HTN (hypertension)     Anxiety     HTN, goal below 140/90     CARDIOVASCULAR SCREENING; LDL GOAL LESS THAN 160     Tobacco abuse     Obesity     S/P gastric bypass     Vitamin D deficiency disease     Nausea     Symptomatic cholelithiasis     Cholecystitis     Headache     Scotoma     Hiatal hernia     Abdominal pain     Ulcer     Nausea & vomiting     Bilateral otitis media     Tobacco use disorder     Contusion     Renal colic     Severe major depression (H)     Cerebral aneurysm, nonruptured     Panic attack      Current Medications and Allergies:  See printed Medication Report.    Care Coordination Start Date: 1/7/2022   Frequency of Care Coordination: 6 weeks     Form Last Updated: 10/17/2022

## 2022-10-17 NOTE — PROGRESS NOTES
Clinic Care Coordination Contact    Care Coordination Clinician Chart Review    Situation: Patient chart reviewed by care coordinator.       Background: Care Coordination initial assessment and enrollment to Care Coordination was 1/7/22 .   Patient centered goals were developed with participation from patient.  SW CC handed patient off to CHW for continued outreach every 30 days.        Assessment: Per chart review, unsuccessful patient outreach completed by CC CHW on 10/11/22*.  Unable to determine if Patient is actively working to accomplish goal.  Patient's goal remains appropriate and relevant at this time.   Patient is due for updated Plan of Care.  Annual assessment will be due 1/7/23.      Care Plan: Housing and Support     Problem: Insufficient In-home support     Goal: Safe Housing     Start Date: 1/7/2022 Expected End Date: 10/30/2022    This Visit's Progress: 50% Recent Progress: 20%    Priority: High    Note:     Barriers: Finances are tight and I can't afford my own place right now.        Strengths: I have transportation.I am in process of working out alimony, etc.      Patient expressed understanding of goal: Yes  Action steps to achieve this goal:   1. I will contact [facility] Outreach  - call or text [phone].   2. Care Coordinator will send me a list of housing resources including [facility] section 8 application, CDA programs and housing search resources.     3.I plan to move in with a neighbor the week of 9/6/22.  4. I have applied for CDA Senior Housing.                            Plan/Recommendations: The patient will continue working with Care Coordination to achieve goal as above.  CHW will involve MISTY CC as needed or if patient is ready to move to maintenance.  SW CC will continue to monitor progress to goals and CHW outreaches every 6 weeks.   Plan of Care updated and mailed to patient: Yes,     GATITO Villarreal Care Coordination Team  [phone]

## 2022-10-18 ENCOUNTER — PATIENT OUTREACH (OUTPATIENT)
Dept: CARE COORDINATION | Facility: CLINIC | Age: 62
End: 2022-10-18

## 2022-10-18 NOTE — PROGRESS NOTES
Clinic Care Coordination Contact  [facility]/Voicemail       MISTY CC returned message to [facility] Adult Mental health Worker Nicole Lopez ([phone]) .  MARIE signed by Antoinette  in chart .      GATITO Villarreal Care Coordination Team  [phone]

## 2022-10-25 NOTE — PROGRESS NOTES
Clinic Care Coordination Contact  MISTY WILLIS has not yet been able to connect by phone  with [facility] Adult Mental Health Worker,  Renae Valverde.  There is a MARIE in Antoinette's chart to share information.  She left MISTY WILLIS  a vm message with some information she wanted to pass on to her PCP.tiffany Curry plans to schedule an  appointment with PCP and hopes to address the following concerns    1.  Long Covid symptoms.  Has been concerned about brain fog.  .  2.  Interest in seeing a pain specialist   3. :Concerned about neurological issues with her history of mini strokes.      MISTY WILLIS routed a message to PCP with this information.      GATITO Villarreal   Care Coordination Team  [phone]
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
## 2022-10-26 ENCOUNTER — TELEPHONE (OUTPATIENT)
Dept: FAMILY MEDICINE | Facility: CLINIC | Age: 62
End: 2022-10-26

## 2022-10-26 DIAGNOSIS — M54.2 CHRONIC NECK PAIN: Primary | ICD-10-CM

## 2022-10-26 DIAGNOSIS — G89.29 CHRONIC NECK PAIN: Primary | ICD-10-CM

## 2022-10-26 NOTE — LETTER
Antoinette Romero  [address]  [place] [address]    Thank you for choosing [facility] today for your health care needs.     [facility] is transforming primary care  At [facility], we re dedicated to constantly improve how we serve the health care needs of our patients and communities. We re currently making changes to the way we deliver care.     Changes you ll notice include:    An emphasis on building a relationship with a primary care provider    Access to a PAL (patient advocate and liaison) to help guide you with your care needs    Appointment lengths tailored to your specific needs and greater access to a care team to help you and your provider improve and maintain your health and well-being    Improved online access to your care team    Benefits of a primary care provider  If you don t have a designated primary care provider, we encourage you to get to know our care team online and find a provider you d like to see. Most of our providers have a short video on their online provider page. Visit [email or web address] to explore our providers and locations.    Benefits of having a primary care provider include:      They get to know you - your health history, family history and goals, making it easier to make a health plan together.     You get to know them - making health-related conversations and decisions easier      Primary care doctors help you when you re sick or hurt - but also focus on keeping you healthy with preventive care and screenings.      A doctor who sees you regularly is more likely to notice changes in your health.     You ll be connected to a broad care team who partners with your provider to support you.    Patient Advocate Liaison (PAL)   To help make sure you get the right care, at the right time, we include PALs, or Patient Advocate Liaisons, as part of your care team. Your PAL will be your first line of contact. They ll advocate for your needs and help you navigate our  services, connecting you with care team members and community resources to ensure your care is well coordinated. You ll be introduced to a PAL in an upcoming visit.     Expanded care team access with tailored appointment lengths  Depending on your health care needs, you may have longer or shorter appointments and see additional care team providers - including Medication Therapy Management (MTM) pharmacists, diabetes educators, behavioral health clinicians, or social workers. At times, they may be included in your visit with your provider, or you may see them individually.     Online access to your health care records and care team  [employer] is our online tool that makes it easy to see your health care information and communicate with your care team.     [employer] allows you to:     View your health maintenance plan so you know when you re due for a preventive screening    Send secure messages to your care team    View your health history and visit summaries     Schedule appointments     Complete questionnaires and eCheck-in before appointments      Get care from your provider with an e-visit      View and pay your bill     Sign up at [employer]. Once you have an account, you also can download the mobile lucio.     Connecting to fast and convenient care  When you need fast, convenient care - consider one of the following options:       Video Visit: A convenient care option for visiting with your provider out of the comfort of your own home. Most of the things you come to the clinic to address with your provider can now be done virtually through a video. This includes your chronic medication follow up, questions or concerns you may have, and even your annual Medicare Wellness Visit.       Phone Visit: Another convenient option for follow up of common problems that may require a more in-depth discussion with your provider.       E-visit: When you need acute care quickly, or have a quick question about your  medication, an E-visit is completed through [employer] and your provider will respond within one business day.      Jessica CONTI RN, BSN, PHN - PAL (patient advocate liaison)  [facility]  [phone]

## 2022-10-26 NOTE — TELEPHONE ENCOUNTER
See note from CC SW      1. I would recommend Antoinette see Neurology. Patient has seen Dr. Delarosa in the past @ [facility]  This would be appropriate for the 'long haul' covid symptoms and mini-strokes.    2. Referral for pain clinic made    [facility] [place] will call you to coordinate care as prescribed your provider. If you don t hear from a representative within 2 business days, please call [phone].

## 2022-10-26 NOTE — TELEPHONE ENCOUNTER
"Umu Roberto PA-C- FYI    Spoke to patient to inform of Umu's  recommendation.   -Patient informs she will contact neurology in regards to long term covid and mini strokes.   -Patient reports that she saw a rheumatologist for pain and \"they want to give me drugs and I get very ill from taking medications\". Patient reports that she has been offered legal marijuana and declined. Patient stated she is just dealing with the pain.Patient declines pain management as she does not want to take medications.  Encouraged patient to schedule with pain management clinic to discuss alternative options. Patient agreeable.     Patient reports she is homeless and her \"Mind is scrambled and just trying to survive\". Nicole is trying to help advocate for patient but it is hard, confusing and she has many appointments.   Informed patient of the RN PAL role, direct number provided and can contact with any questions or concerns. Patient verbalized understanding.     SB#3 PAL welcome letter sent    Patient informs she was seeing Doctor at [facility] and insurance changed they do not accept new insurance. Patient reports she needs back surgery and is homeless and has nowhere to go to recover at this time but would like to start seeing new orthopedic surgeon. Advised patient to contact insurance to see if any are covered and to call if referral is needed.     Jessica CONTI RN, BSN, PHN, PAL (patient advocate liaison)   [facility]  [phone]    "

## 2022-10-26 NOTE — TELEPHONE ENCOUNTER
Roslyn Patel, Umu Comer, Antoinette Dorantes is now working with a [facility] Adult Mental Health Worker , Renae Valverde.  There is a MADHAVI in Antoinette's chart to share information.  Renae and I have not been able to connect by phone yet but she left me a vm message with some information she wanted to pas on to you.       Antoinette plans to schedule an  appointment with you and hopes to address the following concerns     1.  Long Covid symptoms.  Has been concerned about brain fog.  .   2.  Interest in seeing a pain specialist   3. :Concerned about neurological issues with her history of mini strokes.       GATITO Villarreal    Care Coordination Team   [phone]

## 2022-11-09 ENCOUNTER — NURSE TRIAGE (OUTPATIENT)
Dept: FAMILY MEDICINE | Facility: CLINIC | Age: 62
End: 2022-11-09

## 2022-11-09 NOTE — TELEPHONE ENCOUNTER
Nurse Triage SBAR    Is this a 2nd Level Triage? YES, LICENSED PRACTITIONER REVIEW IS REQUIRED    Situation: having increased pain to right knee. History of right knee pain. Did have a cortisone injection 8/17/22 with rheumatology. Too early for another one. Patient having increased pain since yesterday. Knee is continuously swollen, not really swollen more than normal. Knee is warmer than normal. Denies redness. Denies fever. Patient was walking yesterday, had a sudden sharp shooting pain zing through knee and knee gave out/buckled. She did not fall, but leaned into chair. Rates pain 9/10. States has tingling continuously. Denies numbness. Has tried tylenol, ice and heat, no relief. Patient states knee constantly feels like it will give out.     Background: cortisone injection 8/17/22 with rheumatology. Knee gave out yesterday.     Assessment: advised to be seen today per protocol. No clinic openings. Will send encounter to provider for recommendations. Advised patient to go to Urgent Care if pain worsens. Patient verbalized understanding and is agreeable.     Ok to Leave a message if no answer.     Protocol Recommended Disposition:   See in Office Today    Recommendation:   Can patient be fit in on your schedule today? Please give recommendations.      Routed to provider    Does the patient meet one of the following criteria for ADS visit consideration? No    Reason for Disposition    SEVERE pain (e.g., excruciating, unable to walk)    Additional Information    Negative: Sounds like a life-threatening emergency to the triager    Negative: Followed a knee injury    Negative: Swollen knee joint and fever    Negative: Thigh or calf pain and only 1 side and present > 1 hour    Negative: Thigh or calf swelling and only 1 side    Negative: Patient sounds very sick or weak to the triager    Negative: Can't move swollen joint at all    Protocols used: KNEE PAIN-A-OH

## 2022-11-09 NOTE — TELEPHONE ENCOUNTER
Called patient back.  Advised of below.  Patient unsure what to do.  Also waiting for call back from rheumatology.  Also discussed ortho UC.  Patient unsure if anyone will inject as she is 9 days early from last injection.  She is wondering what UC would do for her.  She will wait a bit longer and see if rheumatology calls her back and if not will go to UC.  Ivelisse Epperson RN

## 2022-11-09 NOTE — TELEPHONE ENCOUNTER
Agree with RN triage protocol/recommendations. Patient should be seen today in clinic or UC.     UC recommended today for pt.

## 2022-11-11 ENCOUNTER — TELEPHONE (OUTPATIENT)
Dept: NURSING | Facility: CLINIC | Age: 62
End: 2022-11-11

## 2022-11-11 NOTE — TELEPHONE ENCOUNTER
Pt calling with concerns about;    States her friend has a MRSA infection in her hand (hand wound well dressed)  Pt went to see Pt on 11/10 and gave her a hug before she was aware of MRSA infection;    Pt is now worrying that she may get this infection (?) even though wound was completely covered and friend is taking Abx for this infection.   Pt reports that she immediately washed her hands and feels she was very consciousness.      Advised that if no contact with wound, Pt has very little chance of direct contact.    If Pt has further questions or concerns, advised to call triage back at any time.    Dimple Hardy RN, Nurse Advisor 7:47 PM 11/11/2022

## 2022-11-23 ENCOUNTER — PATIENT OUTREACH (OUTPATIENT)
Dept: CARE COORDINATION | Facility: CLINIC | Age: 62
End: 2022-11-23

## 2022-11-23 ASSESSMENT — ACTIVITIES OF DAILY LIVING (ADL): DEPENDENT_IADLS:: INDEPENDENT

## 2022-11-23 NOTE — PROGRESS NOTES
Clinic Care Coordination Contact    Care Coordination Clinician Chart Review    Situation: Patient chart reviewed by care coordinator.       Background: Care Coordination initial assessment and enrollment to Care Coordination was 1/7/22.   Patient centered goals were developed with participation from patient.  SW CC handed patient off to CHW for continued outreach every 30 days.        Assessment: Per chart review, patient outreach completed by CC CHW on 10/11/22.  Patient is actively working to accomplish goal.  Patient's goal remains appropriate and relevant at this time.   Patient is not due for updated Plan of Care.  Annual assessment will be due 1/7/23.      Care Plan: Housing and Support     Problem: Insufficient In-home support     Goal: Safe Housing     Start Date: 1/7/2022 Expected End Date: 1/30/2023    Recent Progress: 50%    Priority: High    Note:     Barriers: Finances are tight and I can't afford my own place right now.        Strengths: I have transportation.I am in process of working out alimony, etc.      Patient expressed understanding of goal: Yes  Action steps to achieve this goal:   1. I will contact [facility] Outreach  - call or text [phone].   2. Care Coordinator will send me a list of housing resources including [facility] section 8 application, CDA programs and housing search resources.     3.I plan to move in with a neighbor the week of 9/6/22.  4. I have applied for CDA Senior Housing.                            Plan/Recommendations: The patient will continue working with Care Coordination to achieve goal as above.  CHW will involve MISTY CC as needed or if patient is ready to move to maintenance.  SW CC will continue to monitor progress to goals and CHW outreaches every 6 weeks.   Plan of Care updated and mailed to patient: GATITO [place] Care Coordination Team  [phone]

## 2022-11-29 ENCOUNTER — PATIENT OUTREACH (OUTPATIENT)
Dept: CARE COORDINATION | Facility: CLINIC | Age: 62
End: 2022-11-29

## 2022-11-29 NOTE — PROGRESS NOTES
Clinic Care Coordination Contact  [facility]/Voicemail       Clinical Data: Care Coordinator Outreach  Outreach attempted x 1.  Left message on patient's voicemail with call back information and requested return call.    Plan: Care Coordinator will try to reach patient again in 10 business days.    CHW called nataly Rivera [facility]  for an update: their last conversation was on 11/9 Antoinette said she found another friend to live with. She feels pt has been stepping back from working as closely with her than in the beginning, supposed to meet this week. Texted yesterday and said she would rather talk next week. Feels uncertain on how she's doing.   Patient does have a housing worker, [employer]. Nicole will email them to see where things are at. Case will be transferred to ongoing  next week.     Essie Asher, JACQUIE, B.S. [facility]  Clinic Care Coordination  [facility]:  [place], [place] and [place]  [phone]  [email or web address]

## 2022-11-30 ENCOUNTER — TELEPHONE (OUTPATIENT)
Dept: FAMILY MEDICINE | Facility: CLINIC | Age: 62
End: 2022-11-30

## 2022-11-30 NOTE — TELEPHONE ENCOUNTER
Pt calls, wants to discuss MRSA, see 11/11/22 note, discussed at length, no known sores, extremely indirect exposure, pt will follow up prn if sores develop etc    Sheridan Zuniga RN, BSN  [facility]

## 2022-12-14 NOTE — PROGRESS NOTES
Clinic Care Coordination Contact  Community Health Worker Follow Up    Care Gaps:     Health Maintenance Due   Topic Date Due     COVID-19 Vaccine (1) Never done     Pneumococcal Vaccine: Pediatrics (0 to 5 Years) and At-Risk Patients (6 to 64 Years) (1 - PCV) Never done     COLORECTAL CANCER SCREENING  Never done     ZOSTER IMMUNIZATION (1 of 2) Never done     LUNG CANCER SCREENING  Never done     MAMMO SCREENING  10/17/2015     PAP  10/17/2016     YEARLY PREVENTIVE VISIT  01/03/2018     INFLUENZA VACCINE (1) Never done     PHQ-9  11/17/2022       Care Gap Goal set: Yes    Care Plan:   Care Plan: Housing and Support     Problem: Insufficient In-home support     Goal: Safe Housing     Start Date: 1/7/2022 Expected End Date: 1/30/2023    This Visit's Progress: 60% Recent Progress: 50%    Priority: High    Note:     Barriers: Finances are tight and I can't afford my own place right now.        Strengths: I have transportation.I am in process of working out [employer], etc.      Patient expressed understanding of goal: Yes  Action steps to achieve this goal:   1. I will contact [employer] Outreach  - call or text [phone].   2. Care Coordinator will send me a list of housing resources including [facility] section 8 application, CDA programs and housing search resources.     3.I plan to move in with a neighbor the week of 9/6/22.  4. I have applied for CDA Senior Housing.                    Care Plan: Address Overdue Health Maintenance     Problem: HP GENERAL PROBLEM     Goal: I will address my overdue health maintenance     Start Date: 12/14/2022 Expected End Date: 3/14/2023    Note:     Barriers: Housing, finances  Strengths: Transportation, seeing psychologist at [facility].   Patient expressed understanding of goal: Yes  Action steps to achieve this goal:  1. I will schedule a physical exam with my PCP Umu Roberto PA-C.   2. I will find an orthopedic surgeon in my insurance network and schedule  "back surgery when I have stable housing.   3. I will review outstanding care gaps at my PCP appointment and contact CC team with any questions or concerns.                         Intervention and Education during outreach: Patient states that she thinks she is getting sick again, headache, and body aches. Roommates are sick too, she just got over the flu.  Check in on housing, patient states she continues to look however nowhere she can find is affordable, on housing list. Thinks it will be a few years away for senior housing. No Dasilva is her housing worker through the [facility], they talk often and are \"keeping in touch\" per pt.   Knows she needs to be seen for a couple things, need to find orthopedic surgeon for back surgery. Doesn't have a stable place to stay after.     Sees MH at [facility] sees psychologist, Negar Gardiner    Patient asks for CHW to call back tomorrow to help schedule appt, around 11am. To schedule physical.     CHW Plan: Called patient back on 12/15 and LVM, gave RN PALs number for her to call to assist with scheduling. Next outreach in one month.     JACQUIE Ramos, B.S. [facility]  Clinic Care Coordination  [facility]:  [place], [place] and [place]  [phone]  [email or web address]      "

## 2022-12-27 ENCOUNTER — PATIENT OUTREACH (OUTPATIENT)
Dept: FAMILY MEDICINE | Facility: CLINIC | Age: 62
End: 2022-12-27

## 2022-12-27 DIAGNOSIS — Z72.0 TOBACCO ABUSE DISORDER: ICD-10-CM

## 2022-12-27 NOTE — TELEPHONE ENCOUNTER
[facility] Outreach due to elevated BP     Mychart message sent.    Jessica CONTI RN, BSN, PHN  [facility]

## 2022-12-30 NOTE — TELEPHONE ENCOUNTER
Message left to return call to this RN PAL. Please transfer if available.    Direct number left for this RN PAL on message for return call.     Jessica CONTI RN, BSN, PHN, PAL (patient advocate liaison)   [facility]  [phone]

## 2023-01-02 ENCOUNTER — PATIENT OUTREACH (OUTPATIENT)
Dept: CARE COORDINATION | Facility: CLINIC | Age: 63
End: 2023-01-02

## 2023-01-02 ASSESSMENT — ACTIVITIES OF DAILY LIVING (ADL): DEPENDENT_IADLS:: INDEPENDENT

## 2023-01-02 NOTE — TELEPHONE ENCOUNTER
Patient reviewed [employer] message. Will follow up next week.    Jessica CONTI RN, BSN, PHN - PAL (patient advocate liaison)  [facility]  [phone]

## 2023-01-02 NOTE — LETTER
[facility] CARE COORDINATION  [facility]   January 6, 2023        Antoinette Romero  [address]  [place] [address]          Dear Antoinette,     Inez is an updated Patient Centered Plan of Care for your continued enrollment in Care Coordination. Please let us know if you have additional questions, concerns, or goals that we can assist with.    Sincerely,    GATITO Villarreal   Care Coordination Team  [phone]          [facility]  Patient Centered Plan of Care  About Me:        Patient Name:  Antoinette Romero    YOB: 1960  Age:         62 year old   [place] MRN:    [id number] Telephone Information:  Home Phone [phone]   Mobile [phone]       Address:  [address]  [place] [address] Email address:  [email or web address]      Emergency Contact(s)    Name Relationship Lgl Grd Work Phone Home Phone Mobile Phone   1. ANANDA LORD Daughter  [phone]           Primary language:  English     needed? No   [place] [employer]:  [phone] op. 1  Other communication barriers:None    Preferred Method of Communication:  Mail  Current living arrangement: Other    Mobility Status/ Medical Equipment: Independent        Health Maintenance  Health Maintenance Reviewed: Due/Overdue   Health Maintenance Due   Topic Date Due     COVID-19 Vaccine (1) Never done     Pneumococcal Vaccine: Pediatrics (0 to 5 Years) and At-Risk Patients (6 to 64 Years) (1 - PCV) Never done     COLORECTAL CANCER SCREENING  Never done     ZOSTER IMMUNIZATION (1 of 2) Never done     LUNG CANCER SCREENING  Never done     MAMMO SCREENING  10/17/2015     PAP  10/17/2016     YEARLY PREVENTIVE VISIT  01/03/2018     INFLUENZA VACCINE (1) Never done     PHQ-9  11/17/2022         My Access Plan  Medical Emergency 911   Primary Clinic Line [facility] - [phone]   24 Hour Appointment Line [phone] or  [phone]  [phone]) (toll-free)   24 Hour Nurse Line [phone] (toll-free)   Preferred Urgent Care No data recorded   Preferred Hospital [facility]  [phone]     Preferred Pharmacy [facility] - [place], [place] - [address]     Behavioral Health Crisis Line The National Suicide Prevention Lifeline at 1-497.144.7103 or Text/Call 488             My Care Team Members  Patient Care Team       Relationship Specialty Notifications Start End    Umu Roberto PA-C PCP - General Family Practice  11/29/12     Phone: [phone] Fax: [phone] [address] [facility] [address]    Umu Roberto PA-C Assigned PCP   10/4/12     Phone: [phone] Fax: [phone] [address] [facility] [address]    Parviz Martinez MD MD General Surgery  5/26/15     Phone: [phone] Fax: [phone]         [address] [place] [address]    Roslyn Patel, LSW Lead Care Coordinator Primary Care - CC Admissions 1/5/22     Phone: [phone]         Essie Asher MA Community Health Worker  Admissions 8/23/22     Phone: [phone]         Nicole Lopez    10/10/22     [facility] Adult Mental health Worker    Phone: [phone]         Jessica Mcdonough, RN Personal Advocate & Liaison (PAL) Nurse Admissions 10/26/22     [phone]            My Care Plans  Self Management and Treatment Plan  Care Plan  Care Plan: Housing and Support     Problem: Insufficient In-home support     Goal: Safe Housing     Start Date: 1/7/2022 Expected End Date: 1/30/2023    This Visit's Progress: 60% Recent Progress: 50%    Priority: High    Note:     Barriers: Finances are tight and I can't afford my own place right now.        Strengths: I have transportation.I am in process of working out alimony, etc.      Patient expressed understanding of goal: Yes  Action steps to achieve this goal:   1. I will contact [email or web address] [place]  Outreach  - call or text [phone].   2. Care Coordinator will send me a list of housing resources including [facility] section 8 application, CDA programs and housing search resources.     3.I plan to move in with a neighbor the week of 9/6/22.  4. I have applied for CDA Senior Housing.                    Care Plan: Address Overdue Health Maintenance     Problem: HP GENERAL PROBLEM     Goal: I will address my overdue health maintenance     Start Date: 12/14/2022 Expected End Date: 3/14/2023    Note:     Barriers: Housing, finances  Strengths: Transportation, seeing psychologist at [facility].   Patient expressed understanding of goal: Yes  Action steps to achieve this goal:  1. I will schedule a physical exam with my PCP Umu Roberto PA-C.   2. I will find an orthopedic surgeon in my insurance network and schedule back surgery when I have stable housing.   3. I will review outstanding care gaps at my PCP appointment and contact CC team with any questions or concerns.                          Action Plans on File:                       Advance Care Plans/Directives Type:   Honoring Choices    Advance Care Planning and Health Care Directives  When it comes to decisions about your health care, it s important that your voice is heard. You may not always be able to speak for yourself.    We encourage you to have discussions with your loved ones, cultural or spiritual leaders and health care providers about your goals, values, beliefs and choices.    We are a part of [employer] [place] , supporting and promoting the benefits of advance care planning conversations.    Our goals are to:    Help you make informed decisions about your healthcare choices and ensure that those choices are honored    Offer advance care planning discussions with trained staff    Ensure your choices are clearly defined, documented and available in your medical record    Translate your choices into medical orders as  needed    Why is Advance Care Planning important?    Know what your health care choices are and decide what is right for you    An unexpected illness or injury could leave you unable to participate in important treatment decisions    When choices are left to others to decide that responsibility can be difficult and stressful    By discussing and outlining your choices, your voice is heard in the care you want to receive    How can I learn more?  We offer free classes at multiple locations, days and times. Our trained facilitators will provide information and guide you through a Health Care Directive document. They can also review, notarize and add your document to your medical record.    Call [employer] at [phone] or toll free at [phone] for assistance.    My Medical and Care Information  Problem List   Patient Active Problem List   Diagnosis     HTN (hypertension)     Anxiety     HTN, goal below 140/90     CARDIOVASCULAR SCREENING; LDL GOAL LESS THAN 160     Tobacco abuse     Obesity     S/P gastric bypass     Vitamin D deficiency disease     Nausea     Symptomatic cholelithiasis     Cholecystitis     Headache     Scotoma     Hiatal hernia     Abdominal pain     Ulcer     Nausea & vomiting     Bilateral otitis media     Tobacco use disorder     Contusion     Renal colic     Severe major depression (H)     Cerebral aneurysm, nonruptured     Panic attack      Current Medications and Allergies:  See printed Medication Report.    Care Coordination Start Date: 1/7/2022   Frequency of Care Coordination: 6 weeks     Form Last Updated: 01/06/2023

## 2023-01-02 NOTE — PROGRESS NOTES
Clinic Care Coordination Contact    Care Coordination Clinician Chart Review    Situation: Patient chart reviewed by care coordinator.       Background: Care Coordination initial assessment and enrollment to Care Coordination was 1/7/22.   Patient centered goals were developed with participation from patient.  MISTY WILLIS handed patient off to CHW for continued outreach every 30 days.        Assessment: Per chart review, patient outreach completed by CC CHW on 11/29/22.  Patient is actively working to accomplish goals.  Patient's goals remain appropriate and relevant at this time.   Patient is due for updated Plan of Care.  Annual assessment will be due 7/2023.      Care Plan: Housing and Support     Problem: Insufficient In-home support     Goal: Safe Housing     Start Date: 1/7/2022 Expected End Date: 1/30/2023    This Visit's Progress: 60% Recent Progress: 50%    Priority: High    Note:     Barriers: Finances are tight and I can't afford my own place right now.        Strengths: I have transportation.I am in process of working out alimony, etc.      Patient expressed understanding of goal: Yes  Action steps to achieve this goal:   1. I will contact [facility] Outreach  - call or text [phone].   2. Care Coordinator will send me a list of housing resources including [facility] section 8 application, CDA programs and housing search resources.     3.I plan to move in with a neighbor the week of 9/6/22.  4. I have applied for CDA Senior Housing.                    Care Plan: Address Overdue Health Maintenance     Problem: HP GENERAL PROBLEM     Goal: I will address my overdue health maintenance     Start Date: 12/14/2022 Expected End Date: 3/14/2023    Note:     Barriers: Housing, finances  Strengths: Transportation, seeing psychologist at [facility].   Patient expressed understanding of goal: Yes  Action steps to achieve this goal:  1. I will schedule a physical exam with my PCP Umu Roberto PA-C.    2. I will find an orthopedic surgeon in my insurance network and schedule back surgery when I have stable housing.   3. I will review outstanding care gaps at my PCP appointment and contact CC team with any questions or concerns.                             Plan/Recommendations: The patient will continue working with Care Coordination to achieve goal as above.  CHW will involve SW CC as needed or if patient is ready to move to maintenance.  SW CC will continue to monitor progress to goals and CHW outreaches every 6 weeks.   Plan of Care updated and mailed to patient: Yes,     GATITO Villarreal Care Coordination Team  [phone]

## 2023-01-06 NOTE — PROGRESS NOTES
Clinic Care Coordination Contact  [facility]/Voicemail    Referral Source: PCP  Annual assessment due  Clinical Data: Care Coordinator Outreach  Outreach attempted x 1.  Left message on patient's voicemail with call back information and requested return call.  Plan: Care Coordinator  via . Care Coordinator will try to reach patient again in 10 business days.    GATITO Villarreal   Care Coordination Team  [phone]

## 2023-01-11 NOTE — TELEPHONE ENCOUNTER
Spoke to patient to inform.     Patient was given an opportunity to ask questions, verbalized understanding of plan, and is agreeable.    Jessica CONTI RN, BSN, PHN - PAL (patient advocate liaison)  [facility]  [phone]

## 2023-01-11 NOTE — TELEPHONE ENCOUNTER
Umu Roberto PA-C    Patient is requesting refill of nicotine patches, gum, and inhaler. Patient reports smoking 1 pack per day.     RN pended Nicotrol inhaler refill from [facility]. Please advise on gum and patches. Patient declines virtual visit to discuss at this time.     Patient declines MTM visit at this time.     Scheduled patient for annual with Umu on 4/20/23.     Jessica CONTI RN, BSN, PHN - PAL (patient advocate liaison)  [facility]  [phone]

## 2023-01-14 ENCOUNTER — HEALTH MAINTENANCE LETTER (OUTPATIENT)
Age: 63
End: 2023-01-14

## 2023-01-26 ENCOUNTER — PATIENT OUTREACH (OUTPATIENT)
Dept: CARE COORDINATION | Facility: CLINIC | Age: 63
End: 2023-01-26

## 2023-01-26 ASSESSMENT — ACTIVITIES OF DAILY LIVING (ADL): DEPENDENT_IADLS:: INDEPENDENT

## 2023-01-26 NOTE — LETTER
[facility] CARE COORDINATION    January 30, 2023    Antoinette Romero  [address]  [place] [address]      Dear Antoinette,    I have been unsuccessful in reaching you since our last contact. At this time the Care Coordination team will make no further attempts to reach you, however this does not change your ability to continue receiving care from your providers at your primary care clinic. If you need additional support from a care coordinator in the future please contact me at [phone].    All of us at [facility] are invested in your health and are here to assist you in meeting your goals.     Sincerely,    GATITO Villarreal   Care Coordination Team  [phone]

## 2023-01-30 NOTE — PROGRESS NOTES
Clinic Care Coordination Contact  [facility]/Voicemail    Referral Source: PCP  Clinical Data: Care Coordinator Outreach  Outreach attempted x 2.  Left message on patient's voicemail with call back information and requested return call.  Plan: Care Coordinator will send disenrollment letter with care coordinator contact information via [employer]. Care Coordinator will do no further outreaches at this time.    GATITO Villarreal   Care Coordination Team  [phone]

## 2023-02-09 ENCOUNTER — TELEPHONE (OUTPATIENT)
Dept: FAMILY MEDICINE | Facility: CLINIC | Age: 63
End: 2023-02-09

## 2023-02-09 DIAGNOSIS — F17.200 TOBACCO USE DISORDER: Primary | ICD-10-CM

## 2023-02-09 DIAGNOSIS — F17.210 CIGARETTE NICOTINE DEPENDENCE WITHOUT COMPLICATION: ICD-10-CM

## 2023-02-09 NOTE — TELEPHONE ENCOUNTER
Patient calling and states pharmacy told her nicotrol inhaler needs a PA and to call her doctors office.  Routing to PA pool to process.  Ivelisse Epperson RN

## 2023-02-14 NOTE — TELEPHONE ENCOUNTER
PRIOR AUTHORIZATION DENIED    Medication: Nicotrol inhaler    Denial Date: 2/14/2023    Denial Rational:  Per insurance, medication is excluded from patient's benefit plan and will not be covered. Review and appeal are not available because of this exclusion.              Appeal Information:  N/A

## 2023-02-15 NOTE — TELEPHONE ENCOUNTER
Spoke with patient and informed of below. Patient will contact insurance to discuss covered medications. Direct number provided.     Jessica CONTI RN, BSN, PHN - PAL (patient advocate liaison)  [facility]  [phone]

## 2023-02-16 NOTE — TELEPHONE ENCOUNTER
Pt called wanting to report that [employer] insurance will fax additional paper work to [facility] for provider to fill out for pt to qualify for Nicotrol inhalor.    Case ref id [id number]    Pt reports if she is unable to get nicotrol inhaler, she is ok with a different type of brand/generic inhaler.    Bob Cleary RN on 2/16/2023 at 5:09 PM

## 2023-02-17 NOTE — TELEPHONE ENCOUNTER
Antoinette left voicemail for this RN PAL with same information below. Called to inform patient voicemail received. Will monitor for forms.     This RN will follow-up with patient next week to update.     Jessica CONTI RN, BSN, PHN - PAL (patient advocate liaison)  [facility]  [phone]

## 2023-02-21 NOTE — TELEPHONE ENCOUNTER
Message left to return call to this RN PAL. Please transfer if available.    Direct number left for this RN PAL on message for return call.     Jessica CONTI RN, BSN, PHN, PAL (patient advocate liaison)   [facility]  [phone]

## 2023-02-22 NOTE — TELEPHONE ENCOUNTER
Patient reports she called insurance and they were going to send paperwork with alternative medications. Informed patient that it has not been received at this time.     Patient will call insurance again. Advised patient to ask for the name of an alternative medication when she speaks to insurance and can inform Umu Roberto, LEAH    Patient was given an opportunity to ask questions, verbalized understanding of plan, and is agreeable.    Jessica CONTI RN, BSN, PHN - PAL (patient advocate liaison)  [facility]  [phone]

## 2023-02-27 NOTE — TELEPHONE ENCOUNTER
Patient spoke to [facility] this morning and called express scripts. Reports [employer] will resend coverage criteria questions to Umu Roberto PA-C.     RN will monitor for receipt.     Jessica CONTI RN, BSN, PHN - PAL (patient advocate liaison)  [facility]  [phone]

## 2023-02-28 NOTE — TELEPHONE ENCOUNTER
Received forms from [employer]- placed in pcp in box for completion. Once completed fax back to:    [facility] [phone]    Micki LAINEZ  [facility]   [facility]

## 2023-03-02 NOTE — TELEPHONE ENCOUNTER
Writer did not locate the fax pt was referring to.     Writer placed call to [facility] pharmacy to request alternative options for nicotine inhaler.     Spoke to Kyaw,     He is unsure of alternatives for inhaler.     Recommends Chantix, gums, patches.     Call placed to pt at [phone].     - Informed her of above and that a fax was sent to [facility] [employer] from Umu Roberto PA-C with the response to coverage criteria.     -Informed pt that she is to call insurance regarding coverage medications and to wait follow up on sent fax from 3/1/23.     Pt verbalizes understanding and will call back with information or questions. Will await follow-up.     Gwendolyn Ruiz RN   PAL (Patient Advocate Liason)  [facility]

## 2023-03-02 NOTE — TELEPHONE ENCOUNTER
Received incoming call from pt regarding coverage of Nicotrol inhaler.     - Pt states that she has received a message stating that the medication was approved through 2/28/2024 and that a notice was sent to provider.     - Writer informed pt that there are no notes in system, however will monitor for a fax received.     - Writer spoke to pt about reaching out to pharmacy regarding alternatives if coverage continues to be an issue.     -Pt reports she received a list from pharmacy, however is having difficulty accessing it from phone.     Routing to [facility] to f/u.     Gwendolyn Ruiz RN   PAL (Patient Advocate Liason)  [facility]

## 2023-04-20 ENCOUNTER — OFFICE VISIT (OUTPATIENT)
Dept: FAMILY MEDICINE | Facility: CLINIC | Age: 63
End: 2023-04-20
Payer: COMMERCIAL

## 2023-04-20 VITALS
RESPIRATION RATE: 20 BRPM | SYSTOLIC BLOOD PRESSURE: 134 MMHG | DIASTOLIC BLOOD PRESSURE: 81 MMHG | BODY MASS INDEX: 20.15 KG/M2 | TEMPERATURE: 98.7 F | OXYGEN SATURATION: 98 % | HEIGHT: 66 IN | WEIGHT: 125.4 LBS | HEART RATE: 79 BPM

## 2023-04-20 DIAGNOSIS — Z00.00 ROUTINE HISTORY AND PHYSICAL EXAMINATION OF ADULT: Primary | ICD-10-CM

## 2023-04-20 DIAGNOSIS — Z12.11 SCREEN FOR COLON CANCER: ICD-10-CM

## 2023-04-20 DIAGNOSIS — I10 HTN, GOAL BELOW 140/90: ICD-10-CM

## 2023-04-20 DIAGNOSIS — Z12.31 VISIT FOR SCREENING MAMMOGRAM: ICD-10-CM

## 2023-04-20 DIAGNOSIS — H61.22 IMPACTED CERUMEN OF LEFT EAR: ICD-10-CM

## 2023-04-20 DIAGNOSIS — G43.009 MIGRAINE WITHOUT AURA AND WITHOUT STATUS MIGRAINOSUS, NOT INTRACTABLE: ICD-10-CM

## 2023-04-20 DIAGNOSIS — F41.9 ANXIETY: ICD-10-CM

## 2023-04-20 DIAGNOSIS — K21.00 GASTROESOPHAGEAL REFLUX DISEASE WITH ESOPHAGITIS WITHOUT HEMORRHAGE: ICD-10-CM

## 2023-04-20 DIAGNOSIS — Z12.4 CERVICAL CANCER SCREENING: ICD-10-CM

## 2023-04-20 LAB
ERYTHROCYTE [DISTWIDTH] IN BLOOD BY AUTOMATED COUNT: 14.4 % (ref 10–15)
HCT VFR BLD AUTO: 39 % (ref 35–47)
HGB BLD-MCNC: 12.3 G/DL (ref 11.7–15.7)
MCH RBC QN AUTO: 27 PG (ref 26.5–33)
MCHC RBC AUTO-ENTMCNC: 31.5 G/DL (ref 31.5–36.5)
MCV RBC AUTO: 86 FL (ref 78–100)
PLATELET # BLD AUTO: 388 10E3/UL (ref 150–450)
RBC # BLD AUTO: 4.55 10E6/UL (ref 3.8–5.2)
WBC # BLD AUTO: 8.4 10E3/UL (ref 4–11)

## 2023-04-20 PROCEDURE — 80061 LIPID PANEL: CPT | Performed by: PHYSICIAN ASSISTANT

## 2023-04-20 PROCEDURE — 80053 COMPREHEN METABOLIC PANEL: CPT | Performed by: PHYSICIAN ASSISTANT

## 2023-04-20 PROCEDURE — 85027 COMPLETE CBC AUTOMATED: CPT | Performed by: PHYSICIAN ASSISTANT

## 2023-04-20 PROCEDURE — 36415 COLL VENOUS BLD VENIPUNCTURE: CPT | Performed by: PHYSICIAN ASSISTANT

## 2023-04-20 PROCEDURE — 99396 PREV VISIT EST AGE 40-64: CPT | Performed by: PHYSICIAN ASSISTANT

## 2023-04-20 PROCEDURE — 99214 OFFICE O/P EST MOD 30 MIN: CPT | Mod: 25 | Performed by: PHYSICIAN ASSISTANT

## 2023-04-20 RX ORDER — VENLAFAXINE HYDROCHLORIDE 37.5 MG/1
37.5 CAPSULE, EXTENDED RELEASE ORAL DAILY
Qty: 30 CAPSULE | Refills: 1 | Status: SHIPPED | OUTPATIENT
Start: 2023-04-20 | End: 2023-05-15

## 2023-04-20 RX ORDER — LISINOPRIL 10 MG/1
TABLET ORAL
Qty: 45 TABLET | Refills: 3 | Status: SHIPPED | OUTPATIENT
Start: 2023-04-20 | End: 2024-05-06

## 2023-04-20 RX ORDER — PANTOPRAZOLE SODIUM 20 MG/1
TABLET, DELAYED RELEASE ORAL
Qty: 90 TABLET | Refills: 3 | Status: SHIPPED | OUTPATIENT
Start: 2023-04-20 | End: 2024-05-06

## 2023-04-20 RX ORDER — SUMATRIPTAN 25 MG/1
TABLET, FILM COATED ORAL
Qty: 8 TABLET | Refills: 0 | Status: SHIPPED | OUTPATIENT
Start: 2023-04-20 | End: 2023-12-28

## 2023-04-20 SDOH — ECONOMIC STABILITY: FOOD INSECURITY: WITHIN THE PAST 12 MONTHS, YOU WORRIED THAT YOUR FOOD WOULD RUN OUT BEFORE YOU GOT MONEY TO BUY MORE.: NEVER TRUE

## 2023-04-20 SDOH — ECONOMIC STABILITY: TRANSPORTATION INSECURITY
IN THE PAST 12 MONTHS, HAS LACK OF TRANSPORTATION KEPT YOU FROM MEETINGS, WORK, OR FROM GETTING THINGS NEEDED FOR DAILY LIVING?: NO

## 2023-04-20 SDOH — ECONOMIC STABILITY: FOOD INSECURITY: WITHIN THE PAST 12 MONTHS, THE FOOD YOU BOUGHT JUST DIDN'T LAST AND YOU DIDN'T HAVE MONEY TO GET MORE.: PATIENT DECLINED

## 2023-04-20 SDOH — ECONOMIC STABILITY: TRANSPORTATION INSECURITY
IN THE PAST 12 MONTHS, HAS THE LACK OF TRANSPORTATION KEPT YOU FROM MEDICAL APPOINTMENTS OR FROM GETTING MEDICATIONS?: NO

## 2023-04-20 SDOH — HEALTH STABILITY: PHYSICAL HEALTH: ON AVERAGE, HOW MANY DAYS PER WEEK DO YOU ENGAGE IN MODERATE TO STRENUOUS EXERCISE (LIKE A BRISK WALK)?: 0 DAYS

## 2023-04-20 SDOH — ECONOMIC STABILITY: INCOME INSECURITY: HOW HARD IS IT FOR YOU TO PAY FOR THE VERY BASICS LIKE FOOD, HOUSING, MEDICAL CARE, AND HEATING?: SOMEWHAT HARD

## 2023-04-20 SDOH — ECONOMIC STABILITY: INCOME INSECURITY: IN THE LAST 12 MONTHS, WAS THERE A TIME WHEN YOU WERE NOT ABLE TO PAY THE MORTGAGE OR RENT ON TIME?: NO

## 2023-04-20 ASSESSMENT — LIFESTYLE VARIABLES
HOW OFTEN DO YOU HAVE A DRINK CONTAINING ALCOHOL: NEVER
AUDIT-C TOTAL SCORE: 0
HOW MANY STANDARD DRINKS CONTAINING ALCOHOL DO YOU HAVE ON A TYPICAL DAY: PATIENT DOES NOT DRINK
SKIP TO QUESTIONS 9-10: 1
HOW OFTEN DO YOU HAVE SIX OR MORE DRINKS ON ONE OCCASION: NEVER

## 2023-04-20 ASSESSMENT — ANXIETY QUESTIONNAIRES
IF YOU CHECKED OFF ANY PROBLEMS ON THIS QUESTIONNAIRE, HOW DIFFICULT HAVE THESE PROBLEMS MADE IT FOR YOU TO DO YOUR WORK, TAKE CARE OF THINGS AT HOME, OR GET ALONG WITH OTHER PEOPLE: NOT DIFFICULT AT ALL
3. WORRYING TOO MUCH ABOUT DIFFERENT THINGS: NEARLY EVERY DAY
7. FEELING AFRAID AS IF SOMETHING AWFUL MIGHT HAPPEN: SEVERAL DAYS
GAD7 TOTAL SCORE: 14
GAD7 TOTAL SCORE: 14
5. BEING SO RESTLESS THAT IT IS HARD TO SIT STILL: NOT AT ALL
8. IF YOU CHECKED OFF ANY PROBLEMS, HOW DIFFICULT HAVE THESE MADE IT FOR YOU TO DO YOUR WORK, TAKE CARE OF THINGS AT HOME, OR GET ALONG WITH OTHER PEOPLE?: NOT DIFFICULT AT ALL
6. BECOMING EASILY ANNOYED OR IRRITABLE: SEVERAL DAYS
7. FEELING AFRAID AS IF SOMETHING AWFUL MIGHT HAPPEN: SEVERAL DAYS
GAD7 TOTAL SCORE: 14
2. NOT BEING ABLE TO STOP OR CONTROL WORRYING: NEARLY EVERY DAY
1. FEELING NERVOUS, ANXIOUS, OR ON EDGE: NEARLY EVERY DAY
4. TROUBLE RELAXING: NEARLY EVERY DAY

## 2023-04-20 ASSESSMENT — ENCOUNTER SYMPTOMS
HEARTBURN: 0
CHILLS: 0
ARTHRALGIAS: 1
DIARRHEA: 0
FEVER: 0
SHORTNESS OF BREATH: 0
BREAST MASS: 0
NERVOUS/ANXIOUS: 0
EYE PAIN: 0
HEADACHES: 1
DYSURIA: 0
PARESTHESIAS: 0
JOINT SWELLING: 1
CONSTIPATION: 0
HEMATURIA: 0
HEMATOCHEZIA: 0
DIZZINESS: 0
COUGH: 0
WEAKNESS: 1
ABDOMINAL PAIN: 0
FREQUENCY: 0
NAUSEA: 1
PALPITATIONS: 0
SORE THROAT: 0
MYALGIAS: 1

## 2023-04-20 ASSESSMENT — SOCIAL DETERMINANTS OF HEALTH (SDOH)
HOW OFTEN DO YOU ATTEND CHURCH OR RELIGIOUS SERVICES?: MORE THAN 4 TIMES PER YEAR
HOW OFTEN DO YOU GET TOGETHER WITH FRIENDS OR RELATIVES?: NEVER
IN A TYPICAL WEEK, HOW MANY TIMES DO YOU TALK ON THE PHONE WITH FAMILY, FRIENDS, OR NEIGHBORS?: MORE THAN THREE TIMES A WEEK
DO YOU BELONG TO ANY CLUBS OR ORGANIZATIONS SUCH AS CHURCH GROUPS UNIONS, FRATERNAL OR ATHLETIC GROUPS, OR SCHOOL GROUPS?: NO

## 2023-04-20 ASSESSMENT — PATIENT HEALTH QUESTIONNAIRE - PHQ9
SUM OF ALL RESPONSES TO PHQ QUESTIONS 1-9: 22
SUM OF ALL RESPONSES TO PHQ QUESTIONS 1-9: 22
10. IF YOU CHECKED OFF ANY PROBLEMS, HOW DIFFICULT HAVE THESE PROBLEMS MADE IT FOR YOU TO DO YOUR WORK, TAKE CARE OF THINGS AT HOME, OR GET ALONG WITH OTHER PEOPLE: NOT DIFFICULT AT ALL

## 2023-04-20 NOTE — PROGRESS NOTES
Patient identified using two patient identifiers.  Ear exam showing wax occlusion completed by provider.  Solution: warm water was placed in the left ear(s) via irrigation tool: elephant ear.    Jovanna Savage MA

## 2023-04-20 NOTE — PROGRESS NOTES
SUBJECTIVE:   CC: Antoinette is an 62 year old who presents for preventive health visit.        View : No data to display.            Patient has been advised of split billing requirements and indicates understanding: Yes  Healthy Habits:     Getting at least 3 servings of Calcium per day:  NO    Bi-annual eye exam:  NO    Dental care twice a year:  NO    Sleep apnea or symptoms of sleep apnea:  Daytime drowsiness    Diet:  Regular (no restrictions)    Frequency of exercise:  1 day/week    Duration of exercise:  30-45 minutes    Taking medications regularly:  Yes    PHQ-2 Total Score: 6    Additional concerns today:  No          Hypertension Follow-up      Do you check your blood pressure regularly outside of the clinic? Yes     Are you following a low salt diet? Yes    Are your blood pressures ever more than 140 on the top number (systolic) OR more   than 90 on the bottom number (diastolic), for example 140/90? No    Anxiety Follow-Up    How are you doing with your anxiety since your last visit? Worsened     Are you having other symptoms that might be associated with anxiety? Yes:  panic, worry    Have you had a significant life event? Relationship Concerns, Financial Concerns and Housing Concerns     Are you feeling depressed? No    Do you have any concerns with your use of alcohol or other drugs? No    Social History     Tobacco Use     Smoking status: Every Day     Packs/day: 1.00     Types: Cigarettes     Smokeless tobacco: Never     Tobacco comments:     quit 3/2018   Vaping Use     Vaping status: Never Used   Substance Use Topics     Alcohol use: No     Drug use: No         1/5/2022    11:19 AM 5/17/2022     9:15 AM 4/20/2023     2:49 PM   RAFAL-7 SCORE   Total Score 18 (severe anxiety) 20 (severe anxiety) 14 (moderate anxiety)   Total Score 18 20 14         1/5/2022    11:18 AM 5/17/2022     9:14 AM 4/20/2023     2:48 PM   PHQ   PHQ-9 Total Score 19 23 22   Q9: Thoughts of better off dead/self-harm past 2 weeks  Several days Not at all Not at all   F/U: Thoughts of suicide or self-harm Yes     F/U: Self harm-plan No     F/U: Self-harm action No     F/U: Safety concerns No           4/20/2023     2:48 PM   Last PHQ-9   1.  Little interest or pleasure in doing things 3   2.  Feeling down, depressed, or hopeless 3   3.  Trouble falling or staying asleep, or sleeping too much 3   4.  Feeling tired or having little energy 3   5.  Poor appetite or overeating 3   6.  Feeling bad about yourself 3   7.  Trouble concentrating 3   8.  Moving slowly or restless 1   Q9: Thoughts of better off dead/self-harm past 2 weeks 0   PHQ-9 Total Score 22         4/20/2023     2:49 PM   RAFAL-7    1. Feeling nervous, anxious, or on edge 3   2. Not being able to stop or control worrying 3   3. Worrying too much about different things 3   4. Trouble relaxing 3   5. Being so restless that it is hard to sit still 0   6. Becoming easily annoyed or irritable 1   7. Feeling afraid, as if something awful might happen 1   RAFAL-7 Total Score 14   If you checked any problems, how difficult have they made it for you to do your work, take care of things at home, or get along with other people? Not difficult at all         Today's PHQ-2 Score:       4/20/2023     2:52 PM   PHQ-2 ( 1999 [employer])   Q1: Little interest or pleasure in doing things 3   Q2: Feeling down, depressed or hopeless 3   PHQ-2 Score 6   Q1: Little interest or pleasure in doing things Nearly every day   Q2: Feeling down, depressed or hopeless Nearly every day   PHQ-2 Score 6           Social History     Tobacco Use     Smoking status: Every Day     Packs/day: 1.00     Types: Cigarettes     Smokeless tobacco: Never     Tobacco comments:     quit 3/2018   Vaping Use     Vaping status: Never Used   Substance Use Topics     Alcohol use: No             4/20/2023     2:52 PM   Alcohol Use   Prescreen: >3 drinks/day or >7 drinks/week? Not Applicable     Reviewed orders with patient.  Reviewed health  maintenance and updated orders accordingly - Yes  Lab work is in process    Breast Cancer Screenin/5/2022    11:27 AM   Breast CA Risk Assessment (FHS-7)   Do you have a family history of breast, colon, or ovarian cancer? No / Unknown         Mammogram Screening: Recommended mammography every 1-2 years with patient discussion and risk factor consideration  Pertinent mammograms are reviewed under the imaging tab.    History of abnormal Pap smear: NO - age 30-65 PAP every 5 years with negative HPV co-testing recommended      10/17/2013    12:00 AM   PAP / HPV   PAP (Historical) NIL       Reviewed and updated as needed this visit by clinical staff   Tobacco  Allergies  Meds              Reviewed and updated as needed this visit by Provider                 Past Medical History:   Diagnosis Date     Anastomotic ulcer 3/28/2014    smoking history; 1PPD     Anxiety      Arthritis      Migraine      Morbid obesity (H)     s/p gastric bypass        Review of Systems   Constitutional: Negative for chills and fever.   HENT: Positive for ear pain. Negative for congestion, hearing loss and sore throat.    Eyes: Negative for pain and visual disturbance.   Respiratory: Negative for cough and shortness of breath.    Cardiovascular: Positive for peripheral edema. Negative for chest pain and palpitations.   Gastrointestinal: Positive for nausea. Negative for abdominal pain, constipation, diarrhea, heartburn and hematochezia.   Breasts:  Negative for tenderness, breast mass and discharge.   Genitourinary: Negative for dysuria, frequency, hematuria, pelvic pain, urgency, vaginal bleeding and vaginal discharge.   Musculoskeletal: Positive for arthralgias, joint swelling and myalgias.   Skin: Negative for rash.   Neurological: Positive for weakness and headaches. Negative for dizziness and paresthesias.   Psychiatric/Behavioral: Positive for mood changes. The patient is not nervous/anxious.           OBJECTIVE:   /81  "(BP Location: Right arm, Patient Position: Sitting, Cuff Size: Adult Regular)   Pulse 79   Temp 98.7  F (37.1  C) (Oral)   Resp 20   Ht 1.676 m (5' 6\")   Wt 56.9 kg (125 lb 6.4 oz)   LMP 03/01/2010   SpO2 98%   BMI 20.24 kg/m    Physical Exam  GENERAL APPEARANCE: healthy, alert and no distress  EYES: Eyes grossly normal to inspection, PERRL and conjunctivae and sclerae normal  HENT: nose and mouth without ulcers or lesions, oropharynx clear, oral mucous membranes moist, right ear: normal: no effusions, no erythema, normal landmarks and left ear: occluded with wax  NECK: no adenopathy, no asymmetry, masses, or scars and thyroid normal to palpation  RESP: lungs clear to auscultation - no rales, rhonchi or wheezes  CV: regular rate and rhythm, normal S1 S2, no S3 or S4, no murmur, click or rub, no peripheral edema and peripheral pulses strong  ABDOMEN: soft, nontender, no hepatosplenomegaly, no masses and bowel sounds normal  MS: no musculoskeletal defects are noted and gait is age appropriate without ataxia  SKIN: no suspicious lesions or rashes  NEURO: Normal strength and tone, sensory exam grossly normal, mentation intact and speech normal  PSYCH: mentation appears normal and affect normal/bright    Ear flushed by MA, TM on left normal.     ASSESSMENT/PLAN:   (Z00.00) Routine history and physical examination of adult  (primary encounter diagnosis)  Comment:   Plan: CBC with platelets, Comprehensive metabolic         panel (BMP + Alb, Alk Phos, ALT, AST, Total.         Bili, TP), Lipid panel reflex to direct LDL         Non-fasting            (I10) HTN, goal below 140/90  Comment: controlled with meds.   Plan: lisinopril (ZESTRIL) 10 MG tablet, OFFICE/OUTPT        VISIT,EST,LEVL IV            (K21.00) Gastroesophageal reflux disease with esophagitis without hemorrhage  Comment: stable with meds.   Plan: pantoprazole (PROTONIX) 20 MG EC tablet,         OFFICE/OUTPT VISIT,EST,LEVL IV            (F41.9) " Anxiety  Comment: Patient wants to try Effexor, has had genesight testing. Does not tolerate meds well.  Will start with low dose.   Plan: venlafaxine (EFFEXOR XR) 37.5 MG 24 hr capsule,        OFFICE/OUTPT VISIT,EST,LEVL IV            (G43.009) Migraine without aura and without status migrainosus, not intractable  Comment: can use as needed   Plan: SUMAtriptan (IMITREX) 25 MG tablet            (Z12.4) Cervical cancer screening  Comment:   Plan: declines    (Z12.31) Visit for screening mammogram  Comment:   Plan: declines    (Z12.11) Screen for colon cancer  Comment:   Plan: declines    (H61.22) Impacted cerumen of left ear  Comment:   Plan: ear wash    Patient has been advised of split billing requirements and indicates understanding: No      COUNSELING:  Reviewed preventive health counseling, as reflected in patient instructions       Regular exercise       Healthy diet/nutrition       Vision screening       Hearing screening       Colorectal Cancer Screening        She reports that she has been smoking cigarettes. She has been smoking an average of 1 pack per day. She has never used smokeless tobacco.  Nicotine/Tobacco Cessation Plan:   Information offered: Patient not interested at this time          Umu Roberto PA-C  [facility]  Answers for HPI/ROS submitted by the patient on 4/20/2023  If you checked off any problems, how difficult have these problems made it for you to do your work, take care of things at home, or get along with other people?: Not difficult at all  PHQ9 TOTAL SCORE: 22  RAFAL 7 TOTAL SCORE: 14

## 2023-04-21 LAB
ALBUMIN SERPL BCG-MCNC: 4.1 G/DL (ref 3.5–5.2)
ALP SERPL-CCNC: 98 U/L (ref 35–104)
ALT SERPL W P-5'-P-CCNC: 12 U/L (ref 10–35)
ANION GAP SERPL CALCULATED.3IONS-SCNC: 12 MMOL/L (ref 7–15)
AST SERPL W P-5'-P-CCNC: 25 U/L (ref 10–35)
BILIRUB SERPL-MCNC: 0.2 MG/DL
BUN SERPL-MCNC: 12.6 MG/DL (ref 8–23)
CALCIUM SERPL-MCNC: 9.5 MG/DL (ref 8.8–10.2)
CHLORIDE SERPL-SCNC: 109 MMOL/L (ref 98–107)
CHOLEST SERPL-MCNC: 217 MG/DL
CREAT SERPL-MCNC: 0.91 MG/DL (ref 0.51–0.95)
DEPRECATED HCO3 PLAS-SCNC: 23 MMOL/L (ref 22–29)
GFR SERPL CREATININE-BSD FRML MDRD: 71 ML/MIN/1.73M2
GLUCOSE SERPL-MCNC: 93 MG/DL (ref 70–99)
HDLC SERPL-MCNC: 96 MG/DL
LDLC SERPL CALC-MCNC: 104 MG/DL
NONHDLC SERPL-MCNC: 121 MG/DL
POTASSIUM SERPL-SCNC: 4.9 MMOL/L (ref 3.4–5.3)
PROT SERPL-MCNC: 6.5 G/DL (ref 6.4–8.3)
SODIUM SERPL-SCNC: 144 MMOL/L (ref 136–145)
TRIGL SERPL-MCNC: 86 MG/DL

## 2023-04-24 ENCOUNTER — TELEPHONE (OUTPATIENT)
Dept: FAMILY MEDICINE | Facility: CLINIC | Age: 63
End: 2023-04-24

## 2023-04-24 NOTE — TELEPHONE ENCOUNTER
Pt calls,     S-(situation): starting venlafaxine 2 days ago, anxious about side effects    B-(background): dealing with side effects, not sleeping, nausea and headache, wonders how long symptoms will last , feels better already after 2 days and symptoms do improve after several hours, pt worried that the sleep side effect will linger and worried about having to stop if later and going thru withdrawals, wants CJ reassurance, aware CJ out today     A-(assessment): medication side effect     R-(recommendations): routed to CJ, please advise, may route to SB3 PAL for processing, pt informs to leave message on her voice mail as may not be able to answer    Sheridan Zuniga RN, BSN  [facility]

## 2023-04-25 NOTE — TELEPHONE ENCOUNTER
Advise this is normal. Make sure you are taking the medication in the morning when you first wake up.  Side effects should fade over next week or two.    Umu Roberto PA-C

## 2023-04-25 NOTE — TELEPHONE ENCOUNTER
Message left to return call to this RN PAL. Please transfer if available.    Direct number left for this RN PAL on message for return call.     Jessica CONTI RN, BSN, PHN, PAL (patient advocate liaison)   [facility]  [phone]

## 2023-04-26 NOTE — TELEPHONE ENCOUNTER
I think the increased anxiety is just due to worry about the side effects of the medication.    Pt does not have to take statin. She can make some lifestyle changes, not smoking would make her risk go down enough that she likely would not need cholesterol medications.     Patient can try over the counter supplement like Red Yeast Rice as well to help lower cholesterol.    Umu Roberto PA-C

## 2023-04-26 NOTE — TELEPHONE ENCOUNTER
"Umu Roberto PA-C-    Patient would like recommendation on if it would be okay to work on lowering cholesterol through lifestyle changes prior to starting cholesterol lowering medication.     Spoke to patient and informed of recommendation below. Advised patient to call back if symptoms don't subside in the next week or two, worsen, or any additional questions. Patient verbalized understanding.   -Patient reports she feels an increase in anxiety after starting medication and is unsure of cause.     Patient reviewed Umu's result note and recommendation to start on cholesterol reducing medication.   Antoinette,     Your complete blood count, electrolytes, blood sugar, kidney function and liver enzymes were stable. Cholesterol: total and LDL were above goal. It is recommended to start on a low dose cholesterol lowering medication called pravastatin. Please message me back and let me know your thoughts on this.     Umu Roberto PA-C   Written by Umu Roberto PA-C on 4/25/2023  6:08 PM CDT    -Patient would like to know if there is anything she can do to work on lowering cholesterol on her own prior to starting medication. Patient education provided on lifestyle changes to control cholesterol such as eating health, exercise, quitting smoking. RN also discussed genetic factors can contribute to cholesterol as well. LIVELENZ message sent to include lifestyle changes per patients request.   -RN asked patient how quitting smoking was going   -Patient stated \"terrible\",  feels she is smoking more now, possibly due to increase anxiety.    -RN asked if patient has participated in smoking cessation counseling. Patient reports she has not and is doing it on  her own.    -Patient feels she has everything needed to quit smoking. \"I have to make a big change and have what I need\".    Jessica CONTI RN, BSN, PHN - PAL (patient advocate liaison)  [facility]  [phone]    "

## 2023-04-26 NOTE — TELEPHONE ENCOUNTER
Spoke to patient and informed of recommendation below.   -Patient reports she will try to make lifestyle changes including quitting smoking and discuss further at next visit with Umu Roberto PAJudithC.     Jessica CONTI RN, BSN, PHN  [facility]  [phone]

## 2023-05-13 DIAGNOSIS — F41.9 ANXIETY: ICD-10-CM

## 2023-05-15 RX ORDER — VENLAFAXINE HYDROCHLORIDE 37.5 MG/1
CAPSULE, EXTENDED RELEASE ORAL
Qty: 90 CAPSULE | Refills: 3 | Status: SHIPPED | OUTPATIENT
Start: 2023-05-15 | End: 2023-06-13

## 2023-06-13 ENCOUNTER — PATIENT OUTREACH (OUTPATIENT)
Dept: FAMILY MEDICINE | Facility: CLINIC | Age: 63
End: 2023-06-13

## 2023-06-13 DIAGNOSIS — F41.9 ANXIETY: ICD-10-CM

## 2023-06-13 DIAGNOSIS — M54.50 BILATERAL LOW BACK PAIN WITHOUT SCIATICA, UNSPECIFIED CHRONICITY: Primary | ICD-10-CM

## 2023-06-13 RX ORDER — LIDOCAINE 4 G/G
1 PATCH TOPICAL EVERY 24 HOURS
Qty: 30 PATCH | Refills: 1 | Status: SHIPPED | OUTPATIENT
Start: 2023-06-13 | End: 2023-12-28

## 2023-06-13 RX ORDER — VENLAFAXINE HYDROCHLORIDE 75 MG/1
75 CAPSULE, EXTENDED RELEASE ORAL DAILY
Qty: 90 CAPSULE | Refills: 1 | Status: SHIPPED | OUTPATIENT
Start: 2023-06-13 | End: 2023-12-18

## 2023-06-13 NOTE — TELEPHONE ENCOUNTER
Message left to return call to this RN PAL. Please transfer if available.    Direct number left for this RN PAL on message for return call.     Jessica CONTI RN, BSN, PHN, PAL (patient advocate liaison)   [facility]  [phone]

## 2023-06-13 NOTE — TELEPHONE ENCOUNTER
If it is for medical assitance through state/county and benefits then yes, I can do form. She would need to get county worker to fax it to us or she can bring in form.    If is SSDI (social security disability) I believe this is different and has to go through a physician and not a PA.    HARSHA HeC

## 2023-06-13 NOTE — TELEPHONE ENCOUNTER
Rx sent for lidocaine patches.     Increased venlafaxine to 75 mg daily. New Rx sent in to try for mood.

## 2023-06-13 NOTE — TELEPHONE ENCOUNTER
"Umu Roberto PA-C    Please review, e-visit needed?  -Patient requests provider recommendation regarding increasing venlafaxine (effecor XR) 37.5 MG dose  -Patient requests prescription for lidocaine patch sent to pharmacy to see if insurance will cover.    Anxiety  Patient called to report she has been venlafaxine since 04/20/23 and felt it was working well.   -Patient is now experiencing periods of 20-25 minutes of \"feeling really down\" and would like provider recommendation on increasing dose of venlafaxine.   -Patient expressed concerns regarding the need to continue to increase the dose of medication in order to stay at a therapeutic level.    RN asked if patient is having any thoughts of self harm patient reports \"No, rverything is good\" its just periods of feeling really down and grumpy.     Per visit note 4/20/23  (F41.9) Anxiety  Comment: Patient wants to try Effexor, has had genesight testing. Does not tolerate meds well.  Will start with low dose.   Plan: venlafaxine (EFFEXOR XR) 37.5 MG 24 hr capsule,        OFFICE/OUTPT VISIT,EST,LEVL IV    Lidocaine patches  Patient requests prescription for lidocaine patches due to back pain. Notes patches worked in the past.   -Patient reports surgery is needed and patches were previously prescribed by rheumatologist. -RN informed patient insurance does not typically cover patches as they are available OTC.  -RN advised patient reach out to previous prescribing provider. Patient reports she no longer follows with rheumatology, requests message sent to PCP.    Jessica CONTI RN, BSN, PHN - PAL (patient advocate liaison)  [facility]  [phone]      "

## 2023-06-13 NOTE — TELEPHONE ENCOUNTER
Umu Roberto PA-C-    Please advise, recommend patient follow-up with previous provider or is there an assessment available that patient can complete?    Patient would like to know if Umu Roberto PA-C has ability to write a letter stating patient is certified disabled for medical assistance.     Patient is trying to get on social security disability. Patient was notified that she needs a statement from a provider. Patient reports this was completed previously but is not sure which provider completed this previously.   -Informed patient it does not appear Umu Roberto PA-C completed this previously. Will route message to provider to advise.     Spoke to patient and informed of provider note below. Patient verbalized understanding & reports she spoke with the pharmacy and lidocaine patches are not covered by insurance. Patient reports she will look into purchasing OTC.    Jessica CONTI RN, BSN, PHN - PAL (patient advocate liaison)  [facility]  [phone]

## 2023-06-14 NOTE — PLAN OF CARE
Called patient left message on vm notifying appt scheduled 6/16.    FOCUS/GOAL  Bowel management, Bladder management, Pain management, Skin integrity and Cognition/Memory/Judgment/Problem solving    ASSESSMENT, INTERVENTIONS AND CONTINUING PLAN FOR GOAL:  Pt is alert and oriented, continent of bowel and bladder during the night, reported neck and shoulder pain, given 10 mg oxycodone x 2 with temporary relief, is interested in learning more about what collars she will be sent home with and how she will shower at home, Nicotine patch on currently, neck collar on at all times, slept through majority of the night, no further care concerns at this time continue with POC.

## 2023-06-14 NOTE — TELEPHONE ENCOUNTER
Patient reports she will work with her  and contact social security to find out what exactly is needed.   -Advised patient to contact this RN with any additional questions. Patient agreeable.    Patient reports she is concerned about getting to max dose of venlafaxine and finding it ineffective.  -RN advised patient to monitor symptoms and call this RN if she does not feel venlafaxine is at a therapeutic dose.   -Patient reports she has felt improvement with venlafaxine and is hopeful this next dose will be effective.  RN advised patient call this RN with questions and concerns. Patient agreeable to plan.     Jessica CONTI RN, BSN, PHN - PAL (patient advocate liaison)  [facility]  [phone]

## 2023-08-18 ENCOUNTER — PATIENT OUTREACH (OUTPATIENT)
Dept: FAMILY MEDICINE | Facility: CLINIC | Age: 63
End: 2023-08-18

## 2023-08-18 NOTE — TELEPHONE ENCOUNTER
Patient Quality Outreach Health Maintenance - PAL RN    Summary:    PAL RN contacted pt regarding overdue health maintenance    Patient is due/failing the following:   Colon Cancer Screening  Breast Cancer Screening - Mammogram  Cervical Cancer Screening - PAP Needed      Topic Date Due    COVID-19 Vaccine (1) Never done    Pneumococcal Vaccine (1 - PCV) Never done    Zoster (Shingles) Vaccine (1 of 2) Never done       Health Maintenance Due   Topic Date Due    COVID-19 Vaccine (1) Never done    Pneumococcal Vaccine: Pediatrics (0 to 5 Years) and At-Risk Patients (6 to 64 Years) (1 - PCV) Never done    COLORECTAL CANCER SCREENING  Never done    ZOSTER IMMUNIZATION (1 of 2) Never done    LUNG CANCER SCREENING  Never done    MAMMO SCREENING  10/17/2015    PAP  10/17/2016       Type of outreach:    Phone, left message for patient/parent to call back.  -Patient does not have follow-up with PCP scheduled at this time.       Questions for provider review:    None    Jessica CONTI RN, BSN, PHN - PAL (patient advocate liaison)  [facility]  [phone]

## 2023-08-21 NOTE — TELEPHONE ENCOUNTER
Spoke to patient and informed of HM Due and recommended scheduling follow-up with PCP.     Patient Quality Outreach Health Maintenance - PAL RN    Summary:    PAL RN contacted pt regarding overdue health maintenance    Patient is due/failing the following:   Colon Cancer Screening  Breast Cancer Screening - Mammogram  Cervical Cancer Screening - PAP Needed      Topic Date Due    COVID-19 Vaccine (1) Never done    Pneumococcal Vaccine (1 - PCV) Never done    Zoster (Shingles) Vaccine (1 of 2) Never done       Health Maintenance Due   Topic Date Due    COVID-19 Vaccine (1) Never done    Pneumococcal Vaccine: Pediatrics (0 to 5 Years) and At-Risk Patients (6 to 64 Years) (1 - PCV) Never done    COLORECTAL CANCER SCREENING  Never done    ZOSTER IMMUNIZATION (1 of 2) Never done    LUNG CANCER SCREENING  Never done    MAMMO SCREENING  10/17/2015    PAP  10/17/2016       Type of outreach:    Phone, spoke to patient/parent. Patient declines scheduling for all HM Due. Reports she will call back to schedule follow-up with PCP and will discuss care gaps during upcoming visit.     - Advised patient if any questions or concerns comes up to call the PAL RN.   - Patient given opportunity to ask questions and at this time there is nothing further needed.     Questions for provider review:    None                                                                                     Jessica CONTI RN, BSN, PHN - PAL (patient advocate liaison)  [facility]  [phone]

## 2023-09-14 ENCOUNTER — TELEPHONE (OUTPATIENT)
Dept: FAMILY MEDICINE | Facility: CLINIC | Age: 63
End: 2023-09-14

## 2023-09-14 NOTE — TELEPHONE ENCOUNTER
Pt calls, needs venlafaxine refill, has refill at [facility], pt will contact [facility] directly  Sheridan Zuniga RN, BSN  [facility]

## 2023-12-15 ENCOUNTER — TELEPHONE (OUTPATIENT)
Dept: FAMILY MEDICINE | Facility: CLINIC | Age: 63
End: 2023-12-15
Payer: COMMERCIAL

## 2023-12-15 NOTE — TELEPHONE ENCOUNTER
Medication Question or Refill        What medication are you calling about (include dose and sig)?: venlafaxine (EFFEXOR XR) 75 MG 24 hr capsule     Preferred Pharmacy:   [facility] [id number] [place] [address] [facility] [address] [facility] [address]  Phone: [phone] Fax: [phone]      Controlled Substance Agreement on file:   CSA -- Patient Level:    CSA: None found at the patient level.       Who prescribed the medication?: primary    Do you need a refill? Yes    When did you use the medication last? 6/13    Patient offered an appointment? No    Do you have any questions or concerns?  No      Could we send this information to you in [place] or would you prefer to receive a phone call?:   Patient would prefer a phone call   Okay to leave a detailed message?: Yes at Cell number on file:    Telephone Information:   Mobile [phone]   Mobile [phone]

## 2023-12-18 DIAGNOSIS — F41.9 ANXIETY: ICD-10-CM

## 2023-12-18 RX ORDER — VENLAFAXINE HYDROCHLORIDE 75 MG/1
75 CAPSULE, EXTENDED RELEASE ORAL DAILY
Qty: 90 CAPSULE | Refills: 0 | Status: SHIPPED | OUTPATIENT
Start: 2023-12-18 | End: 2023-12-28 | Stop reason: DRUGHIGH

## 2023-12-18 NOTE — TELEPHONE ENCOUNTER
Allergy warning, routing to PCP to review.    Cady Machado RN, BSN  [facility]

## 2023-12-18 NOTE — TELEPHONE ENCOUNTER
Duplicate, see refill encounter, will use refill encounter, sent high priority per pt request  Sheridan Zuniga RN, BSN  [facility]

## 2023-12-18 NOTE — TELEPHONE ENCOUNTER
Pt calls, informs refill in process, out, will acosta refill high priority, pt agrees to schedule appointment now    Sheridan Zuniga RN, BSN  [facility]

## 2023-12-28 ENCOUNTER — OFFICE VISIT (OUTPATIENT)
Dept: FAMILY MEDICINE | Facility: CLINIC | Age: 63
End: 2023-12-28
Payer: COMMERCIAL

## 2023-12-28 VITALS
HEART RATE: 69 BPM | SYSTOLIC BLOOD PRESSURE: 134 MMHG | TEMPERATURE: 98.1 F | OXYGEN SATURATION: 98 % | WEIGHT: 135.6 LBS | BODY MASS INDEX: 21.28 KG/M2 | DIASTOLIC BLOOD PRESSURE: 60 MMHG | HEIGHT: 67 IN

## 2023-12-28 DIAGNOSIS — F41.9 ANXIETY: ICD-10-CM

## 2023-12-28 DIAGNOSIS — F33.2 SEVERE EPISODE OF RECURRENT MAJOR DEPRESSIVE DISORDER, WITHOUT PSYCHOTIC FEATURES (H): Primary | ICD-10-CM

## 2023-12-28 DIAGNOSIS — N39.41 URGE INCONTINENCE OF URINE: ICD-10-CM

## 2023-12-28 LAB
ALBUMIN UR-MCNC: NEGATIVE MG/DL
APPEARANCE UR: CLEAR
BILIRUB UR QL STRIP: NEGATIVE
COLOR UR AUTO: YELLOW
GLUCOSE UR STRIP-MCNC: NEGATIVE MG/DL
HGB UR QL STRIP: NEGATIVE
KETONES UR STRIP-MCNC: NEGATIVE MG/DL
LEUKOCYTE ESTERASE UR QL STRIP: NEGATIVE
NITRATE UR QL: NEGATIVE
PH UR STRIP: 5.5 [PH] (ref 5–7)
SP GR UR STRIP: 1.02 (ref 1–1.03)
UROBILINOGEN UR STRIP-ACNC: 0.2 E.U./DL

## 2023-12-28 PROCEDURE — 99214 OFFICE O/P EST MOD 30 MIN: CPT | Performed by: PHYSICIAN ASSISTANT

## 2023-12-28 PROCEDURE — 81003 URINALYSIS AUTO W/O SCOPE: CPT | Performed by: PHYSICIAN ASSISTANT

## 2023-12-28 RX ORDER — VENLAFAXINE HYDROCHLORIDE 37.5 MG/1
37.5 CAPSULE, EXTENDED RELEASE ORAL DAILY
Qty: 30 CAPSULE | Refills: 1 | Status: SHIPPED | OUTPATIENT
Start: 2023-12-28 | End: 2024-03-19

## 2023-12-28 ASSESSMENT — ANXIETY QUESTIONNAIRES
2. NOT BEING ABLE TO STOP OR CONTROL WORRYING: NEARLY EVERY DAY
GAD7 TOTAL SCORE: 21
1. FEELING NERVOUS, ANXIOUS, OR ON EDGE: NEARLY EVERY DAY
6. BECOMING EASILY ANNOYED OR IRRITABLE: NEARLY EVERY DAY
GAD7 TOTAL SCORE: 21
3. WORRYING TOO MUCH ABOUT DIFFERENT THINGS: NEARLY EVERY DAY
5. BEING SO RESTLESS THAT IT IS HARD TO SIT STILL: NEARLY EVERY DAY
7. FEELING AFRAID AS IF SOMETHING AWFUL MIGHT HAPPEN: NEARLY EVERY DAY
4. TROUBLE RELAXING: NEARLY EVERY DAY

## 2023-12-28 ASSESSMENT — PATIENT HEALTH QUESTIONNAIRE - PHQ9
SUM OF ALL RESPONSES TO PHQ QUESTIONS 1-9: 24
10. IF YOU CHECKED OFF ANY PROBLEMS, HOW DIFFICULT HAVE THESE PROBLEMS MADE IT FOR YOU TO DO YOUR WORK, TAKE CARE OF THINGS AT HOME, OR GET ALONG WITH OTHER PEOPLE: NOT DIFFICULT AT ALL
SUM OF ALL RESPONSES TO PHQ QUESTIONS 1-9: 24

## 2023-12-28 NOTE — PROGRESS NOTES
Assessment & Plan     Severe episode of recurrent major depressive disorder, without psychotic features (H)  Depression seems to have worsened. Patient reports she initially had good improvement with the venlafaxine at 37.5 mg. This wore off after a couple weeks and she ended up having it increased to 75 mg. She feels like she is not having emotions, feeling emotionless. She states she is present but indicates she is not having any shiraz. She does have a therapist. She has previously seen psychiatry but does not wish to see them again. While she has had suicidal thoughts these are passive, without plan and she wants to live for her children.   I recommended she follow up with her therapist.  We will adjust venlafaxine dose to 37.5 mg again to see if this helps with her lack of emotion. I [facility] to see how the dose is working. Another medication may be helpful but patient is cautious to add more medications.     Anxiety    - venlafaxine (EFFEXOR XR) 37.5 MG 24 hr capsule; Take 1 capsule (37.5 mg) by mouth daily    Urge incontinence of urine  She is having some urge incontinence. We discussed bladder irritants and checked a UA today. She declines any medications. She will work on limiting bladder irritants for now.  - UA Macroscopic with reflex to Microscopic and Culture - Lab Collect; Future  - UA Macroscopic with reflex to Microscopic and Culture - Lab Collect    Note from April 2023 reviewed when venlafaxine was started  Review of external notes as documented elsewhere in note  Review of the result(s) of each unique test - UA  Ordering of each unique test  Prescription drug management    Depression Screening Follow Up        12/28/2023     1:45 PM   PHQ   PHQ-9 Total Score 24   Q9: Thoughts of better off dead/self-harm past 2 weeks Several days   F/U: Thoughts of suicide or self-harm No   F/U: Safety concerns No         12/28/2023     1:45 PM   Last PHQ-9   1.  Little  interest or pleasure in doing things 3   2.  Feeling down, depressed, or hopeless 3   3.  Trouble falling or staying asleep, or sleeping too much 3   4.  Feeling tired or having little energy 3   5.  Poor appetite or overeating 3   6.  Feeling bad about yourself 3   7.  Trouble concentrating 3   8.  Moving slowly or restless 2   Q9: Thoughts of better off dead/self-harm past 2 weeks 1   PHQ-9 Total Score 24   In the past two weeks have you had thoughts of suicide or self harm? No   Do you have concerns about your personal safety or the safety of others? No         Follow Up      Follow Up Actions Taken  Crisis resource information provided in the After Visit Summary  Referred patient back to mental health provider    Discussed the following ways the patient can remain in a safe environment:  be around others    Ciera Nichole PA-C  [facility]    Courtney Curry is a 63 year old, presenting for the following health issues:  Recheck Medication (Venlafaxine 75mg)      12/28/2023     1:58 PM   Additional Questions   Roomed by Fany   Accompanied by Self       History of Present Illness       Reason for visit:  Medication check?    She eats 0-1 servings of fruits and vegetables daily.She consumes 0 sweetened beverage(s) daily.She exercises with enough effort to increase her heart rate 9 or less minutes per day.  She exercises with enough effort to increase her heart rate 3 or less days per week.   She is taking medications regularly.         Depression Followup  How are you doing with your depression since your last visit? Better but patient feels the depression is worse in her mind  Are you having other symptoms that might be associated with depression? Yes:  constant urination and extreme fatigue   Have you had a significant life event?  Relationship Concerns   Are you feeling anxious or having panic attacks?   Yes:     Do you have any concerns with your use of alcohol or other drugs?  "No    Seeing therapist   Has seen psychiatry previously but not interested in seeing them again.      Social History     Tobacco Use    Smoking status: Every Day     Packs/day: 1     Types: Cigarettes    Smokeless tobacco: Never    Tobacco comments:     quit 3/2018   Vaping Use    Vaping Use: Never used   Substance Use Topics    Alcohol use: No    Drug use: No         5/17/2022     9:14 AM 4/20/2023     2:48 PM 12/28/2023     1:45 PM   PHQ   PHQ-9 Total Score 23 22 24   Q9: Thoughts of better off dead/self-harm past 2 weeks Not at all Not at all Several days   F/U: Thoughts of suicide or self-harm   No   F/U: Safety concerns   No         5/17/2022     9:15 AM 4/20/2023     2:49 PM 12/28/2023     1:46 PM   RAFAL-7 SCORE   Total Score 20 (severe anxiety) 14 (moderate anxiety) 21 (severe anxiety)   Total Score 20 14 21         12/28/2023     1:45 PM   Last PHQ-9   1.  Little interest or pleasure in doing things 3   2.  Feeling down, depressed, or hopeless 3   3.  Trouble falling or staying asleep, or sleeping too much 3   4.  Feeling tired or having little energy 3   5.  Poor appetite or overeating 3   6.  Feeling bad about yourself 3   7.  Trouble concentrating 3   8.  Moving slowly or restless 2   Q9: Thoughts of better off dead/self-harm past 2 weeks 1   PHQ-9 Total Score 24   In the past two weeks have you had thoughts of suicide or self harm? No   Do you have concerns about your personal safety or the safety of others? No       Review of Systems   GENERAL:  No fevers        Objective    /60 (BP Location: Right arm, Patient Position: Sitting, Cuff Size: Adult Regular)   Pulse 69   Temp 98.1  F (36.7  C) (Oral)   Ht 1.702 m (5' 7\")   Wt 61.5 kg (135 lb 9.6 oz)   LMP 03/01/2010   SpO2 98%   BMI 21.24 kg/m    Body mass index is 21.24 kg/m .  Physical Exam   GENERAL: No acute distress  HEENT: Normocephalic  NEURO: Alert and non-focal                 "

## 2023-12-28 NOTE — PATIENT INSTRUCTIONS
Bladder irritants also can cause symptoms similar to a bladder infection. Bladder irritants include: caffeine (tea and coffee), alcohol, apple juice, lemon juice, soy sauce, carbonated drinks, pineapple, strawberries, tomatoes, yogurt and vinegar.

## 2024-01-04 NOTE — COMMUNITY RESOURCES LIST (ENGLISH)
01/04/2024   [facility]  N/A  For questions about this resource list or additional care needs, please contact your primary care clinic or care manager.  Phone: [phone]   Email: N/A   Address: [facility] [address] [place] [address]   Hours: N/A        Hotlines and Helplines       Hotline - Housing crisis  1  [facility] (Main Office) Distance: 5.72 miles      Phone/Virtual   [address] [place] [address]  Language: English  Hours: Mon - Sun Open 24 Hours   Phone: (342) 238-9898 Email: info@[employer] Website: http://[employer]     2  [facility] Distance: 12.67 miles      Phone/Virtual   [address] [place] [address]  Language: English  Hours: Mon - Sun Open 24 Hours   Phone: (902) 195-1157 Email: info@[employer] Website: http://www.[employer].org          Housing       Coordinated Entry access point  3  [facility] [employer] [place] ([facility] - Housing Services Distance: 12.63 miles      In-Person   [address] [place] [address]  Language: English  Hours: Mon - Fri 9:00 AM - 5:00 PM  Fees: Free   Phone: (883) 341-4895 Email: housing@[facility].org Website: http://www.[facility].org/housing     4  [facility] Distance: 14.38 miles      In-Person, Phone/Virtual   [address] [place] [address]  Language: English  Hours: Mon - Fri 8:30 AM - 4:30 PM  Fees: Free   Phone: (907) 802-5294 Email: info@[facility].org Website: https://www.[facility].org/locations/[facility]-clinic/     Drop-in center or day shelter  5  [facility] Distance: 13.04 miles      In-Person   [address] [place] [address]  Language: English  Hours: Mon - Fri 12:00 PM - 3:00 PM  Fees: Free   Phone: (284) 930-1864 Email: [employer] Website: http://[employer].org/     6  [facility] [employer] - [facility] Distance: 13.18 miles       In-Person   [address] [place] [address]  Language: English, [place] Timorese, Danish  Hours: Mon - Sat 7:00 AM - 3:00 PM  Fees: Free, Self Pay   Phone: (934) 766-7319 Email: info@[employer] Website: https://www.[employer]/locations/opportunity-center/     Housing search assistance  7  [facility] - Rental Homes for Future Homebuyers Program Distance: 3.81 miles      Phone/Virtual   [address] [place] [address]  Language: English  Hours: Mon - Fri 8:00 AM - 4:30 PM  Fees: Free   Phone: (592) 870-9678 Email: hra@[facility] Website: https://www.[facility]/hra/[place]-housing-and-ehnhhtfxcfksx-kxjcbnsjv-amc     8  [facility] - Online Housing Search Assistance Distance: 11.17 miles      Phone/Virtual   [address] [place] [address]  Language: English  Hours: Mon - Sun Open 24 Hours  Fees: Free   Phone: (463) 654-2376 Email: findhoubony@[employer] Website: https://[employer]/     Shelter for families  9  [facility] Distance: 7.56 miles      In-Person   [address] [place] [address]  Language: English  Hours: Mon - Sun Open 24 Hours  Fees: Free, Sliding Fee   Phone: (132) 618-8281 Ext.1 Email: info@[facility] HospitalYuangu[facility] SoftwareBentonPinger.eeGeo Website: http://www.[facility].org     Shelter for individuals  10  [employer] [employer] Distance: 7.97 miles      In-Person   [address] [place] [address]  Language: English, Danish  Hours: Mon - Fri 8:00 AM - 4:30 PM  Fees: Free   Phone: (301) 332-5715 Email: info@[facility].org Website: http://www.[facility].org     11  [employer] ([employer] [place] & [facility] [place] Distance: 12.3 miles      In-Person   [address] [place] [address]  Language: English, Danish  Hours: Mon - Fri 8:00 AM - 4:30 PM  Fees: Free   Phone: (336)  676-7686 Email: info@[employer].org Website: https://www.capagency.org/          Important Numbers & Websites       Emergency Services   911  [facility] Services   311  Poison Control   (270) 731-5760  Suicide Prevention Lifeline   (554) 778-3750 (TALK)  Child Abuse Hotline   (409) 876-7942 (4-A-Child)  Sexual Assault Hotline   (161) 154-1174 (HOPE)  National Runaway Safeline   (343) 653-5216 (RUNAWAY)  All-Options Talkline   (418) 655-9574  Substance Abuse Referral   (240) 594-9804 (HELP)

## 2024-01-11 DIAGNOSIS — F41.9 ANXIETY: ICD-10-CM

## 2024-01-11 RX ORDER — VENLAFAXINE HYDROCHLORIDE 37.5 MG/1
37.5 CAPSULE, EXTENDED RELEASE ORAL DAILY
Qty: 90 CAPSULE | Refills: 1 | OUTPATIENT
Start: 2024-01-11

## 2024-03-19 DIAGNOSIS — F41.9 ANXIETY: ICD-10-CM

## 2024-03-19 RX ORDER — VENLAFAXINE HYDROCHLORIDE 37.5 MG/1
37.5 CAPSULE, EXTENDED RELEASE ORAL DAILY
Qty: 30 CAPSULE | Refills: 1 | Status: SHIPPED | OUTPATIENT
Start: 2024-03-19 | End: 2024-05-06

## 2024-03-21 ENCOUNTER — PATIENT OUTREACH (OUTPATIENT)
Dept: CARE COORDINATION | Facility: CLINIC | Age: 64
End: 2024-03-21
Payer: COMMERCIAL

## 2024-04-04 ENCOUNTER — PATIENT OUTREACH (OUTPATIENT)
Dept: CARE COORDINATION | Facility: CLINIC | Age: 64
End: 2024-04-04
Payer: COMMERCIAL

## 2024-05-06 DIAGNOSIS — I10 HTN, GOAL BELOW 140/90: ICD-10-CM

## 2024-05-06 DIAGNOSIS — K21.00 GASTROESOPHAGEAL REFLUX DISEASE WITH ESOPHAGITIS WITHOUT HEMORRHAGE: ICD-10-CM

## 2024-05-06 DIAGNOSIS — F41.9 ANXIETY: ICD-10-CM

## 2024-05-06 RX ORDER — VENLAFAXINE HYDROCHLORIDE 37.5 MG/1
37.5 CAPSULE, EXTENDED RELEASE ORAL DAILY
Qty: 90 CAPSULE | Refills: 0 | Status: SHIPPED | OUTPATIENT
Start: 2024-05-06 | End: 2024-08-22

## 2024-05-06 RX ORDER — PANTOPRAZOLE SODIUM 20 MG/1
TABLET, DELAYED RELEASE ORAL
Qty: 90 TABLET | Refills: 0 | Status: SHIPPED | OUTPATIENT
Start: 2024-05-06 | End: 2024-08-01

## 2024-05-06 RX ORDER — LISINOPRIL 10 MG/1
TABLET ORAL
Qty: 45 TABLET | Refills: 3 | Status: SHIPPED | OUTPATIENT
Start: 2024-05-06

## 2024-06-07 ENCOUNTER — TELEPHONE (OUTPATIENT)
Dept: FAMILY MEDICINE | Facility: CLINIC | Age: 64
End: 2024-06-07
Payer: COMMERCIAL

## 2024-06-07 NOTE — LETTER
[facility]  [address] [id number] [phone]  June 7, 2024    Antoinette Romero  [address]  [facility] [address]    Dear Antoinette,    I care about your health and have reviewed your health plan. I have reviewed your medical conditions, medication list, and lab results and am making recommendations based on this review, to better manage your health.    You are in particular need of attention regarding:  -Depression  -Breast Cancer Screening  -Colon Cancer Screening  -Cervical Cancer Screening  -Wellness (Physical) Visit   -Tobacco use    I am recommending that you:  {recommendations:-schedule a WELLNESS (Physical) APPOINTMENT with me.   I will check fasting labs the same day - nothing to eat except water and meds for 8-10 hours prior.    Here is a list of Health Maintenance topics that are due now or due soon:  Health Maintenance Due   Topic Date Due    Pneumococcal Vaccine: Pediatrics (0 to 5 Years) and At-Risk Patients (6 to 64 Years) (1 of 2 - PCV) Never done    COLORECTAL CANCER SCREENING  Never done    ZOSTER IMMUNIZATION (1 of 2) Never done    LUNG CANCER SCREENING  Never done    MAMMO SCREENING  10/17/2015    PAP  10/17/2016    RSV VACCINE (Pregnancy & 60+) (1 - 1-dose 60+ series) Never done    COVID-19 Vaccine (1 - 2023-24 season) Never done    DTAP/TDAP/TD IMMUNIZATION (2 - Td or Tdap) 10/17/2023    NICOTINE/TOBACCO CESSATION COUNSELING Q 1 YR  04/20/2024    YEARLY PREVENTIVE VISIT  04/20/2024    ANNUAL REVIEW OF HM ORDERS  04/20/2024    DEPRESSION 6 MO INDEX REPEAT PHQ-9  04/29/2024    PHQ-9  06/28/2024       Please call us at [phone] (or use [employer]) to address the above recommendations.     Thank you for trusting [facility] and we appreciate the opportunity to serve you.  We look forward to supporting your healthcare needs in the future.    Healthy Regards,    Umu Roberto PA-C

## 2024-06-07 NOTE — TELEPHONE ENCOUNTER
Patient Quality Outreach    Patient is due for the following:   Colon Cancer Screening  Breast Cancer Screening - Mammogram  Cervical Cancer Screening - PAP Needed  Depression  -  PHQ-9 needed  Physical Preventive Adult Physical      Topic Date Due    Pneumococcal Vaccine (1 of 2 - PCV) Never done    Zoster (Shingles) Vaccine (1 of 2) Never done    COVID-19 Vaccine (1 - 2023-24 season) Never done    Diptheria Tetanus Pertussis (DTAP/TDAP/TD) Vaccine (2 - Td or Tdap) 10/17/2023       Next Steps:   Schedule a Adult Preventative    Type of outreach:    Sent letter.      Questions for provider review:    None           Fany Berumen, CMA

## 2024-06-30 ENCOUNTER — HEALTH MAINTENANCE LETTER (OUTPATIENT)
Age: 64
End: 2024-06-30

## 2024-08-01 DIAGNOSIS — K21.00 GASTROESOPHAGEAL REFLUX DISEASE WITH ESOPHAGITIS WITHOUT HEMORRHAGE: ICD-10-CM

## 2024-08-01 RX ORDER — PANTOPRAZOLE SODIUM 20 MG/1
TABLET, DELAYED RELEASE ORAL
Qty: 90 TABLET | Refills: 0 | Status: SHIPPED | OUTPATIENT
Start: 2024-08-01

## 2024-10-08 NOTE — PROGRESS NOTES
Your Child's Health             [age]-Year-Old Visit          Maye RUBY Coleman  October 8, 2024    Visit Vitals  Pulse 120   Temp 98 °F (36.7 °C) (Temporal)   Resp 28   Ht 3' (0.914 m)   Wt 12.2 kg (27 lb)   HC 48 cm (18.9\")   BMI 14.65 kg/m²     Weight: 27 lbs      YOUR CHILD'S 2 YEAR-OLD VISIT      NUTRITION: At this age children should be sitting at the table for meals and eating the same as the rest of the family. Just be sure to continue to avoid hard, chunk or chewy foods that could cause choking. Offer a couple of snacks daily in addition to meals since toddlers do not generally eat large portions at a time. Your child should be getting ~16 to 20 ounces of low fat or skim milk daily. A multivitamin with iron is recommended primarily for the iron and vitamin D. Continue to expect your child to like or prefer a limited number of foods, but keep offering a variety, even foods they have already turned down. Don't get upset when they refuse something--just try it again at a later date.     Keep in mind that overweight and obesity are serious problems in our country. As long as you are in charge of what your child is eating, keep it healthy! Avoid starting unhealthy eating habits like sweets, fried fast food, and sweet drinks like soda, juice and Palomo-Aid. Sweet drinks are a huge source of unhealthy sugar and calories. Whole fruit is a much healthier choice than juice. If you give your child juice, limit it to no more than 4 ounces a day of 100% juice. Do not water it down in a cup that they can carry around and drink from over an extended period of time; this frequent exposure to the sugars in juice (even if diluted with water) just increases their risk of tooth decay.    Remember, it is always healthier to eat fruit than to drink it!    DENTAL: You should help your child brush their teeth at least twice a day; bedtime brushing is particularly important to their dental health. Use a very small amount of regular  [facility]  Neurosurgery followup:    HPI: Antionette Romero is a 57 year old female who was admitted on 3/10/2018 with multiple falls and weakness with incomplete spinal cord injury. She had weakness in left arm and leg and extreme hypersensitivity in BUE. Subsequently underwent C3-T1 ACDF with Dr. Maynor Daley on 3/11/2018.  Post op she had continued weakness which was to be expected.  The pt continues to be sensitive on the left.  She continues to be depressed. She is doing at home PT at least 3X per week. She can't afford to go to formal PT.            Exam:  Constitutional:  Alert, well nourished, NAD.  HEENT: Normocephalic, atraumatic.   Pulm:  Without shortness of breath   CV:  No pitting edema of BLE.     Neurological:  Awake  Alert  Oriented x 3  Motor exam:     Shoulder Abduction:  Right:  5/5    Left:  5/5  Biceps:                      Right:  5/5    Left:  5/5  Triceps:                     Right:  5/5    Left:  5/5  Wrist Extensors:       Right:  5/5    Left:  5/5  Wrist Flexors:           Right:  5/5    Left:  5/5  Intrinsics:                  Right:  5/5    Left:  5/5     Able to spontaneously move U/E bilaterally  Sensation intact throughout all U/E dermatomes  Incisions:  Cervical incision healed     Imaging:  Cervical fusion intact per xray     A/P:  Antoinette Romero is a 57 year old female who was admitted on 3/10/2018 with multiple falls and weakness with incomplete spinal cord injury. She had weakness in left arm and leg and extreme hypersensitivity in BUE. Subsequently underwent C3-T1 ACDF with Dr. Maynor Daley on 3/11/2018.  Post op she had continued weakness which was to be expected.  The pt continues to be sensitive on the left.  She continues to be depressed. She is doing at home PT at least 3X per week. She can't afford to go to formal PT.  The pt has full strength to bilateral UE.  She was educated again that she had a spinal cord injury and a large fusion. She is much  (fluoride) toothpaste. Using city (tap) water to drink and cook with provides important fluoride for strengthening teeth to protect against cavities. If you have well water instead of a city water supply, however, you should have it tested for fluoride content to determine whether your child should receive a fluoride supplement.    DEVELOPMENT & BEHAVIOR: A 2 year old child likes to imitate what you are doing, play simple make-believe games, start to wash and dry their hands and do some dressing and undressing. Your child has likely been learning new words rapidly over the last few months; by age 2, most have at least 50 words and are putting two words together in short sentences. They hopefully have a favorite book (that they can complete the sentences in!) and will name pictures if you point to them. They are more and more active and may seem clumsy sometimes because they are eager to do everything fast. (This is normal!)    Reading books together continues to be one of the best things you can do for your child. Not only will it improve their language skills, but it is a fact that children who love reading and books do better in school than children who were not read to early in life.      Help your child's independence grow by letting them explore new settings and situations (as long as they are safe!). Encourage their attempts at simple tasks and help them learn how to express their feelings like anger, happiness and frustration.     It remains very important to be consistent as you are teaching your child rules and “do’s\" and \"do not's”. When all the family members react to the child's behaviors in the same way, the child will soon learn which behaviors are more likely to earn them praise and smiles (positive attention). When your child misbehaves, say “no” and provide a simple explanation and redirect them to something else. Don’t yell or give a long lecture--remember that your attention is what your child  better than prior to surgery and is now able to move her extremities and walk.  She states that she doesn't remember having pain and weakness prior to surgery. We reviewed her notes prior to surgery and post.  She states that she is just wanting to be better. She is depressed but denies any suicidal ideation or plan. She is continuing to need Oxycodone for pain.  We discussed a referral to pain and an EMG of her arms.  She is open to this.  Her fusion hardware is intact.  We will have her return in 6 months with a repeat xray.          Patient Instructions   Patient to follow up with Primary Care provider regarding elevated blood pressure.      1.   Please schedule your EMG. This is a nerve conduction study.  Someone will contact you to schedule. I will call you with the results.         2. Schedule at the [facility] pain clinic [phone]    3.  Return in 6 months with a xray prior.     4.  You MUST see your primary care doctor about depression.         [facility]  [facility]  [address] [address]    Tel [phone]  Pager [phone]     wants most from you, regardless of whether it is positive or negative. Short time-outs (no more than 2 minutes) at this age can also be effective-- just remember, again, to be consistent (in how you do it and the reasons you do it for).       TELEVISION: The American Academy of Pediatrics recommends no more than one hour a day of television (or any screen time--videos, pads, phones, computers) at this age. As the parent, you should choose high quality, educational programs, and you should watch with your child to help them understand what they are viewing. Avoid letting your child watch programs that adults in the home are watching that have frightening or adult content because this can influence children's behavior and sleep. Also know that young children can be strongly influenced by commercials on TV and will likely start asking for the toys and junk food they see advertised (which is another reason to really limit how much TV they watch!).     TOILET TRAINING:  Signs that your child may be ready for toilet training include: recognizing the urge to go, understanding what that urge means or what they need to do to relieve it, using words to express that they need to go and actually being able to physically get to the toilet and and use it. If you feel your child is ready, encourage their use of a potty chair and give lots of praise when they use it. (If they don't seem interested, don't push it-- wait until they indicate an interest.) Small rewards, like using a sticker or star chart, for successful use of the potty are often effective. Like in so many other aspects of your young child's life, consistency in potty training is important. Go through the same routine each time they use the potty (including hand washing afterwards); children learn more quickly when they learn to predict what comes next.   Remember that most children are not physically ready to be fully potty trained until around age 3 years, so be  sure to avoid negative  remarks or punishments when accidents happen-- their little bodies are still developing the control they need to be fully potty trained! Helpful information about potty training can be found on the website of the  American Academy of Pediatrics:HealthyChildren.org - From the American Academy of Pediatrics (search for \"toilet training\").    SAFETY:  1. CAR SAFETY: An approved car seat in the back seat is required by [place] law. Your child is safest in a rear-facing convertible car seat until they reach the top height or weight limit allowed by the car seat . (Some car seats will not specify a height limit, but they recommend that there be one inch between the top of the child’s head and the top of the car seat.) Once they outgrow the size limits for the rear-facing position, turn the seat around and use it until your child reaches the size limits for the forward facing position. (It is very important your child remain in a car seat with the 5-point harness--with straps over both shoulder and both hips--at this age. They are too young for a “booster” seat.)  2. HOME & KITCHEN SAFETY: By now you realize that your busy toddler wants to get into everything! Keep a close eye out at home to make sure medications, toxic substances (cigarettes, alcohol, lighters, cleaning & chemical items) remain safely stored (out of sight, up high and/or locked). Keep knives and other sharp items and anything hot out of reach--curious toddlers will reach for anything interesting that they see! If there is a gun or firearm in the home, make sure it is stored unloaded in a secure locked location separate from the ammunition.   Make sure your smoke and carbon monoxide alarms work and that you have a family fire escape plan. Also, is this number in your cell phone? Call the Poison Center at 1-225.531.4322 for any known or suspected poisoning.  3. OUTDOOR SAFETY: Careful supervision is very important both  indoors and out. Toddlers may be able to open doors, so be extra careful to make sure they don’t get outside without you knowing. When outside they need to be carefully watched near streets and anywhere that cars might be backing up--small children are very difficult to see in rear-view mirrors. Your child should be kept away from moving machinery, including lawn mowers, and ideally kept out of spaces (garages, sheds) where any sharp equipment or toxic chemical products are stored. If your child starts riding a tricycle, have them wear a helmet. (And make sure the whole family is wearing helmets!)  4. PETS: Toddlers need to be carefully watched around pets. Dog bites are common in this age group and are most likely to occur when an unsupervised child is either left alone with a dog or wanders off and approaches a dog--even (especially!) one they know.  (Most dog bites in this age group are from pets of the family or friends.)   5. SUN & WATER SAFETY: Toddlers may love water but they still need to be very carefully watched around it (this means tubs, buckets, wading pools and toilets as well as pools and lakes!).Children can drown in just a couple inches of water, so never leave an infant or toddler alone in a tub. Also, do not rely on older children to properly supervise the younger ones. An adult should always be within arm’s reach whenever a young child is in or around water. Your child is too young for swimming lessons; toddler swim programs, life jackets or “swimmies” will NOT protect your child from drowning! Constant supervision by an adult who knows how to swim is critical. Permanent pools (in ground and above ground) should be fenced off (and locked), and wading pools should be drained when not in use. Keep large buckets empty and keep toilet seat lids down and secured with a safety lock if possible.    SMOKING:  Continue to protect your child from cigarette smoke. Exposure to smoke will increase their risk  of asthma, ear infections, and respiratory infections (pneumonia). If you smoke and are ready to consider quitting, talk to your doctor. Nicotine replacement products can be very helpful in breaking this tough addiction.      LEAD EXPOSURE: The [facility] recommends screening children three times in the first 3 years of life (age 2yo, 3yo and 2yo). If you do not live in [place], talk to your doctor about lead screening if any of the following apply: (1) your child currently (or had previously) lives in or frequently visits a house or building built before 1950 (including , grandparent homes, etc); (2) your child currently (or had previously) lives in or frequently visits a house or building built before 1978 with recent or ongoing renovations; (3) your child has a brother, sister or playmate who has had lead poisoning; (4) your child is enrolled in Medicaid or WIC. Very rarely, other sources of lead exposure can include herbal remedies or imported items (mini blinds, canned goods). Do an internet search for “[facility] Department Lead” for helpful information about lead risks in [place]. If you have questions about older neighborhoods in [place] that might have lead connecting (or service) pipes, do an internet search for \"[place] lead awareness and drinking water safety\". From this web page, you can search for your address to find out if your home was built with lead service lines. There are also helpful hints regarding water safety on this site.    MEDICATION FOR FEVER OR PAIN:   Acetaminophen liquid (e.g., Tylenol or Tempra) may be given every four hours as needed for pain or fever.  Acetaminophen liquid is less concentrated than the infant dropper bottle type.  Be sure to check which product CONCENTRATION you are using.    CHILDREN’S Tylenol/Acetaminophen  (160 MG/5 mL)    Child’s Weight: Dose:  24 - 35 pounds:   160 mg (5.0 mL (1 Teaspoon))    CHILDREN’S  Tylenol/Acetaminophen MELTAWAYS ( 80 MG tablets)    Child’s Weight:  Dose:  24 - 35 pounds:    160 mg (2 meltaway tablets)    CHILDREN'S Ibuprofen liquid (100MG/5mL) (e.g., Advil or Motrin) may be given every six hours as needed for pain or fever. Please check the concentration of your ibuprofen to be sure you are giving the correct amount.      Child’s Weight:  Dose:  24 - 35 pounds:    100 mg  (5 mL (1 Teaspoon))    If Maye is outside this weight range, call your pediatrician’s office for advice.    Most Recent Immunizations   Administered Date(s) Administered   • DTaP 09/21/2023   • DTaP/Hep B/IPV 01/06/2023   • Hep A, ped/adol, 2 dose 02/23/2024   • Hep B, adolescent or pediatric 2022   • Hib (PRP-OMP) 09/21/2023   • Influenza, split virus, quadrivalent, PF 02/06/2023   • MMR 06/23/2023   • Pneumococcal conjugate PCV 13 09/21/2023   • Rotavirus - pentavalent 01/06/2023   • Varicella 06/23/2023       If Maye develops any of the following reactions within 72 hours after an immunization, notify your pediatrician by calling the pediatric phone nurse:  A temperature of 105 degrees or above.  More than 3 hours of continuous crying.  A shrill, high-pitched cry.  A pale, limp spell.  A seizure or fainting spell.  In this case, you should call 911 or go immediately to the emergency room.    NEXT VISIT:  2 1/2 YEARS OF AGE    Thank you for entrusting your care to [facility].    Also, check out “Children’s Health” on the [facility] Blog for updates on timely topics regarding children’s health!

## 2024-10-17 ENCOUNTER — PATIENT OUTREACH (OUTPATIENT)
Dept: CARE COORDINATION | Facility: CLINIC | Age: 64
End: 2024-10-17
Payer: COMMERCIAL

## 2025-02-08 ENCOUNTER — HEALTH MAINTENANCE LETTER (OUTPATIENT)
Age: 65
End: 2025-02-08

## 2025-02-12 ENCOUNTER — TELEPHONE (OUTPATIENT)
Dept: FAMILY MEDICINE | Facility: CLINIC | Age: 65
End: 2025-02-12
Payer: COMMERCIAL

## 2025-02-12 DIAGNOSIS — F41.9 ANXIETY: ICD-10-CM

## 2025-02-19 ENCOUNTER — TELEPHONE (OUTPATIENT)
Dept: FAMILY MEDICINE | Facility: CLINIC | Age: 65
End: 2025-02-19
Payer: COMMERCIAL

## 2025-02-19 DIAGNOSIS — F41.9 ANXIETY: ICD-10-CM

## 2025-02-19 RX ORDER — VENLAFAXINE HYDROCHLORIDE 37.5 MG/1
37.5 CAPSULE, EXTENDED RELEASE ORAL DAILY
Qty: 30 CAPSULE | Refills: 0 | Status: SHIPPED | OUTPATIENT
Start: 2025-02-19

## 2025-02-19 RX ORDER — VENLAFAXINE HYDROCHLORIDE 37.5 MG/1
37.5 CAPSULE, EXTENDED RELEASE ORAL DAILY
Qty: 90 CAPSULE | Refills: 0 | OUTPATIENT
Start: 2025-02-19

## 2025-02-19 NOTE — TELEPHONE ENCOUNTER
Patient calling to inquire about venlafaxine refill and aslo schedule an Jennifer to discuss handicap placard. States has one tablet venlafaxine left.    Per chart review patient due for AWV and needs to re-establish as Umu Roberto is no longer at [facility].    Scheduled to establish with AWV with Dr. Nice 2/24/25.    Per chart review  2/12/25 refill encoutner  -venlafaxine needs provider review    venlafaxine (EFFEXOR XR) 37.5 MG 24 hr capsule 90 capsule 0 11/11/2024 -- No   Sig - Route: TAKE 1 CAPSULE BY MOUTH EVERY DAY - Oral   Prescribing Provider's NPI: [id number]  Deanna Roberto covering pool please review and give christina refill to 2/24/25 OV.    Christina Garzon RN

## 2025-02-19 NOTE — TELEPHONE ENCOUNTER
Refill provided for Venlafaxine by Ciera Nihcole, PAC  in another encounter today     Closing this encounter     Amy Schmitt, Registered Nurse  [facility]

## 2025-02-19 NOTE — TELEPHONE ENCOUNTER
Unclear why sent/routed as it was. Can you look into this?  Refill taken care of in different encounter

## 2025-02-24 ENCOUNTER — OFFICE VISIT (OUTPATIENT)
Dept: FAMILY MEDICINE | Facility: CLINIC | Age: 65
End: 2025-02-24
Payer: COMMERCIAL

## 2025-02-24 ENCOUNTER — ANCILLARY PROCEDURE (OUTPATIENT)
Dept: GENERAL RADIOLOGY | Facility: CLINIC | Age: 65
End: 2025-02-24
Attending: FAMILY MEDICINE
Payer: COMMERCIAL

## 2025-02-24 VITALS
WEIGHT: 120.4 LBS | RESPIRATION RATE: 20 BRPM | OXYGEN SATURATION: 98 % | DIASTOLIC BLOOD PRESSURE: 82 MMHG | HEIGHT: 67 IN | HEART RATE: 68 BPM | SYSTOLIC BLOOD PRESSURE: 156 MMHG | TEMPERATURE: 98 F | BODY MASS INDEX: 18.9 KG/M2

## 2025-02-24 DIAGNOSIS — F41.9 ANXIETY: ICD-10-CM

## 2025-02-24 DIAGNOSIS — R03.0 ELEVATED BLOOD PRESSURE READING WITHOUT DIAGNOSIS OF HYPERTENSION: ICD-10-CM

## 2025-02-24 DIAGNOSIS — Z00.00 ROUTINE GENERAL MEDICAL EXAMINATION AT A HEALTH CARE FACILITY: Primary | ICD-10-CM

## 2025-02-24 DIAGNOSIS — Z12.4 CERVICAL CANCER SCREENING: ICD-10-CM

## 2025-02-24 DIAGNOSIS — Z13.6 CARDIOVASCULAR SCREENING; LDL GOAL LESS THAN 160: ICD-10-CM

## 2025-02-24 DIAGNOSIS — I10 HTN, GOAL BELOW 140/90: ICD-10-CM

## 2025-02-24 DIAGNOSIS — M25.552 HIP PAIN, LEFT: ICD-10-CM

## 2025-02-24 DIAGNOSIS — Z98.84 S/P GASTRIC BYPASS: ICD-10-CM

## 2025-02-24 DIAGNOSIS — Z12.31 VISIT FOR SCREENING MAMMOGRAM: ICD-10-CM

## 2025-02-24 DIAGNOSIS — D50.0 IRON DEFICIENCY ANEMIA DUE TO CHRONIC BLOOD LOSS: ICD-10-CM

## 2025-02-24 DIAGNOSIS — F33.3 SEVERE RECURRENT MAJOR DEPRESSIVE DISORDER WITH PSYCHOTIC FEATURES (H): ICD-10-CM

## 2025-02-24 DIAGNOSIS — Z12.11 SCREEN FOR COLON CANCER: ICD-10-CM

## 2025-02-24 DIAGNOSIS — K21.00 GASTROESOPHAGEAL REFLUX DISEASE WITH ESOPHAGITIS WITHOUT HEMORRHAGE: ICD-10-CM

## 2025-02-24 PROBLEM — M17.11 PRIMARY OSTEOARTHRITIS OF RIGHT KNEE: Status: ACTIVE | Noted: 2022-11-14

## 2025-02-24 LAB
ERYTHROCYTE [DISTWIDTH] IN BLOOD BY AUTOMATED COUNT: 17.5 % (ref 10–15)
EST. AVERAGE GLUCOSE BLD GHB EST-MCNC: 111 MG/DL
FOLATE SERPL-MCNC: 7.1 NG/ML (ref 4.6–34.8)
HBA1C MFR BLD: 5.5 % (ref 0–5.6)
HCT VFR BLD AUTO: 30.9 % (ref 35–47)
HGB BLD-MCNC: 9.1 G/DL (ref 11.7–15.7)
MCH RBC QN AUTO: 22.6 PG (ref 26.5–33)
MCHC RBC AUTO-ENTMCNC: 29.4 G/DL (ref 31.5–36.5)
MCV RBC AUTO: 77 FL (ref 78–100)
PLATELET # BLD AUTO: 428 10E3/UL (ref 150–450)
RBC # BLD AUTO: 4.03 10E6/UL (ref 3.8–5.2)
WBC # BLD AUTO: 8.3 10E3/UL (ref 4–11)

## 2025-02-24 PROCEDURE — 80061 LIPID PANEL: CPT | Performed by: FAMILY MEDICINE

## 2025-02-24 PROCEDURE — 99000 SPECIMEN HANDLING OFFICE-LAB: CPT | Performed by: FAMILY MEDICINE

## 2025-02-24 PROCEDURE — 84443 ASSAY THYROID STIM HORMONE: CPT | Performed by: FAMILY MEDICINE

## 2025-02-24 PROCEDURE — 83540 ASSAY OF IRON: CPT | Performed by: FAMILY MEDICINE

## 2025-02-24 PROCEDURE — 82607 VITAMIN B-12: CPT | Performed by: FAMILY MEDICINE

## 2025-02-24 PROCEDURE — 82728 ASSAY OF FERRITIN: CPT | Performed by: FAMILY MEDICINE

## 2025-02-24 PROCEDURE — 82746 ASSAY OF FOLIC ACID SERUM: CPT | Performed by: FAMILY MEDICINE

## 2025-02-24 PROCEDURE — 36415 COLL VENOUS BLD VENIPUNCTURE: CPT | Performed by: FAMILY MEDICINE

## 2025-02-24 PROCEDURE — 99214 OFFICE O/P EST MOD 30 MIN: CPT | Mod: 25 | Performed by: FAMILY MEDICINE

## 2025-02-24 PROCEDURE — 84590 ASSAY OF VITAMIN A: CPT | Mod: 90 | Performed by: FAMILY MEDICINE

## 2025-02-24 PROCEDURE — G2211 COMPLEX E/M VISIT ADD ON: HCPCS | Performed by: FAMILY MEDICINE

## 2025-02-24 PROCEDURE — 80053 COMPREHEN METABOLIC PANEL: CPT | Performed by: FAMILY MEDICINE

## 2025-02-24 PROCEDURE — 83970 ASSAY OF PARATHORMONE: CPT | Performed by: FAMILY MEDICINE

## 2025-02-24 PROCEDURE — 73502 X-RAY EXAM HIP UNI 2-3 VIEWS: CPT | Mod: TC | Performed by: RADIOLOGY

## 2025-02-24 PROCEDURE — 82306 VITAMIN D 25 HYDROXY: CPT | Performed by: FAMILY MEDICINE

## 2025-02-24 PROCEDURE — 83550 IRON BINDING TEST: CPT | Performed by: FAMILY MEDICINE

## 2025-02-24 PROCEDURE — 99396 PREV VISIT EST AGE 40-64: CPT | Performed by: FAMILY MEDICINE

## 2025-02-24 PROCEDURE — 83036 HEMOGLOBIN GLYCOSYLATED A1C: CPT | Performed by: FAMILY MEDICINE

## 2025-02-24 PROCEDURE — 85027 COMPLETE CBC AUTOMATED: CPT | Performed by: FAMILY MEDICINE

## 2025-02-24 RX ORDER — VENLAFAXINE HYDROCHLORIDE 37.5 MG/1
37.5 CAPSULE, EXTENDED RELEASE ORAL DAILY
Qty: 90 CAPSULE | Refills: 2 | Status: SHIPPED | OUTPATIENT
Start: 2025-02-24

## 2025-02-24 RX ORDER — MULTIVIT-MIN/IRON FUM/FOLIC AC 7.5 MG-4
2 TABLET ORAL DAILY
Qty: 200 TABLET | Refills: 4 | Status: SHIPPED | OUTPATIENT
Start: 2025-02-24

## 2025-02-24 RX ORDER — LISINOPRIL 10 MG/1
10 TABLET ORAL DAILY
Qty: 90 TABLET | Refills: 2 | Status: SHIPPED | OUTPATIENT
Start: 2025-02-24

## 2025-02-24 RX ORDER — CHOLECALCIFEROL (VITAMIN D3) 50 MCG
1 TABLET ORAL DAILY
Qty: 100 TABLET | Refills: 4 | Status: SHIPPED | OUTPATIENT
Start: 2025-02-24

## 2025-02-24 RX ORDER — UBIDECARENONE 75 MG
100 CAPSULE ORAL DAILY
Qty: 100 TABLET | Refills: 4 | Status: SHIPPED | OUTPATIENT
Start: 2025-02-24

## 2025-02-24 RX ORDER — LISINOPRIL 10 MG/1
10 TABLET ORAL DAILY
Qty: 90 TABLET | Refills: 2 | Status: SHIPPED | OUTPATIENT
Start: 2025-02-24 | End: 2025-02-24

## 2025-02-24 RX ORDER — FERROUS SULFATE 325(65) MG
325 TABLET ORAL EVERY OTHER DAY
Qty: 100 TABLET | Refills: 3 | Status: SHIPPED | OUTPATIENT
Start: 2025-02-24

## 2025-02-24 RX ORDER — PANTOPRAZOLE SODIUM 20 MG/1
TABLET, DELAYED RELEASE ORAL
Qty: 90 TABLET | Refills: 4 | Status: SHIPPED | OUTPATIENT
Start: 2025-02-24

## 2025-02-24 RX ORDER — CALCIUM CARBONATE 500(1250)
2 TABLET ORAL DAILY
Qty: 200 TABLET | Refills: 4 | Status: SHIPPED | OUTPATIENT
Start: 2025-02-24

## 2025-02-24 SDOH — HEALTH STABILITY: PHYSICAL HEALTH: ON AVERAGE, HOW MANY DAYS PER WEEK DO YOU ENGAGE IN MODERATE TO STRENUOUS EXERCISE (LIKE A BRISK WALK)?: 5 DAYS

## 2025-02-24 ASSESSMENT — ANXIETY QUESTIONNAIRES
2. NOT BEING ABLE TO STOP OR CONTROL WORRYING: SEVERAL DAYS
IF YOU CHECKED OFF ANY PROBLEMS ON THIS QUESTIONNAIRE, HOW DIFFICULT HAVE THESE PROBLEMS MADE IT FOR YOU TO DO YOUR WORK, TAKE CARE OF THINGS AT HOME, OR GET ALONG WITH OTHER PEOPLE: NOT DIFFICULT AT ALL
4. TROUBLE RELAXING: SEVERAL DAYS
8. IF YOU CHECKED OFF ANY PROBLEMS, HOW DIFFICULT HAVE THESE MADE IT FOR YOU TO DO YOUR WORK, TAKE CARE OF THINGS AT HOME, OR GET ALONG WITH OTHER PEOPLE?: NOT DIFFICULT AT ALL
7. FEELING AFRAID AS IF SOMETHING AWFUL MIGHT HAPPEN: NOT AT ALL
6. BECOMING EASILY ANNOYED OR IRRITABLE: SEVERAL DAYS
3. WORRYING TOO MUCH ABOUT DIFFERENT THINGS: SEVERAL DAYS
1. FEELING NERVOUS, ANXIOUS, OR ON EDGE: SEVERAL DAYS
GAD7 TOTAL SCORE: 5
GAD7 TOTAL SCORE: 5
7. FEELING AFRAID AS IF SOMETHING AWFUL MIGHT HAPPEN: NOT AT ALL
5. BEING SO RESTLESS THAT IT IS HARD TO SIT STILL: NOT AT ALL
GAD7 TOTAL SCORE: 5

## 2025-02-24 ASSESSMENT — SOCIAL DETERMINANTS OF HEALTH (SDOH): HOW OFTEN DO YOU GET TOGETHER WITH FRIENDS OR RELATIVES?: MORE THAN THREE TIMES A WEEK

## 2025-02-24 ASSESSMENT — PATIENT HEALTH QUESTIONNAIRE - PHQ9
10. IF YOU CHECKED OFF ANY PROBLEMS, HOW DIFFICULT HAVE THESE PROBLEMS MADE IT FOR YOU TO DO YOUR WORK, TAKE CARE OF THINGS AT HOME, OR GET ALONG WITH OTHER PEOPLE: NOT DIFFICULT AT ALL
SUM OF ALL RESPONSES TO PHQ QUESTIONS 1-9: 7
SUM OF ALL RESPONSES TO PHQ QUESTIONS 1-9: 7

## 2025-02-24 ASSESSMENT — PAIN SCALES - GENERAL: PAINLEVEL_OUTOF10: MODERATE PAIN (6)

## 2025-02-24 NOTE — PROGRESS NOTES
Preventive Care Visit  [facility]  Dimple Nice MD, Family Medicine  Feb 24, 2025      Assessment & Plan     Routine general medical examination at a health care facility  Declined pap and mammo and colon cancer screening  Also declined for now lung cancer screening     Visit for screening mammogram  Declined     Cervical cancer screening  Declined     HTN, goal below 140/90  not under optimal control  Increase dose of lisinopril to 10 mg per day   Recheck in one month   - Lipid panel reflex to direct LDL Fasting    Screen for colon cancer  Declined     Severe recurrent major depressive disorder with psychotic features (H)  Under much better control  Continue     S/P gastric bypass    - REVIEW OF HEALTH MAINTENANCE PROTOCOL ORDERS  - Vitamin A  - Vitamin B12  - Parathyroid Hormone Intact  - Vitamin D Deficiency  - Hemoglobin A1c  - Comprehensive metabolic panel  - Iron & Iron Binding Capacity  - Ferritin  - CBC with platelets  - Folate    CARDIOVASCULAR SCREENING; LDL GOAL LESS THAN 160    - Lipid panel reflex to direct LDL Fasting    Elevated blood pressure reading without diagnosis of hypertension    - TSH with free T4 reflex    Hip pain, left  Times 6 months - wonder about DJD   - XR Hip Left 2-3 Views    Gastroesophageal reflux disease with esophagitis without hemorrhage  Under control  Refills per [facility]are    - pantoprazole (PROTONIX) 20 MG EC tablet  Dispense: 90 tablet; Refill: 4    Anxiety  Continue   - venlafaxine (EFFEXOR XR) 37.5 MG 24 hr capsule  Dispense: 90 capsule; Refill: 2      Patient has been advised of split billing requirements and indicates understanding: Yes        Nicotine/Tobacco Cessation  She reports that she has been smoking cigarettes. She started smoking about 52 years ago. She has a 52.4 pack-year smoking history. She has never used smokeless tobacco.  Nicotine/Tobacco Cessation Plan  Information offered: Patient not interested at this  time      Counseling  Appropriate preventive services were addressed with this patient via screening, questionnaire, or discussion as appropriate for fall prevention, nutrition, physical activity, Tobacco-use cessation, social engagement, weight loss and cognition.  Checklist reviewing preventive services available has been given to the patient.  Reviewed patient's diet, addressing concerns and/or questions.   The patient was instructed to see the dentist every 6 months.   She is at risk for psychosocial distress and has been provided with information to reduce risk.   The patient's PHQ-9 score is consistent with mild depression. She was provided with information regarding depression.       FUTURE APPOINTMENTS:       - Follow-up visit in 1 month for BP   Regular exercise  See Patient Instructions    Subjective   Antoinette is a 64 year old, presenting for the following:  Physical, Establish Care, Forms (Handicap placard), and Consult (Clogged ears)  She is also here for recheck on her blood pressure and she has left hip pain for about 6 months.   No injury that she can recall.   She is here to update her history as well.   She would like refills on her medication for her mood and her gastroesophageal reflux disease.           2/24/2025     1:17 PM   Additional Questions   Roomed by dmitry mejia   Accompanied by self          HPI  See above         Health Care Directive  Patient does not have a Health Care Directive: did not have time to address        2/24/2025   General Health   How would you rate your overall physical health? Good   Feel stress (tense, anxious, or unable to sleep) To some extent   (!) STRESS CONCERN      2/24/2025   Nutrition   Three or more servings of calcium each day? Yes   Diet: Regular (no restrictions)   How many servings of fruit and vegetables per day? (!) 2-3   How many sweetened beverages each day? 0-1         2/24/2025   Exercise   Days per week of moderate/strenous exercise 5 days          2/24/2025   Social Factors   Frequency of gathering with friends or relatives More than three times a week   Worry food won't last until get money to buy more No   Food not last or not have enough money for food? No   Do you have housing? (Housing is defined as stable permanent housing and does not include staying ouside in a car, in a tent, in an abandoned building, in an overnight shelter, or couch-surfing.) Yes   Are you worried about losing your housing? No   Lack of transportation? No   Unable to get utilities (heat,electricity)? No         2/24/2025   Fall Risk   Fallen 2 or more times in the past year? No   Trouble with walking or balance? Yes   Gait Speed Test (Document in seconds) 5.12   Gait Speed Test Interpretation Greater than 5.01 seconds - ABNORMAL          2/24/2025   Dental   Dentist two times every year? (!) NO          Today's PHQ-9 Score:       2/24/2025     1:18 PM   PHQ-9 SCORE   PHQ-9 Total Score MyChart 7 (Mild depression)   PHQ-9 Total Score 7        Patient-reported         2/24/2025   Substance Use   Alcohol more than 3/day or more than 7/wk Not Applicable   Do you use any other substances recreationally? (!) CANNABIS PRODUCTS     Social History     Tobacco Use    Smoking status: Every Day     Current packs/day: 1.00     Types: Cigarettes    Smokeless tobacco: Never    Tobacco comments:     quit 3/2018   Vaping Use    Vaping status: Never Used   Substance Use Topics    Alcohol use: No    Drug use: No          Mammogram Screening - Mammography discussed and declined        2/24/2025   STI Screening   New sexual partner(s) since last STI/HIV test? No     History of abnormal Pap smear: one abnormal years ago - declined         10/17/2013    12:00 AM   PAP / HPV   PAP (Historical) NIL      ASCVD Risk   The 10-year ASCVD risk score (Brendan FERRER, et al., 2019) is: 19.1%    Values used to calculate the score:      Age: 64 years      Sex: Female      Is Non- : No       Diabetic: No      Tobacco smoker: Yes      Systolic Blood Pressure: 180 mmHg      Is BP treated: Yes      HDL Cholesterol: 96 mg/dL      Total Cholesterol: 217 mg/dL           Reviewed and updated as needed this visit by Provider                    Labs reviewed in EPIC  BP Readings from Last 3 Encounters:   02/24/25 (!) 156/82   12/28/23 134/60   04/20/23 134/81    Wt Readings from Last 3 Encounters:   02/24/25 54.6 kg (120 lb 6.4 oz)   12/28/23 61.5 kg (135 lb 9.6 oz)   04/20/23 56.9 kg (125 lb 6.4 oz)                  Patient Active Problem List   Diagnosis    HTN (hypertension)    Anxiety    HTN, goal below 140/90    CARDIOVASCULAR SCREENING; LDL GOAL LESS THAN 160    Tobacco abuse    Obesity    S/P gastric bypass    Vitamin D deficiency disease    Nausea    Symptomatic cholelithiasis    Cholecystitis    Headache    Scotoma    Hiatal hernia    Abdominal pain    Ulcer    Nausea & vomiting    Bilateral otitis media    Tobacco use disorder    Contusion    Renal colic    Severe major depression (H)    Cerebral aneurysm, nonruptured    Panic attack    Severe recurrent major depressive disorder with psychotic features (H)    Primary osteoarthritis of right knee     Past Surgical History:   Procedure Laterality Date    DECOMPRESSION, FUSION CERVICAL ANTERIOR THREE+ LEVELS, COMBINED N/A 03/11/2018    Procedure: COMBINED DECOMPRESSION, FUSION CERVICAL ANTERIOR THREE+ LEVELS;   INTRA-OPERATIVE SPINAL CORD MONITIORING, ANTERIOR CERVICAL DECOMPRESSION AND FUSION C3-T1 ;  Surgeon: Maynor Daley MD;  Location:  OR    ENDOSCOPIC UPPER GASTROINTESTINAL, REMOVE SUTURE N/A 01/17/2015    Procedure: ENDOSCOPIC UPPER GASTROINTESTINAL, REMOVE SUTURE;  Surgeon: Parviz Martinez MD;  Location: [facility] OR    ESOPHAGOSCOPY, GASTROSCOPY, DUODENOSCOPY (EGD), COMBINED  02/18/2014    Procedure: COMBINED ESOPHAGOSCOPY, GASTROSCOPY, DUODENOSCOPY (EGD);  Esophagoscopy,Gastroscopy,Duodenoscopy;  Surgeon: Neo Sher MD;   Location:  GI    ESOPHAGOSCOPY, GASTROSCOPY, DUODENOSCOPY (EGD), COMBINED  05/07/2014    Procedure: COMBINED ESOPHAGOSCOPY, GASTROSCOPY, DUODENOSCOPY (EGD), BIOPSY SINGLE OR MULTIPLE;  Surgeon: Jose Antonio Valencia MD;  Location:  GI    GYN SURGERY      tubal pregnancy - left tube removed    LAPAROSCOPIC BYPASS GASTRIC  03/25/2013    Procedure: LAPAROSCOPIC BYPASS GASTRIC;  Laparoscopic Nawaf En Y Gastric Bypass. Hiatel hernia repair;  Surgeon: Parviz Martinez MD;  Location: [facility] OR    LAPAROSCOPIC CHOLECYSTECTOMY WITH CHOLANGIOGRAMS  03/28/2014    Procedure: LAPAROSCOPIC CHOLECYSTECTOMY WITH CHOLANGIOGRAMS;;  Surgeon: Jose Antonio Valencia MD;  Location: [facility] OR    LAPAROSCOPY DIAGNOSTIC (GENERAL)  03/28/2014    Procedure: LAPAROSCOPY DIAGNOSTIC (GENERAL);  Diagnostic Laparoscopy, laparoscopic cholecystectomy, EGD, repair of johnston defect;  Surgeon: Jose Antonio Valencia MD;  Location: [facility] OR    LAPAROSCOPY OPERATIVE ADULT N/A 01/17/2015    Procedure: LAPAROSCOPY OPERATIVE ADULT;  Surgeon: Parviz Martinez MD;  Location:  OR       Social History     Tobacco Use    Smoking status: Every Day     Current packs/day: 1.00     Average packs/day: 1 pack/day for 52.4 years (52.4 ttl pk-yrs)     Types: Cigarettes     Start date: 9/14/1972    Smokeless tobacco: Never    Tobacco comments:     quit 3/2018   Substance Use Topics    Alcohol use: No     Family History   Problem Relation Age of Onset    Respiratory Mother     Diabetes Mother     Arthritis Mother         OA    Neurologic Disorder Father     Cancer Maternal Grandfather     Arthritis Maternal Grandmother         RA    Breast Cancer No family hx of     Ovarian Cancer No family hx of          Current Outpatient Medications   Medication Sig Dispense Refill    acetaminophen (TYLENOL) 325 MG tablet Take 2 tablets (650 mg) by mouth every 4 hours as needed for mild pain 100 tablet     calcium carbonate (OS-BESSY) 500 MG tablet Take 2 tablets (1,000 mg) by mouth  "daily. 200 tablet 4    calcium gluconate 500 MG tablet Take 500 mg by mouth daily      cyanocobalamin (VITAMIN B-12) 100 MCG tablet Take 1 tablet (100 mcg) by mouth daily. 100 tablet 4    lisinopril (ZESTRIL) 10 MG tablet Take 1 tablet (10 mg) by mouth daily. TAKE 1/2 TABLET BY MOUTH EVERY DAY 90 tablet 2    multivitamin CF FORMULA (CHOICEFUL) chewable tablet Take 1 tablet by mouth 2 times daily      multivitamin w/minerals (MULTIVITAMINS W/MINERALS) tablet Take 2 tablets by mouth daily. 200 tablet 4    pantoprazole (PROTONIX) 20 MG EC tablet TAKE ONE TABLET BY MOUTH 30 TO 60 MINUTES BEFORE A MEAL EVERY DAY 90 tablet 4    venlafaxine (EFFEXOR XR) 37.5 MG 24 hr capsule Take 1 capsule (37.5 mg) by mouth daily. 90 capsule 2    vitamin (B COMPLEX-C) tablet Take 1 tablet by mouth daily      vitamin D3 (CHOLECALCIFEROL) 50 mcg (2000 units) tablet Take 1 tablet (50 mcg) by mouth daily. 100 tablet 4     Allergies   Allergen Reactions    Ampicillin-Sulbactam Sodium Anaphylaxis and Swelling    Keflex [Cephalexin] Anaphylaxis    Pristiq [Desvenlafaxine] Nausea and Visual Disturbance     Double vision    Azithromycin      Heart palpitations    Compazine [Prochlorperazine] Other (See Comments)     headaches    Zofran [Ondansetron] Other (See Comments)     headaches         Review of Systems  Constitutional, HEENT, cardiovascular, pulmonary, gi and gu systems are negative, except as otherwise noted.     Objective    Exam  BP (!) 180/99 (BP Location: Left arm, Patient Position: Chair, Cuff Size: Adult Small)   Pulse 68   Temp 98  F (36.7  C) (Temporal)   Resp 20   Ht 1.689 m (5' 6.5\")   Wt 54.6 kg (120 lb 6.4 oz)   LMP 03/01/2010   SpO2 98%   BMI 19.14 kg/m     Estimated body mass index is 19.14 kg/m  as calculated from the following:    Height as of this encounter: 1.689 m (5' 6.5\").    Weight as of this encounter: 54.6 kg (120 lb 6.4 oz).    Physical Exam  GENERAL: alert, no distress, and thin with limp on her left " leg  EYES: Eyes grossly normal to inspection, PERRL and conjunctivae and sclerae normal  HENT: ear canals and TM's normal, nose and mouth without ulcers or lesions  NECK: no adenopathy, no asymmetry, masses, or scars  RESP: lungs clear to auscultation - no rales, rhonchi or wheezes  BREAST: normal without masses, tenderness or nipple discharge and no palpable axillary masses or adenopathy  CV: regular rate and rhythm, normal S1 S2, no S3 or S4, no murmur, click or rub, no peripheral edema  ABDOMEN: soft, nontender, no hepatosplenomegaly, no masses and bowel sounds normal  MS: no gross musculoskeletal defects noted, no edema  SKIN: no suspicious lesions or rashes  NEURO: Normal strength and tone, mentation intact and speech normal  PSYCH: mentation appears normal, affect normal/bright  LYMPH: no cervical, supraclavicular, axillary, or inguinal adenopathy        Signed Electronically by: Dimple Nice MD    Answers submitted by the patient for this visit:  Patient Health Questionnaire (Submitted on 2/24/2025)  If you checked off any problems, how difficult have these problems made it for you to do your work, take care of things at home, or get along with other people?: Not difficult at all  PHQ9 TOTAL SCORE: 7  Patient Health Questionnaire (G7) (Submitted on 2/24/2025)  RAFAL 7 TOTAL SCORE: 5

## 2025-02-24 NOTE — PATIENT INSTRUCTIONS
Patient Education   Preventive Care Advice   This is general advice given by our system to help you stay healthy. However, your care team may have specific advice just for you. Please talk to your care team about your preventive care needs.  Nutrition  Eat 5 or more servings of fruits and vegetables each day.  Try wheat bread, brown rice and whole grain pasta (instead of white bread, rice, and pasta).  Get enough calcium and vitamin D. Check the label on foods and aim for 100% of the RDA (recommended daily allowance).  Lifestyle  Exercise at least 150 minutes each week  (30 minutes a day, 5 days a week).  Do muscle strengthening activities 2 days a week. These help control your weight and prevent disease.  No smoking.  Wear sunscreen to prevent skin cancer.  Have a dental exam and cleaning every 6 months.  Yearly exams  See your health care team every year to talk about:  Any changes in your health.  Any medicines your care team has prescribed.  Preventive care, family planning, and ways to prevent chronic diseases.  Shots (vaccines)   HPV shots (up to age 26), if you've never had them before.  Hepatitis B shots (up to age 59), if you've never had them before.  COVID-19 shot: Get this shot when it's due.  Flu shot: Get a flu shot every year.  Tetanus shot: Get a tetanus shot every 10 years.  Pneumococcal, hepatitis A, and RSV shots: Ask your care team if you need these based on your risk.  Shingles shot (for age 50 and up)  General health tests  Diabetes screening:  Starting at age 35, Get screened for diabetes at least every 3 years.  If you are younger than age 35, ask your care team if you should be screened for diabetes.  Cholesterol test: At age 39, start having a cholesterol test every 5 years, or more often if advised.  Bone density scan (DEXA): At age 50, ask your care team if you should have this scan for osteoporosis (brittle bones).  Hepatitis C: Get tested at least once in your life.  STIs (sexually  transmitted infections)  Before age 24: Ask your care team if you should be screened for STIs.  After age 24: Get screened for STIs if you're at risk. You are at risk for STIs (including HIV) if:  You are sexually active with more than one person.  You don't use condoms every time.  You or a partner was diagnosed with a sexually transmitted infection.  If you are at risk for HIV, ask about PrEP medicine to prevent HIV.  Get tested for HIV at least once in your life, whether you are at risk for HIV or not.  Cancer screening tests  Cervical cancer screening: If you have a cervix, begin getting regular cervical cancer screening tests starting at age 21.  Breast cancer scan (mammogram): If you've ever had breasts, begin having regular mammograms starting at age 40. This is a scan to check for breast cancer.  Colon cancer screening: It is important to start screening for colon cancer at age 45.  Have a colonoscopy test every 10 years (or more often if you're at risk) Or, ask your provider about stool tests like a FIT test every year or Cologuard test every 3 years.  To learn more about your testing options, visit:   .  For help making a decision, visit:   https://bit.ly/dp16508.  Prostate cancer screening test: If you have a prostate, ask your care team if a prostate cancer screening test (PSA) at age 55 is right for you.  Lung cancer screening: If you are a current or former smoker ages 50 to 80, ask your care team if ongoing lung cancer screenings are right for you.  For informational purposes only. Not to replace the advice of your health care provider. Copyright   2023 [facility]. All rights reserved. Clinically reviewed by the [facility]. [employer] 912344 - REV 01/24.  Preventing Falls: Care Instructions  Injuries and health problems such as trouble walking or poor eyesight can increase your risk of falling. So can some medicines. But there are things you can do to help  "prevent falls. You can exercise to get stronger. You can also arrange your home to make it safer.    Talk to your doctor about the medicines you take. Ask if any of them increase the risk of falls and whether they can be changed or stopped.   Try to exercise regularly. It can help improve your strength and balance. This can help lower your risk of falling.         Practice fall safety and prevention.   Wear low-heeled shoes that fit well and give your feet good support. Talk to your doctor if you have foot problems that make this hard.  Carry a cellphone or wear a medical alert device that you can use to call for help.  Use stepladders instead of chairs to reach high objects. Don't climb if you're at risk for falls. Ask for help, if needed.  Wear the correct eyeglasses, if you need them.        Make your home safer.   Remove rugs, cords, clutter, and furniture from walkways.  Keep your house well lit. Use night-lights in hallways and bathrooms.  Install and use sturdy handrails on stairways.  Wear nonskid footwear, even inside. Don't walk barefoot or in socks without shoes.        Be safe outside.   Use handrails, curb cuts, and ramps whenever possible.  Keep your hands free by using a shoulder bag or backpack.  Try to walk in well-lit areas. Watch out for uneven ground, changes in pavement, and debris.  Be careful in the winter. Walk on the grass or gravel when sidewalks are slippery. Use de-icer on steps and walkways. Add non-slip devices to shoes.    Put grab bars and nonskid mats in your shower or tub and near the toilet. Try to use a shower chair or bath bench when bathing.   Get into a tub or shower by putting in your weaker leg first. Get out with your strong side first. Have a phone or medical alert device in the bathroom with you.   Where can you learn more?  Go to https://www.[employer].net/patiented  Enter G117 in the search box to learn more about \"Preventing Falls: Care Instructions.\"  Current as of: " July 31, 2024  Content Version: 14.3    2024 [employer]   Care instructions adapted under license by your healthcare professional. If you have questions about a medical condition or this instruction, always ask your healthcare professional. [employer] disclaims any warranty or liability for your use of this information.    Learning About Stress  What is stress?     Stress is your body's response to a hard situation. Your body can have a physical, emotional, or mental response. Stress is a fact of life for most people, and it affects everyone differently. What causes stress for you may not be stressful for someone else.  A lot of things can cause stress. You may feel stress when you go on a job interview, take a test, or run a race. This kind of short-term stress is normal and even useful. It can help you if you need to work hard or react quickly. For example, stress can help you finish an important job on time.  Long-term stress is caused by ongoing stressful situations or events. Examples of long-term stress include long-term health problems, ongoing problems at work, or conflicts in your family. Long-term stress can harm your health.  How does stress affect your health?  When you are stressed, your body responds as though you are in danger. It makes hormones that speed up your heart, make you breathe faster, and give you a burst of energy. This is called the fight-or-flight stress response. If the stress is over quickly, your body goes back to normal and no harm is done.  But if stress happens too often or lasts too long, it can have bad effects. Long-term stress can make you more likely to get sick, and it can make symptoms of some diseases worse. If you tense up when you are stressed, you may develop neck, shoulder, or low back pain. Stress is linked to high blood pressure and heart disease.  Stress also harms your emotional health. It can make you harmon, tense, or depressed. Your  relationships may suffer, and you may not do well at work or school.  What can you do to manage stress?  You can try these things to help manage stress:   Do something active. Exercise or activity can help reduce stress. Walking is a great way to get started. Even everyday activities such as housecleaning or yard work can help.  Try yoga or rolly chi. These techniques combine exercise and meditation. You may need some training at first to learn them.  Do something you enjoy. For example, listen to music or go to a movie. Practice your hobby or do volunteer work.  Meditate. This can help you relax, because you are not worrying about what happened before or what may happen in the future.  Do guided imagery. Imagine yourself in any setting that helps you feel calm. You can use online videos, books, or a teacher to guide you.  Do breathing exercises. For example:  From a standing position, bend forward from the waist with your knees slightly bent. Let your arms dangle close to the floor.  Breathe in slowly and deeply as you return to a standing position. Roll up slowly and lift your head last.  Hold your breath for just a few seconds in the standing position.  Breathe out slowly and bend forward from the waist.  Let your feelings out. Talk, laugh, cry, and express anger when you need to. Talking with supportive friends or family, a counselor, or a marcela leader about your feelings is a healthy way to relieve stress. Avoid discussing your feelings with people who make you feel worse.  Write. It may help to write about things that are bothering you. This helps you find out how much stress you feel and what is causing it. When you know this, you can find better ways to cope.  What can you do to prevent stress?  You might try some of these things to help prevent stress:  Manage your time. This helps you find time to do the things you want and need to do.  Get enough sleep. Your body recovers from the stresses of the day while  "you are sleeping.  Get support. Your family, friends, and community can make a difference in how you experience stress.  Limit your news feed. Avoid or limit time on social media or news that may make you feel stressed.  Do something active. Exercise or activity can help reduce stress. Walking is a great way to get started.  Where can you learn more?  Go to https://www.[employer].net/patiented  Enter N032 in the search box to learn more about \"Learning About Stress.\"  Current as of: October 24, 2023  Content Version: 14.3    2024 [employer].   Care instructions adapted under license by your healthcare professional. If you have questions about a medical condition or this instruction, always ask your healthcare professional. [employer] disclaims any warranty or liability for your use of this information.    Learning About Depression Screening  What is depression screening?  Depression screening is a way to see if you have depression symptoms. It may be done by a doctor or counselor. It's often part of a routine checkup. That's because your mental health is just as important as your physical health.  Depression is a mental health condition that affects how you feel, think, and act. You may:  Have less energy.  Lose interest in your daily activities.  Feel sad and grouchy for a long time.  Depression is very common. It affects people of all ages.  Many things can lead to depression. Some people become depressed after they have a stroke or find out they have a major illness like cancer or heart disease. The death of a loved one or a breakup may lead to depression. It can run in families. Most experts believe that a combination of inherited genes and stressful life events can cause it.  What happens during screening?  You may be asked to fill out a form about your depression symptoms. You and the doctor will discuss your answers. The doctor may ask you more questions to learn more about how you " "think, act, and feel.  What happens after screening?  If you have symptoms of depression, your doctor will talk to you about your options.  Doctors usually treat depression with medicines or counseling. Often, combining the two works best. Many people don't get help because they think that they'll get over the depression on their own. But people with depression may not get better unless they get treatment.  The cause of depression is not well understood. There may be many factors involved. But if you have depression, it's not your fault.  A serious symptom of depression is thinking about death or suicide. If you or someone you care about talks about this or about feeling hopeless, get help right away.  It's important to know that depression can be treated. Medicine, counseling, and self-care may help.  Where can you learn more?  Go to https://www.[employer]YesVideo/patiented  Enter T185 in the search box to learn more about \"Learning About Depression Screening.\"  Current as of: July 31, 2024  Content Version: 14.3    2024 [employer].   Care instructions adapted under license by your healthcare professional. If you have questions about a medical condition or this instruction, always ask your healthcare professional. [employer] disclaims any warranty or liability for your use of this information.    Substance Use Disorder: Care Instructions  Overview     You can improve your life and health by stopping your use of alcohol or drugs. When you don't drink or use drugs, you may feel and sleep better. You may get along better with your family, friends, and coworkers. There are medicines and programs that can help with substance use disorder.  How can you care for yourself at home?  Here are some ways to help you stay sober and prevent relapse.  If you have been given medicine to help keep you sober or reduce your cravings, be sure to take it exactly as prescribed.  Talk to your doctor about programs " that can help you stop using drugs or drinking alcohol.  Do not keep alcohol or drugs in your home.  Plan ahead. Think about what you'll say if other people ask you to drink or use drugs. Try not to spend time with people who drink or use drugs.  Use the time and money spent on drinking or drugs to do something that's important to you.  Preventing a relapse  Have a plan to deal with relapse. Learn to recognize changes in your thinking that lead you to drink or use drugs. Get help before you start to drink or use drugs again.  Try to stay away from situations, friends, or places that may lead you to drink or use drugs.  If you feel the need to drink alcohol or use drugs again, seek help right away. Call a trusted friend or family member. Some people get support from organizations such as Narcotics Anonymous or [employer] or from treatment facilities.  If you relapse, get help as soon as you can. Some people make a plan with another person that outlines what they want that person to do for them if they relapse. The plan usually includes how to handle the relapse and who to notify in case of relapse.  Don't give up. Remember that a relapse doesn't mean that you have failed. Use the experience to learn the triggers that lead you to drink or use drugs. Then quit again. Recovery is a lifelong process. Many people have several relapses before they are able to quit for good.  Follow-up care is a key part of your treatment and safety. Be sure to make and go to all appointments, and call your doctor if you are having problems. It's also a good idea to know your test results and keep a list of the medicines you take.  When should you call for help?   Call 911  anytime you think you may need emergency care. For example, call if you or someone else:    Has overdosed or has withdrawal signs. Be sure to tell the emergency workers that you are or someone else is using or trying to quit using drugs. Overdose or withdrawal signs  "may include:  Losing consciousness.  Seizure.  Seeing or hearing things that aren't there (hallucinations).     Is thinking or talking about suicide or harming others.   Where to get help 24 hours a day, 7 days a week   If you or someone you know talks about suicide, self-harm, a mental health crisis, a substance use crisis, or any other kind of emotional distress, get help right away. You can:    Call the Suicide and Crisis Lifeline at 058.     Call 5-687-832-TALK (1-265.331.9822).     Text HOME to 190851 to access the Crisis Text Line.   Consider saving these numbers in your phone.  Go to [employer] for more information or to chat online.  Call your doctor now or seek immediate medical care if:    You are having withdrawal symptoms. These may include nausea or vomiting, sweating, shakiness, and anxiety.   Watch closely for changes in your health, and be sure to contact your doctor if:    You have a relapse.     You need more help or support to stop.   Where can you learn more?  Go to https://www.[employer].net/patiented  Enter H573 in the search box to learn more about \"Substance Use Disorder: Care Instructions.\"  Current as of: November 15, 2023  Content Version: 14.3    2024 [employer].   Care instructions adapted under license by your healthcare professional. If you have questions about a medical condition or this instruction, always ask your healthcare professional. [employer] disclaims any warranty or liability for your use of this information.       "

## 2025-02-25 LAB
ALBUMIN SERPL BCG-MCNC: 3.9 G/DL (ref 3.5–5.2)
ALP SERPL-CCNC: 112 U/L (ref 40–150)
ALT SERPL W P-5'-P-CCNC: 10 U/L (ref 0–50)
ANION GAP SERPL CALCULATED.3IONS-SCNC: 9 MMOL/L (ref 7–15)
AST SERPL W P-5'-P-CCNC: 23 U/L (ref 0–45)
BILIRUB SERPL-MCNC: 0.2 MG/DL
BUN SERPL-MCNC: 14.5 MG/DL (ref 8–23)
CALCIUM SERPL-MCNC: 8.8 MG/DL (ref 8.8–10.4)
CHLORIDE SERPL-SCNC: 109 MMOL/L (ref 98–107)
CHOLEST SERPL-MCNC: 183 MG/DL
CREAT SERPL-MCNC: 0.6 MG/DL (ref 0.51–0.95)
EGFRCR SERPLBLD CKD-EPI 2021: >90 ML/MIN/1.73M2
FASTING STATUS PATIENT QL REPORTED: ABNORMAL
FASTING STATUS PATIENT QL REPORTED: NORMAL
FERRITIN SERPL-MCNC: 10 NG/ML (ref 11–328)
GLUCOSE SERPL-MCNC: 87 MG/DL (ref 70–99)
HCO3 SERPL-SCNC: 24 MMOL/L (ref 22–29)
HDLC SERPL-MCNC: 85 MG/DL
IRON BINDING CAPACITY (ROCHE): 327 UG/DL (ref 240–430)
IRON SATN MFR SERPL: 3 % (ref 15–46)
IRON SERPL-MCNC: 11 UG/DL (ref 37–145)
LDLC SERPL CALC-MCNC: 85 MG/DL
NONHDLC SERPL-MCNC: 98 MG/DL
POTASSIUM SERPL-SCNC: 4.9 MMOL/L (ref 3.4–5.3)
PROT SERPL-MCNC: 6.1 G/DL (ref 6.4–8.3)
PTH-INTACT SERPL-MCNC: 56 PG/ML (ref 15–65)
SODIUM SERPL-SCNC: 142 MMOL/L (ref 135–145)
TRIGL SERPL-MCNC: 64 MG/DL
TSH SERPL DL<=0.005 MIU/L-ACNC: 1.71 UIU/ML (ref 0.3–4.2)
VIT B12 SERPL-MCNC: 457 PG/ML (ref 232–1245)
VIT D+METAB SERPL-MCNC: 31 NG/ML (ref 20–50)

## 2025-02-27 LAB
ANNOTATION COMMENT IMP: NORMAL
RETINYL PALMITATE SERPL-MCNC: <0.02 MG/L
VIT A SERPL-MCNC: 0.36 MG/L

## 2025-03-17 ENCOUNTER — TELEPHONE (OUTPATIENT)
Dept: FAMILY MEDICINE | Facility: CLINIC | Age: 65
End: 2025-03-17

## 2025-03-17 NOTE — TELEPHONE ENCOUNTER
Attempt x 1 to reach patient to inquire about why she is asking for an colonoscopy. My chart sent in regards to E-Visit needed for the referrals.  Informed patient she may need to do two E-visits for the two different referrals.    See my chart message    Replied via Chester Ramírez RN BSN  Clinic Nurse  [facility]

## 2025-03-17 NOTE — TELEPHONE ENCOUNTER
Order/Referral Request    Who is requesting: PT    Orders being requested: ENT for her ear/// can't hear out of it and orthopedics for left hip /// and colonoscopy     Reason service is needed/diagnosis: Having pain in left hip and can't hear out  her ear     When are orders needed by: asap     Has this been discussed with Provider: Yes she talked about with luci     Does patient have a preference on a Group/Provider/Facility? [facility]     Does patient have an appointment scheduled?: No Please call her once orders are placed     Where to send orders: Place orders within Epic    Could we send this information to you in [place] or would you prefer to receive a phone call?:   No preference /// both   Okay to leave a detailed message?: Yes at Cell number on file:    Telephone Information:   Mobile [phone]   Mobile [phone]

## 2025-03-18 NOTE — TELEPHONE ENCOUNTER
Called patient regarding below request of wanting a colonoscopy.  Patient will call colo-guard to get new box for screening for colon cancer as she threw out the first box.  Now does not want to do the colonoscopy due to it being an invasive procedure and was not aware of that but will do the colo guard.    Pt voiced understanding and agreeable to plan.  Will call the clinic if any other questions or concerns arise.      Karol Ramírez RN BSN  Clinic Nurse  [facility]

## 2025-03-25 ENCOUNTER — ALLIED HEALTH/NURSE VISIT (OUTPATIENT)
Dept: FAMILY MEDICINE | Facility: CLINIC | Age: 65
End: 2025-03-25
Payer: COMMERCIAL

## 2025-03-25 VITALS — OXYGEN SATURATION: 98 % | HEART RATE: 82 BPM | SYSTOLIC BLOOD PRESSURE: 132 MMHG | DIASTOLIC BLOOD PRESSURE: 78 MMHG

## 2025-03-25 DIAGNOSIS — Z01.30 BP CHECK: Primary | ICD-10-CM

## 2025-03-25 DIAGNOSIS — H61.23 IMPACTED CERUMEN OF BOTH EARS: ICD-10-CM

## 2025-03-25 DIAGNOSIS — H90.3 ASYMMETRICAL SENSORINEURAL HEARING LOSS: ICD-10-CM

## 2025-03-25 PROCEDURE — 99207 PR NO CHARGE NURSE ONLY: CPT

## 2025-03-25 PROCEDURE — 3078F DIAST BP <80 MM HG: CPT

## 2025-03-25 PROCEDURE — 3075F SYST BP GE 130 - 139MM HG: CPT

## 2025-03-25 NOTE — PROGRESS NOTES
Antoinette Romero is a 64 year old patient who comes in today for a Blood Pressure check.  Initial BP:  /78 (BP Location: Left arm, Patient Position: Sitting, Cuff Size: Adult Regular)   Pulse 82   LMP 03/01/2010   SpO2 98%      82  Disposition: provider notified while patient in the clinic and results routed to provider.  Referral placed by Dr Nice to ENT and patient shall be called to have this appointment scheduled.  Fany Berumen CMA on 3/25/2025 at 11:22 AM

## 2025-04-15 ENCOUNTER — OFFICE VISIT (OUTPATIENT)
Dept: FAMILY MEDICINE | Facility: CLINIC | Age: 65
End: 2025-04-15
Payer: COMMERCIAL

## 2025-04-15 VITALS
BODY MASS INDEX: 19.69 KG/M2 | HEART RATE: 68 BPM | WEIGHT: 122.5 LBS | OXYGEN SATURATION: 98 % | DIASTOLIC BLOOD PRESSURE: 78 MMHG | TEMPERATURE: 98.2 F | HEIGHT: 66 IN | SYSTOLIC BLOOD PRESSURE: 166 MMHG | RESPIRATION RATE: 16 BRPM

## 2025-04-15 DIAGNOSIS — I10 HTN, GOAL BELOW 140/90: ICD-10-CM

## 2025-04-15 DIAGNOSIS — M85.80 OSTEOPENIA, UNSPECIFIED LOCATION: ICD-10-CM

## 2025-04-15 DIAGNOSIS — M25.552 HIP PAIN, LEFT: Primary | ICD-10-CM

## 2025-04-15 DIAGNOSIS — G89.29 CHRONIC PAIN OF RIGHT KNEE: ICD-10-CM

## 2025-04-15 DIAGNOSIS — M25.561 CHRONIC PAIN OF RIGHT KNEE: ICD-10-CM

## 2025-04-15 PROCEDURE — 3077F SYST BP >= 140 MM HG: CPT

## 2025-04-15 PROCEDURE — 3078F DIAST BP <80 MM HG: CPT

## 2025-04-15 PROCEDURE — 99213 OFFICE O/P EST LOW 20 MIN: CPT

## 2025-04-15 NOTE — PROGRESS NOTES
"  Assessment & Plan     Hip pain, left  Chronic pain of right knee  Suspect OA; pt has had injections in the past and requesting ortho referral - placed  - Orthopedic  Referral; Future    Osteopenia, unspecified location  Visualized on previous xrays but has never had DEXA scan. Recommend scheduling DEXA and continuing on calcium and vitamin D  - DX Bone Density; Future    HTN, goal below 140/90  Recommend she monitor BP at home 2-3 times a week for the next few weeks. If BP consistently >130/80 recommend she reach out to PCP for medication adjustment.                Subjective   Antoinette is a 64 year old, presenting for the following health issues:  Referral (Ortho for L hip and rt knee)        4/15/2025    11:31 AM   Additional Questions   Roomed by Elaine LAND     History of Present Illness       Reason for visit:  Referal   She is taking medications regularly.    Pain History:  When did you first notice your pain? L hip x few years, noticed in the hip has been hearing \"clicking\" while walking  Have you seen anyone else for your pain? No  How has your pain affected your ability to work? Not currently working - unrelated to pain  Where in your body do you have pain?   Musculoskeletal problem/pain  Onset/Duration: years  Description  Location: hip - left, and rt knee  Joint Swelling: Not in the hip, at times swelling in the area of the hip, Rt knee yes always swollen  Redness: No  Pain: YES  Warmth: YES in the hip and knee  Intensity:  moderate  Progression of Symptoms:  worsening  Accompanying signs and symptoms:   Fevers: No  Numbness/tingling/weakness: No  History  Trauma to the area: No  Recent illness:  No  Previous similar problem: YES  Previous evaluation:  YES- yrs ago went to ortho, maybe about 4 yrs ago mentioned arthritis   Precipitating or alleviating factors:  Aggravating factors include: sitting, walking, and overuse  Therapies tried and outcome: acetaminophen    - Right Hip Pain: Antoinette piotr Meraz" "64-year-old female, reports right hip pain for more than five years, which has been worsening over time. She previously received cortisone shots that provided temporary relief but discontinued them due to ineffectiveness. She experiences clicking and popping in the hip, with pain during standing and walking.    - Left Hip Pain: She also reports pain in the left hip, which is sometimes more severe than the knee pain. The pain is associated with clicking and popping sounds.               Objective    BP (!) 166/78 (BP Location: Right arm, Patient Position: Sitting, Cuff Size: Adult Regular)   Pulse 68   Temp 98.2  F (36.8  C) (Oral)   Resp 16   Ht 1.676 m (5' 6\")   Wt 55.6 kg (122 lb 8 oz)   LMP 03/01/2010   SpO2 98%   BMI 19.77 kg/m    Body mass index is 19.77 kg/m .  Physical Exam   GENERAL: alert and no distress  RESP: lungs clear to auscultation - no rales, rhonchi or wheezes  CV: regular rate and rhythm, normal S1 S2, no S3 or S4, no murmur, click or rub, no peripheral edema  PSYCH: mentation appears normal, affect normal/bright            Signed Electronically by: MARILIN Mary CNP    "

## 2025-07-10 ENCOUNTER — TELEPHONE (OUTPATIENT)
Dept: FAMILY MEDICINE | Facility: CLINIC | Age: 65
End: 2025-07-10
Payer: COMMERCIAL

## 2025-07-10 NOTE — TELEPHONE ENCOUNTER
Patient Quality Outreach    Patient is due for the following:   Colon Cancer Screening  Breast Cancer Screening - Mammogram    Action(s) Taken:   Schedule a office visit for mammogram    Type of outreach:    Sent Coin message.    Questions for provider review:    None         Sowmya Mckay, [facility]  Chart routed to None.

## (undated) DEVICE — PAD POSITIONING KIT CHEST SHEARGUARD DISP LF 5844-13

## (undated) DEVICE — GLOVE PROTEXIS BLUE W/NEU-THERA 8.5  2D73EB85

## (undated) DEVICE — TUBING SUCTION SOFT 20'X3/16" 0036570

## (undated) DEVICE — DRSG KERLIX FLUFFS X5

## (undated) DEVICE — GLOVE PROTEXIS W/NEU-THERA 7.5  2D73TE75

## (undated) DEVICE — SU VICRYL 3-0 SH CR 8X18" J774

## (undated) DEVICE — SUCTION FRAZIER 12FR W/HANDLE K73

## (undated) DEVICE — SUBDERMAL NEEDLES

## (undated) DEVICE — MANIFOLD NEPTUNE 4 PORT 700-20

## (undated) DEVICE — Device

## (undated) DEVICE — DRAPE SHEET REV FOLD 3/4 9349

## (undated) DEVICE — COVER TABLE POLY 65X90" 8186

## (undated) DEVICE — PREP DURAPREP 06ML APL 8635

## (undated) DEVICE — ESU GROUND PAD UNIVERSAL W/O CORD

## (undated) DEVICE — ESU ELEC BLADE 2.75" COATED/INSULATED E1455

## (undated) DEVICE — SPONGE RAY-TEC 4X8" 7318

## (undated) DEVICE — BLADE KNIFE SURG 11 371111

## (undated) DEVICE — DRAPE MAYO STAND 23X54 8337

## (undated) DEVICE — GLOVE PROTEXIS W/NEU-THERA 8.5  2D73TE85

## (undated) DEVICE — LINEN TOWEL PACK X5 5464

## (undated) DEVICE — SU VICRYL 2-0 CT-2 CR 8X18" J726D

## (undated) DEVICE — DRAPE MICROSOPE ZEISS 52X150" 6120

## (undated) DEVICE — SPONGE KITTNER 31001010

## (undated) DEVICE — DRAIN JACKSON PRATT CHANNEL 10FR RND SIL W/TROCAR JP-2227

## (undated) DEVICE — ESU CLEANER TIP 31142717

## (undated) DEVICE — MIDAS REX DISSECTING TOOL  14MH30

## (undated) DEVICE — SPONGE SURGIFOAM 100 1974

## (undated) DEVICE — GOWN XXLG REINFORCED 9071EL

## (undated) DEVICE — DRAPE COVER C-ARM SEAMLESS SNAP-KAP 03-KP26 LATEX FREE

## (undated) DEVICE — CATH TRAY FOLEY SURESTEP 16FR WDRAIN BAG STLK LATEX A300316A

## (undated) DEVICE — CUSHION INSERT LG PRONE VIEW JACKSON TABLE

## (undated) DEVICE — NDL SPINAL 18GA 3.5" 405184

## (undated) DEVICE — PACK SPINE SM CUSTOM SNE15SSFSK

## (undated) DEVICE — PAD POSITIONING KIT HIP DISP 5844-17

## (undated) DEVICE — TWISTED PAIR NEEDLE ELECTRODES

## (undated) DEVICE — RX SURGIFLO HEMOSTATIC MATRIX W/THROMBIN 8ML 2994

## (undated) DEVICE — WIPES FOLEY CARE SURESTEP PROVON DFC100

## (undated) DEVICE — DRAIN JACKSON PRATT RESERVOIR 100ML SU130-1305

## (undated) RX ORDER — REMIFENTANIL HYDROCHLORIDE 1 MG/ML
INJECTION, POWDER, LYOPHILIZED, FOR SOLUTION INTRAVENOUS
Status: DISPENSED
Start: 2018-03-11

## (undated) RX ORDER — CLINDAMYCIN PHOSPHATE 900 MG/50ML
INJECTION, SOLUTION INTRAVENOUS
Status: DISPENSED
Start: 2018-03-11

## (undated) RX ORDER — BUPIVACAINE HYDROCHLORIDE AND EPINEPHRINE 5; 5 MG/ML; UG/ML
INJECTION, SOLUTION EPIDURAL; INTRACAUDAL; PERINEURAL
Status: DISPENSED
Start: 2018-03-11

## (undated) RX ORDER — HYDROMORPHONE HYDROCHLORIDE 1 MG/ML
INJECTION, SOLUTION INTRAMUSCULAR; INTRAVENOUS; SUBCUTANEOUS
Status: DISPENSED
Start: 2018-03-11

## (undated) RX ORDER — PROPOFOL 10 MG/ML
INJECTION, EMULSION INTRAVENOUS
Status: DISPENSED
Start: 2018-03-11

## (undated) RX ORDER — FENTANYL CITRATE 50 UG/ML
INJECTION, SOLUTION INTRAMUSCULAR; INTRAVENOUS
Status: DISPENSED
Start: 2018-03-11

## (undated) RX ORDER — DEXAMETHASONE SODIUM PHOSPHATE 4 MG/ML
INJECTION, SOLUTION INTRA-ARTICULAR; INTRALESIONAL; INTRAMUSCULAR; INTRAVENOUS; SOFT TISSUE
Status: DISPENSED
Start: 2018-03-11

## (undated) RX ORDER — LIDOCAINE HYDROCHLORIDE 20 MG/ML
INJECTION, SOLUTION EPIDURAL; INFILTRATION; INTRACAUDAL; PERINEURAL
Status: DISPENSED
Start: 2018-03-11

## (undated) RX ORDER — KETAMINE HYDROCHLORIDE 10 MG/ML
INJECTION INTRAMUSCULAR; INTRAVENOUS
Status: DISPENSED
Start: 2018-03-11